# Patient Record
Sex: MALE | Race: WHITE | Employment: OTHER | URBAN - NONMETROPOLITAN AREA
[De-identification: names, ages, dates, MRNs, and addresses within clinical notes are randomized per-mention and may not be internally consistent; named-entity substitution may affect disease eponyms.]

---

## 2016-12-28 PROCEDURE — 93306 TTE W/DOPPLER COMPLETE: CPT | Mod: 26 | Performed by: INTERNAL MEDICINE

## 2017-01-01 ENCOUNTER — HISTORY (OUTPATIENT)
Dept: SURGERY | Facility: OTHER | Age: 82
End: 2017-01-01

## 2017-01-01 ENCOUNTER — AMBULATORY - GICH (OUTPATIENT)
Dept: SURGERY | Facility: OTHER | Age: 82
End: 2017-01-01

## 2017-01-01 ENCOUNTER — MEDICAL CORRESPONDENCE (OUTPATIENT)
Facility: CLINIC | Age: 82
End: 2017-01-01
Payer: MEDICARE

## 2017-01-01 ENCOUNTER — OFFICE VISIT - GICH (OUTPATIENT)
Dept: SURGERY | Facility: OTHER | Age: 82
End: 2017-01-01

## 2017-01-01 DIAGNOSIS — Z98.890 OTHER SPECIFIED POSTPROCEDURAL STATES: ICD-10-CM

## 2017-01-01 DIAGNOSIS — C44.319 BASAL CELL CARCINOMA OF SKIN OF OTHER PARTS OF FACE: ICD-10-CM

## 2017-01-04 ENCOUNTER — COMMUNICATION - GICH (OUTPATIENT)
Dept: FAMILY MEDICINE | Facility: OTHER | Age: 82
End: 2017-01-04

## 2017-01-04 ENCOUNTER — OFFICE VISIT - GICH (OUTPATIENT)
Dept: FAMILY MEDICINE | Facility: OTHER | Age: 82
End: 2017-01-04

## 2017-01-04 ENCOUNTER — HISTORY (OUTPATIENT)
Dept: FAMILY MEDICINE | Facility: OTHER | Age: 82
End: 2017-01-04

## 2017-01-04 DIAGNOSIS — I50.22 CHRONIC SYSTOLIC CONGESTIVE HEART FAILURE (H): ICD-10-CM

## 2017-01-04 DIAGNOSIS — I10 ESSENTIAL (PRIMARY) HYPERTENSION: ICD-10-CM

## 2017-01-04 DIAGNOSIS — E11.8 TYPE 2 DIABETES MELLITUS WITH COMPLICATIONS (H): ICD-10-CM

## 2017-01-04 LAB
ANION GAP - HISTORICAL: 8 (ref 5–18)
BUN SERPL-MCNC: 50 MG/DL (ref 7–25)
BUN/CREAT RATIO - HISTORICAL: 25
CALCIUM SERPL-MCNC: 9.4 MG/DL (ref 8.6–10.3)
CHLORIDE SERPLBLD-SCNC: 100 MMOL/L (ref 98–107)
CO2 SERPL-SCNC: 26 MMOL/L (ref 21–31)
CREAT SERPL-MCNC: 1.97 MG/DL (ref 0.7–1.3)
GFR IF NOT AFRICAN AMERICAN - HISTORICAL: 33 ML/MIN/1.73M2
GLUCOSE SERPL-MCNC: 267 MG/DL (ref 70–105)
POTASSIUM SERPL-SCNC: 4.1 MMOL/L (ref 3.5–5.1)
SODIUM SERPL-SCNC: 134 MMOL/L (ref 133–143)

## 2017-01-12 ENCOUNTER — COMMUNICATION - GICH (OUTPATIENT)
Dept: FAMILY MEDICINE | Facility: OTHER | Age: 82
End: 2017-01-12

## 2017-01-12 DIAGNOSIS — E11.8 TYPE 2 DIABETES MELLITUS WITH COMPLICATIONS (H): ICD-10-CM

## 2017-01-18 ENCOUNTER — OFFICE VISIT - GICH (OUTPATIENT)
Dept: FAMILY MEDICINE | Facility: OTHER | Age: 82
End: 2017-01-18

## 2017-01-18 ENCOUNTER — HISTORY (OUTPATIENT)
Dept: FAMILY MEDICINE | Facility: OTHER | Age: 82
End: 2017-01-18

## 2017-01-18 ENCOUNTER — ANTICOAGULATION - GICH (OUTPATIENT)
Dept: INTERNAL MEDICINE | Facility: OTHER | Age: 82
End: 2017-01-18

## 2017-01-18 DIAGNOSIS — Z79.01 LONG TERM CURRENT USE OF ANTICOAGULANT: ICD-10-CM

## 2017-01-18 DIAGNOSIS — I10 ESSENTIAL (PRIMARY) HYPERTENSION: ICD-10-CM

## 2017-01-18 DIAGNOSIS — I48.91 ATRIAL FIBRILLATION (H): ICD-10-CM

## 2017-01-18 DIAGNOSIS — I50.22 CHRONIC SYSTOLIC CONGESTIVE HEART FAILURE (H): ICD-10-CM

## 2017-01-18 DIAGNOSIS — E11.8 TYPE 2 DIABETES MELLITUS WITH COMPLICATIONS (H): ICD-10-CM

## 2017-01-18 DIAGNOSIS — N18.30 CHRONIC KIDNEY DISEASE, STAGE III (MODERATE) (H): ICD-10-CM

## 2017-01-18 LAB — INR - HISTORICAL: 2.2

## 2017-02-06 ENCOUNTER — AMBULATORY - GICH (OUTPATIENT)
Dept: ORTHOPEDICS | Facility: OTHER | Age: 82
End: 2017-02-06

## 2017-02-06 DIAGNOSIS — M25.562 PAIN IN LEFT KNEE: ICD-10-CM

## 2017-02-07 ENCOUNTER — HOSPITAL ENCOUNTER (OUTPATIENT)
Dept: RADIOLOGY | Facility: OTHER | Age: 82
End: 2017-02-07
Attending: ORTHOPAEDIC SURGERY

## 2017-02-07 ENCOUNTER — HISTORY (OUTPATIENT)
Dept: ORTHOPEDICS | Facility: OTHER | Age: 82
End: 2017-02-07

## 2017-02-07 ENCOUNTER — OFFICE VISIT - GICH (OUTPATIENT)
Dept: ORTHOPEDICS | Facility: OTHER | Age: 82
End: 2017-02-07

## 2017-02-07 DIAGNOSIS — M25.562 PAIN IN LEFT KNEE: ICD-10-CM

## 2017-02-07 DIAGNOSIS — M62.9 DISORDER OF MUSCLE: ICD-10-CM

## 2017-02-08 ENCOUNTER — OFFICE VISIT - GICH (OUTPATIENT)
Dept: FAMILY MEDICINE | Facility: OTHER | Age: 82
End: 2017-02-08

## 2017-02-08 ENCOUNTER — HISTORY (OUTPATIENT)
Dept: FAMILY MEDICINE | Facility: OTHER | Age: 82
End: 2017-02-08

## 2017-02-08 DIAGNOSIS — Z86.73 PERSONAL HISTORY OF TRANSIENT ISCHEMIC ATTACK (TIA), AND CEREBRAL INFARCTION WITHOUT RESIDUAL DEFICITS: ICD-10-CM

## 2017-02-08 DIAGNOSIS — Z79.4 LONG TERM CURRENT USE OF INSULIN (H): ICD-10-CM

## 2017-02-08 DIAGNOSIS — E11.22 TYPE 2 DIABETES MELLITUS WITH DIABETIC CHRONIC KIDNEY DISEASE (H): ICD-10-CM

## 2017-02-08 DIAGNOSIS — I50.22 CHRONIC SYSTOLIC CONGESTIVE HEART FAILURE (H): ICD-10-CM

## 2017-02-08 DIAGNOSIS — N18.30 CHRONIC KIDNEY DISEASE, STAGE III (MODERATE) (H): ICD-10-CM

## 2017-02-08 DIAGNOSIS — E11.8 TYPE 2 DIABETES MELLITUS WITH COMPLICATIONS (H): ICD-10-CM

## 2017-02-08 LAB
ANION GAP - HISTORICAL: 10 (ref 5–18)
BUN SERPL-MCNC: 55 MG/DL (ref 7–25)
BUN/CREAT RATIO - HISTORICAL: 31
CALCIUM SERPL-MCNC: 9.6 MG/DL (ref 8.6–10.3)
CHLORIDE SERPLBLD-SCNC: 101 MMOL/L (ref 98–107)
CO2 SERPL-SCNC: 25 MMOL/L (ref 21–31)
CREAT SERPL-MCNC: 1.77 MG/DL (ref 0.7–1.3)
GFR IF NOT AFRICAN AMERICAN - HISTORICAL: 37 ML/MIN/1.73M2
GLUCOSE SERPL-MCNC: 192 MG/DL (ref 70–105)
POTASSIUM SERPL-SCNC: 4.2 MMOL/L (ref 3.5–5.1)
SODIUM SERPL-SCNC: 136 MMOL/L (ref 133–143)

## 2017-02-15 ENCOUNTER — ANTICOAGULATION - GICH (OUTPATIENT)
Dept: INTERNAL MEDICINE | Facility: OTHER | Age: 82
End: 2017-02-15

## 2017-02-15 ENCOUNTER — HOSPITAL ENCOUNTER (OUTPATIENT)
Dept: PHYSICAL THERAPY | Facility: OTHER | Age: 82
Setting detail: THERAPIES SERIES
End: 2017-02-15
Attending: ORTHOPAEDIC SURGERY

## 2017-02-15 DIAGNOSIS — I48.91 ATRIAL FIBRILLATION (H): ICD-10-CM

## 2017-02-15 DIAGNOSIS — M62.9 DISORDER OF MUSCLE: ICD-10-CM

## 2017-02-15 DIAGNOSIS — Z79.01 LONG TERM CURRENT USE OF ANTICOAGULANT: ICD-10-CM

## 2017-02-15 LAB — INR - HISTORICAL: 1.5

## 2017-02-16 ENCOUNTER — COMMUNICATION - GICH (OUTPATIENT)
Dept: FAMILY MEDICINE | Facility: OTHER | Age: 82
End: 2017-02-16

## 2017-02-16 DIAGNOSIS — E78.2 MIXED HYPERLIPIDEMIA: ICD-10-CM

## 2017-02-16 DIAGNOSIS — E11.8 TYPE 2 DIABETES MELLITUS WITH COMPLICATIONS (H): ICD-10-CM

## 2017-02-28 ENCOUNTER — HOSPITAL ENCOUNTER (OUTPATIENT)
Dept: PHYSICAL THERAPY | Facility: OTHER | Age: 82
Setting detail: THERAPIES SERIES
End: 2017-02-28
Attending: ORTHOPAEDIC SURGERY

## 2017-03-01 ENCOUNTER — ANTICOAGULATION - GICH (OUTPATIENT)
Dept: INTERNAL MEDICINE | Facility: OTHER | Age: 82
End: 2017-03-01

## 2017-03-01 ENCOUNTER — OFFICE VISIT - GICH (OUTPATIENT)
Dept: FAMILY MEDICINE | Facility: OTHER | Age: 82
End: 2017-03-01

## 2017-03-01 ENCOUNTER — HISTORY (OUTPATIENT)
Dept: FAMILY MEDICINE | Facility: OTHER | Age: 82
End: 2017-03-01

## 2017-03-01 DIAGNOSIS — N18.30 CHRONIC KIDNEY DISEASE, STAGE III (MODERATE) (H): ICD-10-CM

## 2017-03-01 DIAGNOSIS — I10 ESSENTIAL (PRIMARY) HYPERTENSION: ICD-10-CM

## 2017-03-01 DIAGNOSIS — I48.19 PERSISTENT ATRIAL FIBRILLATION (H): ICD-10-CM

## 2017-03-01 DIAGNOSIS — E11.22 TYPE 2 DIABETES MELLITUS WITH DIABETIC CHRONIC KIDNEY DISEASE (H): ICD-10-CM

## 2017-03-01 DIAGNOSIS — Z79.4 LONG TERM CURRENT USE OF INSULIN (H): ICD-10-CM

## 2017-03-01 DIAGNOSIS — I50.22 CHRONIC SYSTOLIC CONGESTIVE HEART FAILURE (H): ICD-10-CM

## 2017-03-01 DIAGNOSIS — Z79.01 LONG TERM CURRENT USE OF ANTICOAGULANT: ICD-10-CM

## 2017-03-01 LAB
ANION GAP - HISTORICAL: 10 (ref 5–18)
BUN SERPL-MCNC: 80 MG/DL (ref 7–25)
BUN/CREAT RATIO - HISTORICAL: 35
CALCIUM SERPL-MCNC: 9.6 MG/DL (ref 8.6–10.3)
CHLORIDE SERPLBLD-SCNC: 105 MMOL/L (ref 98–107)
CO2 SERPL-SCNC: 20 MMOL/L (ref 21–31)
CREAT SERPL-MCNC: 2.27 MG/DL (ref 0.7–1.3)
GFR IF NOT AFRICAN AMERICAN - HISTORICAL: 28 ML/MIN/1.73M2
GLUCOSE SERPL-MCNC: 112 MG/DL (ref 70–105)
INR - HISTORICAL: 1.6
POTASSIUM SERPL-SCNC: 4.6 MMOL/L (ref 3.5–5.1)
SODIUM SERPL-SCNC: 135 MMOL/L (ref 133–143)

## 2017-03-02 ENCOUNTER — HOSPITAL ENCOUNTER (OUTPATIENT)
Dept: PHYSICAL THERAPY | Facility: OTHER | Age: 82
Setting detail: THERAPIES SERIES
End: 2017-03-02
Attending: ORTHOPAEDIC SURGERY

## 2017-03-07 ENCOUNTER — HOSPITAL ENCOUNTER (OUTPATIENT)
Dept: PHYSICAL THERAPY | Facility: OTHER | Age: 82
Setting detail: THERAPIES SERIES
End: 2017-03-07
Attending: ORTHOPAEDIC SURGERY

## 2017-03-09 ENCOUNTER — HOSPITAL ENCOUNTER (OUTPATIENT)
Dept: PHYSICAL THERAPY | Facility: OTHER | Age: 82
Setting detail: THERAPIES SERIES
End: 2017-03-09
Attending: ORTHOPAEDIC SURGERY

## 2017-03-15 ENCOUNTER — HOSPITAL ENCOUNTER (OUTPATIENT)
Dept: PHYSICAL THERAPY | Facility: OTHER | Age: 82
Setting detail: THERAPIES SERIES
End: 2017-03-15
Attending: ORTHOPAEDIC SURGERY

## 2017-03-15 ENCOUNTER — ANTICOAGULATION - GICH (OUTPATIENT)
Dept: INTERNAL MEDICINE | Facility: OTHER | Age: 82
End: 2017-03-15

## 2017-03-15 DIAGNOSIS — Z79.01 LONG TERM CURRENT USE OF ANTICOAGULANT: ICD-10-CM

## 2017-03-15 DIAGNOSIS — I48.91 ATRIAL FIBRILLATION (H): ICD-10-CM

## 2017-03-15 LAB — INR - HISTORICAL: 2.8

## 2017-03-22 ENCOUNTER — HOSPITAL ENCOUNTER (OUTPATIENT)
Dept: PHYSICAL THERAPY | Facility: OTHER | Age: 82
Setting detail: THERAPIES SERIES
End: 2017-03-22
Attending: ORTHOPAEDIC SURGERY

## 2017-03-24 ENCOUNTER — HOSPITAL ENCOUNTER (OUTPATIENT)
Dept: PHYSICAL THERAPY | Facility: OTHER | Age: 82
Setting detail: THERAPIES SERIES
End: 2017-03-24
Attending: ORTHOPAEDIC SURGERY

## 2017-03-28 ENCOUNTER — HOSPITAL ENCOUNTER (OUTPATIENT)
Dept: PHYSICAL THERAPY | Facility: OTHER | Age: 82
Setting detail: THERAPIES SERIES
End: 2017-03-28
Attending: ORTHOPAEDIC SURGERY

## 2017-03-30 ENCOUNTER — ANTICOAGULATION - GICH (OUTPATIENT)
Dept: INTERNAL MEDICINE | Facility: OTHER | Age: 82
End: 2017-03-30

## 2017-03-30 ENCOUNTER — HOSPITAL ENCOUNTER (OUTPATIENT)
Dept: PHYSICAL THERAPY | Facility: OTHER | Age: 82
Setting detail: THERAPIES SERIES
End: 2017-03-30
Attending: ORTHOPAEDIC SURGERY

## 2017-03-30 DIAGNOSIS — Z79.01 LONG TERM CURRENT USE OF ANTICOAGULANT: ICD-10-CM

## 2017-03-30 LAB — INR - HISTORICAL: 3.1

## 2017-04-05 ENCOUNTER — COMMUNICATION - GICH (OUTPATIENT)
Dept: FAMILY MEDICINE | Facility: OTHER | Age: 82
End: 2017-04-05

## 2017-04-05 ENCOUNTER — OFFICE VISIT - GICH (OUTPATIENT)
Dept: FAMILY MEDICINE | Facility: OTHER | Age: 82
End: 2017-04-05

## 2017-04-05 ENCOUNTER — HOSPITAL ENCOUNTER (OUTPATIENT)
Dept: PHYSICAL THERAPY | Facility: OTHER | Age: 82
Setting detail: THERAPIES SERIES
End: 2017-04-05
Attending: ORTHOPAEDIC SURGERY

## 2017-04-05 DIAGNOSIS — I50.22 CHRONIC SYSTOLIC CONGESTIVE HEART FAILURE (H): ICD-10-CM

## 2017-04-05 DIAGNOSIS — E11.8 TYPE 2 DIABETES MELLITUS WITH COMPLICATIONS (H): ICD-10-CM

## 2017-04-05 DIAGNOSIS — I25.5 ISCHEMIC CARDIOMYOPATHY: ICD-10-CM

## 2017-04-05 DIAGNOSIS — I10 ESSENTIAL (PRIMARY) HYPERTENSION: ICD-10-CM

## 2017-04-05 LAB
ANION GAP - HISTORICAL: 6 (ref 5–18)
BUN SERPL-MCNC: 69 MG/DL (ref 7–25)
BUN/CREAT RATIO - HISTORICAL: 31
CALCIUM SERPL-MCNC: 9.2 MG/DL (ref 8.6–10.3)
CHLORIDE SERPLBLD-SCNC: 107 MMOL/L (ref 98–107)
CO2 SERPL-SCNC: 22 MMOL/L (ref 21–31)
CREAT SERPL-MCNC: 2.2 MG/DL (ref 0.7–1.3)
ERYTHROCYTE [DISTWIDTH] IN BLOOD BY AUTOMATED COUNT: 17.5 % (ref 11.5–15.5)
ESTIMATED AVERAGE GLUCOSE: 169 MG/DL
GFR IF NOT AFRICAN AMERICAN - HISTORICAL: 29 ML/MIN/1.73M2
GLUCOSE SERPL-MCNC: 111 MG/DL (ref 70–105)
HCT VFR BLD AUTO: 45.5 % (ref 37–53)
HEMOGLOBIN A1C MONITORING (POCT) - HISTORICAL: 7.5 % (ref 4–6.2)
HEMOGLOBIN: 14.2 G/DL (ref 13.5–17.5)
MCH RBC QN AUTO: 31.7 PG (ref 26–34)
MCHC RBC AUTO-ENTMCNC: 31.1 G/DL (ref 32–36)
MCV RBC AUTO: 102 FL (ref 80–100)
PLATELET # BLD AUTO: 152 THOU/CU MM (ref 140–440)
PMV BLD: 8 FL (ref 6.5–11)
POTASSIUM SERPL-SCNC: 4.9 MMOL/L (ref 3.5–5.1)
RED BLOOD COUNT - HISTORICAL: 4.46 MIL/CU MM (ref 4.3–5.9)
SODIUM SERPL-SCNC: 135 MMOL/L (ref 133–143)
WHITE BLOOD COUNT - HISTORICAL: 7.8 THOU/CU MM (ref 4.5–11)

## 2017-04-07 ENCOUNTER — HOSPITAL ENCOUNTER (OUTPATIENT)
Dept: PHYSICAL THERAPY | Facility: OTHER | Age: 82
Setting detail: THERAPIES SERIES
End: 2017-04-07
Attending: ORTHOPAEDIC SURGERY

## 2017-04-10 ENCOUNTER — HOSPITAL ENCOUNTER (OUTPATIENT)
Dept: PHYSICAL THERAPY | Facility: OTHER | Age: 82
Setting detail: THERAPIES SERIES
End: 2017-04-10
Attending: ORTHOPAEDIC SURGERY

## 2017-04-12 ENCOUNTER — HOSPITAL ENCOUNTER (OUTPATIENT)
Dept: PHYSICAL THERAPY | Facility: OTHER | Age: 82
Setting detail: THERAPIES SERIES
End: 2017-04-12
Attending: ORTHOPAEDIC SURGERY

## 2017-04-19 ENCOUNTER — ANTICOAGULATION - GICH (OUTPATIENT)
Dept: INTERNAL MEDICINE | Facility: OTHER | Age: 82
End: 2017-04-19

## 2017-04-19 DIAGNOSIS — Z79.01 LONG TERM CURRENT USE OF ANTICOAGULANT: ICD-10-CM

## 2017-04-19 DIAGNOSIS — I48.19 PERSISTENT ATRIAL FIBRILLATION (H): ICD-10-CM

## 2017-04-19 DIAGNOSIS — I48.91 ATRIAL FIBRILLATION (H): ICD-10-CM

## 2017-04-19 LAB — INR - HISTORICAL: 3.6

## 2017-05-11 ENCOUNTER — COMMUNICATION - GICH (OUTPATIENT)
Dept: FAMILY MEDICINE | Facility: OTHER | Age: 82
End: 2017-05-11

## 2017-05-17 ENCOUNTER — ANTICOAGULATION - GICH (OUTPATIENT)
Dept: INTERNAL MEDICINE | Facility: OTHER | Age: 82
End: 2017-05-17

## 2017-05-17 DIAGNOSIS — I48.91 ATRIAL FIBRILLATION (H): ICD-10-CM

## 2017-05-17 DIAGNOSIS — Z79.01 LONG TERM CURRENT USE OF ANTICOAGULANT: ICD-10-CM

## 2017-05-17 LAB — INR - HISTORICAL: 2.1

## 2017-05-26 ENCOUNTER — COMMUNICATION - GICH (OUTPATIENT)
Dept: FAMILY MEDICINE | Facility: OTHER | Age: 82
End: 2017-05-26

## 2017-05-26 DIAGNOSIS — E78.2 MIXED HYPERLIPIDEMIA: ICD-10-CM

## 2017-06-06 ENCOUNTER — COMMUNICATION - GICH (OUTPATIENT)
Dept: FAMILY MEDICINE | Facility: OTHER | Age: 82
End: 2017-06-06

## 2017-06-06 DIAGNOSIS — E11.8 TYPE 2 DIABETES MELLITUS WITH COMPLICATIONS (H): ICD-10-CM

## 2017-06-06 DIAGNOSIS — M1A.3720 CHRONIC GOUT DUE TO RENAL IMPAIRMENT, LEFT ANKLE AND FOOT, WITHOUT TOPHUS (TOPHI): ICD-10-CM

## 2017-06-06 DIAGNOSIS — I10 ESSENTIAL (PRIMARY) HYPERTENSION: ICD-10-CM

## 2017-06-08 ENCOUNTER — AMBULATORY - GICH (OUTPATIENT)
Dept: LAB | Facility: OTHER | Age: 82
End: 2017-06-08

## 2017-06-08 DIAGNOSIS — M1A.3720 CHRONIC GOUT DUE TO RENAL IMPAIRMENT, LEFT ANKLE AND FOOT, WITHOUT TOPHUS (TOPHI): ICD-10-CM

## 2017-06-08 DIAGNOSIS — I10 ESSENTIAL (PRIMARY) HYPERTENSION: ICD-10-CM

## 2017-06-08 LAB
ANION GAP - HISTORICAL: 6 (ref 5–18)
BUN SERPL-MCNC: 72 MG/DL (ref 7–25)
BUN/CREAT RATIO - HISTORICAL: 33
CALCIUM SERPL-MCNC: 9.2 MG/DL (ref 8.6–10.3)
CHLORIDE SERPLBLD-SCNC: 112 MMOL/L (ref 98–107)
CO2 SERPL-SCNC: 18 MMOL/L (ref 21–31)
CREAT SERPL-MCNC: 2.21 MG/DL (ref 0.7–1.3)
GFR IF NOT AFRICAN AMERICAN - HISTORICAL: 29 ML/MIN/1.73M2
GLUCOSE SERPL-MCNC: 121 MG/DL (ref 70–105)
POTASSIUM SERPL-SCNC: 4.1 MMOL/L (ref 3.5–5.1)
SODIUM SERPL-SCNC: 136 MMOL/L (ref 133–143)
URATE SERPL-MCNC: 6.1 MG/DL (ref 4.4–7.6)

## 2017-06-14 ENCOUNTER — ANTICOAGULATION - GICH (OUTPATIENT)
Dept: INTERNAL MEDICINE | Facility: OTHER | Age: 82
End: 2017-06-14

## 2017-06-14 ENCOUNTER — HISTORY (OUTPATIENT)
Dept: FAMILY MEDICINE | Facility: OTHER | Age: 82
End: 2017-06-14

## 2017-06-14 ENCOUNTER — OFFICE VISIT - GICH (OUTPATIENT)
Dept: FAMILY MEDICINE | Facility: OTHER | Age: 82
End: 2017-06-14

## 2017-06-14 DIAGNOSIS — I50.22 CHRONIC SYSTOLIC CONGESTIVE HEART FAILURE (H): ICD-10-CM

## 2017-06-14 DIAGNOSIS — I48.19 PERSISTENT ATRIAL FIBRILLATION (H): ICD-10-CM

## 2017-06-14 DIAGNOSIS — E11.8 TYPE 2 DIABETES MELLITUS WITH COMPLICATIONS (H): ICD-10-CM

## 2017-06-14 DIAGNOSIS — Z79.01 LONG TERM CURRENT USE OF ANTICOAGULANT: ICD-10-CM

## 2017-06-14 DIAGNOSIS — M1A.3720 CHRONIC GOUT DUE TO RENAL IMPAIRMENT, LEFT ANKLE AND FOOT, WITHOUT TOPHUS (TOPHI): ICD-10-CM

## 2017-06-14 DIAGNOSIS — I48.91 ATRIAL FIBRILLATION (H): ICD-10-CM

## 2017-06-14 DIAGNOSIS — L98.9 DISORDER OF SKIN OR SUBCUTANEOUS TISSUE: ICD-10-CM

## 2017-06-14 DIAGNOSIS — E78.2 MIXED HYPERLIPIDEMIA: ICD-10-CM

## 2017-06-14 LAB — INR - HISTORICAL: 1.9

## 2017-06-20 ENCOUNTER — OFFICE VISIT - GICH (OUTPATIENT)
Dept: FAMILY MEDICINE | Facility: OTHER | Age: 82
End: 2017-06-20

## 2017-06-20 ENCOUNTER — HISTORY (OUTPATIENT)
Dept: FAMILY MEDICINE | Facility: OTHER | Age: 82
End: 2017-06-20

## 2017-06-20 DIAGNOSIS — E11.8 TYPE 2 DIABETES MELLITUS WITH COMPLICATIONS (H): ICD-10-CM

## 2017-06-20 DIAGNOSIS — E16.2 HYPOGLYCEMIA: ICD-10-CM

## 2017-07-03 ENCOUNTER — COMMUNICATION - GICH (OUTPATIENT)
Dept: FAMILY MEDICINE | Facility: OTHER | Age: 82
End: 2017-07-03

## 2017-07-03 DIAGNOSIS — I48.19 PERSISTENT ATRIAL FIBRILLATION (H): ICD-10-CM

## 2017-07-03 DIAGNOSIS — Z79.01 LONG TERM CURRENT USE OF ANTICOAGULANT: ICD-10-CM

## 2017-07-12 ENCOUNTER — ANTICOAGULATION - GICH (OUTPATIENT)
Dept: INTERNAL MEDICINE | Facility: OTHER | Age: 82
End: 2017-07-12

## 2017-07-12 DIAGNOSIS — I48.91 ATRIAL FIBRILLATION (H): ICD-10-CM

## 2017-07-12 DIAGNOSIS — Z79.01 LONG TERM CURRENT USE OF ANTICOAGULANT: ICD-10-CM

## 2017-07-12 LAB — INR - HISTORICAL: 2.3

## 2017-08-09 ENCOUNTER — ANTICOAGULATION - GICH (OUTPATIENT)
Dept: INTERNAL MEDICINE | Facility: OTHER | Age: 82
End: 2017-08-09

## 2017-08-09 DIAGNOSIS — Z79.01 LONG TERM CURRENT USE OF ANTICOAGULANT: ICD-10-CM

## 2017-08-09 DIAGNOSIS — I48.91 ATRIAL FIBRILLATION (H): ICD-10-CM

## 2017-08-09 LAB — INR - HISTORICAL: 3.3

## 2017-09-06 ENCOUNTER — ANTICOAGULATION - GICH (OUTPATIENT)
Dept: INTERNAL MEDICINE | Facility: OTHER | Age: 82
End: 2017-09-06

## 2017-09-06 DIAGNOSIS — I48.91 ATRIAL FIBRILLATION (H): ICD-10-CM

## 2017-09-06 DIAGNOSIS — Z79.01 LONG TERM CURRENT USE OF ANTICOAGULANT: ICD-10-CM

## 2017-09-06 LAB — INR - HISTORICAL: 4.6

## 2017-09-13 ENCOUNTER — ANTICOAGULATION - GICH (OUTPATIENT)
Dept: INTERNAL MEDICINE | Facility: OTHER | Age: 82
End: 2017-09-13

## 2017-09-13 DIAGNOSIS — I48.19 PERSISTENT ATRIAL FIBRILLATION (H): ICD-10-CM

## 2017-09-13 DIAGNOSIS — Z79.01 LONG TERM CURRENT USE OF ANTICOAGULANT: ICD-10-CM

## 2017-09-13 DIAGNOSIS — I48.91 ATRIAL FIBRILLATION (H): ICD-10-CM

## 2017-09-13 LAB — INR - HISTORICAL: 3.1

## 2017-09-27 ENCOUNTER — ANTICOAGULATION - GICH (OUTPATIENT)
Dept: INTERNAL MEDICINE | Facility: OTHER | Age: 82
End: 2017-09-27

## 2017-09-27 ENCOUNTER — COMMUNICATION - GICH (OUTPATIENT)
Dept: UROLOGY | Facility: OTHER | Age: 82
End: 2017-09-27

## 2017-09-27 DIAGNOSIS — C61 MALIGNANT NEOPLASM OF PROSTATE (H): ICD-10-CM

## 2017-09-27 DIAGNOSIS — Z79.01 LONG TERM CURRENT USE OF ANTICOAGULANT: ICD-10-CM

## 2017-09-27 DIAGNOSIS — I48.91 ATRIAL FIBRILLATION (H): ICD-10-CM

## 2017-09-27 LAB — INR - HISTORICAL: 2.6

## 2017-10-05 ENCOUNTER — COMMUNICATION - GICH (OUTPATIENT)
Dept: FAMILY MEDICINE | Facility: OTHER | Age: 82
End: 2017-10-05

## 2017-10-05 DIAGNOSIS — I48.19 PERSISTENT ATRIAL FIBRILLATION (H): ICD-10-CM

## 2017-10-05 DIAGNOSIS — Z79.01 LONG TERM CURRENT USE OF ANTICOAGULANT: ICD-10-CM

## 2017-10-06 ENCOUNTER — OFFICE VISIT - GICH (OUTPATIENT)
Dept: FAMILY MEDICINE | Facility: OTHER | Age: 82
End: 2017-10-06

## 2017-10-06 ENCOUNTER — HISTORY (OUTPATIENT)
Dept: FAMILY MEDICINE | Facility: OTHER | Age: 82
End: 2017-10-06

## 2017-10-06 DIAGNOSIS — I50.22 CHRONIC SYSTOLIC CONGESTIVE HEART FAILURE (H): ICD-10-CM

## 2017-10-06 DIAGNOSIS — Z23 ENCOUNTER FOR IMMUNIZATION: ICD-10-CM

## 2017-10-06 DIAGNOSIS — I48.19 PERSISTENT ATRIAL FIBRILLATION (H): ICD-10-CM

## 2017-10-06 DIAGNOSIS — E11.8 TYPE 2 DIABETES MELLITUS WITH COMPLICATIONS (H): ICD-10-CM

## 2017-10-06 DIAGNOSIS — C44.329 SQUAMOUS CELL CARCINOMA OF SKIN OF OTHER PARTS OF FACE: ICD-10-CM

## 2017-10-06 DIAGNOSIS — C61 MALIGNANT NEOPLASM OF PROSTATE (H): ICD-10-CM

## 2017-10-06 LAB
ANION GAP - HISTORICAL: 10 (ref 5–18)
BUN SERPL-MCNC: 77 MG/DL (ref 7–25)
BUN/CREAT RATIO - HISTORICAL: 34
CALCIUM SERPL-MCNC: 9.6 MG/DL (ref 8.6–10.3)
CHLORIDE SERPLBLD-SCNC: 106 MMOL/L (ref 98–107)
CHOL/HDL RATIO - HISTORICAL: 2.58
CHOLESTEROL TOTAL: 93 MG/DL
CO2 SERPL-SCNC: 17 MMOL/L (ref 21–31)
CREAT SERPL-MCNC: 2.29 MG/DL (ref 0.7–1.3)
ERYTHROCYTE [DISTWIDTH] IN BLOOD BY AUTOMATED COUNT: 15.1 % (ref 11.5–15.5)
ESTIMATED AVERAGE GLUCOSE: 143 MG/DL
GFR IF NOT AFRICAN AMERICAN - HISTORICAL: 27 ML/MIN/1.73M2
GLUCOSE SERPL-MCNC: 135 MG/DL (ref 70–105)
HCT VFR BLD AUTO: 40.1 % (ref 37–53)
HDLC SERPL-MCNC: 36 MG/DL (ref 23–92)
HEMOGLOBIN A1C MONITORING (POCT) - HISTORICAL: 6.6 % (ref 4–6.2)
HEMOGLOBIN: 13.3 G/DL (ref 13.5–17.5)
LDLC SERPL CALC-MCNC: 31 MG/DL
MCH RBC QN AUTO: 32.9 PG (ref 26–34)
MCHC RBC AUTO-ENTMCNC: 33.2 G/DL (ref 32–36)
MCV RBC AUTO: 99 FL (ref 80–100)
NON-HDL CHOLESTEROL - HISTORICAL: 57 MG/DL
PLATELET # BLD AUTO: 143 THOU/CU MM (ref 140–440)
PMV BLD: 10.8 FL (ref 6.5–11)
POTASSIUM SERPL-SCNC: 4.9 MMOL/L (ref 3.5–5.1)
PROVIDER ORDERDED STATUS - HISTORICAL: NORMAL
PSA TOTAL (DIAGNOSTIC) - HISTORICAL: 1.59 NG/ML
RED BLOOD COUNT - HISTORICAL: 4.04 MIL/CU MM (ref 4.3–5.9)
SODIUM SERPL-SCNC: 133 MMOL/L (ref 133–143)
TRIGL SERPL-MCNC: 132 MG/DL
WHITE BLOOD COUNT - HISTORICAL: 7.4 THOU/CU MM (ref 4.5–11)

## 2017-10-10 ENCOUNTER — AMBULATORY - GICH (OUTPATIENT)
Dept: LAB | Facility: OTHER | Age: 82
End: 2017-10-10

## 2017-10-10 DIAGNOSIS — E11.8 TYPE 2 DIABETES MELLITUS WITH COMPLICATIONS (H): ICD-10-CM

## 2017-10-10 LAB
ALB RAND URINE - HISTORICAL: 20.3 MG/L
CREATININE, URINE - HISTORICAL: 0.28 G/L
MICROALBUMIN, RAND UR - HISTORICAL: 72.5 MG/G CREAT

## 2017-10-12 ENCOUNTER — HISTORY (OUTPATIENT)
Dept: UROLOGY | Facility: OTHER | Age: 82
End: 2017-10-12

## 2017-10-12 ENCOUNTER — AMBULATORY - GICH (OUTPATIENT)
Dept: UROLOGY | Facility: OTHER | Age: 82
End: 2017-10-12

## 2017-10-12 DIAGNOSIS — N35.919 URETHRAL STRICTURE: ICD-10-CM

## 2017-10-12 DIAGNOSIS — Z87.448 PERSONAL HISTORY OF OTHER DISEASES OF URINARY SYSTEM: ICD-10-CM

## 2017-10-12 DIAGNOSIS — C61 MALIGNANT NEOPLASM OF PROSTATE (H): ICD-10-CM

## 2017-10-18 ENCOUNTER — ANTICOAGULATION - GICH (OUTPATIENT)
Dept: INTERNAL MEDICINE | Facility: OTHER | Age: 82
End: 2017-10-18

## 2017-10-18 DIAGNOSIS — Z79.01 LONG TERM CURRENT USE OF ANTICOAGULANT: ICD-10-CM

## 2017-10-18 DIAGNOSIS — I48.91 ATRIAL FIBRILLATION (H): ICD-10-CM

## 2017-10-18 DIAGNOSIS — I48.19 PERSISTENT ATRIAL FIBRILLATION (H): ICD-10-CM

## 2017-10-18 LAB — INR - HISTORICAL: 3.2

## 2017-10-20 ENCOUNTER — TRANSFERRED RECORDS (OUTPATIENT)
Dept: HEALTH INFORMATION MANAGEMENT | Facility: CLINIC | Age: 82
End: 2017-10-20

## 2017-10-20 ENCOUNTER — MEDICAL CORRESPONDENCE (OUTPATIENT)
Dept: CARDIOLOGY | Facility: CLINIC | Age: 82
End: 2017-10-20
Payer: MEDICARE

## 2017-10-20 ENCOUNTER — OFFICE VISIT - GICH (OUTPATIENT)
Dept: CARDIOLOGY | Facility: OTHER | Age: 82
End: 2017-10-20

## 2017-10-20 ENCOUNTER — HISTORY (OUTPATIENT)
Dept: CARDIOLOGY | Facility: OTHER | Age: 82
End: 2017-10-20

## 2017-10-20 DIAGNOSIS — I25.5 ISCHEMIC CARDIOMYOPATHY: ICD-10-CM

## 2017-10-20 DIAGNOSIS — Z95.1 PRESENCE OF AORTOCORONARY BYPASS GRAFT: ICD-10-CM

## 2017-10-20 DIAGNOSIS — N18.30 CHRONIC KIDNEY DISEASE, STAGE III (MODERATE) (H): ICD-10-CM

## 2017-10-20 DIAGNOSIS — Z79.4 LONG TERM CURRENT USE OF INSULIN (H): ICD-10-CM

## 2017-10-20 DIAGNOSIS — I48.19 PERSISTENT ATRIAL FIBRILLATION (H): ICD-10-CM

## 2017-10-20 DIAGNOSIS — E78.2 MIXED HYPERLIPIDEMIA: ICD-10-CM

## 2017-10-20 DIAGNOSIS — E87.6 HYPOKALEMIA: ICD-10-CM

## 2017-10-20 DIAGNOSIS — I50.22 CHRONIC SYSTOLIC CONGESTIVE HEART FAILURE (H): ICD-10-CM

## 2017-10-20 DIAGNOSIS — E11.22 TYPE 2 DIABETES MELLITUS WITH DIABETIC CHRONIC KIDNEY DISEASE (H): ICD-10-CM

## 2017-10-20 DIAGNOSIS — Z95.810 PRESENCE OF AUTOMATIC IMPLANTABLE CARDIOVERTER-DEFIBRILLATOR: ICD-10-CM

## 2017-10-20 DIAGNOSIS — Z86.73 PERSONAL HISTORY OF TRANSIENT ISCHEMIC ATTACK (TIA), AND CEREBRAL INFARCTION WITHOUT RESIDUAL DEFICITS: ICD-10-CM

## 2017-10-20 PROCEDURE — 99204 OFFICE O/P NEW MOD 45 MIN: CPT | Mod: ZP | Performed by: NURSE PRACTITIONER

## 2017-10-23 ENCOUNTER — AMBULATORY - GICH (OUTPATIENT)
Dept: SURGERY | Facility: OTHER | Age: 82
End: 2017-10-23

## 2017-10-23 ENCOUNTER — HISTORY (OUTPATIENT)
Dept: SURGERY | Facility: OTHER | Age: 82
End: 2017-10-23

## 2017-10-23 DIAGNOSIS — C44.329 SQUAMOUS CELL CARCINOMA OF SKIN OF OTHER PARTS OF FACE: ICD-10-CM

## 2017-10-23 DIAGNOSIS — L98.9 DISORDER OF SKIN OR SUBCUTANEOUS TISSUE: ICD-10-CM

## 2017-10-26 ENCOUNTER — AMBULATORY - GICH (OUTPATIENT)
Dept: SURGERY | Facility: OTHER | Age: 82
End: 2017-10-26

## 2017-10-26 ENCOUNTER — COMMUNICATION - GICH (OUTPATIENT)
Dept: SURGERY | Facility: OTHER | Age: 82
End: 2017-10-26

## 2017-11-01 ENCOUNTER — HOSPITAL ENCOUNTER (OUTPATIENT)
Dept: RADIOLOGY | Facility: OTHER | Age: 82
End: 2017-11-01
Attending: NURSE PRACTITIONER

## 2017-11-01 ENCOUNTER — TRANSFERRED RECORDS (OUTPATIENT)
Dept: HEALTH INFORMATION MANAGEMENT | Facility: CLINIC | Age: 82
End: 2017-11-01

## 2017-11-01 ENCOUNTER — MEDICAL CORRESPONDENCE (OUTPATIENT)
Facility: CLINIC | Age: 82
End: 2017-11-01
Payer: MEDICARE

## 2017-11-01 ENCOUNTER — OFFICE VISIT - GICH (OUTPATIENT)
Dept: SURGERY | Facility: OTHER | Age: 82
End: 2017-11-01

## 2017-11-01 ENCOUNTER — HISTORY (OUTPATIENT)
Dept: SURGERY | Facility: OTHER | Age: 82
End: 2017-11-01

## 2017-11-01 DIAGNOSIS — I48.19 PERSISTENT ATRIAL FIBRILLATION (H): ICD-10-CM

## 2017-11-01 DIAGNOSIS — C44.319 BASAL CELL CARCINOMA OF SKIN OF OTHER PARTS OF FACE: ICD-10-CM

## 2017-11-01 DIAGNOSIS — Z98.890 OTHER SPECIFIED POSTPROCEDURAL STATES: ICD-10-CM

## 2017-11-01 DIAGNOSIS — I50.22 CHRONIC SYSTOLIC CONGESTIVE HEART FAILURE (H): ICD-10-CM

## 2017-11-01 DIAGNOSIS — I25.5 ISCHEMIC CARDIOMYOPATHY: ICD-10-CM

## 2017-11-01 PROCEDURE — 93306 TTE W/DOPPLER COMPLETE: CPT | Mod: 26 | Performed by: INTERNAL MEDICINE

## 2017-11-08 ENCOUNTER — HISTORY (OUTPATIENT)
Dept: CARDIOLOGY | Facility: OTHER | Age: 82
End: 2017-11-08

## 2017-11-08 ENCOUNTER — ANTICOAGULATION - GICH (OUTPATIENT)
Dept: INTERNAL MEDICINE | Facility: OTHER | Age: 82
End: 2017-11-08

## 2017-11-08 ENCOUNTER — TRANSFERRED RECORDS (OUTPATIENT)
Dept: HEALTH INFORMATION MANAGEMENT | Facility: CLINIC | Age: 82
End: 2017-11-08

## 2017-11-08 ENCOUNTER — OFFICE VISIT - GICH (OUTPATIENT)
Dept: CARDIOLOGY | Facility: OTHER | Age: 82
End: 2017-11-08

## 2017-11-08 ENCOUNTER — MEDICAL CORRESPONDENCE (OUTPATIENT)
Dept: CARDIOLOGY | Facility: CLINIC | Age: 82
End: 2017-11-08
Payer: MEDICARE

## 2017-11-08 DIAGNOSIS — I08.0 RHEUMATIC DISORDER OF BOTH MITRAL AND AORTIC VALVES: ICD-10-CM

## 2017-11-08 DIAGNOSIS — I50.22 CHRONIC SYSTOLIC CONGESTIVE HEART FAILURE (H): ICD-10-CM

## 2017-11-08 DIAGNOSIS — I10 ESSENTIAL (PRIMARY) HYPERTENSION: ICD-10-CM

## 2017-11-08 DIAGNOSIS — I48.91 ATRIAL FIBRILLATION (H): ICD-10-CM

## 2017-11-08 DIAGNOSIS — Z79.01 LONG TERM CURRENT USE OF ANTICOAGULANT: ICD-10-CM

## 2017-11-08 DIAGNOSIS — I25.5 ISCHEMIC CARDIOMYOPATHY: ICD-10-CM

## 2017-11-08 DIAGNOSIS — Z95.810 PRESENCE OF AUTOMATIC IMPLANTABLE CARDIOVERTER-DEFIBRILLATOR: ICD-10-CM

## 2017-11-08 DIAGNOSIS — I77.810 THORACIC AORTIC ECTASIA (H): ICD-10-CM

## 2017-11-08 LAB — INR - HISTORICAL: 2.5

## 2017-11-08 PROCEDURE — 99214 OFFICE O/P EST MOD 30 MIN: CPT | Mod: ZP | Performed by: NURSE PRACTITIONER

## 2017-11-14 ENCOUNTER — COMMUNICATION - GICH (OUTPATIENT)
Dept: FAMILY MEDICINE | Facility: OTHER | Age: 82
End: 2017-11-14

## 2017-11-14 DIAGNOSIS — M1A.3720 CHRONIC GOUT DUE TO RENAL IMPAIRMENT, LEFT ANKLE AND FOOT, WITHOUT TOPHUS (TOPHI): ICD-10-CM

## 2017-11-22 ENCOUNTER — COMMUNICATION - GICH (OUTPATIENT)
Dept: FAMILY MEDICINE | Facility: OTHER | Age: 82
End: 2017-11-22

## 2017-11-22 DIAGNOSIS — M1A.3720 CHRONIC GOUT DUE TO RENAL IMPAIRMENT, LEFT ANKLE AND FOOT, WITHOUT TOPHUS (TOPHI): ICD-10-CM

## 2017-11-28 ENCOUNTER — AMBULATORY - GICH (OUTPATIENT)
Dept: SCHEDULING | Facility: OTHER | Age: 82
End: 2017-11-28

## 2017-11-28 ENCOUNTER — MEDICAL CORRESPONDENCE (OUTPATIENT)
Dept: CARDIOLOGY | Facility: CLINIC | Age: 82
End: 2017-11-28
Payer: MEDICARE

## 2017-11-28 ENCOUNTER — TRANSFERRED RECORDS (OUTPATIENT)
Dept: HEALTH INFORMATION MANAGEMENT | Facility: CLINIC | Age: 82
End: 2017-11-28

## 2017-11-28 PROCEDURE — 93289 INTERROG DEVICE EVAL HEART: CPT | Mod: 26 | Performed by: INTERNAL MEDICINE

## 2017-12-06 ENCOUNTER — ANTICOAGULATION - GICH (OUTPATIENT)
Dept: INTERNAL MEDICINE | Facility: OTHER | Age: 82
End: 2017-12-06

## 2017-12-06 DIAGNOSIS — I48.91 ATRIAL FIBRILLATION (H): ICD-10-CM

## 2017-12-06 DIAGNOSIS — Z79.01 LONG TERM CURRENT USE OF ANTICOAGULANT: ICD-10-CM

## 2017-12-06 LAB — INR - HISTORICAL: 2.4

## 2017-12-14 ENCOUNTER — COMMUNICATION - GICH (OUTPATIENT)
Dept: FAMILY MEDICINE | Facility: OTHER | Age: 82
End: 2017-12-14

## 2017-12-14 DIAGNOSIS — I50.22 CHRONIC SYSTOLIC CONGESTIVE HEART FAILURE (H): ICD-10-CM

## 2017-12-27 NOTE — PROGRESS NOTES
Patient Information     Patient Name MRN Sex Sukhdeep Cline [id number] Male 1934      Progress Notes by Emmanuel Snyder MD at 10/23/2017  1:50 PM     Author:  Emmanuel Snyder MD Service:  (none) Author Type:  Physician     Filed:  10/23/2017  2:18 PM Encounter Date:  10/23/2017 Status:  Signed     :  Emmanuel Snyder MD (Physician)            Procedure Note  Pre/Post Operative Diagnosis:   1.2 cm skin lesion right forehead    Procedure:    Excision     Surgeon: Emmanuel Snyder MD FACS    Local Anesthesia: 1% lidocaine with 0.25%Marcaine with epinephrine    Indication for the procedure:    This is a 83 y.o. male patient referred by Cesar Sanders MD  With 1.2 cm wide ulcerated skin lesion right forehead .  After explaining the risks to include bleeding, infection, recurrence or need for reexcision,and scarring the patient wished to proceed.    Procedure:   The area was prepped and draped in usual sterile fashion with ChloraPrep . After, adequate local anesthesia,an elliptical skin incision was made to encompass the lesion. Bleeding vessel controlled with 3-0 Vicryl suture . The skin was closed with 5-0 Nylon suture.  A clean dry dressing was applied.    Plan:  The patient will followup in one week for suture removal and to review the pathology. Patient will follow up if there any problems with the wound including redness or drainage.

## 2017-12-27 NOTE — PROGRESS NOTES
Patient Information     Patient Name MRN Sukhdeep Victoria [id number] Male 1934      Progress Notes by Cesar Sanders MD at 10/6/2017  2:15 PM     Author:  Cesar Sanders MD Service:  (none) Author Type:  Physician     Filed:  10/8/2017 10:38 AM Encounter Date:  10/6/2017 Status:  Signed     :  Cesar Sanders MD (Physician)            SUBJECTIVE:  83 y.o. male here with son for follow up on CHF, diabetes, a-fib on warfarin, CKD, and a sore on scalp.     Decreased furosemide dose to 20 mg BID alternating with 20 mg daily. Weight stable at home, but I have him down 6 pounds in 2.5 months. No leg edema or SOB.   ECHO Dec '16 obtained with EF of 19%.    Blood sugars stable from previous appointment. AM sugars are , before dinner 143-200, and before bed 115-244.    Remains on warfarin for a-fib. Has a lot of bruising on arms, but stable.    Mentioned a sore on scalp in Oct 2016. Monitoring and it has not improved and may have worsened. In the summer I recommended excision and he wanted to wait. Willing to see surgery now.    REVIEW OF SYSTEMS:    Constitutional: Negative  Respiratory: Negative  Cardiac: Negative  Neurological: stable neuropathy pain      Past Medical History:     Diagnosis  Date     Acute on chronic systolic congestive heart failure (HC) 2012    15-20% EF 2012      Acute urinary retention     with stricture      Aortic root enlargement (HC)     moderate, with mild increase in diameter of the ascending aorta      Aortic valve sclerosis     with mild Al      Arthritis      Atherosclerotic coronary vascular disease      Atrial fibrillation (HC)      Biatrial enlargement      Cardiomyopathy (HC)      Chronic kidney disease     Stage III      Chronic low back pain     lumbar spinal stenosis      Diabetes mellitus type II     A1C 7.6       Gout     tophaceous, right foot, right fingers      Hamstring tightness of left lower extremity 2017      "Hyperlipidemia      Hypertension     chronic      Peripheral neuropathy (HC)     via 2008 EMG in [place]      Prostate cancer (HC) 2003     Systolic heart failure (HC) 2009    echo, mild to moderate left ventricular enlargement with marked decreased in systolic function.      TIA (transient ischemic attack) 11/17/2014     Upper GI bleeding 2009    secondary to NSAID gastritis         Current Outpatient Prescriptions       Medication  Sig Dispense Refill     acetaminophen (TYLENOL) 325 mg tablet Take 975 mg by mouth every 6 hours if needed. Max acetaminophen dose: 4000mg in 24 hrs.       allopurinol (ZYLOPRIM) 100 mg tablet Take 1 tablet by mouth once daily. 90 tablet 3     amoxicillin (AMOXIL) 500 mg capsule TAKE 4 CAPSULES BY MOUTH ONE HOUR PRIOR TO APPT  99     aspirin 81 mg tablet Take one tablet by mouth daily  0     cholecalciferol (VITAMIN D) 1,000 unit tablet Take one tablet by mouth daily  0     furosemide (LASIX) 20 mg tablet Take 1 tablet every morning and 1 tablet every other afternoon. 135 tablet 1     insulin aspart protamine-insulin aspart (NOVOLOG MIX 70-30) 100 unit/mL (70-30) FLEXPEN 35 units every morning and 10 units every evening 10 pen 11     lisinopril (PRINIVIL; ZESTRIL) 2.5 mg tablet Take 1 tablet by mouth once daily. 30 tablet 11     metoprolol succinate (TOPROL XL) 50 mg sustained-release tablet Take 1.5 tablets by mouth once daily. 135 tablet 4     pen needle, diabetic (BD INSULIN PEN NEEDLE UF) 31 gauge x 5/16\" FOR ADMINISTERING INSULIN AT HOME. USE WITH NOVOLOG INJECTIONS (TWICE DAILY) Dx: E11.8 200 Each 3     potassium chloride (KLOR-CON M20) 20 mEq Extended-Release tablet Take 1 tablet by mouth once daily with a meal. 90 tablet 0     simvastatin (ZOCOR) 20 mg tablet Take 1 tablet by mouth at bedtime. 90 tablet 4     warfarin (COUMADIN) 2 mg tablet Take 1 mg x 2 days and 2 mg x 5 days/week 90 tablet 0     Wheel Chair Wheelchair with elevating leg rests/foot rest. For home use. Length of " need: 99 mo 1 Device 0     Allergies as of 10/06/2017 - Jeremy as Reviewed 10/06/2017      Allergen  Reaction Noted     Codeine *Unknown 08/23/2012     Naproxen Other - Describe In Comment Field 08/23/2012     Sulfa (sulfonamide antibiotics) *Unknown 08/23/2012     Tramadol Nausea Only 08/23/2012     Vancomycin *Unknown 08/23/2012       OBJECTIVE:  /64  Pulse 66  Resp 16  Wt 71.7 kg (158 lb) Comment: Pt stated  BMI 25.39 kg/m2    General Appearance: Alert. No acute distress  Chest/Respiratory Exam: Clear to auscultation bilaterally  Cardiovascular Exam: Regular rate and rhythm. S1, S2, no murmur, gallop, or rubs.  Extremities: No lower extremity edema.  Foot Exam: Left and right foot: monofilament sensation decreased, trace pedal pulses, no lesions, nail hygiene good.  Skin: 8 mm ulcerated lesion on R forehead.  Psychiatric: Normal pleasant affect    Results for orders placed or performed in visit on 10/06/17      Hgb A1c      Result  Value Ref Range    HEMOGLOBIN A1C MONITORING (POCT) 6.6 (H) 4.0 - 6.2 %    ESTIMATED AVERAGE GLUCOSE  143 mg/dL   BASIC METABOLIC PANEL      Result  Value Ref Range    SODIUM 133 133 - 143 mmol/L    POTASSIUM 4.9 3.5 - 5.1 mmol/L    CHLORIDE 106 98 - 107 mmol/L    CO2,TOTAL 17 (L) 21 - 31 mmol/L    ANION GAP 10 5 - 18                    GLUCOSE 135 (H) 70 - 105 mg/dL    CALCIUM 9.6 8.6 - 10.3 mg/dL    BUN 77 (H) 7 - 25 mg/dL    CREATININE 2.29 (H) 0.70 - 1.30 mg/dL    BUN/CREAT RATIO           34                    GFR if African American 33 (L) >60 ml/min/1.73m2    GFR if not African American 27 (L) >60 ml/min/1.73m2   CBC W PLT NO DIFF      Result  Value Ref Range    WHITE BLOOD COUNT         7.4 4.5 - 11.0 thou/cu mm    RED BLOOD COUNT           4.04 (L) 4.30 - 5.90 mil/cu mm    HEMOGLOBIN                13.3 (L) 13.5 - 17.5 g/dL    HEMATOCRIT                40.1 37.0 - 53.0 %    MCV                       99 80 - 100 fL    MCH                       32.9 26.0 - 34.0 pg     MCHC                      33.2 32.0 - 36.0 g/dL    RDW                       15.1 11.5 - 15.5 %    PLATELET COUNT            143 140 - 440 thou/cu mm    MPV                       10.8 6.5 - 11.0 fL   LIPID PANEL      Result  Value Ref Range    CHOLESTEROL,TOTAL 93 <200 mg/dL    TRIGLYCERIDES 132 <150 mg/dL    HDL CHOLESTEROL 36 23 - 92 mg/dL    NON-HDL CHOLESTEROL 57 <145 mg/dl    CHOL/HDL RATIO            2.58 <4.50                    LDL CHOLESTEROL 31 <100 mg/dL    PROVIDER ORDERED STATUS RANDOM    PSA TOTAL (DIAGNOSTIC)      Result  Value Ref Range    PSA TOTAL (DIAGNOSTIC) 1.587 <=3.100 ng/mL         ASSESSMENT/PLAN:    ICD-10-CM   1. 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 Chronic systolic congestive heart failure (HC) I50.22   4. Persistent atrial fibrillation (HC) I48.1   5. Needs flu shot Z23   6. Prostate ca-treated with radiotherapy 2007 C61     A1c in range of 6.5 to 7.5 acceptable for insulin and age. He likely needs a little lower dose, but has been stable and so will make no changes. If readings in the 70s, then reduce insulin.    Referral to general surgery to biopsy the lesion on forehead. Recommending stopping warfarin 3 d prior to appt to reduce bleeding.    Referral to cardiology for heart failure. We waited to make referral given lack of cardiology coverage earlier in the year. He appears stable at this time, but will get cardiology opinion on current med regimen. May be overdiuresed with the elevated Cr and drop in weight.    Given flu shot    PSA obtained for Dr Sanchez, he has an appt coming up.    F/U 3 mo

## 2017-12-27 NOTE — PROGRESS NOTES
Patient Information     Patient Name MRN Sex Sukhdeep Cline [id number] Male 1934      Progress Notes by Nguyen Azevedo NP at 10/20/2017 12:45 PM     Author:  Nguyen Azevedo NP Service:  (none) Author Type:  PHYS- Nurse Practitioner     Filed:  10/24/2017  3:48 PM Encounter Date:  10/20/2017 Status:  Signed     :  Nguyen Azevedo NP (PHYS- Nurse Practitioner)            [facility] HEART CARE   CARDIOLOGY CONSULT    Sukhdeep Gomez  [address] [facility] [address] [address]    Cesar Sanders MD    Chief Complaint     Patient presents with       Consult      chronic systolic CHF, persistent a-fib         HPI:  Sukhdeep Gomez is an 83 year old male who presents to [facility] cardiology care. He has a history of ASCAD, , ischemic cardiomyopathy, chronic atrial fibrillation and chronic systolic heart failure. Additional history of hypertension, documented regurgitation, mixed hyperlipidemia, type 2 diabetes diabetic neuropathy, chronic hypokalemia, TIA and stage III chronic renal disease.    Admits to a past history of myocardial infarction in 1989 treated at the [facility], which ultimately led to 4V bypass in . There is no documentation of this on record.     Angiography in  with stents x2 at [facility] in [place] per patient report. No angiogram report available.     Dual chamber ICD implanted in , ischemic cardiomyopathy with reduced EF with ICD generator change on 13 at [facility].    History of AF for 20+ years, believes s/p 4V CABG. Has been on oral anticoagulation with Coumadin since onset. Denies any signs or symptoms of bleeding, no complications with coumadin. He is currently rate controlled on metoprolol 50 mg daily. He denies any palpitations or racing heart. He is largely asymptomatic with his atrial fibrillation.  He has no history of RANGEL, denies ETOH use. Limited caffeine use of 2 cups coffee per week.     He has no  history of tobacco use. He has a  family history of premature coronary artery disease. His father was his first myocardial infarction in his 40s, past at the age of 67 complicated by heart failure.    Currently he denies any chest pain, chest tightness or chest pressure. He has had no increased shortness of breath or increased dyspnea on exertion. He has had no central or peripheral edema. He is on a sodium restricted diet. He denies any orthopnea or PND. He currently sleeps flat with only one pillow at night.     For HFrEF related to ICM he is currently on metoprolol succinate 50 mg daily, lisinopril 2.5 mg daily and Lasix as currently dosed at 20 mg daily with an additional dose of 20 mg in the afternoon every other day. He is on sodium restricted diet. He does admit his appetite has been worse. He weighs himself once per week, admits this has been stable with average weight of 159 pounds. No noted edema. No orthopnea or PND. No increased SHORTNESS OF BREATH or CACERES. Last echocardiogram 12/28/16 with a biplane LVEF of 19%. Severe diffuse hypokinesis is present. Inferior wall akinesis is present. Global right ventricular function was mildly to moderately reduced.    IMAGING RESULTS:  Echocardiogram 12/28/16:  Biplane LVEF of 19%. Severe diffuse hypokinesis is present. Inferior wall akinesis is present.  Global right ventricular function is mild to moderately reduced.  Mild to moderate aortic valve sclerosis present. There is no aortic stenosis by Doppler, although may be underestimated by LV dysfunction.  Mild aortic insufficiency present.  The aorta is dilated and measures 46 mm at the level of the proximal ascending segment.  The aortic root, index 2 BSA is 22.84 mm/m2  the inferior vena cava is normal. Estimated right atrial pressure is less than 5 mmHg.  No pericardial effusion is present.        PAST MEDICAL HISTORY:  Past Medical History:     Diagnosis  Date     Acute on chronic systolic congestive heart  failure (HC) 11/14/2012    15-20% EF 11/2012      Acute urinary retention 2010    with stricture      Aortic root enlargement (HC)     moderate, with mild increase in diameter of the ascending aorta      Aortic valve sclerosis     with mild Al      Arthritis      Atherosclerotic coronary vascular disease      Atrial fibrillation (HC)      Biatrial enlargement      Cardiomyopathy (HC)      Chronic kidney disease     Stage III      Chronic low back pain     lumbar spinal stenosis      Diabetes mellitus type II 2009    A1C 7.6       Gout     tophaceous, right foot, right fingers      Hamstring tightness of left lower extremity 2/7/2017     Hyperlipidemia      Hypertension     chronic      Peripheral neuropathy (HC)     via 2008 EMG in [place]      Prostate cancer (HC) 2003     Systolic heart failure (HC) 2009    echo, mild to moderate left ventricular enlargement with marked decreased in systolic function.      TIA (transient ischemic attack) 11/17/2014     Upper GI bleeding 2009    secondary to NSAID gastritis        FAMILY HISTORY:  Family History      Problem  Relation Age of Onset     Heart Disease Father      Cancer-prostate Brother        PAST SURGICAL HISTORY:  Past Surgical History:      Procedure  Laterality Date     CORONARY ARTERY BYPASS GRAFT      4 vessel, [facility], 1989       CORONARY STENT PLACEMENT  approx. 2003    angiogram, 2 stents, [facility]       CYSTOSCOPY  2009    & dilation of urethral stricture, Dr. Wallace       CYSTOSCOPY  2010    & catheter placement for acute obstruction, Dr. Elise       ESOPHAGOGASTRODUODENOSCOPY  2007    & colonoscopy with polypectomy, [facility] essetnially normal EGD with small polyp removed       KNEE REPLACEMENT  2011    left       LUMBAR LAMINECTOMY  2006    decompressive, L3, L4 & L5 with improvement, Dr. Lopes       PACEMAKER PLACEMENT  2007    Guidant ICD, CPI Vitality 2 EL, model #T177, serial #[id number], which was implanted on 05/30/2007 at [facility].         SOCIAL  HISTORY:  Social History     Social History        Marital status:       Spouse name: N/A     Number of children:  N/A     Years of education:  N/A     Social History Main Topics       Smoking status: Never Smoker     Smokeless tobacco: Never Used     Alcohol use No     Drug use: No     Sexual activity: Yes     Other Topics   Concern      Service  No     Blood Transfusions  No     Caffeine Concern  No     Occupational Exposure  No     Hobby Hazards  No     Sleep Concern  No     Stress Concern  No     Weight Concern  No     Special Diet  No     diabetic diet      Back Care  No     Exercise  No     Bike Helmet  No     doesn't use a bike      Seat Belt  Yes     Self-Exams  No     Social History Narrative     wife    Retired  for [employer]     The patient's wife  at home 2011.      Son is very helpful in the patient's care.    Patient has never smoked. Alcohol Use - no                       CURRENT MEDICATIONS:  Current Outpatient Prescriptions on File Prior to Visit       Medication  Sig Dispense Refill     acetaminophen (TYLENOL) 325 mg tablet Take 975 mg by mouth every 6 hours if needed. Max acetaminophen dose: 4000mg in 24 hrs.       allopurinol (ZYLOPRIM) 100 mg tablet Take 1 tablet by mouth once daily. 90 tablet 3     amoxicillin (AMOXIL) 500 mg capsule TAKE 4 CAPSULES BY MOUTH ONE HOUR PRIOR TO APPT  99     aspirin 81 mg tablet Take one tablet by mouth daily  0     cholecalciferol (VITAMIN D) 1,000 unit tablet Take one tablet by mouth daily  0     furosemide (LASIX) 20 mg tablet Take 1 tablet every morning and 1 tablet every other afternoon. 135 tablet 1     insulin aspart protamine-insulin aspart (NOVOLOG MIX 70-30) 100 unit/mL (70-30) FLEXPEN 35 units every morning and 10 units every evening 10 pen 11     lisinopril (PRINIVIL; ZESTRIL) 2.5 mg tablet Take 1 tablet by mouth once daily. 30 tablet 11     metoprolol succinate (TOPROL XL) 50 mg  "sustained-release tablet Take 1.5 tablets by mouth once daily. 135 tablet 4     pen needle, diabetic (BD INSULIN PEN NEEDLE UF) 31 gauge x 5/16\" FOR ADMINISTERING INSULIN AT HOME. USE WITH NOVOLOG INJECTIONS (TWICE DAILY) Dx: E11.8 200 Each 3     potassium chloride (KLOR-CON M20) 20 mEq Extended-Release tablet Take 1 tablet by mouth once daily with a meal. 90 tablet 0     warfarin (COUMADIN) 2 mg tablet Take 1 mg x 2 days and 2 mg x 5 days/week 90 tablet 0     Wheel Chair Wheelchair with elevating leg rests/foot rest. For home use. Length of need: 99 mo 1 Device 0     No current facility-administered medications on file prior to visit.        ALLERGIES:  Allergies      Allergen   Reactions     Codeine  *Unknown     Naproxen  Other - Describe In Comment Field     \"kidneys shut down\"      Sulfa (Sulfonamide Antibiotics)  *Unknown     Tramadol  Nausea Only     Vancomycin  *Unknown         ROS:  CONSTITUTIONAL:  No weight loss, fever, chills, weakness or increased fatigue.  CARDIOVASCULAR:  No chest pain, chest pressure or chest discomfort. No palpitations or increased lower extremity edema.  RESPIRATORY:  No increased  shortness of breath, dyspnea upon exertion, cough or sputum production.  GASTROINTESTINAL: No reported abdominal pain. No anorexia, nausea, vomiting or diarrhea.   NEUROLOGICAL:  No reported headache, lightheadedness, dizziness, syncope, ataxia or weakness.  HEMATOLOGIC:  No anemia, bleeding or bruising.  ENDOCRINOLOGIC:  No reports of sweating, cold or heat intolerance.       PHYSICAL EXAM:  /80  Pulse 64  Wt 72.1 kg (159 lb)  BMI 25.55 kg/m2  GENERAL: The patient is a well-developed, well-nourished, in no apparent distress. Alert and oriented x3.  HEENT: Head is normocephalic and atraumatic. Eyes are symmetrical with normal visual tracking. Nares appeared normal without nasal drainage. Mucous membranes are moist.   NECK: Supple. No appreciable JVD.   HEART: Regular rate and rhythm, S1S2 " present without audible murmur, rub or gallop.  LUNGS: Respirations regular and unlabored. Clear to auscultation.  GI: Abdomen is nondistended.    EXTREMITIES: Trace peripheral edema present.  NEUROLOGIC: Alert and oriented X3. No focal neurologic deficits.   SKIN: No jaundice. No rashes or visible skin lesions present.    EKG:  EKG with AF today, rate 68 bpm    LAB RESULTS:  Anticoagulation on 10/18/2017        Component  Date Value Ref Range Status     INR 10/18/2017 3.2* <1.3 Final   Orders Only on 10/10/2017        Component  Date Value Ref Range Status     ALB RAND URINE            10/10/2017 20.3  mg/L Final     CREATININE,URINE          10/10/2017 0.28  g/L Final     MICROALBUMIN,RAND UR      10/10/2017 72.5* <30.0 mg/g creat Final   Office Visit on 10/06/2017        Component  Date Value Ref Range Status     HEMOGLOBIN A1C MONITORING (POCT) 10/06/2017 6.6* 4.0 - 6.2 % Final     ESTIMATED AVERAGE GLUCOSE  10/06/2017 143  mg/dL Final     SODIUM 10/06/2017 133  133 - 143 mmol/L Final     POTASSIUM 10/06/2017 4.9  3.5 - 5.1 mmol/L Final     CHLORIDE 10/06/2017 106  98 - 107 mmol/L Final     CO2,TOTAL 10/06/2017 17* 21 - 31 mmol/L Final     ANION GAP 10/06/2017 10  5 - 18                 Final     GLUCOSE 10/06/2017 135* 70 - 105 mg/dL Final     CALCIUM 10/06/2017 9.6  8.6 - 10.3 mg/dL Final     BUN 10/06/2017 77* 7 - 25 mg/dL Final     CREATININE 10/06/2017 2.29* 0.70 - 1.30 mg/dL Final     BUN/CREAT RATIO           10/06/2017 34                  Final     GFR if  10/06/2017 33* >60 ml/min/1.73m2 Final     GFR if not  10/06/2017 27* >60 ml/min/1.73m2 Final     WHITE BLOOD COUNT         10/06/2017 7.4  4.5 - 11.0 thou/cu mm Final     RED BLOOD COUNT           10/06/2017 4.04* 4.30 - 5.90 mil/cu mm Final     HEMOGLOBIN                10/06/2017 13.3* 13.5 - 17.5 g/dL Final     HEMATOCRIT                10/06/2017 40.1  37.0 - 53.0 % Final     MCV                       10/06/2017 99   80 - 100 fL Final     MCH                       10/06/2017 32.9  26.0 - 34.0 pg Final     MCHC                      10/06/2017 33.2  32.0 - 36.0 g/dL Final     RDW                       10/06/2017 15.1  11.5 - 15.5 % Final     PLATELET COUNT            10/06/2017 143  140 - 440 thou/cu mm Final     MPV                       10/06/2017 10.8  6.5 - 11.0 fL Final     CHOLESTEROL,TOTAL 10/06/2017 93  <200 mg/dL Final     TRIGLYCERIDES 10/06/2017 132  <150 mg/dL Final     HDL CHOLESTEROL 10/06/2017 36  23 - 92 mg/dL Final     NON-HDL CHOLESTEROL 10/06/2017 57  <145 mg/dl Final     CHOL/HDL RATIO            10/06/2017 2.58  <4.50                 Final     LDL CHOLESTEROL 10/06/2017 31  <100 mg/dL Final     PROVIDER ORDERED STATUS 10/06/2017 RANDOM   Final     PSA TOTAL (DIAGNOSTIC) 10/06/2017 1.587  <=3.100 ng/mL Final   Anticoagulation on 09/27/2017        Component  Date Value Ref Range Status     INR 09/27/2017 2.6* <1.3 Final   Anticoagulation on 09/13/2017        Component  Date Value Ref Range Status     INR 09/13/2017 3.1* <1.3 Final   Anticoagulation on 09/06/2017        Component  Date Value Ref Range Status     INR 09/06/2017 4.6* <1.3 Final   Anticoagulation on 08/09/2017        Component  Date Value Ref Range Status     INR 08/09/2017 3.3* <1.3 Final   Anticoagulation on 07/12/2017        Component  Date Value Ref Range Status     INR 07/12/2017 2.3* <1.3 Final   Anticoagulation on 06/14/2017        Component  Date Value Ref Range Status     INR 06/14/2017 1.9* <1.3 Final   Orders Only on 06/08/2017        Component  Date Value Ref Range Status     SODIUM 06/08/2017 136  133 - 143 mmol/L Final     POTASSIUM 06/08/2017 4.1  3.5 - 5.1 mmol/L Final     CHLORIDE 06/08/2017 112* 98 - 107 mmol/L Final     CO2,TOTAL 06/08/2017 18* 21 - 31 mmol/L Final     ANION GAP 06/08/2017 6  5 - 18                 Final     GLUCOSE 06/08/2017 121* 70 - 105 mg/dL Final     CALCIUM 06/08/2017 9.2  8.6 - 10.3 mg/dL Final     BUN  06/08/2017 72* 7 - 25 mg/dL Final     CREATININE 06/08/2017 2.21* 0.70 - 1.30 mg/dL Final     BUN/CREAT RATIO           06/08/2017 33                  Final     GFR if  06/08/2017 35* >60 ml/min/1.73m2 Final     GFR if not  06/08/2017 29* >60 ml/min/1.73m2 Final     URIC ACID 06/08/2017 6.1  4.4 - 7.6 mg/dL Final   There may be more visits with results that are not included.      ASSESSMENT:  Sukhdeep Gomez presents to establish cardiology care. He has a history of ASCAD, ischemic cardiomyopathy, chronic atrial fibrillation and chronic systolic heart failure. Additional history of hypertension, documented regurgitation, mixed hyperlipidemia, type 2 diabetes diabetic neuropathy, chronic hypokalemia, TIA and stage III chronic renal disease.      PLAN:  1. Chronic systolic congestive heart failure (HC)  HFrEF, stage C classification. Currently on metoprolol, lisinopril and Lasix. Appears largely euvolemic today. Last echo with biplane EF of 19% in December 2016. Repeat echocardiogram ordered. Based on results, consider low dose Entresto with stage 3 renal failure.     - AMB CONSULT TO CARDIOLOGY  - ECHO COMPLETE WO CONTRAST; Future    2. Persistent atrial fibrillation (HC)  largely asymptomatic with chronic atrial fibrillation. Current lead and AF today based on EKG with rate controlled at 68 bpm. He is appropriately anticoagulated on Coumadin and rate controlled on metoprolol succinate. Repeat echo ordered, if rate in question, consider amiodarone (no thyroid of pulmonary disease) to prevent further LV dysfunction, which can also be rate induced with chronic AF. Rate currently stable.     - AMB CONSULT TO CARDIOLOGY  - EKG 12 LEAD UNIT PERFORMED  - NC ELECTROCARDIOGRAM TRACING  - ECHO COMPLETE WO CONTRAST; Future    3. S/P CABG (coronary artery bypass graft)  Recommend switching from moderate intensity statin (Zocor) to high intensity statin (Lipitor) with given history of bypass and  coronary stenting for plaque stabilization.  .  - atorvastatin (LIPITOR) 10 mg tablet; Take 1 tablet by mouth at bedtime.  Dispense: 60 tablet; Refill: 3    4. Ischemic cardiomyopathy  See above. ICD with no reported discharges. He is due for his next interrogation in November 2017. This will be set up with Device RN Terri Reynolds from [facility].     - ECHO COMPLETE WO CONTRAST; Future  - atorvastatin (LIPITOR) 10 mg tablet; Take 1 tablet by mouth at bedtime.  Dispense: 60 tablet; Refill: 3    5. Controlled type 2 diabetes mellitus with stage 3 chronic kidney disease, with long-term current use of insulin (HC)  Continued management by PCP    6. Hypokalemia  Stable.    7. Mixed hyperlipidemia  - atorvastatin (LIPITOR) 10 mg tablet; Take 1 tablet by mouth at bedtime.  Dispense: 60 tablet; Refill: 3    8. CKD (chronic kidney disease) stage 3, GFR 30-59 ml/min      9. History of TIA (transient ischemic attack)      10. ICD (implantable cardioverter-defibrillator) in place  See above device interrogation schedule.     Follow-up in 2-4 weeks following Echocardiogram, certainly sooner as needed. Continue with low sodium diet and daily weight monitoring.       Thank you for allowing me to participate in the care of your patient. Please do not hesitate to contact me if you have any questions.     LEXUS Chamberlain, FOSTER  Cardiology

## 2017-12-27 NOTE — PROGRESS NOTES
"Patient Information     Patient Name MRN Sex     Sukhdeep Gomez [id number] Male 1934      Progress Notes by Camryn Garcia MD at 2017 12:30 PM     Author:  Camryn Garcia MD Service:  (none) Author Type:  Physician     Filed:  2017  4:20 PM Encounter Date:  2017 Status:  Signed     :  Camryn Garcia MD (Physician)            SUBJECTIVE:    Sukhdeep Gomez is a 82 y.o. male who presents for Er followup    Nursing Notes:   Karen Rucker  2017 12:39 PM  Signed  Patient is here for follow up for [facility] visit and diabetes. Patient presented to [facility] for low blood sugar while visiting family.  Karen Rucker LPN .............2017  12:26 PM      HPI Comments: Was visiting in [facility] () when he developed weakness and slurred speech, seemed slightly better with juice but decided to go to ER  They diagnosed him with low BG, when it was measured it was 100  He felt immediately better with glucose infusion  No imaging was done  He had similar episode in Dec when returning from the [facility], seen in [facility] ER, head CT normal  He manages his own medications, lives with son in his own home  He is feeling fine now without complaint      Allergies      Allergen   Reactions     Codeine  *Unknown     Naproxen  Other - Describe In Comment Field     \"kidneys shut down\"      Sulfa (Sulfonamide Antibiotics)  *Unknown     Tramadol  Nausea Only     Vancomycin  *Unknown   ,   Current Outpatient Prescriptions on File Prior to Visit       Medication  Sig Dispense Refill     acetaminophen (TYLENOL) 325 mg tablet Take 975 mg by mouth every 6 hours if needed. Max acetaminophen dose: 4000mg in 24 hrs.       allopurinol (ZYLOPRIM) 100 mg tablet Take 1 tablet by mouth once daily. 90 tablet 3     amoxicillin (AMOXIL) 500 mg capsule TAKE 4 CAPSULES BY MOUTH ONE HOUR PRIOR TO APPT  99     aspirin 81 mg tablet Take one tablet by mouth daily  0     " "cholecalciferol (VITAMIN D) 1,000 unit tablet Take one tablet by mouth daily  0     furosemide (LASIX) 20 mg tablet Take 1 tablet every morning and 1 tablet every other afternoon. 135 tablet 1     insulin aspart protamine-insulin aspart (NOVOLOG MIX 70-30) 100 unit/mL (70-30) FLEXPEN 35 units every morning and 10 units every evening 10 pen 11     lisinopril (PRINIVIL; ZESTRIL) 2.5 mg tablet Take 1 tablet by mouth once daily. 30 tablet 11     metoprolol succinate (TOPROL XL) 50 mg sustained-release tablet Take 1.5 tablets by mouth once daily. 135 tablet 4     pen needle, diabetic (BD INSULIN PEN NEEDLE UF) 31 gauge x 5/16\" FOR ADMINISTERING INSULIN AT HOME. USE WITH NOVOLOG INJECTIONS (TWICE DAILY) Dx: E11.8 200 Each 3     potassium chloride (KLOR-CON M20) 20 mEq Extended-Release tablet Take 1 tablet by mouth once daily with a meal. 90 tablet 0     simvastatin (ZOCOR) 20 mg tablet Take 1 tablet by mouth at bedtime. 90 tablet 4     warfarin (COUMADIN) 2 mg tablet Take  2mg x 6 days and 3 mg x 1 day/week 90 tablet 1     Wheel Chair Wheelchair with elevating leg rests/foot rest. For home use. Length of need: 99 mo 1 Device 0     No current facility-administered medications on file prior to visit.     and   Patient Active Problem List       Diagnosis  Date Noted     Hamstring tightness of left lower extremity  02/07/2017     Altered level of consciousness  12/04/2016     Hypoglycemia associated with diabetes (HC)  12/04/2016     DM II (diabetes mellitus, type II), controlled (HC)  12/04/2016     Elevated troponin  12/04/2016     Hypokalemia  12/04/2016     Elevated INR  12/04/2016     Yeast dermatitis  12/04/2016     Anticoagulation monitoring, INR range 2-3  05/13/2015     History of TIA (transient ischemic attack)  11/17/2014     CKD (chronic kidney disease) stage 3, GFR 30-59 ml/min  10/17/2014     ACP (advance care planning)  12/10/2013     Patient has identified Health Care Agent(s): No  Add Health Care Agents: " No  Patient has Advance Care Plan Documents (Health Care Directive, POLST): Yes    Advance Care Plan Documents:  Health Care Directive    Patient has identified Specific Treatment Preferences: No   Specific limits to treatment preferences NOT identified: ASSUME FULL TREATMENT.            Diabetic neuropathy  11/11/2013     Prostate ca-treated with radiotherapy 2007 11/11/2013     History of urethral stricture  11/07/2013     Orthostatic hypotension  11/05/2013     Hematoma of right hip  11/21/2012     Systolic congestive heart failure (HC)  11/14/2012 9/19/2012 ECHO  Final Impression:   1) Dilated left ventricle to a moderate degree with severely depressed left ventricular systolic function and ejection fraction of 15%. See comments in text regarding question of left ventricular thrombus. Echo density noted there is felt to be artifactual, but the possibility of LV thrombus cannot be fully excluded. Consider repeat study with contrast.   2) Severe left atrial enlargement.   3) Sclerotic aortic valve with mild aortic insufficiency.   4) Sclerotic mitral valve with mild insufficiency.   5) Sclerotic tricuspid valve with mild to moderate regurgitation.   6) Probable moderate elevation right ventricular systolic pressure to 48 mmHg plus right atrial pressure.   7) Study is performed with patient in atrial fibrillation.   8) When compared with prior study of August 7, 2009, there has been further increase in right ventricular systolic pressure from 29 mmHg plus right atrial to 48 mmHg plus right atrial pressure and the aortic root and ascending aorta appear to have further increased in size from prior 4 cm in the mid ascending aorta to 4.4 cm. Additionally, the issue of possible LV thrombus was not raised in the report of the prior study.   Interpretation: M-Mode, two-dimensional, spectral and color flow Doppler evaluations were performed with standard echocardiographic images with the patient in atrial  fibrillation. The study is technically adequate for interpretation.   Aortic root and ascending aorta are mildly to moderately enlarged at 4.5 and 4.4 cm, respectively. There is severe left atrial enlargement. The right atrium is probably within normal limits. The right ventricle is also probably of normal size with normal wall motion. A device lead is noted throughout the right heart.   The left ventricular chamber is moderately enlarged at 6.2 cm. Left ventricular wall thickness appears to be probably mildly increased in the intraventricular septum.   The inferior vena cava is not well visualized.   All cardiac valves appear to be sclerotic. There is mild aortic insufficiency and probable mild mitral regurgitation. There is mild to moderate tricuspid regurgitation with regurgitation jet velocity suggesting right ventricular systolic pressure to be elevated to 48 mmHg plus indeterminate right atrial pressure, suggesting at least probable moderate pulmonary hypertension.   Left ventricular ejection fraction is severely reduced with severe global hypokinesis with estimated ejection fraction in the range of 15%. There is obstruction noted on apical views within the left ventricular apex which is felt to be artifactual; however, the possibility of left ventricular thrombus cannot be excluded. Consider repeat study with contrast for better definition.   No pericardial effusion is noted.   Left ventricular diastolic function is indeterminate in the setting of the atrial fibrillation.         Macrocytic anemia  04/25/2012     SQUAMOUS CELL CARCINOMA OF SKIN SITE UNSPECIFIED       METATARSALGIA       PES PLANUS  02/16/2012     DEGENERATIVE JOINT DISEASE       Stricture of male urethra  03/17/2011     COUMADIN THERAPY  10/20/2010     ONYCHOMYCOSIS  08/05/2010     Ischemic cardiomyopathy       S/P CABG (coronary artery bypass graft)       status post 4 vessel CABG, [facility]        Atrial fibrillation (HC)        chronic warfarin anticoagulation and toprol for rate control          Essential hypertension       Mixed hyperlipidemia       Gout of left foot due to renal impairment       tophaceous          SPINAL STENOSIS       Diabetes mellitus with multiple complications (HC)       LOW BACK PAIN, CHRONIC       MITRAL REGURGITATION       status post TTE          GI BLEEDING       upper/anemia 8.1          ACUTE GASTRITIS WITH HEMORRHAGE  08/10/2009     NSAID            REVIEW OF SYSTEMS:  Review of Systems   Constitutional: Negative for chills, diaphoresis, fever, malaise/fatigue and weight loss.   HENT: Positive for hearing loss.    Respiratory: Negative for cough and shortness of breath.    Cardiovascular: Negative for chest pain and palpitations.   Gastrointestinal: Negative for heartburn, nausea and vomiting.   Genitourinary: Negative for dysuria and urgency.   Musculoskeletal: Negative for neck pain.   Neurological: Negative for dizziness, tingling, weakness and headaches.   Psychiatric/Behavioral: Negative for memory loss.       OBJECTIVE:  /64  Pulse 66  Wt 74.4 kg (164 lb)  BMI 26.36 kg/m2    EXAM:   Physical Exam   Constitutional: He is oriented to person, place, and time and well-developed, well-nourished, and in no distress. No distress.   Cardiovascular:   Irregular, 2/6 GISELE   Pulmonary/Chest: Effort normal and breath sounds normal. No respiratory distress.   Musculoskeletal: He exhibits edema.   Neurological: He is alert and oriented to person, place, and time. He has normal reflexes. GCS score is 15.   Skin: No rash noted.   Psychiatric: Affect normal.       ASSESSMENT/PLAN:  Hypoglycemia, resolved  DM-2, good control with current regimen    Plan:  Suspect with change in routine that current insulin dosing is too much, recommend decreasing when traveling/change in routine  Would prefer he run high given age and coumadin use/risk of falls  They agree this seem to be the correlation  a1c at  goal    Patient Instructions   With travel or change in routine, recommend decreasing insulin to 25 units in morning and staying with 10 units in afternoon    Episode sounds typical of low blood sugar    Total of 25 minutes was spent with patient in counseling and coordination of care.  Camryn Manuel MD  4:18 PM 6/22/2017

## 2017-12-28 NOTE — TELEPHONE ENCOUNTER
Patient Information     Patient Name MRN Sukhdeep Victoria [id number] Male 1934      Telephone Encounter by Cesar Sanders MD at 2017 10:23 AM     Author:  Cesar Sanders MD Service:  (none) Author Type:  Physician     Filed:  2017 10:23 AM Encounter Date:  2017 Status:  Signed     :  Cesar Sanders MD (Physician)            Ordered necessary labs.

## 2017-12-28 NOTE — PROGRESS NOTES
Patient Information     Patient Name MRN Sex Sukhdeep Cline [id number] Male 1934      Progress Notes by Emmanuel Snyder MD at 2017  2:30 PM     Author:  Emmanuel Snyder MD Service:  (none) Author Type:  Physician     Filed:  2017  2:32 PM Encounter Date:  2017 Status:  Signed     :  Emmanuel Snyder MD (Physician)            Subjective:  This is a follow up after excision of basal cell carcinoma on right forehead. The patient has no complaints.   He reports that he did bleed after surgery. The area is still sensitive. Pathology reviewed with patient which shows basal cell carcinoma with invasion deep and at the margins.    Objective: /80  Pulse 64  Wt 72.1 kg (159 lb)  BMI 25.55 kg/m2  The incision is healing well without erythema. Some residual ecchymosis. Sutures removed.    Assessment:    Residual basal cell carcinoma right forehead    Plan:  Reexcision in a few weeks as an office procedure. The patient will hold his Coumadin for 3 days.

## 2017-12-28 NOTE — TELEPHONE ENCOUNTER
Patient Information     Patient Name MRN Sukhdeep Victoria [id number] Male 1934      Telephone Encounter by Cat Fraser at 10/26/2017 10:24 AM     Author:  Cat Fraser Service:  (none) Author Type:  (none)     Filed:  10/26/2017 10:24 AM Encounter Date:  10/26/2017 Status:  Signed     :  Cat Fraser            See result note.  Cat Fraser LPN.......................... 10/26/2017  10:24 AM

## 2017-12-28 NOTE — PATIENT INSTRUCTIONS
Patient Information     Patient Name MRN Sex Sukhdeep Cline [id number] Male 1934      Patient Instructions by Camryn Garcia MD at 2017 12:30 PM     Author:  Camryn Garcia MD Service:  (none) Author Type:  Physician     Filed:  2017 12:56 PM Encounter Date:  2017 Status:  Signed     :  Camryn Garcia MD (Physician)            With travel or change in routine, recommend decreasing insulin to 25 units in morning and staying with 10 units in afternoon    Episode sounds typical of low blood sugar

## 2017-12-28 NOTE — TELEPHONE ENCOUNTER
Patient Information     Patient Name MRN Sex Sukhdeep Cline [id number] Male 1934      Telephone Encounter by Yolanda Avila RN at 7/3/2017  8:46 AM     Author:  Yolanda Avila RN Service:  (none) Author Type:  NURS- Registered Nurse     Filed:  7/3/2017  8:48 AM Encounter Date:  7/3/2017 Status:  Signed     :  Yolanda Avila RN (NURS- Registered Nurse)            Anticoagulant    Office visit in the past 12 months.    Last visit with ARLENE GALLEGOS was on: 2017 in [facility]  Next visit with ARLENE GALLEGOS is on: No future appointment listed with this provider  Next visit with Family Practice is on: No future appointment listed in this department    Lab tests:  PT/INR at least monthly    INR (no units)    Date Value   2017 1.9 (H)     HEMOGLOBIN                (g/dL)    Date Value   2017 14.2         Prescription refilled per RN Medication Refill Policy.................... Yolanda Avila RN ....................  7/3/2017   8:47 AM

## 2017-12-28 NOTE — PROGRESS NOTES
Patient Information     Patient Name MRN Sex Sukhdeep Cline [id number] Male 1934      Progress Notes by Cesar Sanders MD at 2017 10:30 AM     Author:  Cesar Sanders MD Service:  (none) Author Type:  Physician     Filed:  2017  9:39 PM Encounter Date:  2017 Status:  Signed     :  Cesar Sanders MD (Physician)            SUBJECTIVE:  82 y.o. male here with son for follow up on CHF, diabetes, hyperlipidemia, gout, CKD, and a sore on scalp.     Decreased furosemide dose to 20 mg BID. Still lost a few pounds since last appointment. No leg edema or SOB.   ECHO Dec '16 obtained with EF of 19%.  Due for refill on simvastatin.    Blood sugars stable from previous appointment. A1c was 7.3% two months ago. AM sugars are , before dinner , and before bed .    No gout flares. Uric acid was below 5 a couple years ago before allopurinol dose dropped from 300 to 100.    Mentioned a sore on scalp in Oct 2016. Monitoring and it has not improved and may have worsened.    REVIEW OF SYSTEMS:    Constitutional: Negative  Respiratory: Negative  Cardiac: Negative  Neurological: stable neuropathy pain      Past Medical History:     Diagnosis  Date     Acute on chronic systolic congestive heart failure (HC) 2012    15-20% EF 2012      Acute urinary retention     with stricture      Aortic root enlargement (HC)     moderate, with mild increase in diameter of the ascending aorta      Aortic valve sclerosis     with mild Al      Arthritis      Atherosclerotic coronary vascular disease      Atrial fibrillation (HC)      Biatrial enlargement      Cardiomyopathy (HC)      Chronic kidney disease     Stage III      Chronic low back pain     lumbar spinal stenosis      Diabetes mellitus type II     A1C 7.6       Gout     tophaceous, right foot, right fingers      Hamstring tightness of left lower extremity 2017     Hyperlipidemia      Hypertension     chronic   "    Peripheral neuropathy (HC)     via 2008 EMG in [place]      Prostate cancer (HC) 2003     Systolic heart failure (HC) 2009    echo, mild to moderate left ventricular enlargement with marked decreased in systolic function.      TIA (transient ischemic attack) 11/17/2014     Upper GI bleeding 2009    secondary to NSAID gastritis         Current Outpatient Prescriptions       Medication  Sig Dispense Refill     acetaminophen (TYLENOL) 325 mg tablet Take 975 mg by mouth every 6 hours if needed. Max acetaminophen dose: 4000mg in 24 hrs.       allopurinol (ZYLOPRIM) 100 mg tablet Take 1 tablet by mouth once daily. 90 tablet 3     amoxicillin (AMOXIL) 500 mg capsule TAKE 4 CAPSULES BY MOUTH ONE HOUR PRIOR TO APPT  99     aspirin 81 mg tablet Take one tablet by mouth daily  0     cholecalciferol (VITAMIN D) 1,000 unit tablet Take one tablet by mouth daily  0     furosemide (LASIX) 20 mg tablet Take 1 tablet by mouth twice daily. 60 tablet 5     insulin aspart protamine-insulin aspart (NOVOLOG MIX 70-30) 100 unit/mL (70-30) FLEXPEN 35 units every morning and 10 units every evening 10 pen 11     lisinopril (PRINIVIL; ZESTRIL) 2.5 mg tablet Take 1 tablet by mouth once daily. 30 tablet 11     metoprolol succinate (TOPROL XL) 50 mg sustained-release tablet Take 1.5 tablets by mouth once daily. 135 tablet 4     pen needle, diabetic (BD INSULIN PEN NEEDLE UF) 31 gauge x 5/16\" FOR ADMINISTERING INSULIN AT HOME. USE WITH NOVOLOG INJECTIONS (TWICE DAILY) Dx: E11.8 200 Each 3     potassium chloride (KLOR-CON M20) 20 mEq Extended-Release tablet Take 1 tablet by mouth once daily with a meal. 90 tablet 0     simvastatin (ZOCOR) 20 mg tablet Take 1 tablet by mouth at bedtime. 30 tablet 0     warfarin (COUMADIN) 2 mg tablet Take  2mg  daily 90 tablet 1     Wheel Chair Wheelchair with elevating leg rests/foot rest. For home use. Length of need: 99 mo 1 Device 0     Allergies as of 06/14/2017 - Jeremy as Reviewed 06/14/2017      Allergen  " "Reaction Noted     Codeine *Unknown 08/23/2012     Naproxen Other - Describe In Comment Field 08/23/2012     Sulfa (sulfonamide antibiotics) *Unknown 08/23/2012     Tramadol Nausea Only 08/23/2012     Vancomycin *Unknown 08/23/2012       OBJECTIVE:  /60  Pulse 64  Ht 1.68 m (5' 6.14\")  Wt 73 kg (161 lb) Comment: patient stated  BMI 25.87 kg/m2    General Appearance: Alert. No acute distress  Chest/Respiratory Exam: Clear to auscultation bilaterally  Cardiovascular Exam: Regular rate and rhythm. S1, S2, no murmur, gallop, or rubs.  Extremities: No pitting lower extremity edema  Skin: 7 mm ulcerated lesion on R forehead.  Psychiatric: Normal pleasant affect    Results for orders placed or performed in visit on 06/08/17      BASIC METABOLIC PANEL      Result  Value Ref Range    SODIUM 136 133 - 143 mmol/L    POTASSIUM 4.1 3.5 - 5.1 mmol/L    CHLORIDE 112 (H) 98 - 107 mmol/L    CO2,TOTAL 18 (L) 21 - 31 mmol/L    ANION GAP 6 5 - 18                    GLUCOSE 121 (H) 70 - 105 mg/dL    CALCIUM 9.2 8.6 - 10.3 mg/dL    BUN 72 (H) 7 - 25 mg/dL    CREATININE 2.21 (H) 0.70 - 1.30 mg/dL    BUN/CREAT RATIO           33                    GFR if African American 35 (L) >60 ml/min/1.73m2    GFR if not African American 29 (L) >60 ml/min/1.73m2   URIC ACID      Result  Value Ref Range    URIC ACID 6.1 4.4 - 7.6 mg/dL         ASSESSMENT/PLAN:    ICD-10-CM   1. Chronic systolic congestive heart failure (HC) I50.22   2. Diabetes mellitus with multiple complications (HC) E11.8   3. Mixed hyperlipidemia E78.2   4. 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 Skin lesion of scalp L98.9     Weight dropped despite lower dose of furosemide. He used to take 20 mg BID alternating days with 20 mg daily. Cr and BUN remain elevated, but stable, so likely still overdiuresed. Decrease furosemide to 20 mg BID alternating with 20 mg daily. Daily weights. If up 2 lbs in a day or 5 lbs in a week, then take an " extra 20 mg of furosemide that day.  Keep on low dose lisinopril for heart failure, may tolerate increase in dose since BP increased. Refilled potassium.  Recommend cardiology consult with low EF, but he prefers to wait until the fall despite my recommendation to pursue now.    Diabetes is stable. Continue same insulin dosing.    Refilled simvastatin.     Recommend seeing surgeon about skin lesions, but he declines until the fall.     F/U 2 mo

## 2017-12-28 NOTE — TELEPHONE ENCOUNTER
Patient Information     Patient Name MRN Sukhdeep Victoria [id number] Male 1934      Telephone Encounter by Yolanda Avila RN at 10/6/2017 12:53 PM     Author:  Yolanda Avila RN Service:  (none) Author Type:  NURS- Registered Nurse     Filed:  10/6/2017 12:56 PM Encounter Date:  10/5/2017 Status:  Signed     :  Yolanda Avila RN (NURS- Registered Nurse)            Anticoagulant    Office visit in the past 12 months.    Last visit with ARLENE GALLEGOS was on: 2017 in [facility]  Next visit with ARLENE GALLEGOS is on: 10/06/2017 in [facility]  Next visit with Family Practice is on: 10/06/2017 in [facility]    Lab tests:  PT/INR at least monthly    INR (no units)    Date Value   2017 2.6 (H)     HEMOGLOBIN                (g/dL)    Date Value   2017 14.2       Prescription refilled per RN Medication Refill Policy.................... Yolanda Avila RN ....................  10/6/2017   12:53 PM

## 2017-12-28 NOTE — TELEPHONE ENCOUNTER
Patient Information     Patient Name MRN Sex Sukhdeep Cline [id number] Male 1934      Telephone Encounter by Patricia Davis RN at 2017 11:17 AM     Author:  Patricia Davis RN Service:  (none) Author Type:  NURS- Registered Nurse     Filed:  2017 11:24 AM Encounter Date:  2017 Status:  Signed     :  Patricia Davis RN (NURS- Registered Nurse)            GOUT  Office visit in the past 12 months or per provider note.  Last visit with ARLENE GALLEGOS was on: 10/06/2017 in [facility]-follow up 3 months  Next visit with ARLENE GALLEGOS is on: No future appointment listed with this provider  Next visit with Family Practice is on: No future appointment listed in this department  Max refill for 12 months from last office visit or per provider note.  Prescription refilled per RN Medication Refill Policy.................... Patricia Davis RN ....................  2017   11:23 AM

## 2017-12-28 NOTE — PROGRESS NOTES
Patient Information     Patient Name MRN Sukhdeep Victoria [id number] Male 1934      Progress Notes by Edgardo Sanchez MD at 10/12/2017 10:15 AM     Author:  Edgardo Sanchez MD Service:  (none) Author Type:  Physician     Filed:  10/12/2017 12:11 PM Encounter Date:  10/12/2017 Status:  Signed     :  Edgardo Sanchez MD (Physician)            Type of Visit  Established    Chief Complaint  Urethral stricture  History of prostate cancer    HPI  Mr. Gomez is a 83 y.o. male follows up with history of urethral stricture s/p dilation last 2015.  Prior to that was 2013.  He has a history of prostate cancer treated in  with EBRT.  We have been following his PSA once yearly as well as once yearly cystoscopies to proactively treat a clinically significant urethral stricture prior to developing acute urinary retention.  He continues to deny worsening of urinary symptoms.  He denies hematuria or dysuria.  He recently had a PSA drawn.      Review of Systems  I reviewed the ROS the patient today.    Weight loss: No   Recent fever/chills: No  Night sweats: No   Current skin rash: No   Recent hair loss: No   Heat intolerance: No   Cold intolerance: No   Chest pain: No   Palpitations: No   Shortness of breath: No  Wheezing: No   Constipation: No   Diarrhea: No   Nausea: No    Vomiting: no   Kidney/side pain: No   Back pain: No  Frequent headaches: No  Dizziness: No   Leg swelling: No   Calf pain: No      Nursing Notes:   Prabha Wang RN  10/12/2017 11:06 AM  Signed  Patient positioned in supine position, perineum area prepped with chlorhexidene Gluconate and patient draped per sterile technique. Per verbal order read back by Edgardo Sanchez MD, Urojet 10mL 2% lidocaine jelly to be instilled into urethra.  Urojet- 10ml 2% Lidocaine jelly instilled into the urethra.    Urojet 2%  Lot#: [id number]  Expiration date:   : [employer]  NDC: [address]-6    [place] Protocol    A. Pre-procedure  verification complete yes  1-relevant information / documentation available, reviewed and properly matched to the patient; 2-consent accurate and complete, 3-equipment and supplies available    B. Site marking complete N/A  Site marked if not in continuous attendance with patient    C. TIME OUT completed yes  Time Out was conducted just prior to starting procedure to verify the eight required elements: 1-patient identity, 2-consent accurate and complete, 3-position, 4-correct side/site marked (if applicable), 5-procedure, 6-relevant images / results properly labeled and displayed (if applicable), 7-antibiotics / irrigation fluids (if applicable), 8-safety precautions.    After procedure perineum area rinsed. Discharge instructions reviewed with patient. Patient verbalized understanding of discharge instructions and discharged ambulatory.     Family History  Family History      Problem  Relation Age of Onset     Heart Disease Father      Cancer-prostate Brother        Physical Exam  Vitals:     10/12/17 1029   Pulse: 68   Resp: 12   Weight: 71.7 kg (158 lb)     Constitutional: NAD, WDWN.  Cardiovascular: Regular rate.  Pulmonary/Chest: Respirations are even and non-labored bilaterally.  Abdominal: Soft. No distension, tenderness, masses or guarding. No CVA tenderness.  Extremities: TREV x 4, Warm. No clubbing.  No cyanosis.    Skin: Pink, warm and dry.  No rashes noted.  Genitourinary: nonpalpable bladder    ^^^^^^^^^^^^^^^^^^^^^^^^^^^^^^^^^^^^^^^^^^^^^^^^^^^    Preprocedure diagnosis  Weak stream  History of urethral strictures    Postprocedure diagnosis  Weak stream  History of urethral strictures  Urethral stricture    Procedure  Flexible Cystourethroscopy    Surgeon  Edgardo Sanchez MD    Anesthesia  2% lidocaine jelly intraurethrally    Complications  None    Indications  83 y.o. male undergoing a flexible cystoscopy for the above mentioned indications.  History of EBRT and subsequent urethral strictures undergoing  serial dilation as management.  His symptoms were returning and there was question as to wehther or not the stricture needed treatment.    Findings  Cystoscopic findings included an approximately 116F stricture located at the bulbar urethra.  Some prostatic growth obstructing urethra  The bladder appeared to be normal capacity.    There were no tumors, stones or foreign bodies.    The orifices were slit-shaped and in their normal location.    Procedure  The patient was placed in supine position and prepped and draped in sterile fashion with lidocaine jelly per urethra for anesthesia.    I passed a lubricated 14F flexible cystoscope through the penile urethra and into the bladder and the bladder was completely visualized.  The cystoscope was retroflexed and the bladder neck and prostate visualized.    The cystoscope was slowly withdrawn while visualizing the urethra and the procedure terminated.    The patient tolerated the procedure well.      ^^^^^^^^^^^^^^^^^^^^^^^^^^^^^^^^^^^^^^^^^^^^^^^^^^^    Labs  SODIUM      Date Value Ref Range Status   10/06/2017 133 133 - 143 mmol/L Final     POTASSIUM      Date Value Ref Range Status   10/06/2017 4.9 3.5 - 5.1 mmol/L Final     CHLORIDE      Date Value Ref Range Status   10/06/2017 106 98 - 107 mmol/L Final     CO2,TOTAL      Date Value Ref Range Status   10/06/2017 17 (L) 21 - 31 mmol/L Final     ANION GAP      Date Value Ref Range Status   10/06/2017 10 5 - 18                 Final     GLUCOSE      Date Value Ref Range Status   10/06/2017 135 (H) 70 - 105 mg/dL Final     BUN      Date Value Ref Range Status   10/06/2017 77 (H) 7 - 25 mg/dL Final     CREATININE      Date Value Ref Range Status   10/06/2017 2.29 (H) 0.70 - 1.30 mg/dL Final     BUN/CREAT RATIO                Date Value Ref Range Status   10/06/2017 34                 Final     CALCIUM      Date Value Ref Range Status   10/06/2017 9.6 8.6 - 10.3 mg/dL Final     Results for JOSE JOY (MRN [id number])  as of 10/12/2017 10:54   12/17/2014 10:10 11/11/2015 13:53 10/26/2016 09:58 10/6/2017 14:50   PSA TOTAL (DIAGNOSTIC) 0.920 1.355 1.006 1.587       Assessment & Plan  Mr. Gomez is a 83 y.o. male follows up with history of urethral stricture s/p dilation last 2/2015.  No worsening of symptoms  Continue to follow.  PSA continues to be slow rate of rise  Follow up in 1 year with PSA and cysto and plans to dilate if needed

## 2017-12-28 NOTE — TELEPHONE ENCOUNTER
Patient Information     Patient Name MRN Sex Sukhdeep Cline [id number] Male 1934      Telephone Encounter by Lety Kirkpatrick at 2017  8:53 AM     Author:  Lety Kirkpatrick Service:  (none) Author Type:  (none)     Filed:  2017  8:54 AM Encounter Date:  2017 Status:  Signed     :  Lety Kirkpatrick            PT HAS LETTER FROM CLINIC FOR DIABETIC CHECK UP, SCHEDULED FOR 2017, NEEDS LAB ORDERS AND  CALL BACK FOR LAB APPT. THANK YOU.

## 2017-12-28 NOTE — TELEPHONE ENCOUNTER
Patient Information     Patient Name MRN Sukhdeep Victoria [id number] Male 1934      Telephone Encounter by Fredy Becerra RN at 2017  1:09 PM     Author:  Fredy Becerra RN Service:  (none) Author Type:  NURS- Registered Nurse     Filed:  2017  1:10 PM Encounter Date:  2017 Status:  Signed     :  Fredy Becerra RN (NURS- Registered Nurse)            Redundant Refill Request for Allopurinol refused;    Prescribing Provider: Cesar Sanders MD                Order Date: 2017  Ordered by: RHYS BAR  Medication:allopurinol (ZYLOPRIM) 100 mg tablet  Prescription #:[id number]    Qty:90 tablet   Ref:0  Start:2017  End:              Route:Oral                  ALEXI:No   Class:eRx    Sig:TAKE ONE TABLET BY MOUTH EVERY DAY    Pharmacy:[facility], [address].              [address]    Cosign accepted by CESAR SANDERS[[id number]] on 2017 12:28 PM    Unable to complete prescription refill per RN Medication Refill Policy.................... Fredy Becerra RN ....................  2017   1:09 PM

## 2017-12-28 NOTE — PATIENT INSTRUCTIONS
Patient Information     Patient Name MRN Sukhdeep Victoria [id number] Male 1934      Patient Instructions by Precious Carrero RN at 2017  9:55 AM     Author:  Precious Carrero RN  Service:  (none) Author Type:  NURS- Registered Nurse     Filed:  2017 11:06 AM  Encounter Date:  2017 Status:  Addendum     :  Precious Carrero RN (NURS- Registered Nurse)        Related Notes: Original Note by Precious Carrero RN (NURS- Registered Nurse) filed at 2017 11:01 AM            Increase lisinopril from 2.5 mg to 5 mg daily    Echo in 6 months    Please follow-up with cardiology after echo

## 2017-12-28 NOTE — TELEPHONE ENCOUNTER
Patient Information     Patient Name MRSukhdeep Porter [id number] Male 1934      Telephone Encounter by Gretchen Choe at 2017 10:37 AM     Author:  Gretchen Choe Service:  (none) Author Type:  (none)     Filed:  2017 10:38 AM Encounter Date:  2017 Status:  Signed     :  Gretchen Choe            Left message for patient letting know of lab orders  Gretchen Choe LPN..............................2017  10:38 AM

## 2017-12-28 NOTE — TELEPHONE ENCOUNTER
Patient Information     Patient Name MRN Sukhdeep Victoria [id number] Male 1934      Telephone Encounter by Coral Jones at 2017 10:28 AM     Author:  Coral Jones Service:  (none) Author Type:  (none)     Filed:  2017 10:31 AM Encounter Date:  2017 Status:  Signed     :  Coral Jones            Assessment & Plan  Mr. Gomez is a 82 y.o. male follows up with history of urethral stricture s/p dilation last 2015.  No worsening of symptoms  Continue to follow.  PSA stable  Follow up in 1 year with PSA and cysto and plans to dilate if needed

## 2017-12-28 NOTE — PROGRESS NOTES
Patient Information     Patient Name MRN Sex Sukhdeep Cline [id number] Male 1934      Progress Notes by Nguyen Azevedo NP at 2017  9:55 AM     Author:  Nguyen Azevedo NP Service:  (none) Author Type:  PHYS- Nurse Practitioner     Filed:  11/10/2017 12:14 PM Encounter Date:  2017 Status:  Signed     :  Nguyen Azevedo NP (PHYS- Nurse Practitioner)            [facility] HEART CARE   CARDIOLOGY PROGRESS NOTE    Sukhdeep Gomez  [id number] [address] [address]    Cesar Sanders MD    Chief Complaint     Patient presents with       Follow Up      echo results         HPI:  Sukhdeep Gomez is an 83 year old male who presents for cardiology follow-up, review echocardiogram results. He has a history of ASCAD, ischemic cardiomyopathy, chronic atrial fibrillation and chronic systolic heart failure. Additional history of hypertension, documented mitral regurgitation, mixed hyperlipidemia, type 2 diabetes diabetic neuropathy, chronic hypokalemia, TIA and stage III chronic renal disease.     Admits to a past history of myocardial infarction in 1989 treated at the [facility], which ultimately led to 4V bypass in . There is no documentation of this on record.      Angiography in  with stents x2 at [facility] per patient report. No angiogram report available.      Dual chamber ICD implanted in , ischemic cardiomyopathy with reduced EF.  ICD generator change on 13 at [facility].     History of AF for 20+ years, believes s/p 4V CABG. Has been on oral anticoagulation with Coumadin since onset. No reported complication orbleeding. He is currently rate controlled on metoprolol 50 mg daily. He denies any palpitations or racing heart. He is largely asymptomatic with his atrial fibrillation.     He has no history of tobacco use. He has a  family history of premature coronary artery disease. His father was his first  myocardial infarction in his 40s, past at the age of 67 complicated by heart failure.     He has been feeling good, he has had no complaints of chest pain, chest tightness or chest pressure. He has had no increased shortness of breath or increased dyspnea on exertion. He has had no central or peripheral edema. No orthopnea or PND. He is on a sodium restricted diet. Has had a few episodes of reflux per his report describes as sharp pain, completely resolves with tarik setlzer.      For HFrEF related to ICM he is currently on metoprolol succinate 50 mg daily, lisinopril 2.5 mg daily and Lasix as currently dosed at 20 mg daily with an additional dose of 20 mg in the afternoon every other day. He is on sodium restricted diet. His weight has been stable at 159 pounds. No noted edema. No orthopnea or PND. No increased SHORTNESS OF BREATH or CACERES. Last echocardiogram 12/28/16 with a biplane LVEF of 19%. Severe diffuse hypokinesis is present. Inferior wall akinesis is present. Global right ventricular function was mildly to moderately reduced.    IMAGING RESULTS:  ECHOCARDIOGRAM 11/1/17:  Moderate to severe LV dilation present.  Biplane LVEF 40%.  Inferior wall akinesis is again present along with posterior wall akinesis.   Right ventricular function, chamber size, wall motion and thickness are normal.   Pulmonary artery systolic pressure is normal.   Ascending aorta 4.8 cm.  Inferior vena cava is normal.   LV size and function improved since last study.       PAST MEDICAL HISTORY:  Past Medical History:     Diagnosis  Date     Acute on chronic systolic congestive heart failure (HC) 11/14/2012    15-20% EF 11/2012      Acute urinary retention 2010    with stricture      Aortic root enlargement (HC)     moderate, with mild increase in diameter of the ascending aorta      Aortic valve sclerosis     with mild Al      Arthritis      Atherosclerotic coronary vascular disease      Atrial fibrillation (HC)      Biatrial enlargement       Cardiomyopathy (HC)      Chronic kidney disease     Stage III      Chronic low back pain     lumbar spinal stenosis      Diabetes mellitus type II 2009    A1C 7.6       Gout     tophaceous, right foot, right fingers      Hamstring tightness of left lower extremity 2/7/2017     Hyperlipidemia      Hypertension     chronic      Peripheral neuropathy (HC)     via 2008 EMG in [place]      Prostate cancer (HC) 2003     Systolic heart failure (HC) 2009    echo, mild to moderate left ventricular enlargement with marked decreased in systolic function.      TIA (transient ischemic attack) 11/17/2014     Upper GI bleeding 2009    secondary to NSAID gastritis        FAMILY HISTORY:  Family History      Problem  Relation Age of Onset     Heart Disease Father      Cancer-prostate Brother        PAST SURGICAL HISTORY:  Past Surgical History:      Procedure  Laterality Date     CORONARY ARTERY BYPASS GRAFT      4 vessel, [facility], 1989       CORONARY STENT PLACEMENT  approx. 2003    angiogram, 2 stents, [facility]       CYSTOSCOPY  2009    & dilation of urethral stricture, Dr. Wallace       CYSTOSCOPY  2010    & catheter placement for acute obstruction, Dr. Elise       ESOPHAGOGASTRODUODENOSCOPY  2007    & colonoscopy with polypectomy, [facility] essetnially normal EGD with small polyp removed       KNEE REPLACEMENT  2011    left       LUMBAR LAMINECTOMY  2006    decompressive, L3, L4 & L5 with improvement, Dr. Lopes       PACEMAKER PLACEMENT  2007    Guidant ICD, CPI Vitality 2 EL, model #T177, serial #[id number], which was implanted on 05/30/2007 at [facility].         SOCIAL HISTORY:  Social History     Social History        Marital status:       Spouse name: N/A     Number of children:  N/A     Years of education:  N/A     Social History Main Topics       Smoking status: Never Smoker     Smokeless tobacco: Never Used     Alcohol use No     Drug use: No     Sexual activity: Yes     Other Topics   Concern      Service   "No     Blood Transfusions  No     Caffeine Concern  No     Occupational Exposure  No     Hobby Hazards  No     Sleep Concern  No     Stress Concern  No     Weight Concern  No     Special Diet  No     diabetic diet      Back Care  No     Exercise  No     Bike Helmet  No     doesn't use a bike      Seat Belt  Yes     Self-Exams  No     Social History Narrative     wife    Retired  for [employer].     The patient's wife  at home 2011.      Son is very helpful in the patient's care.    Patient has never smoked. Alcohol Use - no                       CURRENT MEDICATIONS:  Current Outpatient Prescriptions on File Prior to Visit       Medication  Sig Dispense Refill     acetaminophen (TYLENOL) 325 mg tablet Take 975 mg by mouth every 6 hours if needed. Max acetaminophen dose: 4000mg in 24 hrs.       allopurinol (ZYLOPRIM) 100 mg tablet Take 1 tablet by mouth once daily. 90 tablet 3     amoxicillin (AMOXIL) 500 mg capsule TAKE 4 CAPSULES BY MOUTH ONE HOUR PRIOR TO APPT  99     aspirin 81 mg tablet Take one tablet by mouth daily  0     atorvastatin (LIPITOR) 10 mg tablet Take 1 tablet by mouth at bedtime. 60 tablet 3     cholecalciferol (VITAMIN D) 1,000 unit tablet Take one tablet by mouth daily  0     furosemide (LASIX) 20 mg tablet Take 1 tablet every morning and 1 tablet every other afternoon. 135 tablet 1     insulin aspart protamine-insulin aspart (NOVOLOG MIX 70-30) 100 unit/mL (70-30) FLEXPEN 35 units every morning and 10 units every evening 10 pen 11     metoprolol succinate (TOPROL XL) 50 mg sustained-release tablet Take 1.5 tablets by mouth once daily. 135 tablet 4     pen needle, diabetic (BD INSULIN PEN NEEDLE UF) 31 gauge x 5/16\" FOR ADMINISTERING INSULIN AT HOME. USE WITH NOVOLOG INJECTIONS (TWICE DAILY) Dx: E11.8 200 Each 3     potassium chloride (KLOR-CON M20) 20 mEq Extended-Release tablet Take 1 tablet by mouth once daily with a meal. 90 tablet 0     " "warfarin (COUMADIN) 2 mg tablet Take 1 mg x 2 days and 2 mg x 5 days/week 90 tablet 0     Wheel Chair Wheelchair with elevating leg rests/foot rest. For home use. Length of need: 99 mo 1 Device 0     No current facility-administered medications on file prior to visit.        ALLERGIES:  Allergies      Allergen   Reactions     Codeine  *Unknown     Naproxen  Other - Describe In Comment Field     \"kidneys shut down\"      Sulfa (Sulfonamide Antibiotics)  *Unknown     Tramadol  Nausea Only     Vancomycin  *Unknown         ROS:  CONSTITUTIONAL:  No weight loss, fever, chills, weakness or fatigue.  CARDIOVASCULAR:  No reported cardiac chest pain, chest pressure or chest discomfort. No palpitations or lower extremity edema.  RESPIRATORY:  No shortness of breath, dyspnea upon exertion, cough or sputum production.   ABD: No abdominal distension  NEUROLOGICAL:  No headache, lightheadedness, dizziness, syncope, ataxia or weakness.  HEMATOLOGIC:  No anemia, bleeding or bruising.  SKIN:  No abnormal rashes or itching.      PHYSICAL EXAM:  /78  Pulse 68  Wt 71.7 kg (158 lb)  BMI 25.39 kg/m2  GENERAL: The patient is a well-developed, well-nourished, in no apparent distress. Alert and oriented x3.  HEENT: Head is normocephalic and atraumatic. Eyes are symmetrical with normal visual tracking. Nares appeared normal without nasal drainage. Mucous membranes are moist.   NECK: Supple.   HEART: Regular rate and rhythm, S1S2 present without murmur, rub or gallop.  LUNGS: Respirations regular and unlabored. Clear to auscultation.  GI: Abdomen is nondistended.    EXTREMITIES: No peripheral edema present.  NEUROLOGIC: Alert and oriented X3. No focal neurologic deficits.       LAB RESULTS:  Anticoagulation on 11/08/2017        Component  Date Value Ref Range Status     INR 11/08/2017 2.5* <1.3 Final   Anticoagulation on 10/18/2017        Component  Date Value Ref Range Status     INR 10/18/2017 3.2* <1.3 Final   Orders Only on " 10/10/2017        Component  Date Value Ref Range Status     ALB RAND URINE            10/10/2017 20.3  mg/L Final     CREATININE,URINE          10/10/2017 0.28  g/L Final     MICROALBUMIN,RAND UR      10/10/2017 72.5* <30.0 mg/g creat Final   Office Visit on 10/06/2017        Component  Date Value Ref Range Status     HEMOGLOBIN A1C MONITORING (POCT) 10/06/2017 6.6* 4.0 - 6.2 % Final     ESTIMATED AVERAGE GLUCOSE  10/06/2017 143  mg/dL Final     SODIUM 10/06/2017 133  133 - 143 mmol/L Final     POTASSIUM 10/06/2017 4.9  3.5 - 5.1 mmol/L Final     CHLORIDE 10/06/2017 106  98 - 107 mmol/L Final     CO2,TOTAL 10/06/2017 17* 21 - 31 mmol/L Final     ANION GAP 10/06/2017 10  5 - 18                 Final     GLUCOSE 10/06/2017 135* 70 - 105 mg/dL Final     CALCIUM 10/06/2017 9.6  8.6 - 10.3 mg/dL Final     BUN 10/06/2017 77* 7 - 25 mg/dL Final     CREATININE 10/06/2017 2.29* 0.70 - 1.30 mg/dL Final     BUN/CREAT RATIO           10/06/2017 34                  Final     GFR if  10/06/2017 33* >60 ml/min/1.73m2 Final     GFR if not  10/06/2017 27* >60 ml/min/1.73m2 Final     WHITE BLOOD COUNT         10/06/2017 7.4  4.5 - 11.0 thou/cu mm Final     RED BLOOD COUNT           10/06/2017 4.04* 4.30 - 5.90 mil/cu mm Final     HEMOGLOBIN                10/06/2017 13.3* 13.5 - 17.5 g/dL Final     HEMATOCRIT                10/06/2017 40.1  37.0 - 53.0 % Final     MCV                       10/06/2017 99  80 - 100 fL Final     MCH                       10/06/2017 32.9  26.0 - 34.0 pg Final     MCHC                      10/06/2017 33.2  32.0 - 36.0 g/dL Final     RDW                       10/06/2017 15.1  11.5 - 15.5 % Final     PLATELET COUNT            10/06/2017 143  140 - 440 thou/cu mm Final     MPV                       10/06/2017 10.8  6.5 - 11.0 fL Final     CHOLESTEROL,TOTAL 10/06/2017 93  <200 mg/dL Final     TRIGLYCERIDES 10/06/2017 132  <150 mg/dL Final     HDL CHOLESTEROL 10/06/2017 36  23  - 92 mg/dL Final     NON-HDL CHOLESTEROL 10/06/2017 57  <145 mg/dl Final     CHOL/HDL RATIO            10/06/2017 2.58  <4.50                 Final     LDL CHOLESTEROL 10/06/2017 31  <100 mg/dL Final     PROVIDER ORDERED STATUS 10/06/2017 RANDOM   Final     PSA TOTAL (DIAGNOSTIC) 10/06/2017 1.587  <=3.100 ng/mL Final   Anticoagulation on 09/27/2017        Component  Date Value Ref Range Status     INR 09/27/2017 2.6* <1.3 Final   Anticoagulation on 09/13/2017        Component  Date Value Ref Range Status     INR 09/13/2017 3.1* <1.3 Final   Anticoagulation on 09/06/2017        Component  Date Value Ref Range Status     INR 09/06/2017 4.6* <1.3 Final   Anticoagulation on 08/09/2017        Component  Date Value Ref Range Status     INR 08/09/2017 3.3* <1.3 Final   Anticoagulation on 07/12/2017        Component  Date Value Ref Range Status     INR 07/12/2017 2.3* <1.3 Final   Anticoagulation on 06/14/2017        Component  Date Value Ref Range Status     INR 06/14/2017 1.9* <1.3 Final   There may be more visits with results that are not included.      ASSESSMENT:  Sukhdeep Gomez presents for cardiology follow-up, review echo results. He has a history of ASCAD, ischemic cardiomyopathy with ICD, chronic atrial fibrillation and chronic systolic heart failure. Additional history of hypertension, documented regurgitation, mixed hyperlipidemia, type 2 diabetes diabetic neuropathy, chronic hypokalemia, TIA and stage III chronic renal disease.    PLAN:  1. Atrial fibrillation, unspecified type (HC)  largely asymptomatic with chronic atrial fibrillation. He is appropriately anticoagulated on Coumadin and rate controlled on metoprolol succinate.     2. Essential hypertension  /78 today, increase Lisinopril to 5 mg daily. This will also beneficial dose change for mgmt LV dilation and HFrEF.    - lisinopril (PRINIVIL; ZESTRIL) 5 mg tablet; Take 1 tablet by mouth once daily.  Dispense: 90 tablet; Refill: 3    3. ICD  (implantable cardioverter-defibrillator) in place  ICD with no reported discharges. He is due for his next interrogation in November 2017. This will be set up with Device RN Terri Reynolds from [facility].     4. Ischemic cardiomyopathy      5. MITRAL REGURGITATION  Mild mitral insufficiency on recent echo    6. Chronic systolic heart failure (HC)  Echo results reviewed with improved LV function and size since last study. Biplane EF of 40% which is much improved from previously 19%. He is largely euvolemic today with stable weight. Continue with Current Lasix dosing, BB and ACE. Increased ACE to 5 mg daily. Make consider Digoxin vs. Entresto if needed for decompensation.  Entresto may be limited for therapeutic dosing based on renal failure    - ECHO COMPLETE WO CONTRAST; Future  - lisinopril (PRINIVIL; ZESTRIL) 5 mg tablet; Take 1 tablet by mouth once daily.  Dispense: 90 tablet; Refill: 3    7. Ascending aorta dilatation (HC)  Ascending aorta 4.8 cm on echo, close monitoring and tight BP control with increased Lisinopril dosing today. Repeat echo in 6 months to 1 year. Discussed referral to CT surgery at 5.0 cm  if wish to treat.     - ECHO COMPLETE WO CONTRAST; Future  - lisinopril (PRINIVIL; ZESTRIL) 5 mg tablet; Take 1 tablet by mouth once daily.  Dispense: 90 tablet; Refill: 3      Cardiology follow-up in 6 months, certainly sooner if any signs of decompensation with HFrEF.       Thank you for allowing me to participate in the care of your patient. Please do not hesitate to contact me if you have any questions.     LEXUS Chamberlain, CFNP  [facility]

## 2017-12-29 NOTE — PATIENT INSTRUCTIONS
Patient Information     Patient Name MRN Sex Sukhdeep Cline [id number] Male 1934      Patient Instructions by Cesar Sanders MD at 10/6/2017  2:15 PM     Author:  Cesar Sanders MD  Service:  (none) Author Type:  Physician     Filed:  10/6/2017  2:43 PM  Encounter Date:  10/6/2017 Status:  Addendum     :  Cesar Sanders MD (Physician)        Related Notes: Original Note by Cesar Sanders MD (Physician) filed at 10/6/2017  2:41 PM            Keep medications the same unless lab is abnormal  Referral to cardiologist about the heart failure  Referral to general surgery about the likely skin cancer on forehead  Stop warfarin 3 days before surgery appointment and may resume afterwards

## 2017-12-29 NOTE — PATIENT INSTRUCTIONS
Patient Information     Patient Name MRN Sukhdeep Victoria [id number] Male 1934      Patient Instructions by Ingrid Alexander at 2017  9:45 AM     Author:  Ingrid Alexander Service:  (none) Author Type:  NURS- Student Nurse     Filed:  2017  9:55 AM Encounter Date:  2017 Status:  Signed     :  Ingrid Alexander (NURS- Student Nurse)            2017 Details    Sun  Fri Sat          1               2                 3               4               5               6      Hold   See details      7      2 mg         8      2 mg         9      2 mg           10      2 mg         11      2 mg         12      2 mg         13      2 mg         14               15               16                 17               18               19               20               21               22               23                 24               25               26               27               28               29               30                Date Details    This INR check       Date of next INR:  2017         How to take your warfarin dose     To take:  2 mg Take one of the 2 mg tablets.    Hold Do not take your warfarin dose. The details table to the right may include additional information.                  Description          Hold today's dose. Then take 2 mg X 6 doses, and recheck in 1 week. Ingrid Alexander, RN at 0932 17.                   Anticoagulation Summary as of 2017     INR goal 2.0-3.0    Today's INR 4.6    Next INR check 2017          Call your Anticoagulation Clinic at Dept: [phone]   if:   1. Any medications are started, stopped, or there is a change in dose.  2. You experience any bleeding that is not easily stopped or if it is recurrent.  3. You notice an increase in bruising or any bruising that does not heal.  4. You are scheduled for surgery, colonoscopy, dental extraction or any other procedure where you may need to stop your  Coumadin (warfarin).

## 2017-12-29 NOTE — PATIENT INSTRUCTIONS
Patient Information     Patient Name MRN Sukhdeep Victoria [id number] Male 1934      Patient Instructions by Shasta Restrepo RN at 2017  9:30 AM     Author:  Shasta Restrepo RN Service:  (none) Author Type:  NURS- Registered Nurse     Filed:  2017  9:50 AM Encounter Date:  2017 Status:  Signed     :  Shasta Restrepo RN (NURS- Registered Nurse)            2017 Details    Sun  Fri Sat          1               2                 3               4               5               6               7               8               9                 10               11               12               13      1 mg   See details      14      2 mg         15      2 mg         16      1 mg           17      2 mg         18      2 mg         19      2 mg         20      1 mg         21      2 mg         22      2 mg         23      1 mg           24      2 mg         25      2 mg         26      2 mg         27      1 mg         28               29               30                Date Details    This INR check       Date of next INR:  2017         How to take your warfarin dose     To take:  1 mg Take half of a 2 mg tablet.    To take:  2 mg Take one of the 2 mg tablets.             Description          Continue 12 mg weekly, take 2 mg x five days/week and 1 mg x two days/week and recheck INR in two weeks.  SHASTA RESTREPO RN ....................  2017   9:49 AM                    Anticoagulation Summary as of 2017     INR goal 2.0-3.0    Today's INR 3.1!    Next INR check 2017          Call your Anticoagulation Clinic at Dept: [phone]   if:   1. Any medications are started, stopped, or there is a change in dose.  2. You experience any bleeding that is not easily stopped or if it is recurrent.  3. You notice an increase in bruising or any bruising that does not heal.  4. You are scheduled for surgery, colonoscopy, dental extraction or any  other procedure where you may need to stop your Coumadin (warfarin).

## 2017-12-29 NOTE — PATIENT INSTRUCTIONS
Patient Information     Patient Name MRSukhdeep Porter [id number] Male 1934      Patient Instructions by Prabha Wang RN at 10/12/2017 10:15 AM     Author:  Prabha Wang RN Service:  (none) Author Type:  NURS- Registered Nurse     Filed:  10/12/2017 10:32 AM Encounter Date:  10/12/2017 Status:  Signed     :  Prabha Wang RN (NURS- Registered Nurse)            Home Care after Cystoscopy  Follow these guidelines for your care after your procedure.    Activity  No limitations    Bathing or showering  No limitations    Symptoms  You may notice some burning with urination but this usually resolves after 1-2 days.  You may also notice small amounts of blood in your urine.  Please increase water intake for the next few days to help with these symptoms.    Contacts  General Questions: [phone]  Appointments:  [phone]  Emergencies:  [phone]    When to call the clinic  If you develop any of the following symptoms please call the clinic immediately.  If the clinic is closed please be seen at an urgent care clinic or the Emergency Department.  - Burning with urination that worsens after 2 days  - Unable to urinate causing severe pelvic pain  - Fevers of greater than 101 degrees F  - Flank pain that is not responding to pain medication    Follow up  Please follow up as discussed at the appointment.

## 2017-12-29 NOTE — PATIENT INSTRUCTIONS
Patient Information     Patient Name MRN Sukhdeep Victoria [id number] Male 1934      Patient Instructions by Yolanda Avila RN at 10/18/2017 10:00 AM     Author:  Yolanda Avila RN Service:  (none) Author Type:  NURS- Registered Nurse     Filed:  10/18/2017  9:51 AM Encounter Date:  10/18/2017 Status:  Signed     :  Yolanda Avila RN (NURS- Registered Nurse)            2017 Details    Sun Mon Tue Wed Thu Fri Sat     1               2               3               4               5               6               7                 8               9               10               11               12               13               14                 15               16               17               18      1 mg   See details      19      2 mg         20      2 mg         21      1 mg           22      2 mg         23      2 mg         24      2 mg         25      1 mg         26      2 mg         27      2 mg         28      1 mg           29      2 mg         30      2 mg         31      2 mg              Date Details   10/18 This INR check               How to take your warfarin dose     To take:  1 mg Take half of a 2 mg tablet.    To take:  2 mg Take one of the 2 mg tablets.           2017 Details    Sun Mon Tue Wed Thu Fri Sat        1      1 mg         2      2 mg         3      2 mg         4      1 mg           5      2 mg         6      2 mg         7      2 mg         8      1 mg         9               10               11                 12               13               14               15               16               17               18                 19               20               21               22               23               24               25                 26               27               28               29               30                  Date Details   No additional details    Date of next INR:  2017         How to take your warfarin  dose     To take:  1 mg Take half of a 2 mg tablet.    To take:  2 mg Take one of the 2 mg tablets.             Description          Continue same dose and recheck in 3 weeks. Yolanda Avila RN    10/18/2017  9:50 AM                    Anticoagulation Summary as of 10/18/2017     INR goal 2.0-3.0    Today's INR 3.2    Next INR check 11/8/2017          Call your Anticoagulation Clinic at Dept: [phone]   if:   1. Any medications are started, stopped, or there is a change in dose.  2. You experience any bleeding that is not easily stopped or if it is recurrent.  3. You notice an increase in bruising or any bruising that does not heal.  4. You are scheduled for surgery, colonoscopy, dental extraction or any other procedure where you may need to stop your Coumadin (warfarin).

## 2017-12-29 NOTE — PATIENT INSTRUCTIONS
Patient Information     Patient Name MRN Sukhdeep Victoria [id number] Male 1934      Patient Instructions by Shasta Restrepo RN at 2017  9:45 AM     Author:  Shasta Restrepo RN Service:  (none) Author Type:  NURS- Registered Nurse     Filed:  2017  9:47 AM Encounter Date:  2017 Status:  Signed     :  Shasta Restrepo RN (NURS- Registered Nurse)            2017 Details    Sun Mon Tue Wed Thu Fri Sat          1               2                 3               4               5               6               7               8               9                 10               11               12               13               14               15               16                 17               18               19               20               21               22               23                 24               25               26               27      1 mg   See details      28      2 mg         29      2 mg         30      1 mg          Date Details    This INR check               How to take your warfarin dose     To take:  1 mg Take half of a 2 mg tablet.    To take:  2 mg Take one of the 2 mg tablets.           2017 Details    Sun Mon Tue Wed Thu Fri Sat     1      2 mg         2      2 mg         3      2 mg         4      1 mg         5      2 mg         6      2 mg         7      1 mg           8      2 mg         9      2 mg         10      2 mg         11      1 mg         12      2 mg         13      2 mg         14      1 mg           15      2 mg         16      2 mg         17      2 mg         18      1 mg         19               20               21                 22               23               24               25               26               27               28                 29               30               31                    Date Details   No additional details    Date of next INR:  10/18/2017         How to take your warfarin  dose     To take:  1 mg Take half of a 2 mg tablet.    To take:  2 mg Take one of the 2 mg tablets.             Description          Continue same dose and recheck in 3 weeks. KELBY KEVIN RN    9/27/2017    9:47 AM                    Anticoagulation Summary as of 9/27/2017     INR goal 2.0-3.0    Today's INR 2.6    Next INR check 10/18/2017          Call your Anticoagulation Clinic at Dept: [phone]   if:   1. Any medications are started, stopped, or there is a change in dose.  2. You experience any bleeding that is not easily stopped or if it is recurrent.  3. You notice an increase in bruising or any bruising that does not heal.  4. You are scheduled for surgery, colonoscopy, dental extraction or any other procedure where you may need to stop your Coumadin (warfarin).

## 2017-12-29 NOTE — PATIENT INSTRUCTIONS
Patient Information     Patient Name MRN Sukhdeep Victoria [id number] Male 1934      Patient Instructions by Yolanda Avila RN at 2017 11:00 AM     Author:  Yolanda Avila RN Service:  (none) Author Type:  NURS- Registered Nurse     Filed:  2017 10:14 AM Encounter Date:  2017 Status:  Signed     :  Yolanda Avila RN (NURS- Registered Nurse)            2017 Details    Sun Mon Tue Wed Thu Fri Sat           1                 2               3               4               5               6               7               8                 9               10               11               12      3 mg   See details      13      2 mg         14      2 mg         15      2 mg           16      2 mg         17      2 mg         18      2 mg         19      3 mg         20      2 mg         21      2 mg         22      2 mg           23      2 mg         24      2 mg         25      2 mg         26      3 mg         27      2 mg         28      2 mg         29      2 mg           30      2 mg         31      2 mg               Date Details    This INR check               How to take your warfarin dose     To take:  2 mg Take one of the 2 mg tablets.    To take:  3 mg Take one and a half of the 2 mg tablets.           2017 Details    Sun Mon Tue Wed Thu Fri Sat       1      2 mg         2      3 mg         3      2 mg         4      2 mg         5      2 mg           6      2 mg         7      2 mg         8      2 mg         9      3 mg         10               11               12                 13               14               15               16               17               18               19                 20               21               22               23               24               25               26                 27               28               29               30               31                  Date Details   No additional details    Date  of next INR:  8/9/2017         How to take your warfarin dose     To take:  2 mg Take one of the 2 mg tablets.    To take:  3 mg Take one and a half of the 2 mg tablets.             Description          Continue same Warfarin dose and recheck in 1 month.  Yolanda Avila RN   7/12/2017    10:14 AM                      Anticoagulation Summary as of 7/12/2017     INR goal 2.0-3.0    Today's INR 2.3    Next INR check 8/9/2017          Call your Anticoagulation Clinic at Dept: [phone]   if:   1. Any medications are started, stopped, or there is a change in dose.  2. You experience any bleeding that is not easily stopped or if it is recurrent.  3. You notice an increase in bruising or any bruising that does not heal.  4. You are scheduled for surgery, colonoscopy, dental extraction or any other procedure where you may need to stop your Coumadin (warfarin).

## 2017-12-29 NOTE — PATIENT INSTRUCTIONS
Patient Information     Patient Name MRN Sukhdeep Victoria [id number] Male 1934      Patient Instructions by Yolanda Avila RN at 2017 10:15 AM     Author:  Yolanda Avila RN Service:  (none) Author Type:  NURS- Registered Nurse     Filed:  2017  9:43 AM Encounter Date:  2017 Status:  Signed     :  Yolanda Avila RN (NURS- Registered Nurse)            2017 Details    Sun Mon Tue Wed Thu Fri Sat       1               2               3               4               5                 6               7               8               9      2 mg   See details      10      2 mg         11      2 mg         12      2 mg           13      2 mg         14      2 mg         15      2 mg         16      3 mg         17      2 mg         18      2 mg         19      2 mg           20      2 mg         21      2 mg         22      2 mg         23      3 mg         24      2 mg         25      2 mg         26      2 mg           27      2 mg         28      2 mg         29      2 mg         30      3 mg         31      2 mg            Date Details    This INR check               How to take your warfarin dose     To take:  2 mg Take one of the 2 mg tablets.    To take:  3 mg Take one and a half of the 2 mg tablets.           2017 Details    Sun Mon Tue Wed Thu Fri Sat          1      2 mg         2      2 mg           3      2 mg         4      2 mg         5      2 mg         6      3 mg         7               8               9                 10               11               12               13               14               15               16                 17               18               19               20               21               22               23                 24               25               26               27               28               29               30                Date Details   No additional details    Date of next INR:  2017          How to take your warfarin dose     To take:  2 mg Take one of the 2 mg tablets.    To take:  3 mg Take one and a half of the 2 mg tablets.             Description          Take 2 mg today then continue same Warfarin dose and recheck in 1 month.  Yolanda Avila RN    8/9/2017  9:43 AM                    Anticoagulation Summary as of 8/9/2017     INR goal 2.0-3.0    Today's INR 3.3    Next INR check 9/6/2017          Call your Anticoagulation Clinic at Dept: [phone]   if:   1. Any medications are started, stopped, or there is a change in dose.  2. You experience any bleeding that is not easily stopped or if it is recurrent.  3. You notice an increase in bruising or any bruising that does not heal.  4. You are scheduled for surgery, colonoscopy, dental extraction or any other procedure where you may need to stop your Coumadin (warfarin).

## 2017-12-29 NOTE — PATIENT INSTRUCTIONS
Patient Information     Patient Name MRN Sukhdeep Victoria [id number] Male 1934      Patient Instructions by Shasta Restrepo RN at 2017  9:30 AM     Author:  Shasta Restrepo RN Service:  (none) Author Type:  NURS- Registered Nurse     Filed:  2017  9:58 AM Encounter Date:  2017 Status:  Signed     :  Shasta Restrepo RN (NURS- Registered Nurse)            2017 Details    Sun Mon Tue Wed Thu Fri Sat        1               2               3               4                 5               6               7               8      1 mg   See details      9      2 mg         10      2 mg         11      1 mg           12      2 mg         13      2 mg         14      2 mg         15      1 mg         16      2 mg         17      2 mg         18      1 mg           19      2 mg         20      2 mg         21      2 mg         22      1 mg         23      2 mg         24      2 mg         25      1 mg           26      2 mg         27      2 mg         28      2 mg         29      1 mg         30      2 mg            Date Details    This INR check               How to take your warfarin dose     To take:  1 mg Take half of a 2 mg tablet.    To take:  2 mg Take one of the 2 mg tablets.           2017 Details    Sun Mon Tue Wed Thu Fri Sat          1      2 mg         2      1 mg           3      2 mg         4      2 mg         5      2 mg         6      1 mg         7               8               9                 10               11               12               13               14               15               16                 17               18               19               20               21               22               23                 24               25               26               27               28               29               30                 31                      Date Details   No additional details    Date of next INR:  2017          How to take your warfarin dose     To take:  1 mg Take half of a 2 mg tablet.    To take:  2 mg Take one of the 2 mg tablets.             Description          Continue same dose and recheck in one month.  KELBY KEVIN RN ....................  11/8/2017   9:57 AM            Anticoagulation Summary as of 11/8/2017     INR goal 2.0-3.0    Today's INR 2.5    Next INR check 12/6/2017          Call your Anticoagulation Clinic at Dept: [phone]   if:   1. Any medications are started, stopped, or there is a change in dose.  2. You experience any bleeding that is not easily stopped or if it is recurrent.  3. You notice an increase in bruising or any bruising that does not heal.  4. You are scheduled for surgery, colonoscopy, dental extraction or any other procedure where you may need to stop your Coumadin (warfarin).

## 2017-12-29 NOTE — PATIENT INSTRUCTIONS
Patient Information     Patient Name MRN Sex Sukhdeep Cline [id number] Male 1934      Patient Instructions by Cesar Sanders MD at 2017 10:30 AM     Author:  Cesar Sanders MD Service:  (none) Author Type:  Physician     Filed:  2017 11:02 AM Encounter Date:  2017 Status:  Signed     :  Cesar Sanders MD (Physician)            Reduce furosemide to 20 mg every morning and every other afternoon  Daily weights. If up 2 lbs in a day or 5 lbs in a week, then take an extra 20 mg that day  Follow up in 2 months  Consider seeing surgeon for the scalp and the cardiologist for heart failure

## 2017-12-29 NOTE — PATIENT INSTRUCTIONS
Patient Information     Patient Name MRN Sukhdeep Victoria [id number] Male 1934      Patient Instructions by Yolanda Avila RN at 2017  9:00 AM     Author:  Yolanda Avila RN Service:  (none) Author Type:  NURS- Registered Nurse     Filed:  2017  9:56 AM Encounter Date:  2017 Status:  Signed     :  Yolanda Avila RN (NURS- Registered Nurse)            2017 Details    Sun Mon Tue Wed Thu Fri Sat         1               2               3                 4               5               6               7               8               9               10                 11               12               13               14      3 mg   See details      15      2 mg         16      2 mg         17      2 mg           18      2 mg         19      2 mg         20      2 mg         21      3 mg         22      2 mg         23      2 mg         24      2 mg           25      2 mg         26      2 mg         27      2 mg         28      3 mg         29      2 mg         30      2 mg           Date Details    This INR check               How to take your warfarin dose     To take:  2 mg Take one of the 2 mg tablets.    To take:  3 mg Take one and a half of the 2 mg tablets.           2017 Details    Sun Mon Tue Wed Thu Fri Sat           1      2 mg           2      2 mg         3      2 mg         4      2 mg         5      3 mg         6      2 mg         7      2 mg         8      2 mg           9      2 mg         10      2 mg         11      2 mg         12      3 mg         13               14               15                 16               17               18               19               20               21               22                 23               24               25               26               27               28               29                 30               31                     Date Details   No additional details    Date of next INR:   7/12/2017         How to take your warfarin dose     To take:  2 mg Take one of the 2 mg tablets.    To take:  3 mg Take one and a half of the 2 mg tablets.             Description          Increase dose and recheck in 4 weeks. .............Yolanda Avila RN.......... 6/14/2017  9:56 AM                    Anticoagulation Summary as of 6/14/2017     INR goal 2.0-3.0    Today's INR 1.9    Next INR check 7/12/2017          Call your Anticoagulation Clinic at Dept: [phone]   if:   1. Any medications are started, stopped, or there is a change in dose.  2. You experience any bleeding that is not easily stopped or if it is recurrent.  3. You notice an increase in bruising or any bruising that does not heal.  4. You are scheduled for surgery, colonoscopy, dental extraction or any other procedure where you may need to stop your Coumadin (warfarin).

## 2017-12-29 NOTE — DISCHARGE SUMMARY
Patient Information     Patient Name MRN Sukhdeep Victoria [id number] Male 1934      Discharge Summaries by Jerson Shannon, PT at 2017 11:57 AM     Author:  Jerson Shannon, PT Service:  (none) Author Type:  PT- Physical Therapist     Filed:  2017 11:57 AM Date of Service:  2017 11:57 AM Status:  Signed     :  Jerson Shannon PT (PT- Physical Therapist)            PT D/C Note    Patient was last in PT on 17, please see that note for more information.    Jerson Shannon, PT ....................  2017

## 2017-12-30 NOTE — NURSING NOTE
Patient Information     Patient Name MRN Sukhdeep Victoria [id number] Male 1934      Nursing Note by Precious Carrero RN at 10/20/2017 12:45 PM     Author:  Precious Carrero RN Service:  (none) Author Type:  NURS- Registered Nurse     Filed:  10/20/2017 12:52 PM Encounter Date:  10/20/2017 Status:  Signed     :  Precious Carrero RN (NURS- Registered Nurse)            Pt present today for consult for chronic systolic CHF, and chronic a-fib.  Precious Carrero RN 10/20/2017 12:47 PM

## 2017-12-30 NOTE — NURSING NOTE
Patient Information     Patient Name MRN Sex Sukhdeep Cline [id number] Male 1934      Nursing Note by Prabha Gee at 10/6/2017  2:15 PM     Author:  Prabha Gee Service:  (none) Author Type:  (none)     Filed:  10/6/2017  2:27 PM Encounter Date:  10/6/2017 Status:  Signed     :  Prabha Gee            Previous A1C is at goal of <8  HEMOGLOBIN A1C MONITORING (POCT) (%)    Date Value   2017 7.5 (H)   2014 6.5 (H)     Urine microalbumin:creatine: 2.20  Foot exam Pt is unsure  Eye exam 2017    Patient is not a current smoker  Patient is on a daily aspirin  Patient is on a Statin.  Blood pressure today of   is at the goal of <139/89 for diabetics.    Prabha Gee LPN..............10/6/2017 2:14 PM

## 2017-12-30 NOTE — NURSING NOTE
Patient Information     Patient Name MRN Sex Sukhdeep Cline [id number] Male 1934      Nursing Note by Prabha Wang RN at 10/12/2017 10:15 AM     Author:  Prabha Wang RN Service:  (none) Author Type:  NURS- Registered Nurse     Filed:  10/12/2017 11:06 AM Encounter Date:  10/12/2017 Status:  Signed     :  Prabha Wang RN (NURS- Registered Nurse)            Patient positioned in supine position, perineum area prepped with chlorhexidene Gluconate and patient draped per sterile technique. Per verbal order read back by Edgardo Sanchez MD, Urojet 10mL 2% lidocaine jelly to be instilled into urethra.  Urojet- 10ml 2% Lidocaine jelly instilled into the urethra.    Urojet 2%  Lot#: [id number]  Expiration date:   : [employer]  NDC: [address]-4    [facility] Protocol    A. Pre-procedure verification complete yes  1-relevant information / documentation available, reviewed and properly matched to the patient; 2-consent accurate and complete, 3-equipment and supplies available    B. Site marking complete N/A  Site marked if not in continuous attendance with patient    C. TIME OUT completed yes  Time Out was conducted just prior to starting procedure to verify the eight required elements: 1-patient identity, 2-consent accurate and complete, 3-position, 4-correct side/site marked (if applicable), 5-procedure, 6-relevant images / results properly labeled and displayed (if applicable), 7-antibiotics / irrigation fluids (if applicable), 8-safety precautions.    After procedure perineum area rinsed. Discharge instructions reviewed with patient. Patient verbalized understanding of discharge instructions and discharged ambulatory.

## 2017-12-30 NOTE — NURSING NOTE
Patient Information     Patient Name MRN Sukhdeep Victoria [id number] Male 1934      Nursing Note by Karen Rucker at 2017 12:30 PM     Author:  Karen Rucker Service:  (none) Author Type:  (none)     Filed:  2017 12:39 PM Encounter Date:  2017 Status:  Signed     :  Karen Rucker            Patient is here for follow up for [facility] visit and diabetes. Patient presented to [facility] for low blood sugar while visiting family.  Karen Rucker LPN .............2017  12:26 PM

## 2017-12-30 NOTE — NURSING NOTE
Patient Information     Patient Name MRN Sex Sukhdeep Cline [id number] Male 1934      Nursing Note by Precious Carrero RN at 2017  9:55 AM     Author:  Precious Carrero RN Service:  (none) Author Type:  NURS- Registered Nurse     Filed:  2017 10:26 AM Encounter Date:  2017 Status:  Signed     :  Precious Carrero RN (NURS- Registered Nurse)            Pt presents today for follow up after echo.  States has been feeling well since his last visit  Precious Carrero RN 2017 10:22 AM

## 2017-12-30 NOTE — NURSING NOTE
Patient Information     Patient Name MRN Sukhdeep Victoria [id number] Male 1934      Nursing Note by Misty Davison at 2017 10:30 AM     Author:  Misty Davison Service:  (none) Author Type:  (none)     Filed:  2017 10:40 AM Encounter Date:  2017 Status:  Signed     :  Misty Davison GIA Gomez is a 82 y.o. male presenting for a diabetic check.  Misty Davison LPN 2017 10:23 AM

## 2017-12-30 NOTE — NURSING NOTE
Patient Information     Patient Name MRN Sex Sukhdeep Cline [id number] Male 1934      Nursing Note by Precious Carrero RN at 10/20/2017 12:45 PM     Author:  Precious Carrero RN Service:  (none) Author Type:  NURS- Registered Nurse     Filed:  10/20/2017  1:09 PM Encounter Date:  10/20/2017 Status:  Signed     :  Precious Carrero RN (NURS- Registered Nurse)            Performed EKG in clinic per [facility] from Nguyen Azevedo NP.  Order sent to provider for co-sign.  Precious Carrero RN 10/20/2017 1:09 PM

## 2017-12-30 NOTE — NURSING NOTE
Patient Information     Patient Name MRN Sex Sukhdeep Cline [id number] Male 1934      Nursing Note by Ainsley Cardona at 2017  2:30 PM     Author:  Ainsley Cardona Service:  (none) Author Type:  (none)     Filed:  2017  2:22 PM Encounter Date:  2017 Status:  Signed     :  Ainsley Cardona            Here today for suture removal.  Ainsley Cardona LPN..........2017  2:19 PM

## 2018-01-01 ENCOUNTER — COMMUNICATION - GICH (OUTPATIENT)
Dept: FAMILY MEDICINE | Facility: OTHER | Age: 83
End: 2018-01-01

## 2018-01-01 ENCOUNTER — TRANSFERRED RECORDS (OUTPATIENT)
Dept: HEALTH INFORMATION MANAGEMENT | Facility: OTHER | Age: 83
End: 2018-01-01

## 2018-01-01 ENCOUNTER — APPOINTMENT (OUTPATIENT)
Dept: CARDIOLOGY | Facility: OTHER | Age: 83
DRG: 291 | End: 2018-01-01
Attending: FAMILY MEDICINE
Payer: MEDICARE

## 2018-01-01 ENCOUNTER — ALLIED HEALTH/NURSE VISIT (OUTPATIENT)
Dept: CARDIOLOGY | Facility: OTHER | Age: 83
End: 2018-01-01
Attending: FAMILY MEDICINE
Payer: MEDICARE

## 2018-01-01 ENCOUNTER — HOSPITAL ENCOUNTER (OUTPATIENT)
Dept: PHYSICAL THERAPY | Facility: OTHER | Age: 83
Setting detail: THERAPIES SERIES
End: 2018-10-03
Attending: FAMILY MEDICINE
Payer: MEDICARE

## 2018-01-01 ENCOUNTER — HOSPITAL ENCOUNTER (OUTPATIENT)
Dept: PHYSICAL THERAPY | Facility: OTHER | Age: 83
Setting detail: THERAPIES SERIES
End: 2018-09-07
Attending: FAMILY MEDICINE
Payer: MEDICARE

## 2018-01-01 ENCOUNTER — HOSPITAL ENCOUNTER (OUTPATIENT)
Dept: PHYSICAL THERAPY | Facility: OTHER | Age: 83
Setting detail: THERAPIES SERIES
End: 2018-09-14
Attending: FAMILY MEDICINE
Payer: MEDICARE

## 2018-01-01 ENCOUNTER — APPOINTMENT (OUTPATIENT)
Dept: OCCUPATIONAL THERAPY | Facility: OTHER | Age: 83
DRG: 291 | End: 2018-01-01
Attending: FAMILY MEDICINE
Payer: MEDICARE

## 2018-01-01 ENCOUNTER — ALLIED HEALTH/NURSE VISIT (OUTPATIENT)
Dept: UROLOGY | Facility: OTHER | Age: 83
End: 2018-01-01
Attending: UROLOGY
Payer: MEDICARE

## 2018-01-01 ENCOUNTER — OFFICE VISIT (OUTPATIENT)
Dept: UROLOGY | Facility: OTHER | Age: 83
End: 2018-01-01
Attending: UROLOGY
Payer: MEDICARE

## 2018-01-01 ENCOUNTER — OFFICE VISIT - GICH (OUTPATIENT)
Dept: FAMILY MEDICINE | Facility: OTHER | Age: 83
End: 2018-01-01

## 2018-01-01 ENCOUNTER — OFFICE VISIT (OUTPATIENT)
Dept: FAMILY MEDICINE | Facility: OTHER | Age: 83
End: 2018-01-01
Attending: NURSE PRACTITIONER
Payer: MEDICARE

## 2018-01-01 ENCOUNTER — TELEPHONE (OUTPATIENT)
Dept: FAMILY MEDICINE | Facility: OTHER | Age: 83
End: 2018-01-01

## 2018-01-01 ENCOUNTER — ALLIED HEALTH/NURSE VISIT (OUTPATIENT)
Dept: CARDIOLOGY | Facility: CLINIC | Age: 83
End: 2018-01-01
Attending: INTERNAL MEDICINE
Payer: MEDICARE

## 2018-01-01 ENCOUNTER — APPOINTMENT (OUTPATIENT)
Dept: PHYSICAL THERAPY | Facility: OTHER | Age: 83
DRG: 291 | End: 2018-01-01
Attending: FAMILY MEDICINE
Payer: MEDICARE

## 2018-01-01 ENCOUNTER — HOSPITAL ENCOUNTER (INPATIENT)
Facility: OTHER | Age: 83
LOS: 8 days | DRG: 291 | End: 2018-12-15
Attending: EMERGENCY MEDICINE | Admitting: FAMILY MEDICINE
Payer: MEDICARE

## 2018-01-01 ENCOUNTER — OFFICE VISIT - GICH (OUTPATIENT)
Dept: CARDIOLOGY | Facility: OTHER | Age: 83
End: 2018-01-01

## 2018-01-01 ENCOUNTER — HOSPITAL ENCOUNTER (OUTPATIENT)
Dept: PHYSICAL THERAPY | Facility: OTHER | Age: 83
Setting detail: THERAPIES SERIES
End: 2018-02-09
Attending: FAMILY MEDICINE

## 2018-01-01 ENCOUNTER — HISTORY (OUTPATIENT)
Dept: CARDIOLOGY | Facility: OTHER | Age: 83
End: 2018-01-01

## 2018-01-01 ENCOUNTER — ANTICOAGULATION THERAPY VISIT (OUTPATIENT)
Dept: ANTICOAGULATION | Facility: OTHER | Age: 83
End: 2018-01-01
Attending: FAMILY MEDICINE
Payer: MEDICARE

## 2018-01-01 ENCOUNTER — HOSPITAL ENCOUNTER (OUTPATIENT)
Dept: PHYSICAL THERAPY | Facility: OTHER | Age: 83
Setting detail: THERAPIES SERIES
End: 2018-10-05
Attending: FAMILY MEDICINE
Payer: MEDICARE

## 2018-01-01 ENCOUNTER — HOSPITAL ENCOUNTER (OUTPATIENT)
Dept: PHYSICAL THERAPY | Facility: OTHER | Age: 83
Setting detail: THERAPIES SERIES
End: 2018-08-31
Attending: FAMILY MEDICINE
Payer: MEDICARE

## 2018-01-01 ENCOUNTER — OFFICE VISIT (OUTPATIENT)
Dept: FAMILY MEDICINE | Facility: OTHER | Age: 83
End: 2018-01-01
Attending: FAMILY MEDICINE
Payer: MEDICARE

## 2018-01-01 ENCOUNTER — HOSPITAL ENCOUNTER (OUTPATIENT)
Dept: PHYSICAL THERAPY | Facility: OTHER | Age: 83
Setting detail: THERAPIES SERIES
End: 2018-11-16
Attending: FAMILY MEDICINE
Payer: MEDICARE

## 2018-01-01 ENCOUNTER — HOSPITAL ENCOUNTER (OUTPATIENT)
Dept: PHYSICAL THERAPY | Facility: OTHER | Age: 83
Setting detail: THERAPIES SERIES
End: 2018-03-14
Attending: FAMILY MEDICINE
Payer: MEDICARE

## 2018-01-01 ENCOUNTER — HOSPITAL ENCOUNTER (OUTPATIENT)
Dept: PHYSICAL THERAPY | Facility: OTHER | Age: 83
Setting detail: THERAPIES SERIES
End: 2018-04-13
Attending: FAMILY MEDICINE
Payer: MEDICARE

## 2018-01-01 ENCOUNTER — OFFICE VISIT (OUTPATIENT)
Dept: FAMILY MEDICINE | Facility: OTHER | Age: 83
End: 2018-01-01
Attending: UROLOGY
Payer: MEDICARE

## 2018-01-01 ENCOUNTER — HOSPITAL ENCOUNTER (EMERGENCY)
Facility: OTHER | Age: 83
Discharge: HOME | End: 2018-10-12
Attending: STUDENT IN AN ORGANIZED HEALTH CARE EDUCATION/TRAINING PROGRAM | Admitting: STUDENT IN AN ORGANIZED HEALTH CARE EDUCATION/TRAINING PROGRAM
Payer: MEDICARE

## 2018-01-01 ENCOUNTER — ANESTHESIA EVENT (OUTPATIENT)
Dept: SURGERY | Facility: OTHER | Age: 83
End: 2018-01-01
Payer: MEDICARE

## 2018-01-01 ENCOUNTER — APPOINTMENT (OUTPATIENT)
Dept: CT IMAGING | Facility: OTHER | Age: 83
End: 2018-01-01
Attending: FAMILY MEDICINE
Payer: MEDICARE

## 2018-01-01 ENCOUNTER — APPOINTMENT (OUTPATIENT)
Dept: GENERAL RADIOLOGY | Facility: OTHER | Age: 83
End: 2018-01-01
Attending: STUDENT IN AN ORGANIZED HEALTH CARE EDUCATION/TRAINING PROGRAM
Payer: MEDICARE

## 2018-01-01 ENCOUNTER — HISTORY (OUTPATIENT)
Dept: FAMILY MEDICINE | Facility: OTHER | Age: 83
End: 2018-01-01

## 2018-01-01 ENCOUNTER — DOCUMENTATION ONLY (OUTPATIENT)
Dept: FAMILY MEDICINE | Facility: OTHER | Age: 83
End: 2018-01-01

## 2018-01-01 ENCOUNTER — APPOINTMENT (OUTPATIENT)
Dept: GENERAL RADIOLOGY | Facility: OTHER | Age: 83
End: 2018-01-01
Attending: FAMILY MEDICINE
Payer: MEDICARE

## 2018-01-01 ENCOUNTER — HOSPITAL ENCOUNTER (OUTPATIENT)
Facility: OTHER | Age: 83
Discharge: HOME | End: 2018-05-22
Attending: UROLOGY | Admitting: UROLOGY
Payer: MEDICARE

## 2018-01-01 ENCOUNTER — HOSPITAL ENCOUNTER (OUTPATIENT)
Dept: PHYSICAL THERAPY | Facility: OTHER | Age: 83
Setting detail: THERAPIES SERIES
End: 2018-03-28
Attending: INTERNAL MEDICINE
Payer: MEDICARE

## 2018-01-01 ENCOUNTER — HOSPITAL ENCOUNTER (OUTPATIENT)
Facility: OTHER | Age: 83
Setting detail: OBSERVATION
Discharge: HOME | End: 2018-10-09
Attending: FAMILY MEDICINE | Admitting: FAMILY MEDICINE
Payer: MEDICARE

## 2018-01-01 ENCOUNTER — ANESTHESIA (OUTPATIENT)
Dept: SURGERY | Facility: OTHER | Age: 83
End: 2018-01-01
Payer: MEDICARE

## 2018-01-01 ENCOUNTER — HOSPITAL ENCOUNTER (OUTPATIENT)
Dept: PHYSICAL THERAPY | Facility: OTHER | Age: 83
Setting detail: THERAPIES SERIES
End: 2018-04-27
Attending: FAMILY MEDICINE
Payer: MEDICARE

## 2018-01-01 ENCOUNTER — HOSPITAL ENCOUNTER (OUTPATIENT)
Dept: PHYSICAL THERAPY | Facility: OTHER | Age: 83
Setting detail: THERAPIES SERIES
End: 2018-03-02
Attending: INTERNAL MEDICINE
Payer: MEDICARE

## 2018-01-01 ENCOUNTER — HOSPITAL ENCOUNTER (OUTPATIENT)
Facility: OTHER | Age: 83
Discharge: HOME | End: 2018-05-08
Attending: UROLOGY | Admitting: UROLOGY
Payer: MEDICARE

## 2018-01-01 ENCOUNTER — ANTICOAGULATION - GICH (OUTPATIENT)
Dept: INTERNAL MEDICINE | Facility: OTHER | Age: 83
End: 2018-01-01

## 2018-01-01 ENCOUNTER — HOSPITAL ENCOUNTER (OUTPATIENT)
Dept: CT IMAGING | Facility: OTHER | Age: 83
Discharge: HOME | End: 2018-05-17
Attending: UROLOGY | Admitting: UROLOGY
Payer: MEDICARE

## 2018-01-01 ENCOUNTER — SURGERY (OUTPATIENT)
Age: 83
End: 2018-01-01

## 2018-01-01 ENCOUNTER — ANTICOAGULATION THERAPY VISIT (OUTPATIENT)
Dept: ANTICOAGULATION | Facility: OTHER | Age: 83
End: 2018-01-01
Attending: INTERNAL MEDICINE
Payer: MEDICARE

## 2018-01-01 ENCOUNTER — OFFICE VISIT (OUTPATIENT)
Dept: FAMILY MEDICINE | Facility: OTHER | Age: 83
End: 2018-01-01
Attending: PHYSICIAN ASSISTANT
Payer: MEDICARE

## 2018-01-01 ENCOUNTER — HOSPITAL ENCOUNTER (OUTPATIENT)
Dept: PHYSICAL THERAPY | Facility: OTHER | Age: 83
Setting detail: THERAPIES SERIES
End: 2018-04-11
Attending: FAMILY MEDICINE
Payer: MEDICARE

## 2018-01-01 ENCOUNTER — HOSPITAL ENCOUNTER (OUTPATIENT)
Dept: PHYSICAL THERAPY | Facility: OTHER | Age: 83
Setting detail: THERAPIES SERIES
End: 2018-03-26
Attending: FAMILY MEDICINE
Payer: MEDICARE

## 2018-01-01 ENCOUNTER — HOSPITAL ENCOUNTER (OUTPATIENT)
Dept: PHYSICAL THERAPY | Facility: OTHER | Age: 83
Setting detail: THERAPIES SERIES
End: 2018-08-29
Attending: FAMILY MEDICINE
Payer: MEDICARE

## 2018-01-01 ENCOUNTER — HOSPITAL ENCOUNTER (OUTPATIENT)
Dept: CARDIOLOGY | Facility: OTHER | Age: 83
Discharge: HOME | End: 2018-04-09
Attending: NURSE PRACTITIONER | Admitting: NURSE PRACTITIONER
Payer: MEDICARE

## 2018-01-01 ENCOUNTER — HOSPITAL ENCOUNTER (OUTPATIENT)
Dept: PHYSICAL THERAPY | Facility: OTHER | Age: 83
Setting detail: THERAPIES SERIES
End: 2018-09-28
Attending: FAMILY MEDICINE
Payer: MEDICARE

## 2018-01-01 ENCOUNTER — HOSPITAL ENCOUNTER (OUTPATIENT)
Dept: PHYSICAL THERAPY | Facility: OTHER | Age: 83
Setting detail: THERAPIES SERIES
End: 2018-08-10
Attending: FAMILY MEDICINE
Payer: MEDICARE

## 2018-01-01 ENCOUNTER — HOSPITAL ENCOUNTER (OUTPATIENT)
Dept: PHYSICAL THERAPY | Facility: OTHER | Age: 83
Setting detail: THERAPIES SERIES
End: 2018-02-23
Attending: INTERNAL MEDICINE
Payer: MEDICARE

## 2018-01-01 ENCOUNTER — HOSPITAL ENCOUNTER (OUTPATIENT)
Dept: PHYSICAL THERAPY | Facility: OTHER | Age: 83
Setting detail: THERAPIES SERIES
End: 2018-10-24
Attending: FAMILY MEDICINE
Payer: MEDICARE

## 2018-01-01 ENCOUNTER — HOSPITAL ENCOUNTER (OUTPATIENT)
Dept: PHYSICAL THERAPY | Facility: OTHER | Age: 83
Setting detail: THERAPIES SERIES
End: 2018-03-07
Attending: INTERNAL MEDICINE
Payer: MEDICARE

## 2018-01-01 ENCOUNTER — HOSPITAL ENCOUNTER (OUTPATIENT)
Dept: PHYSICAL THERAPY | Facility: OTHER | Age: 83
Setting detail: THERAPIES SERIES
End: 2018-09-26
Attending: FAMILY MEDICINE
Payer: MEDICARE

## 2018-01-01 ENCOUNTER — APPOINTMENT (OUTPATIENT)
Dept: GENERAL RADIOLOGY | Facility: OTHER | Age: 83
DRG: 291 | End: 2018-01-01
Attending: PHYSICIAN ASSISTANT
Payer: MEDICARE

## 2018-01-01 ENCOUNTER — APPOINTMENT (OUTPATIENT)
Dept: GENERAL RADIOLOGY | Facility: OTHER | Age: 83
End: 2018-01-01
Attending: EMERGENCY MEDICINE
Payer: MEDICARE

## 2018-01-01 ENCOUNTER — ANTICOAGULATION THERAPY VISIT (OUTPATIENT)
Dept: ANTICOAGULATION | Facility: OTHER | Age: 83
End: 2018-01-01
Payer: MEDICARE

## 2018-01-01 ENCOUNTER — HOSPITAL ENCOUNTER (OUTPATIENT)
Dept: PHYSICAL THERAPY | Facility: OTHER | Age: 83
Setting detail: THERAPIES SERIES
End: 2018-04-18
Attending: FAMILY MEDICINE
Payer: MEDICARE

## 2018-01-01 ENCOUNTER — HOSPITAL ENCOUNTER (OUTPATIENT)
Dept: PHYSICAL THERAPY | Facility: OTHER | Age: 83
Setting detail: THERAPIES SERIES
End: 2018-10-19
Attending: FAMILY MEDICINE
Payer: MEDICARE

## 2018-01-01 ENCOUNTER — HOSPITAL ENCOUNTER (OUTPATIENT)
Dept: PHYSICAL THERAPY | Facility: OTHER | Age: 83
Setting detail: THERAPIES SERIES
End: 2018-11-14
Attending: FAMILY MEDICINE
Payer: MEDICARE

## 2018-01-01 ENCOUNTER — HOSPITAL ENCOUNTER (EMERGENCY)
Facility: OTHER | Age: 83
Discharge: HOME | End: 2018-09-17
Attending: STUDENT IN AN ORGANIZED HEALTH CARE EDUCATION/TRAINING PROGRAM | Admitting: STUDENT IN AN ORGANIZED HEALTH CARE EDUCATION/TRAINING PROGRAM
Payer: MEDICARE

## 2018-01-01 ENCOUNTER — HOSPITAL ENCOUNTER (OUTPATIENT)
Dept: PHYSICAL THERAPY | Facility: OTHER | Age: 83
Setting detail: THERAPIES SERIES
End: 2018-02-21
Attending: INTERNAL MEDICINE
Payer: MEDICARE

## 2018-01-01 ENCOUNTER — HOSPITAL ENCOUNTER (OUTPATIENT)
Dept: PHYSICAL THERAPY | Facility: OTHER | Age: 83
Setting detail: THERAPIES SERIES
End: 2018-03-23
Attending: FAMILY MEDICINE
Payer: MEDICARE

## 2018-01-01 ENCOUNTER — OFFICE VISIT (OUTPATIENT)
Dept: CARDIOLOGY | Facility: OTHER | Age: 83
End: 2018-01-01
Attending: STUDENT IN AN ORGANIZED HEALTH CARE EDUCATION/TRAINING PROGRAM
Payer: MEDICARE

## 2018-01-01 ENCOUNTER — HOSPITAL ENCOUNTER (OUTPATIENT)
Dept: PHYSICAL THERAPY | Facility: OTHER | Age: 83
Setting detail: THERAPIES SERIES
End: 2018-07-20
Attending: FAMILY MEDICINE
Payer: MEDICARE

## 2018-01-01 ENCOUNTER — HOSPITAL ENCOUNTER (OUTPATIENT)
Dept: PHYSICAL THERAPY | Facility: OTHER | Age: 83
Setting detail: THERAPIES SERIES
End: 2018-10-31
Attending: FAMILY MEDICINE
Payer: MEDICARE

## 2018-01-01 ENCOUNTER — HOSPITAL ENCOUNTER (OUTPATIENT)
Dept: PHYSICAL THERAPY | Facility: OTHER | Age: 83
Setting detail: THERAPIES SERIES
End: 2018-03-09
Attending: INTERNAL MEDICINE
Payer: MEDICARE

## 2018-01-01 ENCOUNTER — HOSPITAL ENCOUNTER (OUTPATIENT)
Dept: PHYSICAL THERAPY | Facility: OTHER | Age: 83
Setting detail: THERAPIES SERIES
End: 2018-01-24
Attending: FAMILY MEDICINE

## 2018-01-01 ENCOUNTER — HOSPITAL ENCOUNTER (OUTPATIENT)
Dept: PHYSICAL THERAPY | Facility: OTHER | Age: 83
Setting detail: THERAPIES SERIES
End: 2018-01-26
Attending: FAMILY MEDICINE

## 2018-01-01 ENCOUNTER — HOSPITAL ENCOUNTER (OUTPATIENT)
Dept: PHYSICAL THERAPY | Facility: OTHER | Age: 83
Setting detail: THERAPIES SERIES
End: 2018-08-24
Attending: FAMILY MEDICINE
Payer: MEDICARE

## 2018-01-01 ENCOUNTER — HISTORY (OUTPATIENT)
Dept: EMERGENCY MEDICINE | Facility: OTHER | Age: 83
End: 2018-01-01

## 2018-01-01 ENCOUNTER — HOSPITAL ENCOUNTER (OUTPATIENT)
Dept: PHYSICAL THERAPY | Facility: OTHER | Age: 83
Setting detail: THERAPIES SERIES
End: 2018-03-21
Attending: FAMILY MEDICINE
Payer: MEDICARE

## 2018-01-01 ENCOUNTER — HOSPITAL ENCOUNTER (OUTPATIENT)
Dept: PHYSICAL THERAPY | Facility: OTHER | Age: 83
Setting detail: THERAPIES SERIES
End: 2018-03-12
Attending: FAMILY MEDICINE
Payer: MEDICARE

## 2018-01-01 ENCOUNTER — HOSPITAL ENCOUNTER (OUTPATIENT)
Dept: PHYSICAL THERAPY | Facility: OTHER | Age: 83
Setting detail: THERAPIES SERIES
End: 2018-09-12
Attending: FAMILY MEDICINE
Payer: MEDICARE

## 2018-01-01 ENCOUNTER — HOSPITAL ENCOUNTER (OUTPATIENT)
Dept: PHYSICAL THERAPY | Facility: OTHER | Age: 83
Setting detail: THERAPIES SERIES
End: 2018-01-19
Attending: FAMILY MEDICINE

## 2018-01-01 ENCOUNTER — HOSPITAL ENCOUNTER (OUTPATIENT)
Dept: PHYSICAL THERAPY | Facility: OTHER | Age: 83
Setting detail: THERAPIES SERIES
End: 2018-02-28
Attending: INTERNAL MEDICINE
Payer: MEDICARE

## 2018-01-01 ENCOUNTER — HOSPITAL ENCOUNTER (OUTPATIENT)
Dept: PHYSICAL THERAPY | Facility: OTHER | Age: 83
Setting detail: THERAPIES SERIES
End: 2018-11-02
Attending: FAMILY MEDICINE
Payer: MEDICARE

## 2018-01-01 ENCOUNTER — OFFICE VISIT (OUTPATIENT)
Dept: FAMILY MEDICINE | Facility: OTHER | Age: 83
DRG: 291 | End: 2018-01-01
Attending: FAMILY MEDICINE
Payer: MEDICARE

## 2018-01-01 ENCOUNTER — HOSPITAL ENCOUNTER (OUTPATIENT)
Dept: PHYSICAL THERAPY | Facility: OTHER | Age: 83
Setting detail: THERAPIES SERIES
End: 2018-04-04
Attending: FAMILY MEDICINE
Payer: MEDICARE

## 2018-01-01 ENCOUNTER — HOSPITAL ENCOUNTER (OUTPATIENT)
Dept: PHYSICAL THERAPY | Facility: OTHER | Age: 83
Setting detail: THERAPIES SERIES
End: 2018-04-25
Attending: FAMILY MEDICINE
Payer: MEDICARE

## 2018-01-01 ENCOUNTER — ANTICOAGULATION THERAPY VISIT (OUTPATIENT)
Dept: ANTICOAGULATION | Facility: OTHER | Age: 83
DRG: 291 | End: 2018-01-01
Attending: FAMILY MEDICINE
Payer: MEDICARE

## 2018-01-01 ENCOUNTER — TELEPHONE (OUTPATIENT)
Dept: ANTICOAGULATION | Facility: OTHER | Age: 83
End: 2018-01-01

## 2018-01-01 ENCOUNTER — HOSPITAL ENCOUNTER (OUTPATIENT)
Dept: PHYSICAL THERAPY | Facility: OTHER | Age: 83
Setting detail: THERAPIES SERIES
End: 2018-02-07
Attending: FAMILY MEDICINE
Payer: MEDICARE

## 2018-01-01 ENCOUNTER — HOSPITAL ENCOUNTER (EMERGENCY)
Facility: OTHER | Age: 83
Discharge: HOME | End: 2018-05-27
Attending: EMERGENCY MEDICINE | Admitting: EMERGENCY MEDICINE
Payer: MEDICARE

## 2018-01-01 ENCOUNTER — HOSPITAL ENCOUNTER (OUTPATIENT)
Dept: PHYSICAL THERAPY | Facility: OTHER | Age: 83
Setting detail: THERAPIES SERIES
End: 2018-11-21
Attending: FAMILY MEDICINE
Payer: MEDICARE

## 2018-01-01 ENCOUNTER — MEDICAL CORRESPONDENCE (OUTPATIENT)
Dept: CARDIOLOGY | Facility: CLINIC | Age: 83
End: 2018-01-01
Payer: MEDICARE

## 2018-01-01 VITALS
HEIGHT: 66 IN | SYSTOLIC BLOOD PRESSURE: 134 MMHG | WEIGHT: 172 LBS | HEART RATE: 80 BPM | DIASTOLIC BLOOD PRESSURE: 80 MMHG | BODY MASS INDEX: 27.64 KG/M2

## 2018-01-01 VITALS — RESPIRATION RATE: 12 BRPM | HEART RATE: 80 BPM

## 2018-01-01 VITALS
DIASTOLIC BLOOD PRESSURE: 82 MMHG | BODY MASS INDEX: 25.66 KG/M2 | RESPIRATION RATE: 12 BRPM | HEART RATE: 66 BPM | BODY MASS INDEX: 27.32 KG/M2 | WEIGHT: 159 LBS | SYSTOLIC BLOOD PRESSURE: 128 MMHG | DIASTOLIC BLOOD PRESSURE: 60 MMHG | WEIGHT: 158 LBS | HEART RATE: 68 BPM | WEIGHT: 170 LBS | BODY MASS INDEX: 25.5 KG/M2 | SYSTOLIC BLOOD PRESSURE: 100 MMHG | HEART RATE: 44 BPM

## 2018-01-01 VITALS
WEIGHT: 155 LBS | HEIGHT: 66 IN | DIASTOLIC BLOOD PRESSURE: 80 MMHG | BODY MASS INDEX: 24.91 KG/M2 | SYSTOLIC BLOOD PRESSURE: 122 MMHG | RESPIRATION RATE: 16 BRPM | TEMPERATURE: 97.5 F

## 2018-01-01 VITALS
OXYGEN SATURATION: 100 % | RESPIRATION RATE: 16 BRPM | TEMPERATURE: 98 F | DIASTOLIC BLOOD PRESSURE: 64 MMHG | SYSTOLIC BLOOD PRESSURE: 103 MMHG

## 2018-01-01 VITALS
DIASTOLIC BLOOD PRESSURE: 64 MMHG | HEART RATE: 66 BPM | BODY MASS INDEX: 26.36 KG/M2 | WEIGHT: 164 LBS | SYSTOLIC BLOOD PRESSURE: 122 MMHG

## 2018-01-01 VITALS
SYSTOLIC BLOOD PRESSURE: 130 MMHG | WEIGHT: 158 LBS | HEART RATE: 66 BPM | BODY MASS INDEX: 25.66 KG/M2 | HEART RATE: 68 BPM | DIASTOLIC BLOOD PRESSURE: 80 MMHG | HEART RATE: 64 BPM | SYSTOLIC BLOOD PRESSURE: 130 MMHG | DIASTOLIC BLOOD PRESSURE: 78 MMHG | WEIGHT: 158 LBS | SYSTOLIC BLOOD PRESSURE: 126 MMHG | RESPIRATION RATE: 16 BRPM | WEIGHT: 159 LBS | BODY MASS INDEX: 25.5 KG/M2 | DIASTOLIC BLOOD PRESSURE: 64 MMHG | BODY MASS INDEX: 25.5 KG/M2

## 2018-01-01 VITALS
BODY MASS INDEX: 29.36 KG/M2 | TEMPERATURE: 97 F | RESPIRATION RATE: 24 BRPM | DIASTOLIC BLOOD PRESSURE: 58 MMHG | OXYGEN SATURATION: 92 % | SYSTOLIC BLOOD PRESSURE: 95 MMHG | HEART RATE: 82 BPM | WEIGHT: 181.88 LBS

## 2018-01-01 VITALS
TEMPERATURE: 96.3 F | BODY MASS INDEX: 24.91 KG/M2 | WEIGHT: 155 LBS | DIASTOLIC BLOOD PRESSURE: 80 MMHG | HEIGHT: 66 IN | HEART RATE: 60 BPM | SYSTOLIC BLOOD PRESSURE: 102 MMHG

## 2018-01-01 VITALS — RESPIRATION RATE: 20 BRPM | HEART RATE: 70 BPM | TEMPERATURE: 97.1 F | WEIGHT: 160 LBS | BODY MASS INDEX: 25.71 KG/M2

## 2018-01-01 VITALS
HEART RATE: 75 BPM | TEMPERATURE: 95.9 F | BODY MASS INDEX: 24.05 KG/M2 | SYSTOLIC BLOOD PRESSURE: 111 MMHG | DIASTOLIC BLOOD PRESSURE: 66 MMHG | OXYGEN SATURATION: 99 % | WEIGHT: 149 LBS | RESPIRATION RATE: 14 BRPM

## 2018-01-01 VITALS
HEIGHT: 66 IN | SYSTOLIC BLOOD PRESSURE: 125 MMHG | WEIGHT: 155 LBS | TEMPERATURE: 97.2 F | RESPIRATION RATE: 16 BRPM | TEMPERATURE: 98.1 F | DIASTOLIC BLOOD PRESSURE: 69 MMHG | BODY MASS INDEX: 24.91 KG/M2 | OXYGEN SATURATION: 99 % | HEART RATE: 64 BPM

## 2018-01-01 VITALS
SYSTOLIC BLOOD PRESSURE: 140 MMHG | RESPIRATION RATE: 16 BRPM | HEART RATE: 82 BPM | TEMPERATURE: 97.2 F | OXYGEN SATURATION: 99 % | BODY MASS INDEX: 25.3 KG/M2 | DIASTOLIC BLOOD PRESSURE: 59 MMHG | HEIGHT: 66 IN | WEIGHT: 157.41 LBS

## 2018-01-01 VITALS
DIASTOLIC BLOOD PRESSURE: 60 MMHG | HEIGHT: 66 IN | SYSTOLIC BLOOD PRESSURE: 130 MMHG | BODY MASS INDEX: 26.47 KG/M2 | SYSTOLIC BLOOD PRESSURE: 115 MMHG | DIASTOLIC BLOOD PRESSURE: 68 MMHG | HEART RATE: 64 BPM | HEART RATE: 58 BPM | BODY MASS INDEX: 25.88 KG/M2 | HEART RATE: 64 BPM | WEIGHT: 172 LBS | DIASTOLIC BLOOD PRESSURE: 80 MMHG | BODY MASS INDEX: 27.76 KG/M2 | SYSTOLIC BLOOD PRESSURE: 118 MMHG | WEIGHT: 161 LBS | WEIGHT: 164 LBS

## 2018-01-01 VITALS
HEART RATE: 67 BPM | SYSTOLIC BLOOD PRESSURE: 119 MMHG | HEIGHT: 65 IN | RESPIRATION RATE: 14 BRPM | DIASTOLIC BLOOD PRESSURE: 64 MMHG | TEMPERATURE: 96.7 F | OXYGEN SATURATION: 99 % | BODY MASS INDEX: 24.99 KG/M2 | WEIGHT: 150 LBS

## 2018-01-01 VITALS
HEART RATE: 68 BPM | DIASTOLIC BLOOD PRESSURE: 53 MMHG | BODY MASS INDEX: 25.23 KG/M2 | HEIGHT: 66 IN | WEIGHT: 157 LBS | SYSTOLIC BLOOD PRESSURE: 78 MMHG

## 2018-01-01 VITALS — HEART RATE: 80 BPM | RESPIRATION RATE: 16 BRPM

## 2018-01-01 VITALS
DIASTOLIC BLOOD PRESSURE: 78 MMHG | BODY MASS INDEX: 25.23 KG/M2 | SYSTOLIC BLOOD PRESSURE: 126 MMHG | WEIGHT: 157 LBS | HEART RATE: 68 BPM

## 2018-01-01 VITALS — WEIGHT: 150 LBS | RESPIRATION RATE: 16 BRPM | HEIGHT: 66 IN | BODY MASS INDEX: 24.11 KG/M2 | HEART RATE: 76 BPM

## 2018-01-01 VITALS — HEART RATE: 64 BPM | RESPIRATION RATE: 14 BRPM | TEMPERATURE: 96.8 F

## 2018-01-01 VITALS
DIASTOLIC BLOOD PRESSURE: 56 MMHG | OXYGEN SATURATION: 97 % | SYSTOLIC BLOOD PRESSURE: 96 MMHG | HEIGHT: 66 IN | WEIGHT: 153 LBS | BODY MASS INDEX: 24.59 KG/M2 | HEART RATE: 82 BPM

## 2018-01-01 VITALS
DIASTOLIC BLOOD PRESSURE: 80 MMHG | HEART RATE: 64 BPM | WEIGHT: 159 LBS | SYSTOLIC BLOOD PRESSURE: 110 MMHG | BODY MASS INDEX: 25.66 KG/M2

## 2018-01-01 VITALS
TEMPERATURE: 97.1 F | DIASTOLIC BLOOD PRESSURE: 70 MMHG | RESPIRATION RATE: 16 BRPM | HEART RATE: 68 BPM | SYSTOLIC BLOOD PRESSURE: 110 MMHG

## 2018-01-01 VITALS
DIASTOLIC BLOOD PRESSURE: 84 MMHG | BODY MASS INDEX: 27.77 KG/M2 | HEART RATE: 56 BPM | WEIGHT: 172 LBS | SYSTOLIC BLOOD PRESSURE: 124 MMHG

## 2018-01-01 VITALS
WEIGHT: 150 LBS | SYSTOLIC BLOOD PRESSURE: 108 MMHG | HEART RATE: 80 BPM | DIASTOLIC BLOOD PRESSURE: 68 MMHG | BODY MASS INDEX: 24.21 KG/M2 | RESPIRATION RATE: 16 BRPM

## 2018-01-01 VITALS — RESPIRATION RATE: 14 BRPM | TEMPERATURE: 96.7 F | HEART RATE: 60 BPM

## 2018-01-01 VITALS — HEART RATE: 60 BPM | SYSTOLIC BLOOD PRESSURE: 102 MMHG | DIASTOLIC BLOOD PRESSURE: 60 MMHG | RESPIRATION RATE: 16 BRPM

## 2018-01-01 VITALS — RESPIRATION RATE: 16 BRPM | HEART RATE: 68 BPM | DIASTOLIC BLOOD PRESSURE: 78 MMHG | SYSTOLIC BLOOD PRESSURE: 130 MMHG

## 2018-01-01 VITALS
WEIGHT: 155 LBS | RESPIRATION RATE: 20 BRPM | BODY MASS INDEX: 25.02 KG/M2 | SYSTOLIC BLOOD PRESSURE: 98 MMHG | DIASTOLIC BLOOD PRESSURE: 65 MMHG | OXYGEN SATURATION: 98 % | TEMPERATURE: 97.4 F

## 2018-01-01 VITALS — SYSTOLIC BLOOD PRESSURE: 60 MMHG | DIASTOLIC BLOOD PRESSURE: 50 MMHG | HEART RATE: 68 BPM

## 2018-01-01 VITALS — BODY MASS INDEX: 27.92 KG/M2 | WEIGHT: 173 LBS | HEART RATE: 76 BPM | RESPIRATION RATE: 18 BRPM

## 2018-01-01 VITALS
HEART RATE: 68 BPM | HEIGHT: 66 IN | SYSTOLIC BLOOD PRESSURE: 104 MMHG | TEMPERATURE: 96.3 F | DIASTOLIC BLOOD PRESSURE: 62 MMHG

## 2018-01-01 VITALS — HEART RATE: 66 BPM | DIASTOLIC BLOOD PRESSURE: 76 MMHG | RESPIRATION RATE: 18 BRPM | SYSTOLIC BLOOD PRESSURE: 124 MMHG

## 2018-01-01 VITALS — RESPIRATION RATE: 16 BRPM | HEIGHT: 66 IN | BODY MASS INDEX: 24.11 KG/M2 | WEIGHT: 150 LBS

## 2018-01-01 VITALS — SYSTOLIC BLOOD PRESSURE: 96 MMHG | DIASTOLIC BLOOD PRESSURE: 78 MMHG | HEART RATE: 60 BPM

## 2018-01-01 VITALS — WEIGHT: 172.6 LBS | HEART RATE: 76 BPM

## 2018-01-01 VITALS
DIASTOLIC BLOOD PRESSURE: 63 MMHG | WEIGHT: 151.5 LBS | SYSTOLIC BLOOD PRESSURE: 103 MMHG | BODY MASS INDEX: 24.35 KG/M2 | HEART RATE: 72 BPM

## 2018-01-01 VITALS — HEART RATE: 82 BPM | SYSTOLIC BLOOD PRESSURE: 110 MMHG | DIASTOLIC BLOOD PRESSURE: 70 MMHG | RESPIRATION RATE: 16 BRPM

## 2018-01-01 DIAGNOSIS — Z79.01 LONG TERM CURRENT USE OF ANTICOAGULANT: ICD-10-CM

## 2018-01-01 DIAGNOSIS — I50.22 CHRONIC SYSTOLIC CONGESTIVE HEART FAILURE (H): ICD-10-CM

## 2018-01-01 DIAGNOSIS — R41.89 DECREASED RESPONSIVENESS: ICD-10-CM

## 2018-01-01 DIAGNOSIS — I10 ESSENTIAL HYPERTENSION: Primary | ICD-10-CM

## 2018-01-01 DIAGNOSIS — I50.22 CHRONIC SYSTOLIC HEART FAILURE (H): ICD-10-CM

## 2018-01-01 DIAGNOSIS — I10 ESSENTIAL (PRIMARY) HYPERTENSION: ICD-10-CM

## 2018-01-01 DIAGNOSIS — M62.81 MUSCLE WEAKNESS (GENERALIZED): ICD-10-CM

## 2018-01-01 DIAGNOSIS — I48.91 ATRIAL FIBRILLATION, UNSPECIFIED TYPE (H): ICD-10-CM

## 2018-01-01 DIAGNOSIS — R33.9 URINARY RETENTION: Primary | ICD-10-CM

## 2018-01-01 DIAGNOSIS — E11.8 DIABETES MELLITUS WITH MULTIPLE COMPLICATIONS (H): ICD-10-CM

## 2018-01-01 DIAGNOSIS — M79.652 PAIN OF LEFT THIGH: ICD-10-CM

## 2018-01-01 DIAGNOSIS — Z95.810 PRESENCE OF AUTOMATIC IMPLANTABLE CARDIOVERTER-DEFIBRILLATOR: ICD-10-CM

## 2018-01-01 DIAGNOSIS — E11.22 TYPE 2 DIABETES MELLITUS WITH DIABETIC CHRONIC KIDNEY DISEASE (H): ICD-10-CM

## 2018-01-01 DIAGNOSIS — R33.9 URINARY RETENTION: ICD-10-CM

## 2018-01-01 DIAGNOSIS — C61 PROSTATE CA (H): Primary | ICD-10-CM

## 2018-01-01 DIAGNOSIS — E78.2 MIXED HYPERLIPIDEMIA: ICD-10-CM

## 2018-01-01 DIAGNOSIS — S51.812A FOREARM LACERATION, LEFT, INITIAL ENCOUNTER: ICD-10-CM

## 2018-01-01 DIAGNOSIS — Z01.818 PRE-OP EXAM: Primary | ICD-10-CM

## 2018-01-01 DIAGNOSIS — R73.9 HYPERGLYCEMIA: Primary | ICD-10-CM

## 2018-01-01 DIAGNOSIS — I25.5 ISCHEMIC CARDIOMYOPATHY: Primary | ICD-10-CM

## 2018-01-01 DIAGNOSIS — I48.0 PAROXYSMAL ATRIAL FIBRILLATION (H): ICD-10-CM

## 2018-01-01 DIAGNOSIS — N39.0 URINARY TRACT INFECTION ASSOCIATED WITH CYSTOSTOMY CATHETER, INITIAL ENCOUNTER (H): ICD-10-CM

## 2018-01-01 DIAGNOSIS — E11.649 HYPOGLYCEMIA ASSOCIATED WITH DIABETES (H): Primary | ICD-10-CM

## 2018-01-01 DIAGNOSIS — I48.91 ATRIAL FIBRILLATION (H): ICD-10-CM

## 2018-01-01 DIAGNOSIS — R79.1 SUPRATHERAPEUTIC INR: ICD-10-CM

## 2018-01-01 DIAGNOSIS — E78.2 MIXED HYPERLIPIDEMIA: Primary | ICD-10-CM

## 2018-01-01 DIAGNOSIS — I10 ESSENTIAL HYPERTENSION: ICD-10-CM

## 2018-01-01 DIAGNOSIS — M1A.3720 CHRONIC GOUT DUE TO RENAL IMPAIRMENT, LEFT ANKLE AND FOOT, WITHOUT TOPHUS (TOPHI): ICD-10-CM

## 2018-01-01 DIAGNOSIS — N35.919 STRICTURE OF MALE URETHRA: ICD-10-CM

## 2018-01-01 DIAGNOSIS — N18.6 TYPE 2 DIABETES MELLITUS WITH ESRD (END-STAGE RENAL DISEASE) (H): ICD-10-CM

## 2018-01-01 DIAGNOSIS — N18.4 CKD (CHRONIC KIDNEY DISEASE) STAGE 4, GFR 15-29 ML/MIN (H): ICD-10-CM

## 2018-01-01 DIAGNOSIS — Z87.448 HISTORY OF URETHRAL STRICTURE: ICD-10-CM

## 2018-01-01 DIAGNOSIS — R79.89 ELEVATED TROPONIN: ICD-10-CM

## 2018-01-01 DIAGNOSIS — S51.812A SKIN TEAR OF LEFT FOREARM WITHOUT COMPLICATION, INITIAL ENCOUNTER: Primary | ICD-10-CM

## 2018-01-01 DIAGNOSIS — E11.8 DIABETES MELLITUS WITH MULTIPLE COMPLICATIONS (H): Primary | ICD-10-CM

## 2018-01-01 DIAGNOSIS — E11.9 INSULIN DEPENDENT TYPE 2 DIABETES MELLITUS (H): ICD-10-CM

## 2018-01-01 DIAGNOSIS — I77.810 THORACIC AORTIC ECTASIA (H): ICD-10-CM

## 2018-01-01 DIAGNOSIS — I48.91 ATRIAL FIBRILLATION (H): Primary | ICD-10-CM

## 2018-01-01 DIAGNOSIS — E11.42 TYPE 2 DIABETES MELLITUS WITH DIABETIC POLYNEUROPATHY (H): ICD-10-CM

## 2018-01-01 DIAGNOSIS — I50.9 ACUTE ON CHRONIC CONGESTIVE HEART FAILURE, UNSPECIFIED HEART FAILURE TYPE (H): ICD-10-CM

## 2018-01-01 DIAGNOSIS — Z79.01 LONG TERM (CURRENT) USE OF ANTICOAGULANTS: ICD-10-CM

## 2018-01-01 DIAGNOSIS — Z79.01 ANTICOAGULATION GOAL OF INR 2 TO 3: ICD-10-CM

## 2018-01-01 DIAGNOSIS — I50.22 CHRONIC SYSTOLIC HEART FAILURE (H): Primary | ICD-10-CM

## 2018-01-01 DIAGNOSIS — T83.511A URINARY TRACT INFECTION ASSOCIATED WITH INDWELLING URETHRAL CATHETER, INITIAL ENCOUNTER (H): Primary | ICD-10-CM

## 2018-01-01 DIAGNOSIS — Z79.4 CONTROLLED TYPE 2 DIABETES MELLITUS WITH DIABETIC POLYNEUROPATHY, WITH LONG-TERM CURRENT USE OF INSULIN (H): ICD-10-CM

## 2018-01-01 DIAGNOSIS — Z79.01 LONG TERM CURRENT USE OF ANTICOAGULANT THERAPY: ICD-10-CM

## 2018-01-01 DIAGNOSIS — N48.1 BALANITIS: Primary | ICD-10-CM

## 2018-01-01 DIAGNOSIS — N18.30 CHRONIC KIDNEY DISEASE, STAGE III (MODERATE) (H): ICD-10-CM

## 2018-01-01 DIAGNOSIS — R79.89 ELEVATED TROPONIN: Primary | ICD-10-CM

## 2018-01-01 DIAGNOSIS — Z79.4 ENCOUNTER FOR LONG-TERM (CURRENT) USE OF INSULIN (H): ICD-10-CM

## 2018-01-01 DIAGNOSIS — Z79.4 INSULIN DEPENDENT TYPE 2 DIABETES MELLITUS (H): ICD-10-CM

## 2018-01-01 DIAGNOSIS — S20.212A CHEST WALL CONTUSION, LEFT, INITIAL ENCOUNTER: ICD-10-CM

## 2018-01-01 DIAGNOSIS — C61 PROSTATE CA (H): ICD-10-CM

## 2018-01-01 DIAGNOSIS — E16.2 HYPOGLYCEMIA: ICD-10-CM

## 2018-01-01 DIAGNOSIS — Z01.818 PRE-OP EXAM: ICD-10-CM

## 2018-01-01 DIAGNOSIS — Z95.1 PRESENCE OF AORTOCORONARY BYPASS GRAFT: ICD-10-CM

## 2018-01-01 DIAGNOSIS — Z79.01 CHRONIC ANTICOAGULATION: ICD-10-CM

## 2018-01-01 DIAGNOSIS — I48.20 CHRONIC ATRIAL FIBRILLATION (H): ICD-10-CM

## 2018-01-01 DIAGNOSIS — I50.23 ACUTE ON CHRONIC SYSTOLIC HEART FAILURE (H): ICD-10-CM

## 2018-01-01 DIAGNOSIS — I48.91 ATRIAL FIBRILLATION, UNSPECIFIED TYPE (H): Primary | ICD-10-CM

## 2018-01-01 DIAGNOSIS — I25.2 OLD MYOCARDIAL INFARCTION: ICD-10-CM

## 2018-01-01 DIAGNOSIS — Z95.5 PRESENCE OF CORONARY ANGIOPLASTY IMPLANT AND GRAFT: ICD-10-CM

## 2018-01-01 DIAGNOSIS — L03.115 CELLULITIS OF RIGHT LOWER EXTREMITY: ICD-10-CM

## 2018-01-01 DIAGNOSIS — N18.4 CHRONIC KIDNEY DISEASE, STAGE IV (SEVERE) (H): ICD-10-CM

## 2018-01-01 DIAGNOSIS — T83.510A URINARY TRACT INFECTION ASSOCIATED WITH CYSTOSTOMY CATHETER, INITIAL ENCOUNTER (H): ICD-10-CM

## 2018-01-01 DIAGNOSIS — N18.30 CKD (CHRONIC KIDNEY DISEASE) STAGE 3, GFR 30-59 ML/MIN (H): ICD-10-CM

## 2018-01-01 DIAGNOSIS — I77.810 ASCENDING AORTA DILATATION (H): ICD-10-CM

## 2018-01-01 DIAGNOSIS — Z51.81 ANTICOAGULATION GOAL OF INR 2 TO 3: ICD-10-CM

## 2018-01-01 DIAGNOSIS — I25.5 ISCHEMIC CARDIOMYOPATHY: ICD-10-CM

## 2018-01-01 DIAGNOSIS — B02.30 HERPES ZOSTER OPHTHALMICUS, LEFT EYE: ICD-10-CM

## 2018-01-01 DIAGNOSIS — I13.0 MALIGNANT HYPERTENSIVE HEART AND RENAL DISEASE WITH RENAL FAILURE, STAGE 1 THROUGH STAGE 4 OR UNSPECIFIED CHRONIC KIDNEY DISEASE, WITH HEART FAILURE (H): ICD-10-CM

## 2018-01-01 DIAGNOSIS — M62.81 GENERALIZED MUSCLE WEAKNESS: Primary | ICD-10-CM

## 2018-01-01 DIAGNOSIS — L03.116 CELLULITIS OF LEG, LEFT: Primary | ICD-10-CM

## 2018-01-01 DIAGNOSIS — E11.42 CONTROLLED TYPE 2 DIABETES MELLITUS WITH DIABETIC POLYNEUROPATHY, WITH LONG-TERM CURRENT USE OF INSULIN (H): ICD-10-CM

## 2018-01-01 DIAGNOSIS — B02.30 HERPES ZOSTER WITH OPHTHALMIC COMPLICATION, UNSPECIFIED HERPES ZOSTER EYE DISEASE: Primary | ICD-10-CM

## 2018-01-01 DIAGNOSIS — N39.0 URINARY TRACT INFECTION ASSOCIATED WITH INDWELLING URETHRAL CATHETER, INITIAL ENCOUNTER (H): Primary | ICD-10-CM

## 2018-01-01 DIAGNOSIS — E11.22 TYPE 2 DIABETES MELLITUS WITH ESRD (END-STAGE RENAL DISEASE) (H): ICD-10-CM

## 2018-01-01 DIAGNOSIS — Z79.4 LONG TERM CURRENT USE OF INSULIN (H): ICD-10-CM

## 2018-01-01 DIAGNOSIS — I25.10 CORONARY ARTERY DISEASE INVOLVING NATIVE HEART WITHOUT ANGINA PECTORIS, UNSPECIFIED VESSEL OR LESION TYPE: ICD-10-CM

## 2018-01-01 DIAGNOSIS — E11.8 TYPE 2 DIABETES MELLITUS WITH COMPLICATIONS (H): ICD-10-CM

## 2018-01-01 LAB
ALBUMIN SERPL-MCNC: 2.7 G/DL (ref 3.5–5.7)
ALBUMIN SERPL-MCNC: 2.9 G/DL (ref 3.5–5.7)
ALBUMIN SERPL-MCNC: 3 G/DL (ref 3.5–5.7)
ALBUMIN UR-MCNC: 100 MG/DL
ALBUMIN UR-MCNC: 30 MG/DL
ALBUMIN UR-MCNC: NEGATIVE MG/DL
ALP SERPL-CCNC: 108 U/L (ref 34–104)
ALP SERPL-CCNC: 121 U/L (ref 34–104)
ALP SERPL-CCNC: 97 U/L (ref 34–104)
ALT SERPL W P-5'-P-CCNC: 15 U/L (ref 7–52)
ALT SERPL W P-5'-P-CCNC: 21 U/L (ref 7–52)
ALT SERPL W P-5'-P-CCNC: 22 U/L (ref 7–52)
AMORPH CRY #/AREA URNS HPF: ABNORMAL /HPF
ANION GAP COMMENT - HISTORICAL: ABNORMAL
ANION GAP SERPL CALCULATED.3IONS-SCNC: 10 MMOL/L (ref 3–14)
ANION GAP SERPL CALCULATED.3IONS-SCNC: 11 MMOL/L (ref 3–14)
ANION GAP SERPL CALCULATED.3IONS-SCNC: 11 MMOL/L (ref 3–14)
ANION GAP SERPL CALCULATED.3IONS-SCNC: 16 MMOL/L (ref 3–14)
ANION GAP SERPL CALCULATED.3IONS-SCNC: 7 MMOL/L (ref 3–14)
ANION GAP SERPL CALCULATED.3IONS-SCNC: 8 MMOL/L (ref 3–14)
ANION GAP SERPL CALCULATED.3IONS-SCNC: 8 MMOL/L (ref 3–14)
ANION GAP SERPL CALCULATED.3IONS-SCNC: 9 MMOL/L (ref 3–14)
APPEARANCE UR: ABNORMAL
APPEARANCE UR: ABNORMAL
APPEARANCE UR: CLEAR
APTT PPP: 38 SEC (ref 29–50)
AST SERPL W P-5'-P-CCNC: 23 U/L (ref 13–39)
AST SERPL W P-5'-P-CCNC: 24 U/L (ref 13–39)
AST SERPL W P-5'-P-CCNC: 30 U/L (ref 13–39)
BACTERIA #/AREA URNS HPF: ABNORMAL /HPF
BACTERIA SPEC CULT: NORMAL
BASOPHILS # BLD AUTO: 0 10E9/L (ref 0–0.2)
BASOPHILS NFR BLD AUTO: 0.2 %
BASOPHILS NFR BLD AUTO: 0.3 %
BILIRUB SERPL-MCNC: 0.4 MG/DL (ref 0.3–1)
BILIRUB UR QL STRIP: NEGATIVE
BUN SERPL-MCNC: 48 MG/DL (ref 7–25)
BUN SERPL-MCNC: 48 MG/DL (ref 7–25)
BUN SERPL-MCNC: 49 MG/DL (ref 7–25)
BUN SERPL-MCNC: 51 MG/DL (ref 7–25)
BUN SERPL-MCNC: 52 MG/DL (ref 7–25)
BUN SERPL-MCNC: 55 MG/DL (ref 7–25)
BUN SERPL-MCNC: 56 MG/DL (ref 7–25)
BUN SERPL-MCNC: 60 MG/DL (ref 7–25)
BUN SERPL-MCNC: 62 MG/DL (ref 7–25)
BUN SERPL-MCNC: 63 MG/DL (ref 7–25)
BUN SERPL-MCNC: 65 MG/DL (ref 7–25)
BUN SERPL-MCNC: 70 MG/DL (ref 7–25)
BUN SERPL-MCNC: 72 MG/DL (ref 7–25)
BUN SERPL-MCNC: 74 MG/DL (ref 7–25)
BUN SERPL-MCNC: 76 MG/DL (ref 7–25)
BUN/CREAT RATIO - HISTORICAL: 29
CALCIUM SERPL-MCNC: 7.8 MG/DL (ref 8.6–10.3)
CALCIUM SERPL-MCNC: 7.9 MG/DL (ref 8.6–10.3)
CALCIUM SERPL-MCNC: 8 MG/DL (ref 8.6–10.3)
CALCIUM SERPL-MCNC: 8 MG/DL (ref 8.6–10.3)
CALCIUM SERPL-MCNC: 8.1 MG/DL (ref 8.6–10.3)
CALCIUM SERPL-MCNC: 8.2 MG/DL (ref 8.6–10.3)
CALCIUM SERPL-MCNC: 8.2 MG/DL (ref 8.6–10.3)
CALCIUM SERPL-MCNC: 8.3 MG/DL (ref 8.6–10.3)
CALCIUM SERPL-MCNC: 8.4 MG/DL (ref 8.6–10.3)
CALCIUM SERPL-MCNC: 8.4 MG/DL (ref 8.6–10.3)
CALCIUM SERPL-MCNC: 8.5 MG/DL (ref 8.6–10.3)
CALCIUM SERPL-MCNC: 8.6 MG/DL (ref 8.6–10.3)
CHLORIDE SERPL-SCNC: 102 MMOL/L (ref 98–107)
CHLORIDE SERPL-SCNC: 104 MMOL/L (ref 98–107)
CHLORIDE SERPL-SCNC: 105 MMOL/L (ref 98–107)
CHLORIDE SERPL-SCNC: 106 MMOL/L (ref 98–107)
CHLORIDE SERPL-SCNC: 108 MMOL/L (ref 98–107)
CHLORIDE SERPL-SCNC: 109 MMOL/L (ref 98–107)
CHLORIDE SERPL-SCNC: 110 MMOL/L (ref 98–107)
CHLORIDE SERPL-SCNC: 110 MMOL/L (ref 98–107)
CHLORIDE SERPL-SCNC: 112 MMOL/L (ref 98–107)
CHLORIDE SERPL-SCNC: 113 MMOL/L (ref 98–107)
CHLORIDE SERPL-SCNC: 113 MMOL/L (ref 98–107)
CHLORIDE SERPL-SCNC: 114 MMOL/L (ref 98–107)
CHLORIDE SERPL-SCNC: 115 MMOL/L (ref 98–107)
CHLORIDE SERPLBLD-SCNC: 116 MMOL/L (ref 98–107)
CHOLEST SERPL-MCNC: 76 MG/DL
CK SERPL-CCNC: 55 U/L (ref 30–223)
CO2 SERPL-SCNC: 14 MMOL/L (ref 21–31)
CO2 SERPL-SCNC: 16 MMOL/L (ref 21–31)
CO2 SERPL-SCNC: 17 MMOL/L (ref 21–31)
CO2 SERPL-SCNC: 17 MMOL/L (ref 21–31)
CO2 SERPL-SCNC: 18 MMOL/L (ref 21–31)
CO2 SERPL-SCNC: 19 MMOL/L (ref 21–31)
CO2 SERPL-SCNC: 19 MMOL/L (ref 21–31)
CO2 SERPL-SCNC: 20 MMOL/L (ref 21–31)
CO2 SERPL-SCNC: 20 MMOL/L (ref 21–31)
CO2 SERPL-SCNC: 21 MMOL/L (ref 21–31)
CO2 SERPL-SCNC: 22 MMOL/L (ref 21–31)
CO2 SERPL-SCNC: 23 MMOL/L (ref 21–31)
CO2 SERPL-SCNC: 23 MMOL/L (ref 21–31)
COLOR UR AUTO: YELLOW
CREAT SERPL-MCNC: 1.38 MG/DL (ref 0.7–1.3)
CREAT SERPL-MCNC: 1.52 MG/DL (ref 0.7–1.3)
CREAT SERPL-MCNC: 1.53 MG/DL (ref 0.7–1.3)
CREAT SERPL-MCNC: 1.59 MG/DL (ref 0.7–1.3)
CREAT SERPL-MCNC: 1.66 MG/DL (ref 0.7–1.3)
CREAT SERPL-MCNC: 1.91 MG/DL (ref 0.7–1.3)
CREAT SERPL-MCNC: 1.96 MG/DL (ref 0.7–1.3)
CREAT SERPL-MCNC: 1.98 MG/DL (ref 0.7–1.3)
CREAT SERPL-MCNC: 2.11 MG/DL (ref 0.7–1.3)
CREAT SERPL-MCNC: 2.16 MG/DL (ref 0.7–1.3)
CREAT SERPL-MCNC: 2.21 MG/DL (ref 0.7–1.3)
CREAT SERPL-MCNC: 2.28 MG/DL (ref 0.7–1.3)
CREAT SERPL-MCNC: 2.3 MG/DL (ref 0.7–1.3)
CREAT SERPL-MCNC: 2.45 MG/DL (ref 0.7–1.3)
CREAT SERPL-MCNC: 2.61 MG/DL (ref 0.7–1.3)
CREAT SERPL-MCNC: 3.12 MG/DL (ref 0.7–1.3)
CREAT SERPL-MCNC: 3.41 MG/DL (ref 0.7–1.3)
CRP SERPL-MCNC: 2.6 MG/L
D DIMER PPP DDU-MCNC: 292 NG/ML D-DU (ref 0–230)
DIFFERENTIAL METHOD BLD: ABNORMAL
EOSINOPHIL # BLD AUTO: 0 10E9/L (ref 0–0.7)
EOSINOPHIL # BLD AUTO: 0.1 10E9/L (ref 0–0.7)
EOSINOPHIL # BLD AUTO: 0.2 10E9/L (ref 0–0.7)
EOSINOPHIL NFR BLD AUTO: 0.3 %
EOSINOPHIL NFR BLD AUTO: 1 %
EOSINOPHIL NFR BLD AUTO: 1.1 %
EOSINOPHIL NFR BLD AUTO: 1.5 %
EOSINOPHIL NFR BLD AUTO: 1.7 %
ERYTHROCYTE [DISTWIDTH] IN BLOOD BY AUTOMATED COUNT: 14.4 % (ref 10–15)
ERYTHROCYTE [DISTWIDTH] IN BLOOD BY AUTOMATED COUNT: 15.2 % (ref 10–15)
ERYTHROCYTE [DISTWIDTH] IN BLOOD BY AUTOMATED COUNT: 16.5 % (ref 10–15)
ERYTHROCYTE [DISTWIDTH] IN BLOOD BY AUTOMATED COUNT: 16.6 % (ref 10–15)
ERYTHROCYTE [DISTWIDTH] IN BLOOD BY AUTOMATED COUNT: 16.6 % (ref 10–15)
ERYTHROCYTE [DISTWIDTH] IN BLOOD BY AUTOMATED COUNT: 16.7 % (ref 10–15)
ERYTHROCYTE [DISTWIDTH] IN BLOOD BY AUTOMATED COUNT: 16.7 % (ref 10–15)
ERYTHROCYTE [DISTWIDTH] IN BLOOD BY AUTOMATED COUNT: 16.8 % (ref 10–15)
ERYTHROCYTE [DISTWIDTH] IN BLOOD BY AUTOMATED COUNT: 17.9 % (ref 10–15)
ERYTHROCYTE [DISTWIDTH] IN BLOOD BY AUTOMATED COUNT: 18.1 % (ref 10–15)
ERYTHROCYTE [DISTWIDTH] IN BLOOD BY AUTOMATED COUNT: 18.2 % (ref 10–15)
ERYTHROCYTE [DISTWIDTH] IN BLOOD BY AUTOMATED COUNT: 18.7 % (ref 10–15)
ESTIMATED AVERAGE GLUCOSE: 143 MG/DL
FERRITIN SERPL-MCNC: 99 NG/ML (ref 23.9–336.2)
FOLATE SERPL-MCNC: 14.4 NG/ML
GFR IF NOT AFRICAN AMERICAN - HISTORICAL: 29 ML/MIN/1.73M2
GFR SERPL CREATININE-BSD FRML MDRD: 17 ML/MIN/1.7M2
GFR SERPL CREATININE-BSD FRML MDRD: 19 ML/MIN/1.7M2
GFR SERPL CREATININE-BSD FRML MDRD: 24 ML/MIN/1.7M2
GFR SERPL CREATININE-BSD FRML MDRD: 25 ML/MIN/1.7M2
GFR SERPL CREATININE-BSD FRML MDRD: 27 ML/MIN/1.7M2
GFR SERPL CREATININE-BSD FRML MDRD: 28 ML/MIN/1.7M2
GFR SERPL CREATININE-BSD FRML MDRD: 29 ML/MIN/1.7M2
GFR SERPL CREATININE-BSD FRML MDRD: 30 ML/MIN/1.7M2
GFR SERPL CREATININE-BSD FRML MDRD: 32 ML/MIN/1.7M2
GFR SERPL CREATININE-BSD FRML MDRD: 33 ML/MIN/1.7M2
GFR SERPL CREATININE-BSD FRML MDRD: 34 ML/MIN/1.7M2
GFR SERPL CREATININE-BSD FRML MDRD: 40 ML/MIN/1.7M2
GFR SERPL CREATININE-BSD FRML MDRD: 42 ML/MIN/1.7M2
GFR SERPL CREATININE-BSD FRML MDRD: 44 ML/MIN/1.7M2
GFR SERPL CREATININE-BSD FRML MDRD: 44 ML/MIN/1.7M2
GFR SERPL CREATININE-BSD FRML MDRD: 49 ML/MIN/1.7M2
GLUCOSE SERPL-MCNC: 101 MG/DL (ref 70–105)
GLUCOSE SERPL-MCNC: 115 MG/DL (ref 70–105)
GLUCOSE SERPL-MCNC: 120 MG/DL (ref 70–105)
GLUCOSE SERPL-MCNC: 129 MG/DL (ref 70–105)
GLUCOSE SERPL-MCNC: 132 MG/DL (ref 70–105)
GLUCOSE SERPL-MCNC: 146 MG/DL (ref 70–105)
GLUCOSE SERPL-MCNC: 149 MG/DL (ref 70–105)
GLUCOSE SERPL-MCNC: 155 MG/DL (ref 70–105)
GLUCOSE SERPL-MCNC: 160 MG/DL (ref 70–105)
GLUCOSE SERPL-MCNC: 166 MG/DL (ref 70–105)
GLUCOSE SERPL-MCNC: 200 MG/DL (ref 70–105)
GLUCOSE SERPL-MCNC: 232 MG/DL (ref 70–105)
GLUCOSE SERPL-MCNC: 247 MG/DL (ref 70–105)
GLUCOSE SERPL-MCNC: 250 MG/DL (ref 70–105)
GLUCOSE SERPL-MCNC: 52 MG/DL (ref 70–105)
GLUCOSE SERPL-MCNC: 69 MG/DL (ref 70–105)
GLUCOSE SERPL-MCNC: 88 MG/DL (ref 70–105)
GLUCOSE UR STRIP-MCNC: NEGATIVE MG/DL
HBA1C MFR BLD: 5.9 % (ref 4–6)
HBA1C MFR BLD: 6.8 % (ref 4–6)
HCO3 BLD-SCNC: 18 MMOL/L (ref 22–28)
HCT VFR BLD AUTO: 25.1 % (ref 40–53)
HCT VFR BLD AUTO: 26 % (ref 40–53)
HCT VFR BLD AUTO: 26.1 % (ref 40–53)
HCT VFR BLD AUTO: 26.3 % (ref 40–53)
HCT VFR BLD AUTO: 26.6 % (ref 40–53)
HCT VFR BLD AUTO: 26.6 % (ref 40–53)
HCT VFR BLD AUTO: 28.6 % (ref 40–53)
HCT VFR BLD AUTO: 28.7 % (ref 40–53)
HCT VFR BLD AUTO: 29.6 % (ref 40–53)
HCT VFR BLD AUTO: 29.8 % (ref 40–53)
HCT VFR BLD AUTO: 30.5 % (ref 40–53)
HCT VFR BLD AUTO: 31.6 % (ref 40–53)
HCT VFR BLD AUTO: 32 % (ref 40–53)
HCT VFR BLD AUTO: 33.1 % (ref 40–53)
HDLC SERPL-MCNC: 32 MG/DL (ref 23–92)
HEMOGLOBIN A1C MONITORING (POCT) - HISTORICAL: 6.6 % (ref 4–6.2)
HGB BLD-MCNC: 10.2 G/DL (ref 13.3–17.7)
HGB BLD-MCNC: 10.5 G/DL (ref 13.3–17.7)
HGB BLD-MCNC: 7.8 G/DL (ref 13.3–17.7)
HGB BLD-MCNC: 8.2 G/DL (ref 13.3–17.7)
HGB BLD-MCNC: 8.3 G/DL (ref 13.3–17.7)
HGB BLD-MCNC: 8.4 G/DL (ref 13.3–17.7)
HGB BLD-MCNC: 8.9 G/DL (ref 13.3–17.7)
HGB BLD-MCNC: 9 G/DL (ref 13.3–17.7)
HGB BLD-MCNC: 9.1 G/DL (ref 13.3–17.7)
HGB BLD-MCNC: 9.2 G/DL (ref 13.3–17.7)
HGB BLD-MCNC: 9.7 G/DL (ref 13.3–17.7)
HGB UR QL STRIP: ABNORMAL
IMM GRANULOCYTES # BLD: 0.1 10E9/L (ref 0–0.4)
IMM GRANULOCYTES NFR BLD: 0.5 %
IMM GRANULOCYTES NFR BLD: 0.6 %
IMM GRANULOCYTES NFR BLD: 0.7 %
IMM GRANULOCYTES NFR BLD: 1.1 %
IMM GRANULOCYTES NFR BLD: 1.6 %
INR - HISTORICAL: 1.8
INR - HISTORICAL: 2.9
INR POINT OF CARE: 1.4 (ref 2–3)
INR POINT OF CARE: 1.5 (ref 0.86–1.14)
INR POINT OF CARE: 1.6 (ref 2–3)
INR POINT OF CARE: 1.7 (ref 0.86–1.14)
INR POINT OF CARE: 1.8 (ref 0.86–1.14)
INR POINT OF CARE: 1.9 (ref 2–3)
INR POINT OF CARE: 2 (ref 2–3)
INR POINT OF CARE: 2.2 (ref 2–3)
INR POINT OF CARE: 2.3 (ref 2–3)
INR POINT OF CARE: 2.4 (ref 0.86–1.14)
INR POINT OF CARE: 2.5 (ref 0.86–1.14)
INR POINT OF CARE: 2.9 (ref 0.86–1.14)
INR POINT OF CARE: 3.1 (ref 0.86–1.14)
INR POINT OF CARE: 3.1 (ref 0.86–1.14)
INR POINT OF CARE: 3.3 (ref 0.86–1.14)
INR POINT OF CARE: 4.5 (ref 0.86–1.14)
INR POINT OF CARE: >8 (ref 2–3)
INR PPP: 1.48 (ref 0–1.3)
INR PPP: 1.68 (ref 0–1.3)
INR PPP: 1.9 (ref 0–1.3)
INR PPP: 2.15 (ref 0–1.3)
INR PPP: 2.25 (ref 0–1.3)
INR PPP: 2.37 (ref 0–1.3)
INR PPP: 2.53 (ref 0–1.3)
INR PPP: 2.87 (ref 0–1.3)
INR PPP: 2.88 (ref 0–1.3)
INR PPP: 2.99 (ref 0–1.3)
INR PPP: 3.13 (ref 0–1.3)
INR PPP: 3.5 (ref 0–1.3)
INR PPP: 3.53 (ref 0–1.3)
INR PPP: 3.77 (ref 0–1.3)
INR PPP: 4.19 (ref 0–1.3)
IRON SATN MFR SERPL: 7 % (ref 20–55)
IRON SERPL-MCNC: 15 UG/DL (ref 50–212)
KETONES UR STRIP-MCNC: NEGATIVE MG/DL
LACTATE SERPL-SCNC: 1.4 MMOL/L (ref 0.5–2.2)
LDLC SERPL CALC-MCNC: 30 MG/DL
LEUKOCYTE ESTERASE UR QL STRIP: ABNORMAL
LYMPHOCYTES # BLD AUTO: 0.4 10E9/L (ref 0.8–5.3)
LYMPHOCYTES # BLD AUTO: 0.5 10E9/L (ref 0.8–5.3)
LYMPHOCYTES # BLD AUTO: 0.6 10E9/L (ref 0.8–5.3)
LYMPHOCYTES # BLD AUTO: 0.9 10E9/L (ref 0.8–5.3)
LYMPHOCYTES # BLD AUTO: 1 10E9/L (ref 0.8–5.3)
LYMPHOCYTES NFR BLD AUTO: 12.7 %
LYMPHOCYTES NFR BLD AUTO: 3.8 %
LYMPHOCYTES NFR BLD AUTO: 5.7 %
LYMPHOCYTES NFR BLD AUTO: 6.7 %
LYMPHOCYTES NFR BLD AUTO: 8.6 %
MCH RBC QN AUTO: 30.2 PG (ref 26.5–33)
MCH RBC QN AUTO: 30.2 PG (ref 26.5–33)
MCH RBC QN AUTO: 30.3 PG (ref 26.5–33)
MCH RBC QN AUTO: 30.3 PG (ref 26.5–33)
MCH RBC QN AUTO: 30.4 PG (ref 26.5–33)
MCH RBC QN AUTO: 30.5 PG (ref 26.5–33)
MCH RBC QN AUTO: 30.6 PG (ref 26.5–33)
MCH RBC QN AUTO: 30.7 PG (ref 26.5–33)
MCH RBC QN AUTO: 31.8 PG (ref 26.5–33)
MCH RBC QN AUTO: 32 PG (ref 26.5–33)
MCH RBC QN AUTO: 32.3 PG (ref 26.5–33)
MCH RBC QN AUTO: 32.4 PG (ref 26.5–33)
MCH RBC QN AUTO: 32.6 PG (ref 26.5–33)
MCH RBC QN AUTO: 32.6 PG (ref 26.5–33)
MCHC RBC AUTO-ENTMCNC: 30.4 G/DL (ref 31.5–36.5)
MCHC RBC AUTO-ENTMCNC: 30.9 G/DL (ref 31.5–36.5)
MCHC RBC AUTO-ENTMCNC: 31 G/DL (ref 31.5–36.5)
MCHC RBC AUTO-ENTMCNC: 31.1 G/DL (ref 31.5–36.5)
MCHC RBC AUTO-ENTMCNC: 31.2 G/DL (ref 31.5–36.5)
MCHC RBC AUTO-ENTMCNC: 31.5 G/DL (ref 31.5–36.5)
MCHC RBC AUTO-ENTMCNC: 31.6 G/DL (ref 31.5–36.5)
MCHC RBC AUTO-ENTMCNC: 31.6 G/DL (ref 31.5–36.5)
MCHC RBC AUTO-ENTMCNC: 31.7 G/DL (ref 31.5–36.5)
MCHC RBC AUTO-ENTMCNC: 31.8 G/DL (ref 31.5–36.5)
MCHC RBC AUTO-ENTMCNC: 32.3 G/DL (ref 31.5–36.5)
MCHC RBC AUTO-ENTMCNC: 32.8 G/DL (ref 31.5–36.5)
MCV RBC AUTO: 100 FL (ref 78–100)
MCV RBC AUTO: 100 FL (ref 78–100)
MCV RBC AUTO: 102 FL (ref 78–100)
MCV RBC AUTO: 102 FL (ref 78–100)
MCV RBC AUTO: 103 FL (ref 78–100)
MCV RBC AUTO: 96 FL (ref 78–100)
MCV RBC AUTO: 96 FL (ref 78–100)
MCV RBC AUTO: 97 FL (ref 78–100)
MCV RBC AUTO: 98 FL (ref 78–100)
MCV RBC AUTO: 99 FL (ref 78–100)
MONOCYTES # BLD AUTO: 0.6 10E9/L (ref 0–1.3)
MONOCYTES # BLD AUTO: 0.6 10E9/L (ref 0–1.3)
MONOCYTES # BLD AUTO: 0.7 10E9/L (ref 0–1.3)
MONOCYTES NFR BLD AUTO: 6.6 %
MONOCYTES NFR BLD AUTO: 6.9 %
MONOCYTES NFR BLD AUTO: 7.2 %
MONOCYTES NFR BLD AUTO: 7.7 %
MONOCYTES NFR BLD AUTO: 7.8 %
NEUTROPHILS # BLD AUTO: 6.2 10E9/L (ref 1.6–8.3)
NEUTROPHILS # BLD AUTO: 6.7 10E9/L (ref 1.6–8.3)
NEUTROPHILS # BLD AUTO: 7.9 10E9/L (ref 1.6–8.3)
NEUTROPHILS # BLD AUTO: 8.2 10E9/L (ref 1.6–8.3)
NEUTROPHILS # BLD AUTO: 8.8 10E9/L (ref 1.6–8.3)
NEUTROPHILS NFR BLD AUTO: 78 %
NEUTROPHILS NFR BLD AUTO: 82.2 %
NEUTROPHILS NFR BLD AUTO: 83.6 %
NEUTROPHILS NFR BLD AUTO: 83.9 %
NEUTROPHILS NFR BLD AUTO: 87.4 %
NITRATE UR QL: NEGATIVE
NITRATE UR QL: NEGATIVE
NITRATE UR QL: POSITIVE
NON-SQ EPI CELLS #/AREA URNS LPF: ABNORMAL /LPF
NONHDLC SERPL-MCNC: 44 MG/DL
NT-PROBNP SERPL-MCNC: 642 PG/ML (ref 0–100)
O2/TOTAL GAS SETTING VFR VENT: 0 %
OXYHGB MFR BLD: 97 %
PCO2 BLD: 32 MM HG (ref 35–45)
PH BLD: 7.37 PH (ref 7.35–7.45)
PH UR STRIP: 5.5 PH (ref 5–7)
PH UR STRIP: 7 PH (ref 5–9)
PH UR STRIP: 8.5 PH (ref 5–9)
PLATELET # BLD AUTO: 168 10E9/L (ref 150–450)
PLATELET # BLD AUTO: 173 10E9/L (ref 150–450)
PLATELET # BLD AUTO: 177 10E9/L (ref 150–450)
PLATELET # BLD AUTO: 178 10E9/L (ref 150–450)
PLATELET # BLD AUTO: 180 10E9/L (ref 150–450)
PLATELET # BLD AUTO: 181 10E9/L (ref 150–450)
PLATELET # BLD AUTO: 188 10E9/L (ref 150–450)
PLATELET # BLD AUTO: 191 10E9/L (ref 150–450)
PLATELET # BLD AUTO: 200 10E9/L (ref 150–450)
PLATELET # BLD AUTO: 203 10E9/L (ref 150–450)
PLATELET # BLD AUTO: 206 10E9/L (ref 150–450)
PLATELET # BLD AUTO: 217 10E9/L (ref 150–450)
PLATELET # BLD AUTO: 231 10E9/L (ref 150–450)
PLATELET # BLD AUTO: 232 10E9/L (ref 150–450)
PO2 BLD: 124 MM HG (ref 83–108)
POTASSIUM SERPL-SCNC: 3.7 MMOL/L (ref 3.5–5.1)
POTASSIUM SERPL-SCNC: 3.9 MMOL/L (ref 3.5–5.1)
POTASSIUM SERPL-SCNC: 4 MMOL/L (ref 3.5–5.1)
POTASSIUM SERPL-SCNC: 4.1 MMOL/L (ref 3.5–5.1)
POTASSIUM SERPL-SCNC: 4.1 MMOL/L (ref 3.5–5.1)
POTASSIUM SERPL-SCNC: 4.2 MMOL/L (ref 3.5–5.1)
POTASSIUM SERPL-SCNC: 4.3 MMOL/L (ref 3.5–5.1)
POTASSIUM SERPL-SCNC: 4.4 MMOL/L (ref 3.5–5.1)
POTASSIUM SERPL-SCNC: 4.5 MMOL/L (ref 3.5–5.1)
POTASSIUM SERPL-SCNC: 4.6 MMOL/L (ref 3.5–5.1)
POTASSIUM SERPL-SCNC: 4.8 MMOL/L (ref 3.5–5.1)
POTASSIUM SERPL-SCNC: 4.9 MMOL/L (ref 3.5–5.1)
POTASSIUM SERPL-SCNC: 5 MMOL/L (ref 3.5–5.1)
PROT SERPL-MCNC: 5.3 G/DL (ref 6.4–8.9)
PROT SERPL-MCNC: 5.5 G/DL (ref 6.4–8.9)
PROT SERPL-MCNC: 5.8 G/DL (ref 6.4–8.9)
PSA SERPL-MCNC: 1.28 NG/ML
RBC # BLD AUTO: 2.58 10E12/L (ref 4.4–5.9)
RBC # BLD AUTO: 2.6 10E12/L (ref 4.4–5.9)
RBC # BLD AUTO: 2.7 10E12/L (ref 4.4–5.9)
RBC # BLD AUTO: 2.71 10E12/L (ref 4.4–5.9)
RBC # BLD AUTO: 2.72 10E12/L (ref 4.4–5.9)
RBC # BLD AUTO: 2.74 10E12/L (ref 4.4–5.9)
RBC # BLD AUTO: 2.79 10E12/L (ref 4.4–5.9)
RBC # BLD AUTO: 2.9 10E12/L (ref 4.4–5.9)
RBC # BLD AUTO: 2.97 10E12/L (ref 4.4–5.9)
RBC # BLD AUTO: 2.98 10E12/L (ref 4.4–5.9)
RBC # BLD AUTO: 3.05 10E12/L (ref 4.4–5.9)
RBC # BLD AUTO: 3.19 10E12/L (ref 4.4–5.9)
RBC # BLD AUTO: 3.24 10E12/L (ref 4.4–5.9)
RBC # BLD AUTO: 3.3 10E12/L (ref 4.4–5.9)
RBC #/AREA URNS AUTO: ABNORMAL /HPF
SODIUM SERPL-SCNC: 130 MMOL/L (ref 133–143)
SODIUM SERPL-SCNC: 132 MMOL/L (ref 134–144)
SODIUM SERPL-SCNC: 132 MMOL/L (ref 134–144)
SODIUM SERPL-SCNC: 134 MMOL/L (ref 134–144)
SODIUM SERPL-SCNC: 135 MMOL/L (ref 134–144)
SODIUM SERPL-SCNC: 136 MMOL/L (ref 134–144)
SODIUM SERPL-SCNC: 137 MMOL/L (ref 134–144)
SODIUM SERPL-SCNC: 137 MMOL/L (ref 134–144)
SODIUM SERPL-SCNC: 138 MMOL/L (ref 134–144)
SODIUM SERPL-SCNC: 138 MMOL/L (ref 134–144)
SODIUM SERPL-SCNC: 139 MMOL/L (ref 134–144)
SODIUM SERPL-SCNC: 140 MMOL/L (ref 134–144)
SODIUM SERPL-SCNC: 140 MMOL/L (ref 134–144)
SODIUM SERPL-SCNC: 141 MMOL/L (ref 134–144)
SODIUM SERPL-SCNC: 142 MMOL/L (ref 134–144)
SOURCE: ABNORMAL
SP GR UR STRIP: 1.01 (ref 1–1.03)
SPECIMEN SOURCE: NORMAL
TIBC SERPL-MCNC: 200.2 UG/DL (ref 245–400)
TRIGL SERPL-MCNC: 72 MG/DL
TROPONIN I SERPL-MCNC: 0.09 UG/L (ref 0–0.03)
TROPONIN I SERPL-MCNC: 0.12 UG/L (ref 0–0.03)
TROPONIN I SERPL-MCNC: 0.12 UG/L (ref 0–0.03)
TROPONIN I SERPL-MCNC: 0.13 UG/L (ref 0–0.03)
TROPONIN I SERPL-MCNC: 0.13 UG/L (ref 0–0.03)
TROPONIN I SERPL-MCNC: 0.14 UG/L (ref 0–0.03)
TROPONIN I SERPL-MCNC: 0.17 UG/L (ref 0–0.03)
UIBC (UNSATURATED): 185.2 MG/DL
URATE SERPL-MCNC: 5 MG/DL (ref 4.4–7.6)
UROBILINOGEN UR STRIP-ACNC: 0.2 EU/DL (ref 0.2–1)
VIT B12 SERPL-MCNC: 431 PG/ML (ref 180–914)
WBC # BLD AUTO: 10.1 10E9/L (ref 4–11)
WBC # BLD AUTO: 10.2 10E9/L (ref 4–11)
WBC # BLD AUTO: 10.7 10E9/L (ref 4–11)
WBC # BLD AUTO: 7.9 10E9/L (ref 4–11)
WBC # BLD AUTO: 8 10E9/L (ref 4–11)
WBC # BLD AUTO: 8.3 10E9/L (ref 4–11)
WBC # BLD AUTO: 8.4 10E9/L (ref 4–11)
WBC # BLD AUTO: 8.7 10E9/L (ref 4–11)
WBC # BLD AUTO: 8.8 10E9/L (ref 4–11)
WBC # BLD AUTO: 9 10E9/L (ref 4–11)
WBC # BLD AUTO: 9.1 10E9/L (ref 4–11)
WBC # BLD AUTO: 9.3 10E9/L (ref 4–11)
WBC # BLD AUTO: 9.4 10E9/L (ref 4–11)
WBC # BLD AUTO: 9.5 10E9/L (ref 4–11)
WBC #/AREA URNS AUTO: ABNORMAL /HPF

## 2018-01-01 PROCEDURE — 97110 THERAPEUTIC EXERCISES: CPT | Mod: GP | Performed by: PHYSICAL THERAPIST

## 2018-01-01 PROCEDURE — 99223 1ST HOSP IP/OBS HIGH 75: CPT | Mod: AI | Performed by: FAMILY MEDICINE

## 2018-01-01 PROCEDURE — 25000131 ZZH RX MED GY IP 250 OP 636 PS 637: Mod: GY | Performed by: FAMILY MEDICINE

## 2018-01-01 PROCEDURE — 97530 THERAPEUTIC ACTIVITIES: CPT | Mod: GP

## 2018-01-01 PROCEDURE — 40000185 ZZHC STATISTIC PT OUTPT VISIT: Performed by: PHYSICAL THERAPIST

## 2018-01-01 PROCEDURE — 99232 SBSQ HOSP IP/OBS MODERATE 35: CPT | Performed by: FAMILY MEDICINE

## 2018-01-01 PROCEDURE — 85027 COMPLETE CBC AUTOMATED: CPT | Performed by: FAMILY MEDICINE

## 2018-01-01 PROCEDURE — 97140 MANUAL THERAPY 1/> REGIONS: CPT | Mod: GP | Performed by: PHYSICAL THERAPIST

## 2018-01-01 PROCEDURE — 71045 X-RAY EXAM CHEST 1 VIEW: CPT

## 2018-01-01 PROCEDURE — 87086 URINE CULTURE/COLONY COUNT: CPT | Performed by: FAMILY MEDICINE

## 2018-01-01 PROCEDURE — 85379 FIBRIN DEGRADATION QUANT: CPT | Performed by: PHYSICIAN ASSISTANT

## 2018-01-01 PROCEDURE — 80048 BASIC METABOLIC PNL TOTAL CA: CPT | Performed by: FAMILY MEDICINE

## 2018-01-01 PROCEDURE — 99284 EMERGENCY DEPT VISIT MOD MDM: CPT | Mod: 25 | Performed by: STUDENT IN AN ORGANIZED HEALTH CARE EDUCATION/TRAINING PROGRAM

## 2018-01-01 PROCEDURE — 36415 COLL VENOUS BLD VENIPUNCTURE: CPT | Performed by: FAMILY MEDICINE

## 2018-01-01 PROCEDURE — G0463 HOSPITAL OUTPT CLINIC VISIT: HCPCS

## 2018-01-01 PROCEDURE — 51705 CHANGE OF BLADDER TUBE: CPT | Performed by: UROLOGY

## 2018-01-01 PROCEDURE — 12000000 ZZH R&B MED SURG/OB

## 2018-01-01 PROCEDURE — A9270 NON-COVERED ITEM OR SERVICE: HCPCS | Mod: GY | Performed by: INTERNAL MEDICINE

## 2018-01-01 PROCEDURE — 85610 PROTHROMBIN TIME: CPT | Mod: QW

## 2018-01-01 PROCEDURE — G0378 HOSPITAL OBSERVATION PER HR: HCPCS

## 2018-01-01 PROCEDURE — 99284 EMERGENCY DEPT VISIT MOD MDM: CPT | Mod: 25 | Performed by: EMERGENCY MEDICINE

## 2018-01-01 PROCEDURE — 81001 URINALYSIS AUTO W/SCOPE: CPT | Performed by: FAMILY MEDICINE

## 2018-01-01 PROCEDURE — 85610 PROTHROMBIN TIME: CPT | Mod: QW,ZL

## 2018-01-01 PROCEDURE — C1769 GUIDE WIRE: HCPCS | Performed by: UROLOGY

## 2018-01-01 PROCEDURE — 51705 CHANGE OF BLADDER TUBE: CPT

## 2018-01-01 PROCEDURE — G0463 HOSPITAL OUTPT CLINIC VISIT: HCPCS | Mod: 25

## 2018-01-01 PROCEDURE — 93010 ELECTROCARDIOGRAM REPORT: CPT | Performed by: INTERNAL MEDICINE

## 2018-01-01 PROCEDURE — 97112 NEUROMUSCULAR REEDUCATION: CPT | Mod: GP | Performed by: PHYSICAL THERAPIST

## 2018-01-01 PROCEDURE — 84484 ASSAY OF TROPONIN QUANT: CPT | Performed by: STUDENT IN AN ORGANIZED HEALTH CARE EDUCATION/TRAINING PROGRAM

## 2018-01-01 PROCEDURE — 25000132 ZZH RX MED GY IP 250 OP 250 PS 637: Mod: GY | Performed by: INTERNAL MEDICINE

## 2018-01-01 PROCEDURE — 99213 OFFICE O/P EST LOW 20 MIN: CPT | Mod: 25 | Performed by: UROLOGY

## 2018-01-01 PROCEDURE — 84484 ASSAY OF TROPONIN QUANT: CPT | Performed by: PHYSICIAN ASSISTANT

## 2018-01-01 PROCEDURE — 99283 EMERGENCY DEPT VISIT LOW MDM: CPT | Mod: Z6 | Performed by: STUDENT IN AN ORGANIZED HEALTH CARE EDUCATION/TRAINING PROGRAM

## 2018-01-01 PROCEDURE — 97535 SELF CARE MNGMENT TRAINING: CPT | Mod: GO | Performed by: OCCUPATIONAL THERAPIST

## 2018-01-01 PROCEDURE — 97110 THERAPEUTIC EXERCISES: CPT | Mod: GP,KX | Performed by: PHYSICAL THERAPIST

## 2018-01-01 PROCEDURE — 96372 THER/PROPH/DIAG INJ SC/IM: CPT | Mod: XU

## 2018-01-01 PROCEDURE — A9270 NON-COVERED ITEM OR SERVICE: HCPCS | Mod: GY | Performed by: FAMILY MEDICINE

## 2018-01-01 PROCEDURE — 25000128 H RX IP 250 OP 636: Performed by: FAMILY MEDICINE

## 2018-01-01 PROCEDURE — 85610 PROTHROMBIN TIME: CPT | Performed by: FAMILY MEDICINE

## 2018-01-01 PROCEDURE — 25000132 ZZH RX MED GY IP 250 OP 250 PS 637: Mod: GY | Performed by: FAMILY MEDICINE

## 2018-01-01 PROCEDURE — 82805 BLOOD GASES W/O2 SATURATION: CPT | Performed by: PHYSICIAN ASSISTANT

## 2018-01-01 PROCEDURE — 25000125 ZZHC RX 250: Performed by: NURSE ANESTHETIST, CERTIFIED REGISTERED

## 2018-01-01 PROCEDURE — 99213 OFFICE O/P EST LOW 20 MIN: CPT | Performed by: NURSE PRACTITIONER

## 2018-01-01 PROCEDURE — 36416 COLLJ CAPILLARY BLOOD SPEC: CPT | Mod: ZL

## 2018-01-01 PROCEDURE — 99217 ZZC OBSERVATION CARE DISCHARGE: CPT | Performed by: FAMILY MEDICINE

## 2018-01-01 PROCEDURE — 85027 COMPLETE CBC AUTOMATED: CPT | Performed by: NURSE PRACTITIONER

## 2018-01-01 PROCEDURE — 97116 GAIT TRAINING THERAPY: CPT | Mod: GP | Performed by: PHYSICAL THERAPIST

## 2018-01-01 PROCEDURE — 99214 OFFICE O/P EST MOD 30 MIN: CPT | Mod: 25 | Performed by: NURSE PRACTITIONER

## 2018-01-01 PROCEDURE — 83605 ASSAY OF LACTIC ACID: CPT | Performed by: FAMILY MEDICINE

## 2018-01-01 PROCEDURE — 99284 EMERGENCY DEPT VISIT MOD MDM: CPT | Mod: Z6 | Performed by: STUDENT IN AN ORGANIZED HEALTH CARE EDUCATION/TRAINING PROGRAM

## 2018-01-01 PROCEDURE — 99232 SBSQ HOSP IP/OBS MODERATE 35: CPT | Performed by: INTERNAL MEDICINE

## 2018-01-01 PROCEDURE — 96361 HYDRATE IV INFUSION ADD-ON: CPT | Performed by: FAMILY MEDICINE

## 2018-01-01 PROCEDURE — 84484 ASSAY OF TROPONIN QUANT: CPT | Performed by: FAMILY MEDICINE

## 2018-01-01 PROCEDURE — 93306 TTE W/DOPPLER COMPLETE: CPT

## 2018-01-01 PROCEDURE — 54161 CIRCUM 28 DAYS OR OLDER: CPT | Performed by: NURSE ANESTHETIST, CERTIFIED REGISTERED

## 2018-01-01 PROCEDURE — 99207 ZZC NO CHARGE NURSE ONLY: CPT

## 2018-01-01 PROCEDURE — 25000125 ZZHC RX 250: Performed by: ANESTHESIOLOGY

## 2018-01-01 PROCEDURE — 97116 GAIT TRAINING THERAPY: CPT | Mod: GP,KX | Performed by: PHYSICAL THERAPIST

## 2018-01-01 PROCEDURE — 71000027 ZZH RECOVERY PHASE 2 EACH 15 MINS: Performed by: UROLOGY

## 2018-01-01 PROCEDURE — 82746 ASSAY OF FOLIC ACID SERUM: CPT | Performed by: FAMILY MEDICINE

## 2018-01-01 PROCEDURE — 80053 COMPREHEN METABOLIC PANEL: CPT | Performed by: NURSE PRACTITIONER

## 2018-01-01 PROCEDURE — 99239 HOSP IP/OBS DSCHRG MGMT >30: CPT | Performed by: FAMILY MEDICINE

## 2018-01-01 PROCEDURE — A9270 NON-COVERED ITEM OR SERVICE: HCPCS | Mod: GY | Performed by: EMERGENCY MEDICINE

## 2018-01-01 PROCEDURE — 82607 VITAMIN B-12: CPT | Performed by: FAMILY MEDICINE

## 2018-01-01 PROCEDURE — 85025 COMPLETE CBC W/AUTO DIFF WBC: CPT | Performed by: PHYSICIAN ASSISTANT

## 2018-01-01 PROCEDURE — 72192 CT PELVIS W/O DYE: CPT

## 2018-01-01 PROCEDURE — 99214 OFFICE O/P EST MOD 30 MIN: CPT | Performed by: UROLOGY

## 2018-01-01 PROCEDURE — 25000128 H RX IP 250 OP 636: Performed by: ANESTHESIOLOGY

## 2018-01-01 PROCEDURE — 82728 ASSAY OF FERRITIN: CPT | Performed by: FAMILY MEDICINE

## 2018-01-01 PROCEDURE — 73562 X-RAY EXAM OF KNEE 3: CPT | Mod: RT

## 2018-01-01 PROCEDURE — 83880 ASSAY OF NATRIURETIC PEPTIDE: CPT | Performed by: PHYSICIAN ASSISTANT

## 2018-01-01 PROCEDURE — 51700 IRRIGATION OF BLADDER: CPT

## 2018-01-01 PROCEDURE — 85018 HEMOGLOBIN: CPT | Performed by: PHYSICIAN ASSISTANT

## 2018-01-01 PROCEDURE — G8979 MOBILITY GOAL STATUS: HCPCS | Mod: GP,CI | Performed by: PHYSICAL THERAPIST

## 2018-01-01 PROCEDURE — 99285 EMERGENCY DEPT VISIT HI MDM: CPT | Mod: 25 | Performed by: FAMILY MEDICINE

## 2018-01-01 PROCEDURE — 37000008 ZZH ANESTHESIA TECHNICAL FEE, 1ST 30 MIN: Performed by: UROLOGY

## 2018-01-01 PROCEDURE — 93296 REM INTERROG EVL PM/IDS: CPT | Mod: ZF

## 2018-01-01 PROCEDURE — 27210794 ZZH OR GENERAL SUPPLY STERILE: Performed by: UROLOGY

## 2018-01-01 PROCEDURE — 40000305 ZZH STATISTIC PRE PROC ASSESS I: Performed by: UROLOGY

## 2018-01-01 PROCEDURE — 85025 COMPLETE CBC W/AUTO DIFF WBC: CPT | Performed by: STUDENT IN AN ORGANIZED HEALTH CARE EDUCATION/TRAINING PROGRAM

## 2018-01-01 PROCEDURE — 85610 PROTHROMBIN TIME: CPT | Performed by: STUDENT IN AN ORGANIZED HEALTH CARE EDUCATION/TRAINING PROGRAM

## 2018-01-01 PROCEDURE — 70450 CT HEAD/BRAIN W/O DYE: CPT

## 2018-01-01 PROCEDURE — 88305 TISSUE EXAM BY PATHOLOGIST: CPT | Performed by: UROLOGY

## 2018-01-01 PROCEDURE — 82746 ASSAY OF FOLIC ACID SERUM: CPT | Performed by: INTERNAL MEDICINE

## 2018-01-01 PROCEDURE — 40000193 ZZH STATISTIC PT WARD VISIT

## 2018-01-01 PROCEDURE — 84153 ASSAY OF PSA TOTAL: CPT | Performed by: UROLOGY

## 2018-01-01 PROCEDURE — 25000132 ZZH RX MED GY IP 250 OP 250 PS 637: Mod: GY | Performed by: EMERGENCY MEDICINE

## 2018-01-01 PROCEDURE — 99100 ANES PT EXTEME AGE<1 YR&>70: CPT | Performed by: NURSE ANESTHETIST, CERTIFIED REGISTERED

## 2018-01-01 PROCEDURE — G8978 MOBILITY CURRENT STATUS: HCPCS | Mod: GP,CK | Performed by: PHYSICAL THERAPIST

## 2018-01-01 PROCEDURE — 99215 OFFICE O/P EST HI 40 MIN: CPT | Performed by: NURSE PRACTITIONER

## 2018-01-01 PROCEDURE — 88304 TISSUE EXAM BY PATHOLOGIST: CPT | Performed by: UROLOGY

## 2018-01-01 PROCEDURE — 97110 THERAPEUTIC EXERCISES: CPT | Mod: GO | Performed by: OCCUPATIONAL THERAPIST

## 2018-01-01 PROCEDURE — 99214 OFFICE O/P EST MOD 30 MIN: CPT | Performed by: FAMILY MEDICINE

## 2018-01-01 PROCEDURE — 37000009 ZZH ANESTHESIA TECHNICAL FEE, EACH ADDTL 15 MIN: Performed by: UROLOGY

## 2018-01-01 PROCEDURE — 93010 ELECTROCARDIOGRAM REPORT: CPT | Mod: 76 | Performed by: INTERNAL MEDICINE

## 2018-01-01 PROCEDURE — 97112 NEUROMUSCULAR REEDUCATION: CPT | Mod: GP,KX | Performed by: PHYSICAL THERAPIST

## 2018-01-01 PROCEDURE — 25000128 H RX IP 250 OP 636: Performed by: PHYSICIAN ASSISTANT

## 2018-01-01 PROCEDURE — 25000125 ZZHC RX 250: Performed by: UROLOGY

## 2018-01-01 PROCEDURE — 93282 PRGRMG EVAL IMPLANTABLE DFB: CPT | Mod: 26 | Performed by: INTERNAL MEDICINE

## 2018-01-01 PROCEDURE — G8979 MOBILITY GOAL STATUS: HCPCS | Mod: GP,CJ,KX | Performed by: PHYSICAL THERAPIST

## 2018-01-01 PROCEDURE — 85730 THROMBOPLASTIN TIME PARTIAL: CPT | Performed by: STUDENT IN AN ORGANIZED HEALTH CARE EDUCATION/TRAINING PROGRAM

## 2018-01-01 PROCEDURE — 36000050 ZZH SURGERY LEVEL 2 1ST 30 MIN: Performed by: UROLOGY

## 2018-01-01 PROCEDURE — 85025 COMPLETE CBC W/AUTO DIFF WBC: CPT | Performed by: EMERGENCY MEDICINE

## 2018-01-01 PROCEDURE — 25000128 H RX IP 250 OP 636: Performed by: NURSE ANESTHETIST, CERTIFIED REGISTERED

## 2018-01-01 PROCEDURE — 99220 ZZC INITIAL OBSERVATION CARE,LEVL III: CPT | Performed by: FAMILY MEDICINE

## 2018-01-01 PROCEDURE — 99214 OFFICE O/P EST MOD 30 MIN: CPT | Mod: ZP | Performed by: INTERNAL MEDICINE

## 2018-01-01 PROCEDURE — 51798 US URINE CAPACITY MEASURE: CPT | Performed by: UROLOGY

## 2018-01-01 PROCEDURE — 93005 ELECTROCARDIOGRAM TRACING: CPT

## 2018-01-01 PROCEDURE — 80048 BASIC METABOLIC PNL TOTAL CA: CPT | Performed by: EMERGENCY MEDICINE

## 2018-01-01 PROCEDURE — 80048 BASIC METABOLIC PNL TOTAL CA: CPT | Performed by: PHYSICIAN ASSISTANT

## 2018-01-01 PROCEDURE — 40000133 ZZH STATISTIC OT WARD VISIT: Performed by: OCCUPATIONAL THERAPIST

## 2018-01-01 PROCEDURE — G8979 MOBILITY GOAL STATUS: HCPCS | Mod: GP,CJ | Performed by: PHYSICAL THERAPIST

## 2018-01-01 PROCEDURE — 36415 COLL VENOUS BLD VENIPUNCTURE: CPT | Performed by: UROLOGY

## 2018-01-01 PROCEDURE — 96374 THER/PROPH/DIAG INJ IV PUSH: CPT | Performed by: PHYSICIAN ASSISTANT

## 2018-01-01 PROCEDURE — 99213 OFFICE O/P EST LOW 20 MIN: CPT | Performed by: FAMILY MEDICINE

## 2018-01-01 PROCEDURE — 87070 CULTURE OTHR SPECIMN AEROBIC: CPT | Performed by: FAMILY MEDICINE

## 2018-01-01 PROCEDURE — G0008 ADMIN INFLUENZA VIRUS VAC: HCPCS

## 2018-01-01 PROCEDURE — 93282 PRGRMG EVAL IMPLANTABLE DFB: CPT

## 2018-01-01 PROCEDURE — 25000128 H RX IP 250 OP 636: Performed by: INTERNAL MEDICINE

## 2018-01-01 PROCEDURE — 25000128 H RX IP 250 OP 636: Performed by: STUDENT IN AN ORGANIZED HEALTH CARE EDUCATION/TRAINING PROGRAM

## 2018-01-01 PROCEDURE — 36415 COLL VENOUS BLD VENIPUNCTURE: CPT | Performed by: STUDENT IN AN ORGANIZED HEALTH CARE EDUCATION/TRAINING PROGRAM

## 2018-01-01 PROCEDURE — G8978 MOBILITY CURRENT STATUS: HCPCS | Mod: GP,CK,KX | Performed by: PHYSICAL THERAPIST

## 2018-01-01 PROCEDURE — 25800025 ZZH RX 258: Performed by: FAMILY MEDICINE

## 2018-01-01 PROCEDURE — 99024 POSTOP FOLLOW-UP VISIT: CPT | Performed by: UROLOGY

## 2018-01-01 PROCEDURE — 51102 DRAIN BL W/CATH INSERTION: CPT | Performed by: NURSE ANESTHETIST, CERTIFIED REGISTERED

## 2018-01-01 PROCEDURE — 27210995 ZZH RX 272: Performed by: UROLOGY

## 2018-01-01 PROCEDURE — 97165 OT EVAL LOW COMPLEX 30 MIN: CPT | Mod: GO | Performed by: OCCUPATIONAL THERAPIST

## 2018-01-01 PROCEDURE — 88312 SPECIAL STAINS GROUP 1: CPT | Performed by: UROLOGY

## 2018-01-01 PROCEDURE — 40000718 ZZHC STATISTIC PT DEPARTMENT ORTHO VISIT: Performed by: PHYSICAL THERAPIST

## 2018-01-01 PROCEDURE — 96365 THER/PROPH/DIAG IV INF INIT: CPT | Performed by: FAMILY MEDICINE

## 2018-01-01 PROCEDURE — 90662 IIV NO PRSV INCREASED AG IM: CPT | Performed by: FAMILY MEDICINE

## 2018-01-01 PROCEDURE — 83550 IRON BINDING TEST: CPT | Performed by: FAMILY MEDICINE

## 2018-01-01 PROCEDURE — 93306 TTE W/DOPPLER COMPLETE: CPT | Mod: 26 | Performed by: INTERNAL MEDICINE

## 2018-01-01 PROCEDURE — 51710 CHANGE OF BLADDER TUBE: CPT | Performed by: UROLOGY

## 2018-01-01 PROCEDURE — 54161 CIRCUM 28 DAYS OR OLDER: CPT | Performed by: ANESTHESIOLOGY

## 2018-01-01 PROCEDURE — 93295 DEV INTERROG REMOTE 1/2/MLT: CPT | Performed by: INTERNAL MEDICINE

## 2018-01-01 PROCEDURE — 36415 COLL VENOUS BLD VENIPUNCTURE: CPT | Performed by: EMERGENCY MEDICINE

## 2018-01-01 PROCEDURE — 99283 EMERGENCY DEPT VISIT LOW MDM: CPT | Mod: Z6 | Performed by: EMERGENCY MEDICINE

## 2018-01-01 PROCEDURE — 51700 IRRIGATION OF BLADDER: CPT | Performed by: UROLOGY

## 2018-01-01 PROCEDURE — 82550 ASSAY OF CK (CPK): CPT | Performed by: FAMILY MEDICINE

## 2018-01-01 PROCEDURE — 99285 EMERGENCY DEPT VISIT HI MDM: CPT | Mod: Z6 | Performed by: PHYSICIAN ASSISTANT

## 2018-01-01 PROCEDURE — 99233 SBSQ HOSP IP/OBS HIGH 50: CPT | Performed by: INTERNAL MEDICINE

## 2018-01-01 PROCEDURE — 83036 HEMOGLOBIN GLYCOSYLATED A1C: CPT | Performed by: NURSE PRACTITIONER

## 2018-01-01 PROCEDURE — 86140 C-REACTIVE PROTEIN: CPT | Performed by: FAMILY MEDICINE

## 2018-01-01 PROCEDURE — 85610 PROTHROMBIN TIME: CPT | Performed by: PHYSICIAN ASSISTANT

## 2018-01-01 PROCEDURE — 85610 PROTHROMBIN TIME: CPT | Performed by: UROLOGY

## 2018-01-01 PROCEDURE — 83540 ASSAY OF IRON: CPT | Performed by: FAMILY MEDICINE

## 2018-01-01 PROCEDURE — 97530 THERAPEUTIC ACTIVITIES: CPT | Mod: GO | Performed by: OCCUPATIONAL THERAPIST

## 2018-01-01 PROCEDURE — 99285 EMERGENCY DEPT VISIT HI MDM: CPT | Mod: 25 | Performed by: PHYSICIAN ASSISTANT

## 2018-01-01 PROCEDURE — 93005 ELECTROCARDIOGRAM TRACING: CPT | Performed by: FAMILY MEDICINE

## 2018-01-01 PROCEDURE — 97140 MANUAL THERAPY 1/> REGIONS: CPT | Mod: GP,KX | Performed by: PHYSICAL THERAPIST

## 2018-01-01 PROCEDURE — 54100 BIOPSY OF PENIS: CPT | Mod: XU | Performed by: UROLOGY

## 2018-01-01 PROCEDURE — 83036 HEMOGLOBIN GLYCOSYLATED A1C: CPT | Performed by: FAMILY MEDICINE

## 2018-01-01 PROCEDURE — 36600 WITHDRAWAL OF ARTERIAL BLOOD: CPT | Performed by: PHYSICIAN ASSISTANT

## 2018-01-01 PROCEDURE — 96372 THER/PROPH/DIAG INJ SC/IM: CPT | Performed by: STUDENT IN AN ORGANIZED HEALTH CARE EDUCATION/TRAINING PROGRAM

## 2018-01-01 PROCEDURE — 81001 URINALYSIS AUTO W/SCOPE: CPT | Performed by: INTERNAL MEDICINE

## 2018-01-01 PROCEDURE — 25000125 ZZHC RX 250

## 2018-01-01 PROCEDURE — 93005 ELECTROCARDIOGRAM TRACING: CPT | Performed by: STUDENT IN AN ORGANIZED HEALTH CARE EDUCATION/TRAINING PROGRAM

## 2018-01-01 PROCEDURE — 99214 OFFICE O/P EST MOD 30 MIN: CPT | Performed by: PHYSICIAN ASSISTANT

## 2018-01-01 PROCEDURE — 25000128 H RX IP 250 OP 636: Performed by: UROLOGY

## 2018-01-01 PROCEDURE — 97161 PT EVAL LOW COMPLEX 20 MIN: CPT | Mod: GP

## 2018-01-01 PROCEDURE — 97161 PT EVAL LOW COMPLEX 20 MIN: CPT | Mod: GP | Performed by: PHYSICAL THERAPIST

## 2018-01-01 PROCEDURE — 85610 PROTHROMBIN TIME: CPT | Mod: 91 | Performed by: NURSE PRACTITIONER

## 2018-01-01 PROCEDURE — 71046 X-RAY EXAM CHEST 2 VIEWS: CPT

## 2018-01-01 PROCEDURE — 84484 ASSAY OF TROPONIN QUANT: CPT | Performed by: NURSE PRACTITIONER

## 2018-01-01 PROCEDURE — 36415 COLL VENOUS BLD VENIPUNCTURE: CPT | Performed by: PHYSICIAN ASSISTANT

## 2018-01-01 PROCEDURE — 93005 ELECTROCARDIOGRAM TRACING: CPT | Performed by: PHYSICIAN ASSISTANT

## 2018-01-01 PROCEDURE — 36415 COLL VENOUS BLD VENIPUNCTURE: CPT | Performed by: NURSE PRACTITIONER

## 2018-01-01 PROCEDURE — 81001 URINALYSIS AUTO W/SCOPE: CPT | Performed by: UROLOGY

## 2018-01-01 PROCEDURE — 80053 COMPREHEN METABOLIC PANEL: CPT | Performed by: FAMILY MEDICINE

## 2018-01-01 PROCEDURE — 80061 LIPID PANEL: CPT | Performed by: FAMILY MEDICINE

## 2018-01-01 PROCEDURE — 96375 TX/PRO/DX INJ NEW DRUG ADDON: CPT

## 2018-01-01 PROCEDURE — 99285 EMERGENCY DEPT VISIT HI MDM: CPT | Mod: Z6 | Performed by: FAMILY MEDICINE

## 2018-01-01 PROCEDURE — 54161 CIRCUM 28 DAYS OR OLDER: CPT | Performed by: UROLOGY

## 2018-01-01 PROCEDURE — 85025 COMPLETE CBC W/AUTO DIFF WBC: CPT | Performed by: FAMILY MEDICINE

## 2018-01-01 PROCEDURE — 36000052 ZZH SURGERY LEVEL 2 EA 15 ADDTL MIN: Performed by: UROLOGY

## 2018-01-01 PROCEDURE — 51102 DRAIN BL W/CATH INSERTION: CPT | Performed by: UROLOGY

## 2018-01-01 PROCEDURE — 93005 ELECTROCARDIOGRAM TRACING: CPT | Performed by: NURSE PRACTITIONER

## 2018-01-01 PROCEDURE — 97116 GAIT TRAINING THERAPY: CPT | Mod: GP

## 2018-01-01 PROCEDURE — 80048 BASIC METABOLIC PNL TOTAL CA: CPT | Performed by: STUDENT IN AN ORGANIZED HEALTH CARE EDUCATION/TRAINING PROGRAM

## 2018-01-01 PROCEDURE — 99213 OFFICE O/P EST LOW 20 MIN: CPT | Performed by: UROLOGY

## 2018-01-01 RX ORDER — FUROSEMIDE 10 MG/ML
40 INJECTION INTRAMUSCULAR; INTRAVENOUS EVERY 8 HOURS
Status: DISCONTINUED | OUTPATIENT
Start: 2018-01-01 | End: 2018-01-01

## 2018-01-01 RX ORDER — NALOXONE HYDROCHLORIDE 0.4 MG/ML
.1-.4 INJECTION, SOLUTION INTRAMUSCULAR; INTRAVENOUS; SUBCUTANEOUS
Status: DISCONTINUED | OUTPATIENT
Start: 2018-01-01 | End: 2018-01-01 | Stop reason: HOSPADM

## 2018-01-01 RX ORDER — KETOROLAC TROMETHAMINE 30 MG/ML
30 INJECTION, SOLUTION INTRAMUSCULAR; INTRAVENOUS ONCE
Status: COMPLETED | OUTPATIENT
Start: 2018-01-01 | End: 2018-01-01

## 2018-01-01 RX ORDER — LIDOCAINE HYDROCHLORIDE 20 MG/ML
INJECTION, SOLUTION INFILTRATION; PERINEURAL PRN
Status: DISCONTINUED | OUTPATIENT
Start: 2018-01-01 | End: 2018-01-01

## 2018-01-01 RX ORDER — CEFDINIR 300 MG/1
300 CAPSULE ORAL 2 TIMES DAILY
Qty: 20 CAPSULE | Refills: 0 | Status: ON HOLD | OUTPATIENT
Start: 2018-01-01 | End: 2018-01-01

## 2018-01-01 RX ORDER — FENTANYL CITRATE 50 UG/ML
INJECTION, SOLUTION INTRAMUSCULAR; INTRAVENOUS PRN
Status: DISCONTINUED | OUTPATIENT
Start: 2018-01-01 | End: 2018-01-01

## 2018-01-01 RX ORDER — METOPROLOL SUCCINATE 50 MG/1
TABLET, EXTENDED RELEASE ORAL
Qty: 135 TABLET | Refills: 1 | Status: SHIPPED | OUTPATIENT
Start: 2018-01-01 | End: 2018-01-01

## 2018-01-01 RX ORDER — NYSTATIN AND TRIAMCINOLONE ACETONIDE 100000; 1 [USP'U]/G; MG/G
CREAM TOPICAL
COMMUNITY
Start: 2018-01-01 | End: 2018-01-01

## 2018-01-01 RX ORDER — WARFARIN SODIUM 2 MG/1
TABLET ORAL
Qty: 90 TABLET | Refills: 1 | Status: ON HOLD | COMMUNITY
Start: 2018-01-01 | End: 2018-01-01

## 2018-01-01 RX ORDER — SODIUM CHLORIDE, SODIUM LACTATE, POTASSIUM CHLORIDE, CALCIUM CHLORIDE 600; 310; 30; 20 MG/100ML; MG/100ML; MG/100ML; MG/100ML
INJECTION, SOLUTION INTRAVENOUS CONTINUOUS
Status: DISCONTINUED | OUTPATIENT
Start: 2018-01-01 | End: 2018-01-01 | Stop reason: HOSPADM

## 2018-01-01 RX ORDER — ONDANSETRON 2 MG/ML
4 INJECTION INTRAMUSCULAR; INTRAVENOUS EVERY 30 MIN PRN
Status: DISCONTINUED | OUTPATIENT
Start: 2018-01-01 | End: 2018-01-01 | Stop reason: HOSPADM

## 2018-01-01 RX ORDER — ONDANSETRON 2 MG/ML
INJECTION INTRAMUSCULAR; INTRAVENOUS PRN
Status: DISCONTINUED | OUTPATIENT
Start: 2018-01-01 | End: 2018-01-01

## 2018-01-01 RX ORDER — FUROSEMIDE 20 MG
TABLET ORAL
Qty: 137 TABLET | Refills: 3 | Status: SHIPPED | OUTPATIENT
Start: 2018-01-01 | End: 2018-01-01

## 2018-01-01 RX ORDER — LISINOPRIL 2.5 MG/1
2.5 TABLET ORAL DAILY
Status: ON HOLD | COMMUNITY
Start: 2018-01-01 | End: 2018-01-01

## 2018-01-01 RX ORDER — FUROSEMIDE 20 MG
TABLET ORAL
Qty: 90 TABLET | Refills: 0 | Status: SHIPPED | OUTPATIENT
Start: 2018-01-01 | End: 2018-01-01

## 2018-01-01 RX ORDER — PEN NEEDLE, DIABETIC 31 GX5/16"
NEEDLE, DISPOSABLE MISCELLANEOUS
OUTPATIENT
Start: 2018-01-01

## 2018-01-01 RX ORDER — DEXTROSE MONOHYDRATE 25 G/50ML
25-50 INJECTION, SOLUTION INTRAVENOUS
Status: DISCONTINUED | OUTPATIENT
Start: 2018-01-01 | End: 2018-01-01 | Stop reason: HOSPADM

## 2018-01-01 RX ORDER — CEFTRIAXONE SODIUM 1 G/50ML
1 INJECTION, SOLUTION INTRAVENOUS ONCE
Status: COMPLETED | OUTPATIENT
Start: 2018-01-01 | End: 2018-01-01

## 2018-01-01 RX ORDER — WARFARIN SODIUM 2 MG/1
2 TABLET ORAL
Status: COMPLETED | OUTPATIENT
Start: 2018-01-01 | End: 2018-01-01

## 2018-01-01 RX ORDER — FUROSEMIDE 20 MG
TABLET ORAL
Qty: 137 TABLET | Refills: 0 | Status: SHIPPED | OUTPATIENT
Start: 2018-01-01 | End: 2018-01-01

## 2018-01-01 RX ORDER — FUROSEMIDE 20 MG
20 TABLET ORAL EVERY MORNING
Qty: 90 TABLET | Refills: 1 | Status: SHIPPED | OUTPATIENT
Start: 2018-01-01 | End: 2018-01-01

## 2018-01-01 RX ORDER — METOPROLOL SUCCINATE 50 MG/1
TABLET, EXTENDED RELEASE ORAL
COMMUNITY
Start: 2017-03-01 | End: 2018-01-01

## 2018-01-01 RX ORDER — ONDANSETRON 4 MG/1
4 TABLET, ORALLY DISINTEGRATING ORAL EVERY 30 MIN PRN
Status: DISCONTINUED | OUTPATIENT
Start: 2018-01-01 | End: 2018-01-01 | Stop reason: HOSPADM

## 2018-01-01 RX ORDER — ATORVASTATIN CALCIUM 10 MG/1
10 TABLET, FILM COATED ORAL EVERY EVENING
Status: DISCONTINUED | OUTPATIENT
Start: 2018-01-01 | End: 2018-01-01

## 2018-01-01 RX ORDER — ATORVASTATIN CALCIUM 10 MG/1
TABLET, FILM COATED ORAL
COMMUNITY
Start: 2017-10-20 | End: 2018-01-01

## 2018-01-01 RX ORDER — ACETAMINOPHEN 325 MG/1
TABLET ORAL
COMMUNITY
End: 2018-01-01

## 2018-01-01 RX ORDER — LIDOCAINE HYDROCHLORIDE AND EPINEPHRINE 10; 10 MG/ML; UG/ML
INJECTION, SOLUTION INFILTRATION; PERINEURAL PRN
Status: DISCONTINUED | OUTPATIENT
Start: 2018-01-01 | End: 2018-01-01 | Stop reason: HOSPADM

## 2018-01-01 RX ORDER — FUROSEMIDE 20 MG
TABLET ORAL
Status: ON HOLD | COMMUNITY
End: 2018-01-01

## 2018-01-01 RX ORDER — LORAZEPAM 2 MG/ML
.5-1 INJECTION INTRAMUSCULAR
Status: DISCONTINUED | OUTPATIENT
Start: 2018-01-01 | End: 2018-01-01 | Stop reason: HOSPADM

## 2018-01-01 RX ORDER — GABAPENTIN 300 MG/1
300 CAPSULE ORAL 2 TIMES DAILY
Status: DISCONTINUED | OUTPATIENT
Start: 2018-01-01 | End: 2018-01-01 | Stop reason: CLARIF

## 2018-01-01 RX ORDER — OXYCODONE HYDROCHLORIDE 5 MG/1
5 TABLET ORAL EVERY 4 HOURS PRN
Status: DISCONTINUED | OUTPATIENT
Start: 2018-01-01 | End: 2018-01-01 | Stop reason: HOSPADM

## 2018-01-01 RX ORDER — MUPIROCIN 20 MG/G
OINTMENT TOPICAL 3 TIMES DAILY
Qty: 15 G | Refills: 0 | Status: SHIPPED | OUTPATIENT
Start: 2018-01-01 | End: 2018-01-01

## 2018-01-01 RX ORDER — WARFARIN SODIUM 2 MG/1
TABLET ORAL
Qty: 90 TABLET | Refills: 1 | Status: SHIPPED | OUTPATIENT
Start: 2018-01-01 | End: 2018-01-01

## 2018-01-01 RX ORDER — SIMVASTATIN 20 MG
TABLET ORAL
Qty: 90 TABLET | Refills: 0 | Status: SHIPPED | OUTPATIENT
Start: 2018-01-01 | End: 2018-01-01

## 2018-01-01 RX ORDER — NICOTINE POLACRILEX 4 MG
15-30 LOZENGE BUCCAL
Status: DISCONTINUED | OUTPATIENT
Start: 2018-01-01 | End: 2018-01-01 | Stop reason: HOSPADM

## 2018-01-01 RX ORDER — DEXAMETHASONE SODIUM PHOSPHATE 4 MG/ML
4 INJECTION, SOLUTION INTRA-ARTICULAR; INTRALESIONAL; INTRAMUSCULAR; INTRAVENOUS; SOFT TISSUE EVERY 10 MIN PRN
Status: DISCONTINUED | OUTPATIENT
Start: 2018-01-01 | End: 2018-01-01 | Stop reason: HOSPADM

## 2018-01-01 RX ORDER — DUREZOL 0.5 MG/ML
EMULSION OPHTHALMIC
Refills: 1 | COMMUNITY
Start: 2018-01-01 | End: 2018-01-01

## 2018-01-01 RX ORDER — FENTANYL CITRATE 50 UG/ML
50 INJECTION, SOLUTION INTRAMUSCULAR; INTRAVENOUS
Status: DISCONTINUED | OUTPATIENT
Start: 2018-01-01 | End: 2018-01-01 | Stop reason: HOSPADM

## 2018-01-01 RX ORDER — ONDANSETRON 2 MG/ML
4 INJECTION INTRAMUSCULAR; INTRAVENOUS EVERY 6 HOURS PRN
Status: DISCONTINUED | OUTPATIENT
Start: 2018-01-01 | End: 2018-01-01 | Stop reason: HOSPADM

## 2018-01-01 RX ORDER — ACETAMINOPHEN 325 MG/1
650 TABLET ORAL EVERY 4 HOURS PRN
Status: DISCONTINUED | OUTPATIENT
Start: 2018-01-01 | End: 2018-01-01 | Stop reason: HOSPADM

## 2018-01-01 RX ORDER — AMOXICILLIN 500 MG/1
2000 CAPSULE ORAL DAILY PRN
COMMUNITY

## 2018-01-01 RX ORDER — LANOLIN ALCOHOL/MO/W.PET/CERES
CREAM (GRAM) TOPICAL 2 TIMES DAILY PRN
Status: DISCONTINUED | OUTPATIENT
Start: 2018-01-01 | End: 2018-01-01 | Stop reason: HOSPADM

## 2018-01-01 RX ORDER — CEPHALEXIN 500 MG/1
500 CAPSULE ORAL 2 TIMES DAILY
Qty: 20 CAPSULE | Refills: 0 | Status: SHIPPED | OUTPATIENT
Start: 2018-01-01 | End: 2018-01-01

## 2018-01-01 RX ORDER — METOPROLOL SUCCINATE 50 MG/1
75 TABLET, EXTENDED RELEASE ORAL DAILY
Qty: 135 TABLET | Refills: 0 | Status: SHIPPED | OUTPATIENT
Start: 2018-01-01 | End: 2018-01-01

## 2018-01-01 RX ORDER — NICOTINE POLACRILEX 4 MG
15-30 LOZENGE BUCCAL
Status: DISCONTINUED | OUTPATIENT
Start: 2018-01-01 | End: 2018-01-01

## 2018-01-01 RX ORDER — HYDRALAZINE HYDROCHLORIDE 20 MG/ML
2.5-5 INJECTION INTRAMUSCULAR; INTRAVENOUS EVERY 10 MIN PRN
Status: DISCONTINUED | OUTPATIENT
Start: 2018-01-01 | End: 2018-01-01 | Stop reason: HOSPADM

## 2018-01-01 RX ORDER — VALACYCLOVIR HYDROCHLORIDE 1 G/1
1000 TABLET, FILM COATED ORAL 2 TIMES DAILY
Qty: 21 TABLET | Refills: 0 | Status: SHIPPED | OUTPATIENT
Start: 2018-01-01 | End: 2018-01-01

## 2018-01-01 RX ORDER — METOPROLOL SUCCINATE 50 MG/1
TABLET, EXTENDED RELEASE ORAL
Qty: 135 TABLET | Refills: 0 | Status: SHIPPED | OUTPATIENT
Start: 2018-01-01 | End: 2018-01-01

## 2018-01-01 RX ORDER — SIMVASTATIN 20 MG
TABLET ORAL
COMMUNITY
Start: 2017-01-01 | End: 2018-01-01

## 2018-01-01 RX ORDER — ALBUTEROL SULFATE 0.83 MG/ML
2.5 SOLUTION RESPIRATORY (INHALATION) EVERY 4 HOURS PRN
Status: DISCONTINUED | OUTPATIENT
Start: 2018-01-01 | End: 2018-01-01 | Stop reason: HOSPADM

## 2018-01-01 RX ORDER — FUROSEMIDE 20 MG
20 TABLET ORAL DAILY
Status: DISCONTINUED | OUTPATIENT
Start: 2018-01-01 | End: 2018-01-01 | Stop reason: HOSPADM

## 2018-01-01 RX ORDER — MORPHINE SULFATE 100 MG/5ML
5-10 SOLUTION ORAL
Status: DISCONTINUED | OUTPATIENT
Start: 2018-01-01 | End: 2018-01-01 | Stop reason: HOSPADM

## 2018-01-01 RX ORDER — CEFAZOLIN SODIUM 2 G/100ML
2 INJECTION, SOLUTION INTRAVENOUS
Status: COMPLETED | OUTPATIENT
Start: 2018-01-01 | End: 2018-01-01

## 2018-01-01 RX ORDER — SODIUM CHLORIDE 9 MG/ML
INJECTION, SOLUTION INTRAVENOUS CONTINUOUS
Status: DISPENSED | OUTPATIENT
Start: 2018-01-01 | End: 2018-01-01

## 2018-01-01 RX ORDER — MEPERIDINE HYDROCHLORIDE 50 MG/ML
12.5 INJECTION INTRAMUSCULAR; INTRAVENOUS; SUBCUTANEOUS
Status: DISCONTINUED | OUTPATIENT
Start: 2018-01-01 | End: 2018-01-01 | Stop reason: HOSPADM

## 2018-01-01 RX ORDER — ASPIRIN 81 MG/1
81 TABLET ORAL DAILY
Status: DISCONTINUED | OUTPATIENT
Start: 2018-01-01 | End: 2018-01-01 | Stop reason: HOSPADM

## 2018-01-01 RX ORDER — ACETAMINOPHEN 650 MG/1
650 SUPPOSITORY RECTAL EVERY 4 HOURS PRN
Status: DISCONTINUED | OUTPATIENT
Start: 2018-01-01 | End: 2018-01-01 | Stop reason: HOSPADM

## 2018-01-01 RX ORDER — ALLOPURINOL 100 MG/1
TABLET ORAL
Status: ON HOLD | COMMUNITY
Start: 2018-01-01 | End: 2018-01-01

## 2018-01-01 RX ORDER — DEXAMETHASONE SODIUM PHOSPHATE 4 MG/ML
INJECTION, SOLUTION INTRA-ARTICULAR; INTRALESIONAL; INTRAMUSCULAR; INTRAVENOUS; SOFT TISSUE PRN
Status: DISCONTINUED | OUTPATIENT
Start: 2018-01-01 | End: 2018-01-01

## 2018-01-01 RX ORDER — FUROSEMIDE 10 MG/ML
40 INJECTION INTRAMUSCULAR; INTRAVENOUS EVERY 6 HOURS
Status: DISCONTINUED | OUTPATIENT
Start: 2018-01-01 | End: 2018-01-01

## 2018-01-01 RX ORDER — FENTANYL CITRATE 50 UG/ML
25-50 INJECTION, SOLUTION INTRAMUSCULAR; INTRAVENOUS EVERY 5 MIN PRN
Status: DISCONTINUED | OUTPATIENT
Start: 2018-01-01 | End: 2018-01-01 | Stop reason: HOSPADM

## 2018-01-01 RX ORDER — LISINOPRIL 2.5 MG/1
2.5 TABLET ORAL DAILY
Status: DISCONTINUED | OUTPATIENT
Start: 2018-01-01 | End: 2018-01-01

## 2018-01-01 RX ORDER — LIDOCAINE 40 MG/G
CREAM TOPICAL
Status: DISCONTINUED | OUTPATIENT
Start: 2018-01-01 | End: 2018-01-01 | Stop reason: HOSPADM

## 2018-01-01 RX ORDER — MAGNESIUM HYDROXIDE 1200 MG/15ML
LIQUID ORAL PRN
Status: DISCONTINUED | OUTPATIENT
Start: 2018-01-01 | End: 2018-01-01 | Stop reason: HOSPADM

## 2018-01-01 RX ORDER — SODIUM CHLORIDE 9 MG/ML
INJECTION, SOLUTION INTRAVENOUS CONTINUOUS
Status: DISCONTINUED | OUTPATIENT
Start: 2018-01-01 | End: 2018-01-01 | Stop reason: HOSPADM

## 2018-01-01 RX ORDER — MORPHINE SULFATE 10 MG/5ML
5-10 SOLUTION ORAL
Status: DISCONTINUED | OUTPATIENT
Start: 2018-01-01 | End: 2018-01-01 | Stop reason: CLARIF

## 2018-01-01 RX ORDER — PROPOFOL 10 MG/ML
INJECTION, EMULSION INTRAVENOUS CONTINUOUS PRN
Status: DISCONTINUED | OUTPATIENT
Start: 2018-01-01 | End: 2018-01-01

## 2018-01-01 RX ORDER — FUROSEMIDE 20 MG
TABLET ORAL
COMMUNITY
Start: 2017-06-14 | End: 2018-01-01

## 2018-01-01 RX ORDER — SIMVASTATIN 20 MG
20 TABLET ORAL DAILY
Status: DISCONTINUED | OUTPATIENT
Start: 2018-01-01 | End: 2018-01-01 | Stop reason: CLARIF

## 2018-01-01 RX ORDER — FUROSEMIDE 10 MG/ML
40 INJECTION INTRAMUSCULAR; INTRAVENOUS EVERY 12 HOURS
Status: DISCONTINUED | OUTPATIENT
Start: 2018-01-01 | End: 2018-01-01

## 2018-01-01 RX ORDER — POTASSIUM CHLORIDE 1500 MG/1
TABLET, EXTENDED RELEASE ORAL
COMMUNITY
Start: 2018-01-01

## 2018-01-01 RX ORDER — NEOMYCIN/BACITRACIN/POLYMYXINB 3.5-400-5K
OINTMENT (GRAM) TOPICAL ONCE
Status: DISCONTINUED | OUTPATIENT
Start: 2018-01-01 | End: 2018-01-01 | Stop reason: HOSPADM

## 2018-01-01 RX ORDER — ONDANSETRON 4 MG/1
4 TABLET, ORALLY DISINTEGRATING ORAL EVERY 6 HOURS PRN
Status: DISCONTINUED | OUTPATIENT
Start: 2018-01-01 | End: 2018-01-01 | Stop reason: HOSPADM

## 2018-01-01 RX ORDER — MORPHINE SULFATE 2 MG/ML
1-2 INJECTION, SOLUTION INTRAMUSCULAR; INTRAVENOUS
Status: DISCONTINUED | OUTPATIENT
Start: 2018-01-01 | End: 2018-01-01 | Stop reason: HOSPADM

## 2018-01-01 RX ORDER — CEPHALEXIN 500 MG/1
500 CAPSULE ORAL 3 TIMES DAILY
Qty: 29 CAPSULE | Refills: 0 | Status: SHIPPED | OUTPATIENT
Start: 2018-01-01 | End: 2018-01-01

## 2018-01-01 RX ORDER — LISINOPRIL 5 MG/1
TABLET ORAL
COMMUNITY
Start: 2017-11-08 | End: 2018-01-01

## 2018-01-01 RX ORDER — METOPROLOL SUCCINATE 25 MG/1
25 TABLET, EXTENDED RELEASE ORAL DAILY
Status: ON HOLD | COMMUNITY
End: 2018-01-01

## 2018-01-01 RX ORDER — HYDROMORPHONE HYDROCHLORIDE 1 MG/ML
.3-.5 INJECTION, SOLUTION INTRAMUSCULAR; INTRAVENOUS; SUBCUTANEOUS EVERY 10 MIN PRN
Status: DISCONTINUED | OUTPATIENT
Start: 2018-01-01 | End: 2018-01-01 | Stop reason: HOSPADM

## 2018-01-01 RX ORDER — FUROSEMIDE 20 MG
1 TABLET ORAL EVERY MORNING
COMMUNITY
Start: 2018-01-01 | End: 2018-01-01

## 2018-01-01 RX ORDER — AMOXICILLIN 500 MG/1
CAPSULE ORAL
COMMUNITY
Start: 2016-10-17 | End: 2018-01-01

## 2018-01-01 RX ORDER — METOPROLOL SUCCINATE 25 MG/1
25 TABLET, EXTENDED RELEASE ORAL DAILY
Status: DISCONTINUED | OUTPATIENT
Start: 2018-01-01 | End: 2018-01-01

## 2018-01-01 RX ORDER — MOXIFLOXACIN 5 MG/ML
SOLUTION/ DROPS OPHTHALMIC
Refills: 0 | COMMUNITY
Start: 2018-01-01 | End: 2018-01-01

## 2018-01-01 RX ORDER — SIMVASTATIN 20 MG
TABLET ORAL
Qty: 90 TABLET | Refills: 2 | Status: ON HOLD | OUTPATIENT
Start: 2018-01-01 | End: 2018-01-01

## 2018-01-01 RX ORDER — WARFARIN SODIUM 1 MG/1
1 TABLET ORAL
Status: COMPLETED | OUTPATIENT
Start: 2018-01-01 | End: 2018-01-01

## 2018-01-01 RX ORDER — NITROGLYCERIN 0.4 MG/1
0.4 TABLET SUBLINGUAL EVERY 5 MIN PRN
Status: DISCONTINUED | OUTPATIENT
Start: 2018-01-01 | End: 2018-01-01 | Stop reason: HOSPADM

## 2018-01-01 RX ORDER — ASPIRIN 81 MG/1
81 TABLET ORAL DAILY
Status: ON HOLD | COMMUNITY
End: 2018-01-01

## 2018-01-01 RX ORDER — LIDOCAINE 4 G/G
1-2 PATCH TOPICAL DAILY PRN
Status: ON HOLD | COMMUNITY
End: 2018-01-01

## 2018-01-01 RX ORDER — METOPROLOL SUCCINATE 25 MG/1
TABLET, EXTENDED RELEASE ORAL
Qty: 60 TABLET | Refills: 1 | Status: ON HOLD | OUTPATIENT
Start: 2018-01-01 | End: 2018-01-01

## 2018-01-01 RX ORDER — ERYTHROMYCIN 5 MG/G
OINTMENT OPHTHALMIC
Refills: 0 | COMMUNITY
Start: 2018-01-01 | End: 2018-01-01

## 2018-01-01 RX ORDER — GABAPENTIN 300 MG/1
300 CAPSULE ORAL DAILY
Status: DISCONTINUED | OUTPATIENT
Start: 2018-01-01 | End: 2018-01-01

## 2018-01-01 RX ORDER — LORAZEPAM 0.5 MG/1
.5-1 TABLET ORAL
Status: DISCONTINUED | OUTPATIENT
Start: 2018-01-01 | End: 2018-01-01 | Stop reason: HOSPADM

## 2018-01-01 RX ORDER — WARFARIN SODIUM 2 MG/1
TABLET ORAL
COMMUNITY
Start: 2017-10-06 | End: 2018-01-01

## 2018-01-01 RX ORDER — OXYCODONE AND ACETAMINOPHEN 5; 325 MG/1; MG/1
1-2 TABLET ORAL EVERY 4 HOURS PRN
Status: DISCONTINUED | OUTPATIENT
Start: 2018-01-01 | End: 2018-01-01 | Stop reason: HOSPADM

## 2018-01-01 RX ORDER — FUROSEMIDE 40 MG
40 TABLET ORAL DAILY
Status: DISCONTINUED | OUTPATIENT
Start: 2018-01-01 | End: 2018-01-01

## 2018-01-01 RX ORDER — FUROSEMIDE 10 MG/ML
40 INJECTION INTRAMUSCULAR; INTRAVENOUS ONCE
Status: COMPLETED | OUTPATIENT
Start: 2018-01-01 | End: 2018-01-01

## 2018-01-01 RX ORDER — GINSENG 100 MG
CAPSULE ORAL PRN
Status: DISCONTINUED | OUTPATIENT
Start: 2018-01-01 | End: 2018-01-01 | Stop reason: HOSPADM

## 2018-01-01 RX ORDER — SODIUM CHLORIDE 9 MG/ML
INJECTION, SOLUTION INTRAVENOUS CONTINUOUS
Status: DISCONTINUED | OUTPATIENT
Start: 2018-01-01 | End: 2018-01-01

## 2018-01-01 RX ORDER — FENTANYL CITRATE 50 UG/ML
25-50 INJECTION, SOLUTION INTRAMUSCULAR; INTRAVENOUS EVERY 5 MIN PRN
Status: CANCELLED | OUTPATIENT
Start: 2018-01-01

## 2018-01-01 RX ORDER — DEXTROSE MONOHYDRATE 25 G/50ML
25-50 INJECTION, SOLUTION INTRAVENOUS
Status: DISCONTINUED | OUTPATIENT
Start: 2018-01-01 | End: 2018-01-01

## 2018-01-01 RX ADMIN — ATORVASTATIN CALCIUM 10 MG: 10 TABLET, FILM COATED ORAL at 22:26

## 2018-01-01 RX ADMIN — GABAPENTIN 300 MG: 300 CAPSULE ORAL at 09:08

## 2018-01-01 RX ADMIN — FUROSEMIDE 40 MG: 10 INJECTION, SOLUTION INTRAMUSCULAR; INTRAVENOUS at 14:16

## 2018-01-01 RX ADMIN — FUROSEMIDE 40 MG: 10 INJECTION, SOLUTION INTRAMUSCULAR; INTRAVENOUS at 16:28

## 2018-01-01 RX ADMIN — GABAPENTIN 300 MG: 300 CAPSULE ORAL at 22:51

## 2018-01-01 RX ADMIN — INSULIN ASPART 22 UNITS: 100 INJECTION, SUSPENSION SUBCUTANEOUS at 08:39

## 2018-01-01 RX ADMIN — ATORVASTATIN CALCIUM 10 MG: 10 TABLET, FILM COATED ORAL at 22:51

## 2018-01-01 RX ADMIN — FUROSEMIDE 20 MG: 20 TABLET ORAL at 09:39

## 2018-01-01 RX ADMIN — ASPIRIN 81 MG: 81 TABLET, COATED ORAL at 10:45

## 2018-01-01 RX ADMIN — FUROSEMIDE 40 MG: 10 INJECTION, SOLUTION INTRAMUSCULAR; INTRAVENOUS at 04:54

## 2018-01-01 RX ADMIN — ASPIRIN 81 MG: 81 TABLET, COATED ORAL at 09:36

## 2018-01-01 RX ADMIN — FUROSEMIDE 40 MG: 10 INJECTION, SOLUTION INTRAMUSCULAR; INTRAVENOUS at 10:50

## 2018-01-01 RX ADMIN — SODIUM CHLORIDE, SODIUM LACTATE, POTASSIUM CHLORIDE, AND CALCIUM CHLORIDE: 600; 310; 30; 20 INJECTION, SOLUTION INTRAVENOUS at 10:10

## 2018-01-01 RX ADMIN — ONDANSETRON 4 MG: 2 INJECTION INTRAMUSCULAR; INTRAVENOUS at 07:43

## 2018-01-01 RX ADMIN — Medication 0.5 MG: at 17:17

## 2018-01-01 RX ADMIN — CEFAZOLIN SODIUM 2 G: 2 INJECTION, SOLUTION INTRAVENOUS at 11:23

## 2018-01-01 RX ADMIN — FUROSEMIDE 40 MG: 10 INJECTION, SOLUTION INTRAVENOUS at 05:21

## 2018-01-01 RX ADMIN — LIDOCAINE HYDROCHLORIDE 80 MG: 20 INJECTION, SOLUTION INFILTRATION; PERINEURAL at 11:18

## 2018-01-01 RX ADMIN — SODIUM CHLORIDE 250 ML: 900 INJECTION, SOLUTION INTRAVENOUS at 10:48

## 2018-01-01 RX ADMIN — PHENYLEPHRINE HYDROCHLORIDE 200 MCG: 10 INJECTION, SOLUTION INTRAMUSCULAR; INTRAVENOUS; SUBCUTANEOUS at 11:31

## 2018-01-01 RX ADMIN — GABAPENTIN 300 MG: 300 CAPSULE ORAL at 09:36

## 2018-01-01 RX ADMIN — ASPIRIN 81 MG: 81 TABLET, COATED ORAL at 10:19

## 2018-01-01 RX ADMIN — LORAZEPAM 0.5 MG: 0.5 TABLET ORAL at 13:57

## 2018-01-01 RX ADMIN — LIDOCAINE HYDROCHLORIDE,EPINEPHRINE BITARTRATE 2 ML: 10; .01 INJECTION, SOLUTION INFILTRATION; PERINEURAL at 11:34

## 2018-01-01 RX ADMIN — INSULIN ASPART 20 UNITS: 100 INJECTION, SUSPENSION SUBCUTANEOUS at 10:32

## 2018-01-01 RX ADMIN — FUROSEMIDE 40 MG: 10 INJECTION, SOLUTION INTRAMUSCULAR; INTRAVENOUS at 21:23

## 2018-01-01 RX ADMIN — GABAPENTIN 300 MG: 300 CAPSULE ORAL at 22:26

## 2018-01-01 RX ADMIN — CEFTRIAXONE SODIUM 1 G: 1 INJECTION, SOLUTION INTRAVENOUS at 13:12

## 2018-01-01 RX ADMIN — ASPIRIN 81 MG: 81 TABLET, COATED ORAL at 10:49

## 2018-01-01 RX ADMIN — KETOROLAC TROMETHAMINE 30 MG: 30 INJECTION, SOLUTION INTRAMUSCULAR at 12:40

## 2018-01-01 RX ADMIN — WARFARIN SODIUM 2 MG: 2 TABLET ORAL at 17:55

## 2018-01-01 RX ADMIN — FENTANYL CITRATE 25 MCG: 50 INJECTION, SOLUTION INTRAMUSCULAR; INTRAVENOUS at 07:43

## 2018-01-01 RX ADMIN — MORPHINE SULFATE 10 MG: 20 SOLUTION ORAL at 14:05

## 2018-01-01 RX ADMIN — LIDOCAINE HYDROCHLORIDE 0.1 ML: 10 INJECTION, SOLUTION EPIDURAL; INFILTRATION; INTRACAUDAL; PERINEURAL at 07:30

## 2018-01-01 RX ADMIN — ATORVASTATIN CALCIUM 10 MG: 10 TABLET, FILM COATED ORAL at 19:41

## 2018-01-01 RX ADMIN — FUROSEMIDE 40 MG: 10 INJECTION, SOLUTION INTRAMUSCULAR; INTRAVENOUS at 05:34

## 2018-01-01 RX ADMIN — PHENYLEPHRINE HYDROCHLORIDE 100 MCG: 10 INJECTION, SOLUTION INTRAMUSCULAR; INTRAVENOUS; SUBCUTANEOUS at 11:25

## 2018-01-01 RX ADMIN — INSULIN ASPART 22 UNITS: 100 INJECTION, SUSPENSION SUBCUTANEOUS at 08:49

## 2018-01-01 RX ADMIN — SODIUM CHLORIDE, SODIUM LACTATE, POTASSIUM CHLORIDE, AND CALCIUM CHLORIDE: 600; 310; 30; 20 INJECTION, SOLUTION INTRAVENOUS at 08:18

## 2018-01-01 RX ADMIN — SODIUM CHLORIDE 200 ML: 900 IRRIGANT IRRIGATION at 08:25

## 2018-01-01 RX ADMIN — FUROSEMIDE 40 MG: 10 INJECTION, SOLUTION INTRAMUSCULAR; INTRAVENOUS at 22:51

## 2018-01-01 RX ADMIN — ASPIRIN 81 MG: 81 TABLET, COATED ORAL at 09:45

## 2018-01-01 RX ADMIN — ATORVASTATIN CALCIUM 10 MG: 10 TABLET, FILM COATED ORAL at 20:32

## 2018-01-01 RX ADMIN — METOPROLOL SUCCINATE 12.5 MG: 25 TABLET, EXTENDED RELEASE ORAL at 10:50

## 2018-01-01 RX ADMIN — MORPHINE SULFATE 2 MG: 2 INJECTION, SOLUTION INTRAMUSCULAR; INTRAVENOUS at 19:49

## 2018-01-01 RX ADMIN — MORPHINE SULFATE 2 MG: 2 INJECTION, SOLUTION INTRAMUSCULAR; INTRAVENOUS at 10:50

## 2018-01-01 RX ADMIN — WARFARIN SODIUM 2 MG: 2 TABLET ORAL at 17:12

## 2018-01-01 RX ADMIN — MORPHINE SULFATE 5 MG: 20 SOLUTION ORAL at 13:01

## 2018-01-01 RX ADMIN — LIDOCAINE HYDROCHLORIDE 75 MG: 20 INJECTION, SOLUTION INFILTRATION; PERINEURAL at 07:43

## 2018-01-01 RX ADMIN — FUROSEMIDE 40 MG: 10 INJECTION, SOLUTION INTRAMUSCULAR; INTRAVENOUS at 13:24

## 2018-01-01 RX ADMIN — Medication 113 G: at 16:38

## 2018-01-01 RX ADMIN — CEFTRIAXONE SODIUM 1 G: 1 INJECTION, SOLUTION INTRAVENOUS at 10:51

## 2018-01-01 RX ADMIN — FUROSEMIDE 40 MG: 10 INJECTION, SOLUTION INTRAMUSCULAR; INTRAVENOUS at 03:53

## 2018-01-01 RX ADMIN — INSULIN ASPART 10 UNITS: 100 INJECTION, SUSPENSION SUBCUTANEOUS at 17:17

## 2018-01-01 RX ADMIN — SODIUM CHLORIDE: 900 INJECTION, SOLUTION INTRAVENOUS at 14:28

## 2018-01-01 RX ADMIN — LISINOPRIL 2.5 MG: 2.5 TABLET ORAL at 09:46

## 2018-01-01 RX ADMIN — PHENYLEPHRINE HYDROCHLORIDE 200 MCG: 10 INJECTION, SOLUTION INTRAMUSCULAR; INTRAVENOUS; SUBCUTANEOUS at 08:24

## 2018-01-01 RX ADMIN — ATORVASTATIN CALCIUM 10 MG: 10 TABLET, FILM COATED ORAL at 22:40

## 2018-01-01 RX ADMIN — FUROSEMIDE 40 MG: 10 INJECTION, SOLUTION INTRAMUSCULAR; INTRAVENOUS at 17:18

## 2018-01-01 RX ADMIN — Medication 30 G: at 09:26

## 2018-01-01 RX ADMIN — INSULIN ASPART 10 UNITS: 100 INJECTION, SUSPENSION SUBCUTANEOUS at 17:13

## 2018-01-01 RX ADMIN — PHENYLEPHRINE HYDROCHLORIDE 200 MCG: 10 INJECTION, SOLUTION INTRAMUSCULAR; INTRAVENOUS; SUBCUTANEOUS at 08:18

## 2018-01-01 RX ADMIN — LISINOPRIL 2.5 MG: 2.5 TABLET ORAL at 09:16

## 2018-01-01 RX ADMIN — GABAPENTIN 300 MG: 300 CAPSULE ORAL at 11:40

## 2018-01-01 RX ADMIN — Medication 0.5 MG: at 18:59

## 2018-01-01 RX ADMIN — DEXAMETHASONE SODIUM PHOSPHATE 4 MG: 4 INJECTION, SOLUTION INTRA-ARTICULAR; INTRALESIONAL; INTRAMUSCULAR; INTRAVENOUS; SOFT TISSUE at 07:43

## 2018-01-01 RX ADMIN — WARFARIN SODIUM 1 MG: 1 TABLET ORAL at 17:33

## 2018-01-01 RX ADMIN — GABAPENTIN 300 MG: 300 CAPSULE ORAL at 09:45

## 2018-01-01 RX ADMIN — METOPROLOL SUCCINATE 25 MG: 25 TABLET, EXTENDED RELEASE ORAL at 09:16

## 2018-01-01 RX ADMIN — METOPROLOL SUCCINATE 75 MG: 25 TABLET, EXTENDED RELEASE ORAL at 17:59

## 2018-01-01 RX ADMIN — SODIUM CHLORIDE: 900 INJECTION, SOLUTION INTRAVENOUS at 16:51

## 2018-01-01 RX ADMIN — INSULIN ASPART 10 UNITS: 100 INJECTION, SUSPENSION SUBCUTANEOUS at 17:56

## 2018-01-01 RX ADMIN — WARFARIN SODIUM 2 MG: 2 TABLET ORAL at 19:24

## 2018-01-01 RX ADMIN — ASPIRIN 81 MG: 81 TABLET, COATED ORAL at 09:08

## 2018-01-01 RX ADMIN — METOPROLOL SUCCINATE 25 MG: 25 TABLET, EXTENDED RELEASE ORAL at 10:19

## 2018-01-01 RX ADMIN — CEPHALEXIN 500 MG: 250 CAPSULE ORAL at 09:58

## 2018-01-01 RX ADMIN — PHENYLEPHRINE HYDROCHLORIDE 100 MCG: 10 INJECTION, SOLUTION INTRAMUSCULAR; INTRAVENOUS; SUBCUTANEOUS at 08:03

## 2018-01-01 RX ADMIN — INFLUENZA A VIRUS A/MICHIGAN/45/2015 X-275 (H1N1) ANTIGEN (FORMALDEHYDE INACTIVATED), INFLUENZA A VIRUS A/SINGAPORE/INFIMH-16-0019/2016 IVR-186 (H3N2) ANTIGEN (FORMALDEHYDE INACTIVATED), AND INFLUENZA B VIRUS B/MARYLAND/15/2016 BX-69A (A B/COLORADO/6/2017-LIKE VIRUS) ANTIGEN (FORMALDEHYDE INACTIVATED) 0.5 ML: 60; 60; 60 INJECTION, SUSPENSION INTRAMUSCULAR at 10:33

## 2018-01-01 RX ADMIN — SODIUM CHLORIDE, POTASSIUM CHLORIDE, SODIUM LACTATE AND CALCIUM CHLORIDE: 600; 310; 30; 20 INJECTION, SOLUTION INTRAVENOUS at 07:37

## 2018-01-01 RX ADMIN — PHENYLEPHRINE HYDROCHLORIDE 100 MCG: 10 INJECTION, SOLUTION INTRAMUSCULAR; INTRAVENOUS; SUBCUTANEOUS at 08:00

## 2018-01-01 RX ADMIN — WARFARIN SODIUM 2 MG: 2 TABLET ORAL at 17:58

## 2018-01-01 RX ADMIN — INSULIN ASPART 10 UNITS: 100 INJECTION, SUSPENSION SUBCUTANEOUS at 19:41

## 2018-01-01 RX ADMIN — PROPOFOL 140 MCG/KG/MIN: 10 INJECTION, EMULSION INTRAVENOUS at 11:20

## 2018-01-01 RX ADMIN — INSULIN ASPART 22 UNITS: 100 INJECTION, SUSPENSION SUBCUTANEOUS at 09:31

## 2018-01-01 RX ADMIN — ACETAMINOPHEN 650 MG: 325 TABLET, FILM COATED ORAL at 21:41

## 2018-01-01 RX ADMIN — ATORVASTATIN CALCIUM 10 MG: 10 TABLET, FILM COATED ORAL at 22:43

## 2018-01-01 RX ADMIN — SODIUM CHLORIDE, SODIUM LACTATE, POTASSIUM CHLORIDE, AND CALCIUM CHLORIDE: 600; 310; 30; 20 INJECTION, SOLUTION INTRAVENOUS at 11:10

## 2018-01-01 RX ADMIN — DEXTROSE MONOHYDRATE 50 ML: 25 INJECTION, SOLUTION INTRAVENOUS at 10:14

## 2018-01-01 RX ADMIN — INSULIN ASPART 10 UNITS: 100 INJECTION, SUSPENSION SUBCUTANEOUS at 17:33

## 2018-01-01 RX ADMIN — LISINOPRIL 2.5 MG: 2.5 TABLET ORAL at 10:19

## 2018-01-01 RX ADMIN — FUROSEMIDE 40 MG: 10 INJECTION, SOLUTION INTRAVENOUS at 15:50

## 2018-01-01 RX ADMIN — GABAPENTIN 300 MG: 300 CAPSULE ORAL at 10:49

## 2018-01-01 RX ADMIN — SODIUM CHLORIDE: 900 INJECTION, SOLUTION INTRAVENOUS at 10:05

## 2018-01-01 RX ADMIN — ASPIRIN 81 MG: 81 TABLET, COATED ORAL at 09:17

## 2018-01-01 RX ADMIN — FUROSEMIDE 40 MG: 10 INJECTION, SOLUTION INTRAMUSCULAR; INTRAVENOUS at 10:19

## 2018-01-01 RX ADMIN — INSULIN ASPART 10 UNITS: 100 INJECTION, SUSPENSION SUBCUTANEOUS at 17:18

## 2018-01-01 RX ADMIN — FUROSEMIDE 40 MG: 10 INJECTION, SOLUTION INTRAMUSCULAR; INTRAVENOUS at 21:31

## 2018-01-01 RX ADMIN — CEFAZOLIN SODIUM 2 G: 2 INJECTION, SOLUTION INTRAVENOUS at 07:43

## 2018-01-01 RX ADMIN — INSULIN ASPART 22 UNITS: 100 INJECTION, SUSPENSION SUBCUTANEOUS at 09:08

## 2018-01-01 RX ADMIN — METOPROLOL SUCCINATE 25 MG: 25 TABLET, EXTENDED RELEASE ORAL at 09:45

## 2018-01-01 RX ADMIN — METOPROLOL SUCCINATE 25 MG: 25 TABLET, EXTENDED RELEASE ORAL at 10:45

## 2018-01-01 RX ADMIN — LISINOPRIL 2.5 MG: 2.5 TABLET ORAL at 10:45

## 2018-01-01 RX ADMIN — SODIUM CHLORIDE: 900 INJECTION, SOLUTION INTRAVENOUS at 04:39

## 2018-01-01 RX ADMIN — BACITRACIN 0.5 TUBE: 500 OINTMENT TOPICAL at 08:23

## 2018-01-01 RX ADMIN — PROPOFOL 100 MCG/KG/MIN: 10 INJECTION, EMULSION INTRAVENOUS at 07:43

## 2018-01-01 RX ADMIN — FUROSEMIDE 40 MG: 10 INJECTION, SOLUTION INTRAMUSCULAR; INTRAVENOUS at 22:40

## 2018-01-01 RX ADMIN — PHENYLEPHRINE HYDROCHLORIDE 100 MCG: 10 INJECTION, SOLUTION INTRAMUSCULAR; INTRAVENOUS; SUBCUTANEOUS at 08:08

## 2018-01-01 ASSESSMENT — ENCOUNTER SYMPTOMS
WHEEZING: 0
WEAKNESS: 0
AGITATION: 0
CHILLS: 0
FATIGUE: 0
CHEST TIGHTNESS: 0
ARTHRALGIAS: 1
FEVER: 0
FEVER: 0
WOUND: 1
WOUND: 1
PALPITATIONS: 0
SHORTNESS OF BREATH: 1
HEADACHES: 1
CHILLS: 0
PHOTOPHOBIA: 1
PALPITATIONS: 0
CHILLS: 0
ABDOMINAL PAIN: 0
SHORTNESS OF BREATH: 1
SHORTNESS OF BREATH: 0
DYSRHYTHMIAS: 1
BACK PAIN: 1
COUGH: 0
COUGH: 0
FEVER: 0
EYE PAIN: 1

## 2018-01-01 ASSESSMENT — ACTIVITIES OF DAILY LIVING (ADL)
ADLS_ACUITY_SCORE: 22
ADLS_ACUITY_SCORE: 21
ADLS_ACUITY_SCORE: 26
ADLS_ACUITY_SCORE: 21
ADLS_ACUITY_SCORE: 21
ADLS_ACUITY_SCORE: 22
ADLS_ACUITY_SCORE: 22
ADLS_ACUITY_SCORE: 21
ADLS_ACUITY_SCORE: 21
ADLS_ACUITY_SCORE: 26
ADLS_ACUITY_SCORE: 22
ADLS_ACUITY_SCORE: 22
ADLS_ACUITY_SCORE: 21
ADLS_ACUITY_SCORE: 20
ADLS_ACUITY_SCORE: 21
ADLS_ACUITY_SCORE: 22
ADLS_ACUITY_SCORE: 23
ADLS_ACUITY_SCORE: 24
ADLS_ACUITY_SCORE: 21
ADLS_ACUITY_SCORE: 20
ADLS_ACUITY_SCORE: 21
ADLS_ACUITY_SCORE: 21
ADLS_ACUITY_SCORE: 23
ADLS_ACUITY_SCORE: 26
ADLS_ACUITY_SCORE: 21
ADLS_ACUITY_SCORE: 22
ADLS_ACUITY_SCORE: 21
ADLS_ACUITY_SCORE: 22
ADLS_ACUITY_SCORE: 25
ADLS_ACUITY_SCORE: 20
ADLS_ACUITY_SCORE: 21
ADLS_ACUITY_SCORE: 22
ADLS_ACUITY_SCORE: 21
ADLS_ACUITY_SCORE: 25
ADLS_ACUITY_SCORE: 22
ADLS_ACUITY_SCORE: 21
ADLS_ACUITY_SCORE: 26
ADLS_ACUITY_SCORE: 21
ADLS_ACUITY_SCORE: 23
ADLS_ACUITY_SCORE: 21
ADLS_ACUITY_SCORE: 21
ADLS_ACUITY_SCORE: 20
NUMBER_OF_TIMES_PATIENT_HAS_FALLEN_WITHIN_LAST_SIX_MONTHS: 3
ADLS_ACUITY_SCORE: 21
ADLS_ACUITY_SCORE: 21
ADLS_ACUITY_SCORE: 24

## 2018-01-01 ASSESSMENT — PAIN SCALES - GENERAL
PAINLEVEL: NO PAIN (0)
PAINLEVEL: MILD PAIN (3)
PAINLEVEL: MODERATE PAIN (4)
PAINLEVEL: NO PAIN (0)
PAINLEVEL: NO PAIN (0)
PAINLEVEL: MODERATE PAIN (5)
PAINLEVEL: MODERATE PAIN (4)
PAINLEVEL: MODERATE PAIN (4)
PAINLEVEL: MILD PAIN (3)
PAINLEVEL: NO PAIN (0)
PAINLEVEL: MODERATE PAIN (5)
PAINLEVEL: NO PAIN (0)

## 2018-01-01 ASSESSMENT — PATIENT HEALTH QUESTIONNAIRE - PHQ9
SUM OF ALL RESPONSES TO PHQ QUESTIONS 1-9: 0
SUM OF ALL RESPONSES TO PHQ QUESTIONS 1-9: 0

## 2018-01-01 ASSESSMENT — ANXIETY QUESTIONNAIRES
2. NOT BEING ABLE TO STOP OR CONTROL WORRYING: NOT AT ALL
1. FEELING NERVOUS, ANXIOUS, OR ON EDGE: NOT AT ALL

## 2018-01-02 NOTE — PATIENT INSTRUCTIONS
Patient Information     Patient Name MRN Sex Sukhdeep Cline [id number] Male 1934      Patient Instructions by Cesar Sanders MD at 2017 12:15 PM     Author:  Cesar Sanders MD  Service:  (none) Author Type:  Physician     Filed:  2017  1:08 PM  Encounter Date:  2017 Status:  Addendum     :  Cesar Sanders MD (Physician)        Related Notes: Original Note by Cesar Sanders MD (Physician) filed at 2017  1:07 PM            Increase insulin from 25 to 35 units in the morning.   Keep 10 units at night  Start lisinopril 2.5 mg daily  Keep furosemide 40 mg twice daily  Decrease potassium to 40 meq (2 pills) total daily   Decrease metoprolol from 100 to 75 mg daily   Follow up in 2 weeks

## 2018-01-02 NOTE — PROGRESS NOTES
Patient Information     Patient Name MRN Sukhdeep Victoria [id number] Male 1934      Progress Notes by Cesar Sanders MD at 2017 12:15 PM     Author:  Cesar Sanders MD Service:  (none) Author Type:  Physician     Filed:  2017  9:50 AM Encounter Date:  2017 Status:  Signed     :  Cesar Sanders MD (Physician)            SUBJECTIVE:  82 y.o. male here with son for follow up on CHF, leg edema, and diabetes.     Had increased furosemide to 80 mg BID and then decreased to 60 mg BID 2 weeks ago, but after losing further weight and having no edema, his son decreased the dose further. Taking furosemide 40 mg twice daily for the past week.   Potassium taking 40 meq in the morning and 20 meq at night    Weight down to 170, which is his baseline. No leg edema. No SOB.    ECHO obtained with EF of 19%. Down from previous EF of 25% in . He has been off lisinopril a couple years after [facility]. Still has similar wall motion abnormalities and aortic sclerosis.     BS are much higher. Had hypoglycemia  and insulin reduced. Gradually working back up on the dose. He is at 25 units in the morning and 10 at night.   AM blood sugars 112-259, afternoon 302-542 and evening 186-378.    Ulcer on L shin without further erythema after using mupirocin ointment.     REVIEW OF SYSTEMS:    Constitutional: Negative  Respiratory: SOB resolved  GI: Negative    Past Medical History      Diagnosis   Date     Acute on chronic systolic congestive heart failure (HC)  2012     15-20% EF 2012      Acute urinary retention       with stricture      Aortic root enlargement (HC)       moderate, with mild increase in diameter of the ascending aorta      Aortic valve sclerosis       with mild Al      Arthritis       Atherosclerotic coronary vascular disease       Atrial fibrillation (HC)       Biatrial enlargement       Cardiomyopathy (HC)       Chronic kidney disease       Stage III      Chronic low  "back pain       lumbar spinal stenosis      Diabetes mellitus type II  2009     A1C 7.6       Gout       tophaceous, right foot, right fingers      Hyperlipidemia       Hypertension       chronic      Peripheral neuropathy (HC)       via 2008 EMG in [place]      Prostate cancer (HC)  2003     Systolic heart failure (HC)  2009     echo, mild to moderate left ventricular enlargement with marked decreased in systolic function.      TIA (transient ischemic attack)  11/17/2014     Upper GI bleeding  2009     secondary to NSAID gastritis         Current Outpatient Prescriptions       Medication  Sig Dispense Refill     acetaminophen (TYLENOL) 325 mg tablet Take 975 mg by mouth every 6 hours if needed. Max acetaminophen dose: 4000mg in 24 hrs.       allopurinol (ZYLOPRIM) 100 mg tablet Take 1 tablet by mouth once daily. 90 tablet 3     amoxicillin (AMOXIL) 500 mg capsule TAKE 4 CAPSULES BY MOUTH ONE HOUR PRIOR TO APPT  99     aspirin 81 mg tablet Take one tablet by mouth daily  0     cholecalciferol (VITAMIN D) 1,000 unit tablet Take one tablet by mouth daily  0     furosemide (LASIX) 20 mg tablet Take 3 tablets by mouth twice daily. 120 tablet 0     insulin aspart protamine-insulin aspart (NOVOLOG MIX 70-30) 100 unit/mL (70-30) FLEXPEN 25 units every morning and 10 units every evening 10 pen 11     metoprolol succinate (TOPROL XL) 100 mg Sustained-Release tablet TAKE 1 AND 1/2 TABLETS BY MOUTH DAILY 135 tablet 2     mupirocin 2% topical (BACTROBAN OINTMENT) ointment Apply  topically to affected area(s) 2 times daily for 15 days. Left shin 30 g 0     potassium chloride (KLOR-CON M20) 20 mEq Extended-Release tablet Take 1 tablet by mouth 2 times daily with meals. 180 tablet 0     simvastatin (ZOCOR) 20 mg tablet Take 1 tablet by mouth at bedtime. 90 tablet 4     UNIFINE PENTIPS 31 gauge x 5/16\" FOR ADMINISTERING INSULIN AT HOME. USE WITH NOVOLOG INJECTIONS (TWICE DAILY) 200 Each 3     warfarin (COUMADIN) 2 mg tablet Take  2 " mg daily 90 tablet 1     Wheel Chair Wheelchair with elevating leg rests/foot rest. For home use. Length of need: 99 mo 1 Device 0     Allergies as of 01/04/2017 - Jeremy as Reviewed 12/21/2016      Allergen  Reaction Noted     Codeine *Unknown 08/23/2012     Naproxen Other - Describe In Comment Field 08/23/2012     Sulfa (sulfonamide antibiotics) *Unknown 08/23/2012     Tramadol Nausea Only 08/23/2012     Vancomycin *Unknown 08/23/2012       OBJECTIVE:  Visit Vitals       /60     Pulse (!) 44     Wt 77.1 kg (170 lb)     BMI 27.44 kg/m2       General Appearance: Alert. No acute distress  Chest/Respiratory Exam: Clear to auscultation bilaterally  Cardiovascular Exam: Regular rate and rhythm. S1, S2, no murmur, gallop, or rubs.  Extremities: No pitting lower extremity edema  Psychiatric: Normal pleasant affect  Skin: 1 cm abrasion with eschar on L anterior shin, no surrounding erythema.     Results for orders placed or performed in visit on 01/04/17      BASIC METABOLIC PANEL      Result  Value Ref Range    SODIUM 134 133 - 143 mmol/L    POTASSIUM 4.1 3.5 - 5.1 mmol/L    CHLORIDE 100 98 - 107 mmol/L    CO2,TOTAL 26 21 - 31 mmol/L    ANION GAP 8 5 - 18                    GLUCOSE 267 (H) 70 - 105 mg/dL    CALCIUM 9.4 8.6 - 10.3 mg/dL    BUN 50 (H) 7 - 25 mg/dL    CREATININE 1.97 (H) 0.70 - 1.30 mg/dL    BUN/CREAT RATIO           25                    GFR if African American 40 (L) >60 ml/min/1.73m2    GFR if not  33 (L) >60 ml/min/1.73m2        ASSESSMENT/PLAN:    ICD-10-CM   1. Chronic systolic congestive heart failure (HC) I50.22   2. Essential hypertension I10   3. 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 It appears slightly worse, but cannot directly compare as [facility] now reads rather than [facility]. He should be on lisinopril. Is on beta blocker. Perhaps optimal medical management changes function. Offered cardiology referral, but would have to travel.  Patient and son comfortable with some medication adjustment and then see cardiology if needed.    Start lisinopril 2.5 mg daily and reduce metoprolol from 100 mg daily to 75 mg daily due to low BP.     Cr returned elevated, so need to reduce furosemide from 40 mg BID to 40 mg in AM and 20 mg in PM. Reduce potassium from 60 meq to 40 meq total daily.    BS elevated, will make a decent jump in AM Novolog 70/30 dose from 25 to 35 units. Keep the 10 units at night.     F/U in 2 weeks

## 2018-01-02 NOTE — TELEPHONE ENCOUNTER
Patient Information     Patient Name MRN Sex Sukhdeep Cline [id number] Male 1934      Telephone Encounter by Justice Tobar LPN at 2017  3:46 PM     Author:  Justice Tobar LPN Service:  (none) Author Type:  NURS- Licensed Practical Nurse     Filed:  2017  3:46 PM Encounter Date:  2017 Status:  Signed     :  Justice Tobar LPN (NURS- Licensed Practical Nurse)            ----- Message from Cesar Sanders MD sent at 2017  3:36 PM CST -----  Please let patient (or his son) know that the kidney test is slightly elevated due to too much furosemide. Should reduce furosemide to 20 mg in the afternoon and keep at 40 mg in the morning.

## 2018-01-02 NOTE — NURSING NOTE
Patient Information     Patient Name MRSukhdeep Porter [id number] Male 1934      Nursing Note by Misty Davison at 2017 12:15 PM     Author:  Misty Davison Service:  (none) Author Type:  (none)     Filed:  2017 12:28 PM Encounter Date:  2017 Status:  Signed     :  Misty Davison GIA Gomez is a 82 y.o. male presenting for a follow up of his heart failure.  Misty Davison LPN 2017 12:21 PM

## 2018-01-02 NOTE — TELEPHONE ENCOUNTER
Patient Information     Patient Name MRN Sex Sukhdeep Cline [id number] Male 1934      Telephone Encounter by Justice Tobar LPN at 2017  3:46 PM     Author:  Justice Tobar LPN Service:  (none) Author Type:  NURS- Licensed Practical Nurse     Filed:  2017  3:46 PM Encounter Date:  2017 Status:  Signed     :  Justice Tobar LPN (NURS- Licensed Practical Nurse)            Patient notified of results.  Justice Tobar LPN ..............2017 3:46 PM

## 2018-01-03 NOTE — NURSING NOTE
Patient Information     Patient Name MRN Sukhdeep Victoria [id number] Male 1934      Nursing Note by Misty Davison at 2017 10:00 AM     Author:  Misty Davison Service:  (none) Author Type:  (none)     Filed:  2017 10:03 AM Encounter Date:  2017 Status:  Signed     :  Misty Davison GIA Gomez is a 82 y.o. male presenting for a follow up of his diabetes.  Misty Davison LPN 2017 9:52 AM

## 2018-01-03 NOTE — PATIENT INSTRUCTIONS
Patient Information     Patient Name MRN Sukhdeep Victoria [id number] Male 1934      Patient Instructions by Yolanda Avila RN at 2/15/2017  9:45 AM     Author:  Yolanda Avila RN Service:  (none) Author Type:  NURS- Registered Nurse     Filed:  2/15/2017  9:51 AM Encounter Date:  2/15/2017 Status:  Signed     :  Yolanda Avila RN (NURS- Registered Nurse)            2017 Details    Sun Mon Tue Wed Thu Fri Sat        1               2               3               4                 5               6               7               8               9               10               11                 12               13               14               15      4 mg   See details      16      2 mg         17      2 mg         18      2 mg           19      2 mg         20      2 mg         21      2 mg         22      2 mg         23      2 mg         24      2 mg         25      2 mg           26      2 mg         27      2 mg         28      2 mg              Date Details   02/15 This INR check               How to take your warfarin dose     To take:  2 mg Take one of the 2 mg tablets.    To take:  4 mg Take two of the 2 mg tablets.           2017 Details    Sun Mon Tue Wed Thu Fri Sat        1      2 mg         2               3               4                 5               6               7               8               9               10               11                 12               13               14               15               16               17               18                 19               20               21               22               23               24               25                 26               27               28               29               30               31                 Date Details   No additional details    Date of next INR:  3/1/2017         How to take your warfarin dose     To take:  2 mg Take one of the 2 mg tablets.              Description          Take extra 2 mg today then resume 2 mg daily. Yolanda Avila RN    2/15/2017  9:50 AM                Anticoagulation Summary as of 2/15/2017     INR goal 2.0-3.0    Today's INR 1.5    Next INR check 3/1/2017          Call your Anticoagulation Clinic at Dept: [phone]   if:   1. Any medications are started, stopped, or there is a change in dose.  2. You experience any bleeding that is not easily stopped or if it is recurrent.  3. You notice an increase in bruising or any bruising that does not heal.  You are scheduled for surgery, colonoscopy, dental extraction or any other procedure where you may need to stop your Coumadin (warfarin).

## 2018-01-03 NOTE — PROGRESS NOTES
Patient Information     Patient Name MRN Sukhdeep Victoria [id number] Male 1934      Progress Notes by Cesar Sanders MD at 3/1/2017  9:30 AM     Author:  Cesar Sanders MD Service:  (none) Author Type:  Physician     Filed:  3/2/2017 10:38 PM Encounter Date:  3/1/2017 Status:  Signed     :  Cesar Sanders MD (Physician)            SUBJECTIVE:  82 y.o. male here with son for follow up on CHF, htn, CKD, and diabetes.     Weight is stable. He reports 165 lbs at home, is consistently 172 here. On furosemide 40 mg in AM with 20 mg in afternoon. Used to be on 20 mg daily alternating with 40 mg total daily several months ago. Increased furosemide due to weight gain and SOB and has been able to drop dose due to improvement in those symptoms.  Feels well. Denies SOB. No leg edema.  ECHO Dec '16 obtained with EF of 19%.    Started back on lisinopril 2.5 mg daily. BP at home is 113-148/60s. Mostly in 115-120 systolic range.    BS are better overall. AM range is , before dinner 102-283, and before bed .  On 70/30 mix using 34 units in AM and 10 units at night.     REVIEW OF SYSTEMS:    Constitutional: Negative  Respiratory: Negative  Cardiac: Negative      Past Medical History      Diagnosis   Date     Acute on chronic systolic congestive heart failure (HC)  2012     15-20% EF 2012      Acute urinary retention       with stricture      Aortic root enlargement (HC)       moderate, with mild increase in diameter of the ascending aorta      Aortic valve sclerosis       with mild Al      Arthritis       Atherosclerotic coronary vascular disease       Atrial fibrillation (HC)       Biatrial enlargement       Cardiomyopathy (HC)       Chronic kidney disease       Stage III      Chronic low back pain       lumbar spinal stenosis      Diabetes mellitus type II  2009     A1C 7.6       Gout       tophaceous, right foot, right fingers      Hamstring tightness of left lower  "extremity  2/7/2017     Hyperlipidemia       Hypertension       chronic      Peripheral neuropathy (HC)       via 2008 EMG in [place]      Prostate cancer (HC)  2003     Systolic heart failure (HC)  2009     echo, mild to moderate left ventricular enlargement with marked decreased in systolic function.      TIA (transient ischemic attack)  11/17/2014     Upper GI bleeding  2009     secondary to NSAID gastritis         Current Outpatient Prescriptions       Medication  Sig Dispense Refill     acetaminophen (TYLENOL) 325 mg tablet Take 975 mg by mouth every 6 hours if needed. Max acetaminophen dose: 4000mg in 24 hrs.       allopurinol (ZYLOPRIM) 100 mg tablet Take 1 tablet by mouth once daily. 90 tablet 3     amoxicillin (AMOXIL) 500 mg capsule TAKE 4 CAPSULES BY MOUTH ONE HOUR PRIOR TO APPT  99     aspirin 81 mg tablet Take one tablet by mouth daily  0     cholecalciferol (VITAMIN D) 1,000 unit tablet Take one tablet by mouth daily  0     furosemide (LASIX) 20 mg tablet Take 40 mg in the morning and 20 mg in the afternoon 90 tablet 0     insulin aspart protamine-insulin aspart (NOVOLOG MIX 70-30) 100 unit/mL (70-30) FLEXPEN 34 units every morning and 10 units every evening 10 pen 11     lisinopril (PRINIVIL; ZESTRIL) 2.5 mg tablet Take 1 tablet by mouth once daily. 30 tablet 1     metoprolol succinate (TOPROL XL) 50 mg sustained-release tablet Take 1.5 tablets by mouth once daily. 135 tablet 0     pen needle, diabetic (BD INSULIN PEN NEEDLE UF) 31 gauge x 5/16\" FOR ADMINISTERING INSULIN AT HOME. USE WITH NOVOLOG INJECTIONS (TWICE DAILY) Dx: E11.8 200 Each 3     potassium chloride (KLOR-CON M20) 20 mEq Extended-Release tablet Take 1 tablet by mouth 2 times daily with meals. 180 tablet 0     simvastatin (ZOCOR) 20 mg tablet Take 1 tablet by mouth at bedtime. 90 tablet 0     warfarin (COUMADIN) 2 mg tablet Take  2 mg daily 90 tablet 1     Wheel Chair Wheelchair with elevating leg rests/foot rest. For home use. Length of " need: 99 mo 1 Device 0     Allergies as of 03/01/2017 - Jeremy as Reviewed 03/01/2017      Allergen  Reaction Noted     Codeine *Unknown 08/23/2012     Naproxen Other - Describe In Comment Field 08/23/2012     Sulfa (sulfonamide antibiotics) *Unknown 08/23/2012     Tramadol Nausea Only 08/23/2012     Vancomycin *Unknown 08/23/2012       OBJECTIVE:  Visit Vitals       /80     Pulse 64     Wt 78 kg (172 lb)     BMI 27.76 kg/m2       General Appearance: Alert. No acute distress  Chest/Respiratory Exam: Clear to auscultation bilaterally  Cardiovascular Exam: Regular rate and rhythm. S1, S2, no murmur, gallop, or rubs.  Extremities: No pitting lower extremity edema  Psychiatric: Normal pleasant affect    Results for orders placed or performed in visit on 03/01/17      BASIC METABOLIC PANEL      Result  Value Ref Range    SODIUM 135 133 - 143 mmol/L    POTASSIUM 4.6 3.5 - 5.1 mmol/L    CHLORIDE 105 98 - 107 mmol/L    CO2,TOTAL 20 (L) 21 - 31 mmol/L    ANION GAP 10 5 - 18                    GLUCOSE 112 (H) 70 - 105 mg/dL    CALCIUM 9.6 8.6 - 10.3 mg/dL    BUN 80 (H) 7 - 25 mg/dL    CREATININE 2.27 (H) 0.70 - 1.30 mg/dL    BUN/CREAT RATIO           35                    GFR if African American 34 (L) >60 ml/min/1.73m2    GFR if not African American 28 (L) >60 ml/min/1.73m2      ASSESSMENT/PLAN:    ICD-10-CM   1. Chronic systolic congestive heart failure (HC) I50.22   2. Essential hypertension I10   3. CKD (chronic kidney disease) stage 3, GFR 30-59 ml/min N18.3   4. Controlled type 2 diabetes mellitus with stage 3 chronic kidney disease, with long-term current use of insulin (HC) E11.22     N18.3     Z79.4     Weight stable. BP good. He is tolerating meds. Unfortunately the Cr increased and BUN is much increased. He appears to be overdiuresed. Reduce furosemide to 20 mg BID. This is still an overall increase from previous dosing. Reduce potassium to 20 meq daily to prevent hyperkalemia. Remain on lisinopril 20 mg  daily.  Refilled metoprolol at 75 mg daily. This is a decrease from previous 100 mg daily a few months ago.  Was hoping he could follow up in 2 months, but given lab results, return in 1 month.    Diabetes appears under good control. Keep on same insulin dosing.     F/U 1 mo

## 2018-01-03 NOTE — PROGRESS NOTES
Patient Information     Patient Name MRN Sex Sukhdeep Cline [id number] Male 1934      Progress Notes by Jerosn Shannon PT at 3/9/2017 10:15 AM     Author:  Jerson Shannon PT Service:  (none) Author Type:  PT- Physical Therapist     Filed:  3/9/2017  2:25 PM Date of Service:  3/9/2017 10:15 AM Status:  Signed     :  Jerson Shannon PT (PT- Physical Therapist)            [facility]  Outpatient PT - Daily Note      Date of Service: 3/9/2017   Visit 5 of 10  PSFS completed: 2/15/2017      Patient Name: Sukhdeep Gomez   YOB: 1934   Referring MD/Provider: Zacarisa  Diagnosis: Hamstring tightness of left lower extremity [M62.9]    Treatment Diagnosis: decreased knee extension ROM, tightness in hams and gastroc and in posterior knee soft tissue  Insurance: Medicare: G Codes/C Modifiers at Initial Assessment:   and BCBS  Start of Care Date: 2/15/2017   Certification Dates: From: 2/15/2017  Re-Cert Due: 17    Subjective      Pain Rating:     3 = Mild Pain, (Bothersome, Annoying, Irritating, Nagging) and  6 = Severe Pain, (Miserable, Gnawing, Fierce, Piercing) / Location:  Knee  And feet  Other:   Wants to walk more.  Has been doing HEP.    Functional Improvement:    Objective    Today's Intervention:    BioDex balance   Wt shifting, limits of stability on static platform x2 reps  Walk in adan with walker 4WW, 120 ft, rest, 150ft.  Leg Press 120/150# 2x15 each  Seated Tband Ham curl 2x10 red/green  Seated ham stretch  Sit/stand at railing x5 from hand rails on W/C and x5 with hands on wall railing.      Home Exercise Program:  Supine ham stretch, seated ham stretch, seated gastroc stretch with strap, squats.       Assessment    Therapist Assessment:    Did well pushing through fatigue with walking. Gets really tired easily.    Goals:  Patient goal: Have improved ability to standing and walk with less tightness in knee in 8 weeks.     Functional Short  Term Goals (4 weeks):      Patient will have 25% less tightness in hams/knee for stnading and walking in home.  Patient will have 25% improved bedmobility.  Patient is to have improved knee ROM to have 5 deg or better Extension ROM.        Functional Long Term Goals (8 weeks):      Patient will be independent in their Home Exercise Program without exacerbation of symptoms.  Patient will have full extension ROM.  Patient will tolerate all transfers with walker with no need of assist.  Patient will complete walking with walker over 300 ft.    Response to Intervention:  tired       Plan    Treatment Plan / Targeted Outcomes:     Frequency:   16 visits     Duration of Treatment: 3 months    Planned Interventions:    Home Exercise Program development  Therapeutic Exercise (ROM & Strengthening)  Therapeutic Activities  Manual Therapy  Neuromuscular Re-education  Ultrasound  Electrical Stimulation  Phonophoresis with Ketoprofen  Iontophoresis with Dexamethasone  Warm/Cold Pack  Vasopneumatic Device    Plan for next visit:  Walk.      Student or PTA has been instructed in and demonstrates skills necessary to carry out above stated treatment plan: Yes    Thank you for your referral to [facility].  Please call with any questions, concerns or comments.  [phone]

## 2018-01-03 NOTE — PATIENT INSTRUCTIONS
Patient Information     Patient Name MRN Sukhdeep Victoria [id number] Male 1934      Patient Instructions by Shasta Restrepo RN at 3/1/2017 10:15 AM     Author:  Shasta Restrepo RN Service:  (none) Author Type:  NURS- Registered Nurse     Filed:  3/1/2017 10:29 AM Encounter Date:  3/1/2017 Status:  Signed     :  Shasta Restrepo RN (NURS- Registered Nurse)            2017 Details    Sun  Sat        1      3 mg   See details      2      2 mg         3      2 mg         4      2 mg           5      2 mg         6      2 mg         7      2 mg         8      3 mg         9      2 mg         10      2 mg         11      2 mg           12      2 mg         13      2 mg         14      2 mg         15      3 mg         16               17               18                 19               20               21               22               23               24               25                 26               27               28               29               30               31                 Date Details    This INR check       Date of next INR:  3/15/2017         How to take your warfarin dose     To take:  2 mg Take one of the 2 mg tablets.    To take:  3 mg Take one and a half of the 2 mg tablets.             Description          Increased does. Take 3mg on Wednesday 1 x a day/week and 2mg rest of the days. Recheck in two weeks. SHASTA RESTREPO RN ....................  3/1/2017   10:29 AM                  Anticoagulation Summary as of 3/1/2017     INR goal 2.0-3.0    Today's INR 1.6!    Next INR check 3/15/2017          Call your Anticoagulation Clinic at Dept: [phone]   if:   1. Any medications are started, stopped, or there is a change in dose.  2. You experience any bleeding that is not easily stopped or if it is recurrent.  3. You notice an increase in bruising or any bruising that does not heal.  You are scheduled for surgery, colonoscopy, dental  extraction or any other procedure where you may need to stop your Coumadin (warfarin).

## 2018-01-03 NOTE — NURSING NOTE
Patient Information     Patient Name MRN Sukhdeep Victoria [id number] Male 1934      Nursing Note by Misty Davison at 3/1/2017  9:30 AM     Author:  Misty Davison Service:  (none) Author Type:  (none)     Filed:  3/1/2017 10:00 AM Encounter Date:  3/1/2017 Status:  Signed     :  Misty Davisonnayan Gomez is a 82 y.o. male presenting for his 3 week follow up of his diabetes and kidney disease.  Misty Davison LPN 3/1/2017 9:47 AM

## 2018-01-03 NOTE — NURSING NOTE
Patient Information     Patient Name MRN Sex Sukhdeep Cline [id number] Male 1934      Nursing Note by Gosselin, Norma J at 2017  9:45 AM     Author:  Gosselin, Norma J  Service:  (none) Author Type:  (none)     Filed:  2017 10:05 AM  Encounter Date:  2017 Status:  Addendum     :  Gosselin, Norma J        Related Notes: Original Note by Gosselin, Norma J filed at 2017  9:47 AM            Consult left knee.  DOS: 11 with Dr Orta.  He is now having Left knee stiffness.  Norma J Gosselin LPJACKIE....................  2017   9:46 AM

## 2018-01-03 NOTE — PROGRESS NOTES
Patient Information     Patient Name MRN Sex Sukhdeep Cline [id number] Male 1934      Progress Notes by Jerson Shannon PT at 3/22/2017  9:08 AM     Author:  Jerson Shannon PT Service:  (none) Author Type:  PT- Physical Therapist     Filed:  3/22/2017 10:17 AM Date of Service:  3/22/2017  9:08 AM Status:  Signed     :  Jerson Shannon PT (PT- Physical Therapist)            [facility]  Outpatient PT - Daily Note      Date of Service: 3/22/2017   Visit 7 of 10  PSFS completed: 2/15/2017      Patient Name: Sukhdeep Gomez   YOB: 1934   Referring MD/Provider: Zacarias  Diagnosis: Hamstring tightness of left lower extremity [M62.9]    Treatment Diagnosis: decreased knee extension ROM, tightness in hams and gastroc and in posterior knee soft tissue  Insurance: Medicare: G Codes/C Modifiers at Initial Assessment:   and BCBS  Start of Care Date: 2/15/2017   Certification Dates: From: 2/15/2017  Re-Cert Due: 17    Subjective      Pain Rating:     3 = Mild Pain, (Bothersome, Annoying, Irritating, Nagging) and  6 = Severe Pain, (Miserable, Gnawing, Fierce, Piercing) / Location:  Knee  And feet  Other:   Knee is feeling a little stronger, has better ROM.    Functional Improvement:    Objective    Today's Intervention:    NuStep 3 min L5  Walk with walker 150ft  Seated rest  Side step at railing 2x25 ft each L/R  Walk 150ft with 4WW, cues for posture  Leg press 130/150# x12 each  Knee ext 30# 2x10, cues for full ext  Seated STM and stretch/ext mobs of knee extension.    Home Exercise Program:  Supine ham stretch, seated ham stretch, seated gastroc stretch with strap, squats.       Assessment    Therapist Assessment:    Has improved knee extension tolerance, better posture with effort during gait.    Goals:  Patient goal: Have improved ability to standing and walk with less tightness in knee in 8 weeks.     Functional Short Term Goals (4 weeks):       Patient will have 25% less tightness in hams/knee for stnading and walking in home.  Patient will have 25% improved bedmobility.  Patient is to have improved knee ROM to have 5 deg or better Extension ROM.        Functional Long Term Goals (8 weeks):      Patient will be independent in their Home Exercise Program without exacerbation of symptoms.  Patient will have full extension ROM.  Patient will tolerate all transfers with walker with no need of assist.  Patient will complete walking with walker over 300 ft.    Response to Intervention:  tired       Plan    Treatment Plan / Targeted Outcomes:     Frequency:   16 visits     Duration of Treatment: 3 months    Planned Interventions:    Home Exercise Program development  Therapeutic Exercise (ROM & Strengthening)  Therapeutic Activities  Manual Therapy  Neuromuscular Re-education  Ultrasound  Electrical Stimulation  Phonophoresis with Ketoprofen  Iontophoresis with Dexamethasone  Warm/Cold Pack  Vasopneumatic Device    Plan for next visit:  Walk endurance, hams, knee ext.      Student or PTA has been instructed in and demonstrates skills necessary to carry out above stated treatment plan: Yes    Thank you for your referral to [facility].  Please call with any questions, concerns or comments.  [phone]

## 2018-01-03 NOTE — PATIENT INSTRUCTIONS
Patient Information     Patient Name MRN Sukhdeep Victoria [id number] Male 1934      Patient Instructions by Cesar Sanders MD at 2017 10:00 AM     Author:  Cesar Sanders MD  Service:  (none) Author Type:  Physician     Filed:  2017 10:24 AM  Encounter Date:  2017 Status:  Addendum     :  Cesar Sanders MD (Physician)        Related Notes: Original Note by Cesar Sanders MD (Physician) filed at 2017 10:17 AM            Increase insulin to 34 units in the morning, keep at 10 at night  Start lisinopril 2.5 mg daily again  Reduce the furosemide to 40 mg in the morning and 20 mg at night  Plan to take 20 mg additional Lasix if weight goes up 2 lbs in a day over 5 lbs in a week. Call to let me know if you do this more than once  Keep on same potassium  Follow up in 3 weeks

## 2018-01-03 NOTE — INITIAL ASSESSMENTS
Patient Information     Patient Name MRN Sex Sukhdeep Cline [id number] Male 1934      Initial Assessments by Jerson Shannon PT at 2/15/2017 10:04 AM     Author:  Jerson Shannon PT Service:  (none) Author Type:  PT- Physical Therapist     Filed:  2/15/2017  5:15 PM Date of Service:  2/15/2017 10:04 AM Status:  Signed     :  Jerson Shannon PT (PT- Physical Therapist)            [facility]  Outpatient PT - Initial Evaluation      Date of Service: 2/15/2017   Visit 1 of 10  PSFS completed: 2/15/2017     Patient Name: Sukhdeep Gomez   YOB: 1934   Referring MD/Provider: Zacarias  Diagnosis: Hamstring tightness of left lower extremity [M62.9]    Treatment Diagnosis: decreased knee extension ROM, tightness in hams and gastroc and in posterior knee soft tissue  Insurance:  Medicare: G Codes/C Modifiers at Initial Assessment:    and BCBS  Start of Care Date: 2/15/2017   Certification Dates: From: 2/15/2017   Re-Cert Due: 17    Subjective      History/JULES:   Bilateral foot pain and numbness, feels like he is on marbles.  Has been doing PEAK with NuStep, walking, and leg press 2x/week.    Diabetic issues, at times, blood sugars are trying to be better adjusted, seeing MD for this.  Was in a diabetic coma with sugars reading at 28. Went to ED and was back up.    Left posterior leg, hams and gastrocs feels stiff.  Walking causes some soreness.   Stride with left is decreased.    Has a lift chair.    Does some stretches of hams and LAQ regularly at home.    Uses walker for most all mobility at home.  W/C for meals and transfers during the night to bathroom.  Uses grab bars for assist and balance for dressing.    Previous Injury: past TKA  Surgery: TKA 2011  Prior Level of Function: is able to get around with walker, but not as well as he had been prior to increased stiffness in leg.    Pain Rating:     3 = Mild Pain, (Bothersome, Annoying,  Irritating, Nagging) and  6 = Severe Pain, (Miserable, Gnawing, Fierce, Piercing) / Location:  Left knee and hams,   Pain description: stiffness, balance deficits, tightness.  Aggravating factors: stand or walk too long   Relieving Factors: stretch, heat.    Living Situations:  Independent in Living Situation     Preadmission Functional Mobility: Independent with Assistive Device,       Walkers,  Type:  4 Wheeled Walker with Seat and Brakes       also uses W/C around the house.  Cognition:  Oriented to Person, Place, and Time.   Precautions:  Falls.      Occupation: retired teacher  Significant Medical History: CHF, TKA    Diagnostics:         FINDINGS: Postoperative changes of remote prior left total knee arthroplasty are seen. There is no evidence periprosthetic loosening or fracture. There are extensive vascular calcifications. Imaging of the right knee demonstrates slight lateral subluxation of the patella and minimal lateral compartment osteophytosis.      IMPRESSION: Prior left total knee arthroplasty without evidence of complication. Severe vascular calcification.     Current Medications:  Reviewed (see chart)    Drug Allergies:  Reviewed (see chart)  ?   Latex Allergy:  no    Previous Treatment:    Pain Meds / Anti-inflammatory Meds  - see [facility]  Physical Therapy - in past for strength, balance, TKA, gait  Chiro- no  Injections - no  Surgery - TKA 2011     Were cultural / age or other special adaptations needed? No      Patient is a vulnerable adult: No      Patient is aware of diagnosis: Yes      Risks and benefits explained: Yes    Other:     Patient Specific Functional and Pain Scales (PSFS):    Clinician Instructions: Complete after the history and before the exam.    Initial Assessment: We want to know what 3 activities in your life you are unable to perform, or are having the most difficulty performing, as a result of your chief problem. Please list and score at least 3 activities that you are unable  to perform, or having the most difficulty performing, because of your chief problem.   Patient Specific Activity Scoring Scheme (score one number for each activity):   Activity Score (0-10)  0= Unable to perform activity  10= Able to perform activity at same level as before injury or problem   1. Walking with walker 4/10   2. Transfers in/out of car, bed, toilet 8.5/10   3. Stand for tasks in kitchen 6/10   4.  /10   5.  /10   Totals:  18.5/30 = 62 % ability which relates to 38% impairment    Patient verbally states that they understand that the information they have provided above is current and complete to the best of their knowledge.    Patient Specific Functional Scale Modifier Scale Conversion: (patient's modifier that correlates with pt's score on PSFS): 7-CJ (30% Impaired).    G codes and Modifier taken from patient completing the PSFS:   Initial Primary G Code and Modifier:    Per the Patient's intake and/or assessment the Primary G Code is: Mobility .   The Patient's Impairment, Limitation or Restriction Modifier would be best described as: CJ - 20% - 40% Impairment.   Goal Primary G Code and Modifier:    The Patient's G Code Goal would be: Mobility    The Patient's Impairment, Limitation or Restriction Modifier goal would be best described as: CJ - 20% - 40% Impairment.     Objective    Items left blank indicate that the test was inappropriate or not meaningful at the time of evaluation and therefore not performed.    Fall Risk Screening:  History of a fall in the last year  Patient reported fear or concerns of falling     TUG:     Observation: moves fair with walker, does sit/stand with use of hands. Decreased standing left knee extension.    Palpation: mild tension in gastroc and hams    Neuro/sensation: intact distally with proprio, but claims decreased soft touch and sensation with walking due to neuropathy    ROM: lacks 10 deg of extension, flexion is good at over 120    Strength: 4+ knee  flex and ext    Special Tests: TUG 20/21 sec     Today's Intervention/Treatment:    Eval  Intro to HEP, practice  Side lying and supine STM/MFR at posterior lower and upper leg and posterior knee.    Home Exercise Program:   Supine ham stretch, seated ham stretch, seated gastroc stretch with strap, squats.    Assessment    Therapist Assessment: Signs and symptoms consistent with decreased ROM of knee extension and decreased mobility. He will improve with soft tissue manual work and stretches as well as ROM work and muscles stretches.      Pt will benefit from skilled physical therapy to address functional limitations.      Goals:  Patient goal:  Have improved ability to standing and walk with less tightness in knee in 8 weeks.    Functional Short Term Goals (4 weeks):     Patient will have 25% less tightness in hams/knee for stnading and walking in home.  Patient will have 25% improved bedmobility.  Patient is to have improved knee ROM to have 5 deg or better Extension ROM.      Functional Long Term Goals (8 weeks):     Patient will be independent in their Home Exercise Program without exacerbation of symptoms.  Patient will have full extension ROM.  Patient will tolerate all transfers with walker with no need of assist.  Patient will complete walking with walker over 300 ft.      Patient participated in goal selection and understand(s) the plan of care: Yes   Patient Potential for Achieving Desired Outcome:  Fair, age, chronic knee issues.    Response to Intervention: Patient had good response and is in understanding of plan of care and home exercise program at this time.      Plan    Treatment Plan / Targeted Outcomes:     Frequency:   16 visits     Duration of Treatment: 3 months    Planned Interventions:    Home Exercise Program development  Therapeutic Exercise (ROM & Strengthening)  Therapeutic Activities  Manual Therapy  Neuromuscular Re-education  Gait Training  Ultrasound  E-Stim/TENS  Phonophoresis with  Ketoprofen  Iontophoresis with Dexamethasone  Warm/Cold Pack  Vasopneumatic Compression with Cold Therapy ( [employer] )    Plan for next visit: ROM work, IASTM    Student or PTA has been instructed in and demonstrates skills necessary to carry out above stated treatment plan: Yes    Thank you for your referral to [facility].  Please call with any questions, concerns or comments.  [phone]    The signature, of the referring medical provider, on this document indicates certification of the above prescribed plan of care and is medically necessary.

## 2018-01-03 NOTE — PROGRESS NOTES
Patient Information     Patient Name MRN Sex Sukhdeep Cline [id number] Male 1934      Progress Notes by Jerson Shannon PT at 3/15/2017  8:34 AM     Author:  Jerson Shannon PT Service:  (none) Author Type:  PT- Physical Therapist     Filed:  3/15/2017  4:43 PM Date of Service:  3/15/2017  8:34 AM Status:  Signed     :  Jerson Shannon PT (PT- Physical Therapist)            [facility]  Outpatient PT - Daily Note      Date of Service: 3/15/2017   Visit 6 of 10  PSFS completed: 2/15/2017      Patient Name: Sukhdeep Gomez   YOB: 1934   Referring MD/Provider: Zacarias  Diagnosis: Hamstring tightness of left lower extremity [M62.9]    Treatment Diagnosis: decreased knee extension ROM, tightness in hams and gastroc and in posterior knee soft tissue  Insurance: Medicare: G Codes/C Modifiers at Initial Assessment:   and BCBS  Start of Care Date: 2/15/2017   Certification Dates: From: 2/15/2017  Re-Cert Due: 17    Subjective      Pain Rating:     3 = Mild Pain, (Bothersome, Annoying, Irritating, Nagging) and  6 = Severe Pain, (Miserable, Gnawing, Fierce, Piercing) / Location:  Knee  And feet  Other:   Knee is feeling weak.    Functional Improvement:    Objective    Today's Intervention:    Walk in adan with walker   Stop/go, for/back, diagonal 2 step, line walk,   Seated ham stretch   Tband ham curl  Sit/stand x10 at railing  Stand on foam lateral step taps. R, L, and alternating.  Standing cone taps., alternating.  Seated STM/MFR at ham   Knee extension stretch/mobs  LAQ4#       Home Exercise Program:  Supine ham stretch, seated ham stretch, seated gastroc stretch with strap, squats.       Assessment    Therapist Assessment:    Did well pushing through fatigue with walking. Gets really tired easily.  Still increased tension in hams, but improved, was able to improve extension ROM a little with overpressure.    Goals:  Patient goal: Have improved  ability to standing and walk with less tightness in knee in 8 weeks.     Functional Short Term Goals (4 weeks):      Patient will have 25% less tightness in hams/knee for stnading and walking in home.  Patient will have 25% improved bedmobility.  Patient is to have improved knee ROM to have 5 deg or better Extension ROM.        Functional Long Term Goals (8 weeks):      Patient will be independent in their Home Exercise Program without exacerbation of symptoms.  Patient will have full extension ROM.  Patient will tolerate all transfers with walker with no need of assist.  Patient will complete walking with walker over 300 ft.    Response to Intervention:  tired       Plan    Treatment Plan / Targeted Outcomes:     Frequency:   16 visits     Duration of Treatment: 3 months    Planned Interventions:    Home Exercise Program development  Therapeutic Exercise (ROM & Strengthening)  Therapeutic Activities  Manual Therapy  Neuromuscular Re-education  Ultrasound  Electrical Stimulation  Phonophoresis with Ketoprofen  Iontophoresis with Dexamethasone  Warm/Cold Pack  Vasopneumatic Device    Plan for next visit:  Walk endurance, hams, knee ext.      Student or PTA has been instructed in and demonstrates skills necessary to carry out above stated treatment plan: Yes    Thank you for your referral to [facility].  Please call with any questions, concerns or comments.  [phone]

## 2018-01-03 NOTE — PATIENT INSTRUCTIONS
Patient Information     Patient Name MRN Sukhdeep Victoria [id number] Male 1934      Patient Instructions by Yolanda Avila RN at 2017  9:45 AM     Author:  Yolanda Avila RN Service:  (none) Author Type:  NURS- Registered Nurse     Filed:  2017  9:42 AM Encounter Date:  2017 Status:  Signed     :  Yolanda Avila RN (NURS- Registered Nurse)            2017 Details    Sun Mon Tue Wed Thu Fri Sat     1               2               3               4               5               6               7                 8               9               10               11               12               13               14                 15               16               17               18      2 mg   See details      19      2 mg         20      2 mg         21      2 mg           22      2 mg         23      2 mg         24      2 mg         25      2 mg         26      2 mg         27      2 mg         28      2 mg           29      2 mg         30      2 mg         31      2 mg              Date Details    This INR check               How to take your warfarin dose     To take:  2 mg Take one of the 2 mg tablets.           2017 Details    Sun Mon Tue Wed Thu Fri Sat        1      2 mg         2      2 mg         3      2 mg         4      2 mg           5      2 mg         6      2 mg         7      2 mg         8      2 mg         9      2 mg         10      2 mg         11      2 mg           12      2 mg         13      2 mg         14      2 mg         15      2 mg         16               17               18                 19               20               21               22               23               24               25                 26               27               28                    Date Details   No additional details    Date of next INR:  2/15/2017         How to take your warfarin dose     To take:  2 mg Take one of the 2 mg tablets.              Description          Continue same Warfarin dose and recheck in 1 month.  Yolanda Avila RN   1/18/2017    9:42 AM              Anticoagulation Summary as of 1/18/2017     INR goal 2.0-3.0    Today's INR 2.2    Next INR check 2/15/2017          Call your Anticoagulation Clinic at Dept: [phone]   if:   1. Any medications are started, stopped, or there is a change in dose.  2. You experience any bleeding that is not easily stopped or if it is recurrent.  3. You notice an increase in bruising or any bruising that does not heal.  You are scheduled for surgery, colonoscopy, dental extraction or any other procedure where you may need to stop your Coumadin (warfarin).

## 2018-01-03 NOTE — TELEPHONE ENCOUNTER
Patient Information     Patient Name MRN Sukhdeep Victoria [id number] Male 1934      Telephone Encounter by Fredy Becerra RN at 2017 10:06 AM     Author:  Fredy Becerra RN Service:  (none) Author Type:  NURS- Registered Nurse     Filed:  2017 10:18 AM Encounter Date:  2017 Status:  Signed     :  Fredy Becerra RN (NURS- Registered Nurse)            [facility]    Office visit in the past 12 months.    Last visit with ARLENE GALLEGOS was on: 2017 in [place] [facility]  Next visit with ARLENE GALLEGOS is on: 2017 in [facility]  Next visit with Family Practice is on: 2017 in [facility]    Last Lipids:  Chol: 87    10/22/2014  T    10/22/2014  HDL:   33    10/22/2014  LDL:  31    10/22/2014  LDL DIRECT:  No results found in past 5 years    .    Max refills 12 months from last office visit.      Patient is due for medication management appointment as last office visit with PCP to address hyperlipidemia was on 2016. Per office visit notes on that date, patient was to continue on current dose of simvastatin. Limited refill provided at this time and letter sent for reminder to patient. Prescription refilled per RN Medication Refill Policy.................... Fredy Becerra RN ....................  2017   10:12 AM

## 2018-01-03 NOTE — PROGRESS NOTES
Patient Information     Patient Name MRN Sex Sukhdeep Cline [id number] Male 1934      Progress Notes by Jerson Shannon PT at 3/7/2017 10:21 AM     Author:  Jerson Shannon PT Service:  (none) Author Type:  PT- Physical Therapist     Filed:  3/7/2017  2:29 PM Date of Service:  3/7/2017 10:21 AM Status:  Signed     :  Jerson Shannon PT (PT- Physical Therapist)            [facility]  Outpatient PT - Daily Note      Date of Service: 3/7/2017   Visit 4 of 10  PSFS completed: 2/15/2017      Patient Name: Sukhdeep Gomez   YOB: 1934   Referring MD/Provider: Zacarias  Diagnosis: Hamstring tightness of left lower extremity [M62.9]    Treatment Diagnosis: decreased knee extension ROM, tightness in hams and gastroc and in posterior knee soft tissue  Insurance: Medicare: G Codes/C Modifiers at Initial Assessment:   and BCBS  Start of Care Date: 2/15/2017   Certification Dates: From: 2/15/2017  Re-Cert Due: 17    Subjective      Pain Rating:     3 = Mild Pain, (Bothersome, Annoying, Irritating, Nagging) and  6 = Severe Pain, (Miserable, Gnawing, Fierce, Piercing) / Location:  Knee  And feet  Other:   Doesn't see himself getting stronger.    Functional Improvement:    Objective    Today's Intervention:    NuStep 6 min.  Leg press 110/130/150# x10 each  Hip abduct 40# 2x15  Knee Ext 50# 2x10  TRX sit/stand x10  Seated MFR/STM at gastroc and ham  TRX sit/stand  Supine   STM at hams   Ham stretch   Adductor/ham stretch   Extension ROM stretch/mobs    Home Exercise Program:  Supine ham stretch, seated ham stretch, seated gastroc stretch with strap, squats.       Assessment    Therapist Assessment:    Fear of faling. Less tone in hams, knee extension limit still. He has poor stamina.    Goals:  Patient goal: Have improved ability to standing and walk with less tightness in knee in 8 weeks.     Functional Short Term Goals (4 weeks):      Patient will have 25%  less tightness in hams/knee for stnading and walking in home.  Patient will have 25% improved bedmobility.  Patient is to have improved knee ROM to have 5 deg or better Extension ROM.        Functional Long Term Goals (8 weeks):      Patient will be independent in their Home Exercise Program without exacerbation of symptoms.  Patient will have full extension ROM.  Patient will tolerate all transfers with walker with no need of assist.  Patient will complete walking with walker over 300 ft.    Response to Intervention:  tired       Plan    Treatment Plan / Targeted Outcomes:     Frequency:   16 visits     Duration of Treatment: 3 months    Planned Interventions:    Home Exercise Program development  Therapeutic Exercise (ROM & Strengthening)  Therapeutic Activities  Manual Therapy  Neuromuscular Re-education  Ultrasound  Electrical Stimulation  Phonophoresis with Ketoprofen  Iontophoresis with Dexamethasone  Warm/Cold Pack  Vasopneumatic Device    Plan for next visit:  Walk.      Student or PTA has been instructed in and demonstrates skills necessary to carry out above stated treatment plan: Yes    Thank you for your referral to [facility].  Please call with any questions, concerns or comments.  [phone]

## 2018-01-03 NOTE — PROGRESS NOTES
Patient Information     Patient Name MRN Sex Sukhdeep Cline [id number] Male 1934      Progress Notes by Jerson Shannon PT at 2017 11:50 AM     Author:  Jerson Shannon PT  Service:  (none) Author Type:  PT- Physical Therapist     Filed:  2017  5:28 PM  Date of Service:  2017 11:50 AM Status:  Addendum     :  Jerson Shannon PT (PT- Physical Therapist)        Related Notes: Original Note by Jerson Shannon PT (PT- Physical Therapist) filed at 2017  5:27 PM            [facility]  Outpatient PT - Daily Note      Date of Service: 2017     Visit 2 of 10  PSFS completed: 2/15/2017      Patient Name: Sukhdeep Gomez   YOB: 1934   Referring MD/Provider: Zacarias  Diagnosis: Hamstring tightness of left lower extremity [M62.9]    Treatment Diagnosis: decreased knee extension ROM, tightness in hams and gastroc and in posterior knee soft tissue  Insurance: Medicare: G Codes/C Modifiers at Initial Assessment:   and BCBS  Start of Care Date: 2/15/2017   Certification Dates: From: 2/15/2017  Re-Cert Due: 17    Subjective        Pain Rating:     3 = Mild Pain, (Bothersome, Annoying, Irritating, Nagging) and  6 = Severe Pain, (Miserable, Gnawing, Fierce, Piercing) / Location:  Knee  And feet  Other:   Knee feels weak, hams are looser.    Functional Improvement:    Objective    Today's Intervention:    NuStep 2 min  Leg press bilat 160 x20   unilat 80 x10 each  Knee Ext 40# 2x10  TRX sit/stand 2x5  Side lying    STF at ham  Supine   STM at gastroc   Passive ham stretch    Home Exercise Program:  Supine ham stretch, seated ham stretch, seated gastroc stretch with strap, squats.       Assessment    Therapist Assessment:      Lacks some extension ROM of knee.  Quads and qluts area weak    Goals:  Patient goal: Have improved ability to standing and walk with less tightness in knee in 8 weeks.     Functional Short Term Goals (4 weeks):       Patient will have 25% less tightness in hams/knee for stnading and walking in home.  Patient will have 25% improved bedmobility.  Patient is to have improved knee ROM to have 5 deg or better Extension ROM.        Functional Long Term Goals (8 weeks):      Patient will be independent in their Home Exercise Program without exacerbation of symptoms.  Patient will have full extension ROM.  Patient will tolerate all transfers with walker with no need of assist.  Patient will complete walking with walker over 300 ft.    Response to Intervention:  tired       Plan    Treatment Plan / Targeted Outcomes:     Frequency:   16 visits     Duration of Treatment: 3 months    Planned Interventions:    Home Exercise Program development  Therapeutic Exercise (ROM & Strengthening)  Therapeutic Activities  Manual Therapy  Neuromuscular Re-education  Ultrasound  Electrical Stimulation  Phonophoresis with Ketoprofen  Iontophoresis with Dexamethasone  Warm/Cold Pack  Vasopneumatic Device    Plan for next visit:  Leg strength    Student or PTA has been instructed in and demonstrates skills necessary to carry out above stated treatment plan: Yes    Thank you for your referral to [facility].  Please call with any questions, concerns or comments.  [phone]

## 2018-01-03 NOTE — PATIENT INSTRUCTIONS
Patient Information     Patient Name MRN Sukhdeep Victoria [id number] Male 1934      Patient Instructions by Cesar Sanders MD at 2017 10:00 AM     Author:  Cesar Sanders MD Service:  (none) Author Type:  Physician     Filed:  2017 10:24 AM Encounter Date:  2017 Status:  Signed     :  Cesar Sanders MD (Physician)            Stop lisinopril for a week. Resume in a week if feeling okay.  Reduce furosemide to 40 mg in the morning and 20 mg in the afternoon. Do not take this afternoon.     Reduce insulin to 32 units in the morning    Follow up in 3 weeks

## 2018-01-03 NOTE — PROGRESS NOTES
Patient Information     Patient Name MRN Sex Sukhdeep Cline [id number] Male 1934      Progress Notes by Sina Adames DO at 2017  9:45 AM     Author:  Sina Adames DO Service:  (none) Author Type:  Physician     Filed:  2017 10:36 AM Encounter Date:  2017 Status:  Signed     :  Sina Adames DO (Physician)            Sukhdeep Gomez was seen in consultation for Cesar Sanders MD for a chief complaint of left knee pain.    CHIEF COMPLAINT: Sukhdeep Gomez is a 82 y.o.  male  Chief Complaint     Patient presents with       Consult      Left knee         HISTORY OF PRESENTING INJURY   History of presenting injury, patient comes in today with an ongoing complaint of stiffness into his hamstrings. His is on the left knee of which he had a replacement done by Dr. Orta on 2011. He states over probably the past year has noticed more tightness into the hamstring region. He feels a little bit weaker on that left side. He has been going to the therapy department to work out on his own. He has not had formal therapy. Overall he is getting along but is open that there being anything else he can do to be a little bit stronger.  Description of pain:  mild discomfort   Radiation of pain: no  Pain course: stable  Worse with: bending or flexing affected area  Improved by: rest  History of injection: No  Any PT: No    PAST MEDICAL HISTORY:  Past Medical History      Diagnosis   Date     Acute on chronic systolic congestive heart failure (HC)  2012     15-20% EF 2012      Acute urinary retention       with stricture      Aortic root enlargement (HC)       moderate, with mild increase in diameter of the ascending aorta      Aortic valve sclerosis       with mild Al      Arthritis       Atherosclerotic coronary vascular disease       Atrial fibrillation (HC)       Biatrial enlargement       Cardiomyopathy (HC)       Chronic kidney disease        "Stage III      Chronic low back pain       lumbar spinal stenosis      Diabetes mellitus type II  2009     A1C 7.6       Gout       tophaceous, right foot, right fingers      Hyperlipidemia       Hypertension       chronic      Peripheral neuropathy (HC)       via 2008 EMG in [place]      Prostate cancer (HC)  2003     Systolic heart failure (HC)  2009     echo, mild to moderate left ventricular enlargement with marked decreased in systolic function.      TIA (transient ischemic attack)  11/17/2014     Upper GI bleeding  2009     secondary to NSAID gastritis        PAST SURGICAL HISTORY:  Past Surgical History       Procedure   Laterality Date     Coronary artery bypass graft        4 vessel, [facility], 1989       Coronary stent placement   approx. 2003     angiogram, 2 stents, [facility]       Cystoscopy   2009     & dilation of urethral stricture, Dr. Wallace       Esophagogastroduodenoscopy   2007     & colonoscopy with polypectomy, [facility] essetnially normal EGD with small polyp removed       Lumbar laminectomy   2006     decompressive, L3, L4 & L5 with improvement, Dr. Lopes       Cystoscopy   2010     & catheter placement for acute obstruction, Dr. Elise       Knee replacement   2011     left       Pacemaker placement   2007     Guidant ICD, CPI Vitality 2 EL, model #T177, serial #[id number], which was implanted on 05/30/2007 at [facility].         ORTHOPEDIC FRACTURES AND BROKEN BONES  as above.    ALLERGIES:  Allergies      Allergen   Reactions     Codeine  *Unknown     Naproxen  Other - Describe In Comment Field     \"kidneys shut down\"      Sulfa (Sulfonamide Antibiotics)  *Unknown     Tramadol  Nausea Only     Vancomycin  *Unknown       CURRENT MEDICATIONS:  Current Outpatient Prescriptions       Medication  Sig Dispense Refill     acetaminophen (TYLENOL) 325 mg tablet Take 975 mg by mouth every 6 hours if needed. Max acetaminophen dose: 4000mg in 24 hrs.       allopurinol (ZYLOPRIM) 100 mg tablet Take 1 tablet by " "mouth once daily. 90 tablet 3     amoxicillin (AMOXIL) 500 mg capsule TAKE 4 CAPSULES BY MOUTH ONE HOUR PRIOR TO APPT  99     aspirin 81 mg tablet Take one tablet by mouth daily  0     cholecalciferol (VITAMIN D) 1,000 unit tablet Take one tablet by mouth daily  0     furosemide (LASIX) 20 mg tablet Take 40 mg in the morning and 20 mg in the afternoon 90 tablet 0     insulin aspart protamine-insulin aspart (NOVOLOG MIX 70-30) 100 unit/mL (70-30) FLEXPEN 32 units every morning and 10 units every evening 10 pen 11     metoprolol succinate (TOPROL XL) 50 mg sustained-release tablet Take 1.5 tablets by mouth once daily. 135 tablet 0     potassium chloride (KLOR-CON M20) 20 mEq Extended-Release tablet Take 1 tablet by mouth 2 times daily with meals. 180 tablet 0     simvastatin (ZOCOR) 20 mg tablet Take 1 tablet by mouth at bedtime. 90 tablet 4     UNIFINE PENTIPS 31 gauge x 5/16\" FOR ADMINISTERING INSULIN AT HOME. USE WITH NOVOLOG INJECTIONS (TWICE DAILY) 200 Each 3     warfarin (COUMADIN) 2 mg tablet Take  2 mg daily 90 tablet 1     Wheel Chair Wheelchair with elevating leg rests/foot rest. For home use. Length of need: 99 mo 1 Device 0     No current facility-administered medications for this visit.      Medications have been reviewed by me and are current to the best of my knowledge and ability.      SOCIAL HISTORY:  Marital Status:   Children: Yes  Occupation: Retired [employer] professor.  Alcohol use:  No  Tobacco use: Smoker: no  Are you or have you used illicit drugs:  no    FAMILY HISTORY:  Family History      Problem  Relation Age of Onset     Heart Disease Father      Cancer-prostate Brother        REVIEW OF SYSTEMS:  The review of systems as documented in the HPI and on the intake questionnaire, completed by the patient on 2/7/2017., have been reviewed by myself and the pertinent positives and negatives addressed.  The remainder of the complete review of systems was non-contributory.      PHYSICAL EXAM: " "  Visit Vitals       /80     Pulse 80     Ht 1.676 m (5' 6\")     Wt 78 kg (172 lb)     BMI 27.76 kg/m2    Body mass index is 27.76 kg/(m^2).      General Appearance: Pleasant male in good appearance, mood and affect.  Alert and orientated times three ( time, date and location).    Skin: Abnormal, well-healed surgical incision sites. No signs of infection appreciated.      Sensation is Abnormal    Knee:    Effusion: No effusion.  Motion: Motion shows 5-110 degrees there is significant tightness into the hamstring and I cannot passively extended fully.  Patient has normal mechanical sounds to his left knee patella tracks midline.    Hip:  Flexion:Normal  Extension: Normal  Internal rotation: Abnormal, tightness.  External rotation: Abnormal, tightness.    negative Push-pull and log roll testing    Calf:  negative tenderness    Feet:  Pes planus positive   Sensation  Abnormal    Eyes:  Pupils are round and he wears glasses.    Ears:  Hearing: Impaired    Heart:  irregularly irregular    Lungs:  Coarse breath sounds.     Abdomen:  Soft, nontender.    Radiographic images from 2/7 where independently reviewed and discussed with the patient.      Xray:    X-rays demonstrated a well seated left total knee arthroplasty. Significant calcific changes in the vascular of both knees. Right knee does have varus narrowing but minimal osteoarthritic changes. There is no signs of loosening of the components.    IMPRESSION:  Tight hamstring on the left.  Status post left total knee arthroplasty done by [facility] on 11/8/2011    PLAN:  Risks, benefits, conservative, surgical and alternatives to treatment where discussed and the patient would like to proceed with conservative measures.  I will set him up for formal therapy to work on his hamstrings.  He will ice as needed but it would be important to warm it up first and then ice it.  He may be a candidate for graston.  He does not need a brace.  He knows that when in the lazy boy " chair he needs to stretch out his leg better, no pillows under the knee.  Follow-up as needed.    Sina Adames D.O.  Orthopaedic Surgeon    [facility]  [address] [place] [address]  Phone [phone] (KNEE)  Fax [phone]    This document was created using computer generated templates and voice activated software.    10:28 AM 2/7/2017

## 2018-01-03 NOTE — PROGRESS NOTES
Patient Information     Patient Name MRN Sukhdeep Victoria [id number] Male 1934      Progress Notes by Cesar Sanders MD at 2017 10:00 AM     Author:  Cesar Sanders MD Service:  (none) Author Type:  Physician     Filed:  2017  7:25 PM Encounter Date:  2017 Status:  Signed     :  Cesar Sanders MD (Physician)            SUBJECTIVE:  82 y.o. male here with son for follow up on CHF, htn, CKD, leg edema, and diabetes.     Increased insulin from 25 to 35 units in the morning, 10 at night. AM readings now , later in the day are 138-233 and  by night. Has not hypoglycemia symptoms.    Remains on furosemide 40 mg BID. BP very difficult to obtain today. Denies lightheadedness.   Weight remains stable around 170 lbs. No SOB  ECHO obtained with EF of 19%. Down from previous EF of 25% in .     Ulcer on L shin without further erythema after using mupirocin ointment.     REVIEW OF SYSTEMS:    Constitutional: Negative  Respiratory: Negative  Cardiac: Negative  GI: Negative    Past Medical History      Diagnosis   Date     Acute on chronic systolic congestive heart failure (HC)  2012     15-20% EF 2012      Acute urinary retention       with stricture      Aortic root enlargement (HC)       moderate, with mild increase in diameter of the ascending aorta      Aortic valve sclerosis       with mild Al      Arthritis       Atherosclerotic coronary vascular disease       Atrial fibrillation (HC)       Biatrial enlargement       Cardiomyopathy (HC)       Chronic kidney disease       Stage III      Chronic low back pain       lumbar spinal stenosis      Diabetes mellitus type II       A1C 7.6       Gout       tophaceous, right foot, right fingers      Hyperlipidemia       Hypertension       chronic      Peripheral neuropathy (HC)       via  EMG in [place]      Prostate cancer (HC)       Systolic heart failure (HC)  2009     echo, mild to moderate  "left ventricular enlargement with marked decreased in systolic function.      TIA (transient ischemic attack)  11/17/2014     Upper GI bleeding  2009     secondary to NSAID gastritis         Current Outpatient Prescriptions       Medication  Sig Dispense Refill     acetaminophen (TYLENOL) 325 mg tablet Take 975 mg by mouth every 6 hours if needed. Max acetaminophen dose: 4000mg in 24 hrs.       allopurinol (ZYLOPRIM) 100 mg tablet Take 1 tablet by mouth once daily. 90 tablet 3     amoxicillin (AMOXIL) 500 mg capsule TAKE 4 CAPSULES BY MOUTH ONE HOUR PRIOR TO APPT  99     aspirin 81 mg tablet Take one tablet by mouth daily  0     cholecalciferol (VITAMIN D) 1,000 unit tablet Take one tablet by mouth daily  0     furosemide (LASIX) 20 mg tablet Take 40 mg in the morning and 20 mg in the afternoon 90 tablet 0     insulin aspart protamine-insulin aspart (NOVOLOG MIX 70-30) 100 unit/mL (70-30) FLEXPEN 35 units every morning and 10 units every evening 10 pen 11     lisinopril (PRINIVIL; ZESTRIL) 2.5 mg tablet Take 1 tablet by mouth once daily. 30 tablet 1     metoprolol succinate (TOPROL XL) 50 mg sustained-release tablet Take 1.5 tablets by mouth once daily. 135 tablet 0     potassium chloride (KLOR-CON M20) 20 mEq Extended-Release tablet Take 1 tablet by mouth 2 times daily with meals. 180 tablet 0     simvastatin (ZOCOR) 20 mg tablet Take 1 tablet by mouth at bedtime. 90 tablet 4     UNIFINE PENTIPS 31 gauge x 5/16\" FOR ADMINISTERING INSULIN AT HOME. USE WITH NOVOLOG INJECTIONS (TWICE DAILY) 200 Each 3     warfarin (COUMADIN) 2 mg tablet Take  2 mg daily 90 tablet 1     Wheel Chair Wheelchair with elevating leg rests/foot rest. For home use. Length of need: 99 mo 1 Device 0     Allergies as of 01/18/2017 - Jeremy as Reviewed 01/18/2017      Allergen  Reaction Noted     Codeine *Unknown 08/23/2012     Naproxen Other - Describe In Comment Field 08/23/2012     Sulfa (sulfonamide antibiotics) *Unknown 08/23/2012     Tramadol " Nausea Only 08/23/2012     Vancomycin *Unknown 08/23/2012       OBJECTIVE:  Visit Vitals       Pulse 76     Wt 78.3 kg (172 lb 9.6 oz)     BMI 27.86 kg/m2       General Appearance: Alert. No acute distress  Chest/Respiratory Exam: Clear to auscultation bilaterally  Cardiovascular Exam: Regular rate and rhythm. S1, S2, no murmur, gallop, or rubs.  Extremities: No pitting lower extremity edema  Psychiatric: Normal pleasant affect  Skin: 1 cm abrasion with eschar on L anterior shin, no surrounding erythema.       ASSESSMENT/PLAN:    ICD-10-CM   1. 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 Chronic systolic congestive heart failure (HC) I50.22   3. CKD (chronic kidney disease) stage 3, GFR 30-59 ml/min N18.3   4. Essential hypertension I10     BS running low at times. Recommend reducing from 35 to 32 units and keeping 10 units at night. Best to run over 100 and up to 300 rather than repeat hypoglycemia leading to hospitalization like a month ago.    Has been taking more furosemide than recommended. BP likely in 90s/80, but difficult to obtain. He feels fine, so did not check lab, did not send to ED and did not give IVF.  Hold furosemide today. Hold lisinopril for 1 week with the assumption that Cr is elevated. Patient and son comfortable without checking and making corrective steps.  Reduce furosemide to 40 mg in the AM and 20 mg in the afternoon. Resume lisinopril in a week.    Follow up in 3 weeks.

## 2018-01-03 NOTE — PROGRESS NOTES
Patient Information     Patient Name MRN Sex Sukhdeep Cline [id number] Male 1934      Progress Notes by Jerson Shannon, PT at 3/2/2017 10:42 AM     Author:  Jerson Shannon PT Service:  (none) Author Type:  PT- Physical Therapist     Filed:  3/2/2017  2:38 PM Date of Service:  3/2/2017 10:42 AM Status:  Signed     :  Jerson Shannon PT (PT- Physical Therapist)            [facility]  Outpatient PT - Daily Note      Date of Service: 3/2/2017     Visit 3 of 10  PSFS completed: 2/15/2017      Patient Name: Sukhdeep Gomez   YOB: 1934   Referring MD/Provider: Zacarias  Diagnosis: Hamstring tightness of left lower extremity [M62.9]    Treatment Diagnosis: decreased knee extension ROM, tightness in hams and gastroc and in posterior knee soft tissue  Insurance: Medicare: G Codes/C Modifiers at Initial Assessment:   and BCBS  Start of Care Date: 2/15/2017   Certification Dates: From: 2/15/2017  Re-Cert Due: 17    Subjective        Pain Rating:     3 = Mild Pain, (Bothersome, Annoying, Irritating, Nagging) and  6 = Severe Pain, (Miserable, Gnawing, Fierce, Piercing) / Location:  Knee  And feet  Other:   Legs continue to be weak, not sure of himself when transfering today.    Functional Improvement:    Objective    Today's Intervention:    NuStep 2 min  Leg press 130/150/170# x10 each  Hip abduct 48# x15  Knee Ext 50# 2x10  TRX sit/stand 2x5  Seated MFR/STM at gastroc and ham  TRX sit/stand    Home Exercise Program:  Supine ham stretch, seated ham stretch, seated gastroc stretch with strap, squats.       Assessment    Therapist Assessment:      Lacks some extension ROM of knee.  Quads and qluts area weak    Goals:  Patient goal: Have improved ability to standing and walk with less tightness in knee in 8 weeks.     Functional Short Term Goals (4 weeks):      Patient will have 25% less tightness in hams/knee for stnading and walking in home.  Patient will  have 25% improved bedmobility.  Patient is to have improved knee ROM to have 5 deg or better Extension ROM.        Functional Long Term Goals (8 weeks):      Patient will be independent in their Home Exercise Program without exacerbation of symptoms.  Patient will have full extension ROM.  Patient will tolerate all transfers with walker with no need of assist.  Patient will complete walking with walker over 300 ft.    Response to Intervention:  tired       Plan    Treatment Plan / Targeted Outcomes:     Frequency:   16 visits     Duration of Treatment: 3 months    Planned Interventions:    Home Exercise Program development  Therapeutic Exercise (ROM & Strengthening)  Therapeutic Activities  Manual Therapy  Neuromuscular Re-education  Ultrasound  Electrical Stimulation  Phonophoresis with Ketoprofen  Iontophoresis with Dexamethasone  Warm/Cold Pack  Vasopneumatic Device    Plan for next visit:  Leg strength and extension ROM.      Student or PTA has been instructed in and demonstrates skills necessary to carry out above stated treatment plan: Yes    Thank you for your referral to [facility].  Please call with any questions, concerns or comments.  [phone]

## 2018-01-03 NOTE — NURSING NOTE
Patient Information     Patient Name MRN Sukhdeep Victoria [id number] Male 1934      Nursing Note by Misty Davison at 2017 10:00 AM     Author:  Misty Davison Service:  (none) Author Type:  (none)     Filed:  2017 10:12 AM Encounter Date:  2017 Status:  Signed     :  Misty Davison GIA Gomez is a 82 y.o. male presenting for follow up on his diabetes.  Misty Davison LPN 2017 9:55 AM

## 2018-01-03 NOTE — PATIENT INSTRUCTIONS
Patient Information     Patient Name MRN Sukhdeep Victoria [id number] Male 1934      Patient Instructions by Yolanda Avila RN at 3/15/2017  9:45 AM     Author:  Yolanda Avila RN Service:  (none) Author Type:  NURS- Registered Nurse     Filed:  3/15/2017 10:08 AM Encounter Date:  3/15/2017 Status:  Signed     :  Yolanda Avila RN (NURS- Registered Nurse)            2017 Details    Sun  Fri Sat        1               2               3               4                 5               6               7               8               9               10               11                 12               13               14               15      3 mg   See details      16      2 mg         17      2 mg         18      2 mg           19      2 mg         20      2 mg         21      2 mg         22      3 mg         23      2 mg         24      2 mg         25      2 mg           26      2 mg         27      2 mg         28      2 mg         29      3 mg         30      2 mg         31                 Date Details   03/15 This INR check       Date of next INR:  3/30/2017         How to take your warfarin dose     To take:  2 mg Take one of the 2 mg tablets.    To take:  3 mg Take one and a half of the 2 mg tablets.             Description          Continue same dose and recheck in 2 weeks. ...............Yolanda SALEEM. CHARY Avila    3/15/2017    10:08 AM                  Anticoagulation Summary as of 3/15/2017     INR goal 2.0-3.0    Today's INR 2.8    Next INR check 3/30/2017          Call your Anticoagulation Clinic at Dept: [phone]   if:   1. Any medications are started, stopped, or there is a change in dose.  2. You experience any bleeding that is not easily stopped or if it is recurrent.  3. You notice an increase in bruising or any bruising that does not heal.  You are scheduled for surgery, colonoscopy, dental extraction or any other procedure where you may need to stop  your Coumadin (warfarin).

## 2018-01-03 NOTE — PROGRESS NOTES
Patient Information     Patient Name MRN Sex Sukhdeep Cline [id number] Male 1934      Progress Notes by Cesar Sanders MD at 2017 10:00 AM     Author:  Cesar Sanders MD Service:  (none) Author Type:  Physician     Filed:  2017  5:04 PM Encounter Date:  2017 Status:  Signed     :  Cesar Sanders MD (Physician)            SUBJECTIVE:  82 y.o. male here with son for follow up on CHF, htn, CKD, leg edema, and diabetes.     Stopped lisinopril due to low BP. Remains off despite my instructions to restart a couple weeks ago. BP elevated today.     Continues with furosemide 40 mg BID. He was supposed to reduce to 40 mg in AM with 20 mg in afternoon. Feels well. Denies SOB. No leg edema. Weight is stable.  ECHO Dec '16 obtained with EF of 19%. Down from previous EF of 25% in .     BS in -180. In PM is 250-350. Before bed in 300 range. Readings are higher since he dropped insulin from 35 to 32 units in the morning.     REVIEW OF SYSTEMS:    Constitutional: Negative  Respiratory: Negative  Cardiac: Negative  Skin: Shin ulcer healing    Past Medical History      Diagnosis   Date     Acute on chronic systolic congestive heart failure (HC)  2012     15-20% EF 2012      Acute urinary retention       with stricture      Aortic root enlargement (HC)       moderate, with mild increase in diameter of the ascending aorta      Aortic valve sclerosis       with mild Al      Arthritis       Atherosclerotic coronary vascular disease       Atrial fibrillation (HC)       Biatrial enlargement       Cardiomyopathy (HC)       Chronic kidney disease       Stage III      Chronic low back pain       lumbar spinal stenosis      Diabetes mellitus type II  2009     A1C 7.6       Gout       tophaceous, right foot, right fingers      Hamstring tightness of left lower extremity  2017     Hyperlipidemia       Hypertension       chronic      Peripheral neuropathy (HC)       via   "EMG in [place]      Prostate cancer (HC)  2003     Systolic heart failure (HC)  2009     echo, mild to moderate left ventricular enlargement with marked decreased in systolic function.      TIA (transient ischemic attack)  11/17/2014     Upper GI bleeding  2009     secondary to NSAID gastritis         Current Outpatient Prescriptions       Medication  Sig Dispense Refill     acetaminophen (TYLENOL) 325 mg tablet Take 975 mg by mouth every 6 hours if needed. Max acetaminophen dose: 4000mg in 24 hrs.       allopurinol (ZYLOPRIM) 100 mg tablet Take 1 tablet by mouth once daily. 90 tablet 3     amoxicillin (AMOXIL) 500 mg capsule TAKE 4 CAPSULES BY MOUTH ONE HOUR PRIOR TO APPT  99     aspirin 81 mg tablet Take one tablet by mouth daily  0     cholecalciferol (VITAMIN D) 1,000 unit tablet Take one tablet by mouth daily  0     furosemide (LASIX) 20 mg tablet Take 40 mg in the morning and 20 mg in the afternoon 90 tablet 0     insulin aspart protamine-insulin aspart (NOVOLOG MIX 70-30) 100 unit/mL (70-30) FLEXPEN 32 units every morning and 10 units every evening 10 pen 11     metoprolol succinate (TOPROL XL) 50 mg sustained-release tablet Take 1.5 tablets by mouth once daily. 135 tablet 0     potassium chloride (KLOR-CON M20) 20 mEq Extended-Release tablet Take 1 tablet by mouth 2 times daily with meals. 180 tablet 0     simvastatin (ZOCOR) 20 mg tablet Take 1 tablet by mouth at bedtime. 90 tablet 4     UNIFINE PENTIPS 31 gauge x 5/16\" FOR ADMINISTERING INSULIN AT HOME. USE WITH NOVOLOG INJECTIONS (TWICE DAILY) 200 Each 3     warfarin (COUMADIN) 2 mg tablet Take  2 mg daily 90 tablet 1     Wheel Chair Wheelchair with elevating leg rests/foot rest. For home use. Length of need: 99 mo 1 Device 0     Allergies as of 02/08/2017 - Jeremy as Reviewed 02/08/2017      Allergen  Reaction Noted     Codeine *Unknown 08/23/2012     Naproxen Other - Describe In Comment Field 08/23/2012     Sulfa (sulfonamide antibiotics) *Unknown " 08/23/2012     Tramadol Nausea Only 08/23/2012     Vancomycin *Unknown 08/23/2012       OBJECTIVE:  Visit Vitals       /92     Pulse 56     Wt 78 kg (172 lb)     BMI 27.76 kg/m2       General Appearance: Alert. No acute distress  Chest/Respiratory Exam: Clear to auscultation bilaterally  Cardiovascular Exam: Regular rate and rhythm. S1, S2, no murmur, gallop, or rubs.  Extremities: No pitting lower extremity edema  Psychiatric: Normal pleasant affect  Skin: 1 cm abrasion with eschar on L anterior shin, no surrounding erythema.     Results for orders placed or performed in visit on 02/08/17      BASIC METABOLIC PANEL      Result  Value Ref Range    SODIUM 136 133 - 143 mmol/L    POTASSIUM 4.2 3.5 - 5.1 mmol/L    CHLORIDE 101 98 - 107 mmol/L    CO2,TOTAL 25 21 - 31 mmol/L    ANION GAP 10 5 - 18                    GLUCOSE 192 (H) 70 - 105 mg/dL    CALCIUM 9.6 8.6 - 10.3 mg/dL    BUN 55 (H) 7 - 25 mg/dL    CREATININE 1.77 (H) 0.70 - 1.30 mg/dL    BUN/CREAT RATIO           31                    GFR if African American 45 (L) >60 ml/min/1.73m2    GFR if not African American 37 (L) >60 ml/min/1.73m2      ASSESSMENT/PLAN:    ICD-10-CM   1. Controlled type 2 diabetes mellitus with stage 3 chronic kidney disease, with long-term current use of insulin (HC) E11.22     N18.3     Z79.4   2. Diabetes mellitus with multiple complications (HC) E11.8   3. CKD (chronic kidney disease) stage 3, GFR 30-59 ml/min N18.3   4. History of TIA (transient ischemic attack) Z86.73   5. Chronic systolic congestive heart failure (HC) I50.22     BS now running high in general. Previous reduction due to borderline hypoglycemia. Goal still 100-300 for readings. Will increase AM from 32 to 34 units. Keep 10 units at night. Had hospitalization for hypoglycemia about 2 mo ago.    BUN increased, Cr stable. Recommend reducing furosemide to 40 mg in AM and 20 mg in PM - just like last appt. Start lisinopril 2.5 mg daily to help lower BP. Keep on same  potassium doses.    Follow up in 3 weeks.

## 2018-01-03 NOTE — PATIENT INSTRUCTIONS
Patient Information     Patient Name MRN Sukhdeep Victoria [id number] Male 1934      Patient Instructions by Cesar Sanders MD at 3/1/2017  9:30 AM     Author:  Cesar Sanders MD Service:  (none) Author Type:  Physician     Filed:  3/1/2017 10:11 AM Encounter Date:  3/1/2017 Status:  Signed     :  Cesar Sanders MD (Physician)            Continue same medication doses and insulin dosing  Monitor weight at least a couple times weekly. If weight is going up, like 5 lbs in a week, then call for advice  Monitor BP at least weekly  Follow up in 2 months

## 2018-01-03 NOTE — TELEPHONE ENCOUNTER
Patient Information     Patient Name MRN Sukhdeep Victoria [id number] Male 1934      Telephone Encounter by Fredy Becerra RN at 2017 10:16 AM     Author:  Fredy Becerra RN Service:  (none) Author Type:  NURS- Registered Nurse     Filed:  2017 10:18 AM Encounter Date:  2017 Status:  Signed     :  Fredy Becerra RN (NURS- Registered Nurse)            Diabetic Supplies    Office visit in the past 12 months.    Last visit with ARLENE GALLEGOS was on: 2017 in [facility]  Next visit with ARLENE GALLEGOS is on: 2017 in [facility]  Next visit with Family Practice is on: 2017 in [employer] [facility]    Max refill for 12 months from last office visit.    Needs not be listed on Med List to fill.  Need new RX every 12 months or if exceeds the limitations set by Medicare, then every 6 months.  Also when testing frequency is changed is there a need to obtain a new order.  A refill request does not need to be approved by the ordering physician-a beneficiary or their caregiver may request refills.  Physicians are not required to fill out additional forms such as home testing results for suppliers or provide additional documentation unless the supplier is audited and the  is requesting such documentation.      Last office visit with PCP to address diabetes on 17 per chart review. Will refill rx as requested.    Prescription refilled per RN Medication Refill Policy.................... Fredy Becerra RN ....................  2017   10:17 AM

## 2018-01-04 NOTE — PROGRESS NOTES
Patient Information     Patient Name MRN Sex Sukhdeep Cline [id number] Male 1934      Progress Notes by Jerson Shannon PT at 3/30/2017 10:31 AM     Author:  Jerson Shannon PT Service:  (none) Author Type:  PT- Physical Therapist     Filed:  3/30/2017  5:16 PM Date of Service:  3/30/2017 10:31 AM Status:  Signed     :  Jerson Shannon PT (PT- Physical Therapist)            [facility]  Outpatient PT - Daily Note      Date of Service: 3/30/2017   Visit 9 of 10  PSFS completed: 2/15/2017      Patient Name: Sukhdeep Gomez   YOB: 1934   Referring MD/Provider: Zacarias  Diagnosis: Hamstring tightness of left lower extremity [M62.9]    Treatment Diagnosis: decreased knee extension ROM, tightness in hams and gastroc and in posterior knee soft tissue  Insurance: Medicare: G Codes/C Modifiers at Initial Assessment:   and BCBS  Start of Care Date: 2/15/2017   Certification Dates: From: 2/15/2017  Re-Cert Due: 17    Subjective      Pain Rating:     3 = Mild Pain, (Bothersome, Annoying, Irritating, Nagging) and  6 = Severe Pain, (Miserable, Gnawing, Fierce, Piercing) / Location:  Knee  And feet  Other:   Starting to feel a little under the weather.    Functional Improvement:    Objective    Today's Intervention:    NuStep 6 min L5  Walk in adan 150ft with 4WW  Side step at railing 2x40 ft  Walk with 4WW 150ft  Leg Press 140/150/160 x12 each  Supine   stm at hams   Knee extension stretch   Ham stretch    Home Exercise Program:  Supine ham stretch, seated ham stretch, seated gastroc stretch with strap, squats.       Assessment    Therapist Assessment:    Knee extension is slowly getting looser, he has years of lack of motion and reducing strength.    Goals:  Patient goal: Have improved ability to standing and walk with less tightness in knee in 8 weeks.     Functional Short Term Goals (4 weeks):      Patient will have 25% less tightness in hams/knee for  stnading and walking in home.  Patient will have 25% improved bedmobility.  Patient is to have improved knee ROM to have 5 deg or better Extension ROM.        Functional Long Term Goals (8 weeks):      Patient will be independent in their Home Exercise Program without exacerbation of symptoms.  Patient will have full extension ROM.  Patient will tolerate all transfers with walker with no need of assist.  Patient will complete walking with walker over 300 ft.    Response to Intervention:  tired       Plan    Treatment Plan / Targeted Outcomes:     Frequency:   16 visits     Duration of Treatment: 3 months    Planned Interventions:    Home Exercise Program development  Therapeutic Exercise (ROM & Strengthening)  Therapeutic Activities  Manual Therapy  Neuromuscular Re-education  Ultrasound  Electrical Stimulation  Phonophoresis with Ketoprofen  Iontophoresis with Dexamethasone  Warm/Cold Pack  Vasopneumatic Device    Plan for next visit:  Progress note.      Student or PTA has been instructed in and demonstrates skills necessary to carry out above stated treatment plan: Yes    Thank you for your referral to [facility].  Please call with any questions, concerns or comments.  [phone]

## 2018-01-04 NOTE — PROGRESS NOTES
Patient Information     Patient Name MRN Sex Sukhdeep Cline [id number] Male 1934      Progress Notes by Jerson Shannon PT at 4/10/2017 10:00 AM     Author:  Jerson Shannon PT Service:  (none) Author Type:  PT- Physical Therapist     Filed:  4/10/2017  4:44 PM Date of Service:  4/10/2017 10:00 AM Status:  Signed     :  Jerson Shannon PT (PT- Physical Therapist)            [facility]  Outpatient PT - Daily Note      Date of Service: 4/10/2017   Visit 2 of 10  PSFS completed: 2/15/2017, 17     Patient Name: Sukhdeep Gomez   YOB: 1934   Referring MD/Provider: Zacarias  Diagnosis: Hamstring tightness of left lower extremity [M62.9]    Treatment Diagnosis: decreased knee extension ROM, tightness in hams and gastroc and in posterior knee soft tissue  Insurance: Medicare: G Codes/C Modifiers at Initial Assessment:   and BCBS  Start of Care Date: 2/15/2017   Certification Dates: From: 2/15/2017  Re-Cert Due: 17    Subjective      Pain Rating:     3 = Mild Pain, (Bothersome, Annoying, Irritating, Nagging),  5 = Moderate Pain, (Aggravating, Grueling, Upsetting, Frustrating) and  7 = Severe Pain, (Miserable, Gnawing, Fierce, Piercing) / Location:  Knee  And feet  Other:   Knee has been doing ok, still a little stiffness, but not limiting.  Back is sore, when walking with cane.    Functional Improvement:    Objective    Today's Intervention:    Walk with cane 4x25 ft  Walk with walk 50 ft, then cane 50ft  Leg Press 120/140/160/170 x12 each  Lumbar Ext 40/50# x10 each  BioStep 2 min,   Rows Tband blue   2 min    Sit/stand x5   1 min   Standing balance eyes closed.      Home Exercise Program:  Supine ham stretch, seated ham stretch, seated gastroc stretch with strap, squats.       Assessment    Therapist Assessment:    Lumbar spine was more limiting today than knee.      Goals:  Patient goal: Have improved ability to standing and walk with less  tightness in knee in 8 weeks.     Functional Short Term Goals (4 weeks):      Patient will have 25% less tightness in hams/knee for stnading and walking in home. 4/5/2017 met, reported by pt.  Patient will have 25% improved bedmobility. 4/5/2017 met reported by pt.  Patient is to have improved knee ROM to have 5 deg or better Extension ROM. 4/5/2017 progressing, can with overpressure.        Functional Long Term Goals (8 weeks):      Patient will be independent in their Home Exercise Program without exacerbation of symptoms.  Patient will have full extension ROM.  Patient will tolerate all transfers with walker with no need of assist.  Patient will complete walking with walker over 300 ft.    Response to Intervention:  Fatigue lumbar spine.      Plan    Treatment Plan / Targeted Outcomes:     Frequency:   16 visits     Duration of Treatment: 3 months    Planned Interventions:    Home Exercise Program development  Therapeutic Exercise (ROM & Strengthening)  Therapeutic Activities  Manual Therapy  Neuromuscular Re-education  Ultrasound  Electrical Stimulation  Phonophoresis with Ketoprofen  Iontophoresis with Dexamethasone  Warm/Cold Pack  Vasopneumatic Device    Plan for next visit:  Discuss PEAK, lumbar machine.    Student or PTA has been instructed in and demonstrates skills necessary to carry out above stated treatment plan: Yes    Thank you for your referral to [facility].  Please call with any questions, concerns or comments.  [phone]

## 2018-01-04 NOTE — PROGRESS NOTES
Patient Information     Patient Name MRN Sex Sukhdeep Cline [id number] Male 1934      Progress Notes by Jerson Shannon PT at 2017 10:51 AM     Author:  Jerson Shannon PT Service:  (none) Author Type:  PT- Physical Therapist     Filed:  2017  2:17 PM Date of Service:  2017 10:51 AM Status:  Signed     :  Jerson Shannon PT (PT- Physical Therapist)            [facility]  Outpatient PT - Progress/ Daily Note      Date of Service: 2017   Visit 10 of 10  PSFS completed: 2/15/2017      Patient Name: Sukhdeep Gomez   YOB: 1934   Referring MD/Provider: Zacarias  Diagnosis: Hamstring tightness of left lower extremity [M62.9]    Treatment Diagnosis: decreased knee extension ROM, tightness in hams and gastroc and in posterior knee soft tissue  Insurance: Medicare: G Codes/C Modifiers at Initial Assessment:   and BCBS  Start of Care Date: 2/15/2017   Certification Dates: From: 2/15/2017  Re-Cert Due: 17    Subjective      Pain Rating:     3 = Mild Pain, (Bothersome, Annoying, Irritating, Nagging) and  6 = Severe Pain, (Miserable, Gnawing, Fierce, Piercing) / Location:  Knee  And feet  Other:   Starting to feel a little under the weather.    Patient Specific Functional and Pain Scales (PSFS): 2017   Clinician Instructions: Complete after the history and before the exam.   Initial Assessment: We want to know what 3 activities in your life you are unable to perform, or are having the most difficulty performing, as a result of your chief problem. Please list and score at least 3 activities that you are unable to perform, or having the most difficulty performing, because of your chief problem.   Patient Specific Activity Scoring Scheme (score one number for each activity):   Activity Score (0-10)  0= Unable to perform activity  10= Able to perform activity at same level as before injury or problem   1. Walking with walker 6/10   2.  Transfers in/out of car, bed, toilet 9/10   3. Stand for tasks in kitchen 6/10   4.  /10   5.  /10   Totals:  21/30 = 70 % ability which relates to 30% impairment   Patient verbally states that they understand that the information they have provided above is current and complete to the best of their knowledge.   Patient Specific Functional Scale Modifier Scale Conversion: (patient's modifier that correlates with pt's score on PSFS): 7-CJ (30% Impaired).     G codes and Modifier taken from patient completing the PSFS:   Initial Primary G Code and Modifier:   Per the Patient's intake and/or assessment the Primary G Code is: Mobility .  The Patient's Impairment, Limitation or Restriction Modifier would be best described as: CJ - 20% - 40% Impairment.   Goal Primary G Code and Modifier:   The Patient's G Code Goal would be: Mobility   The Patient's Impairment, Limitation or Restriction Modifier goal would be best described as: CJ - 20% - 40% Impairment.        Functional Improvement:    Objective    Today's Intervention:    TU, 20, 21:    Walk in adan 250ft with walker, cues for stride and posture  Seated knee extension stretch and ham stretch  DynaVision x3 with and without hands on walker.  Walk with walker 150ft.  NuStep 5 min, 3x30 sec in the 5 minutes of steps for speed. L 3-5      Home Exercise Program:  Supine ham stretch, seated ham stretch, seated gastroc stretch with strap, squats.       Assessment    Therapist Assessment:    Improved knee extension and improving back tolerance to walking distances.      Goals:  Patient goal: Have improved ability to standing and walk with less tightness in knee in 8 weeks.     Functional Short Term Goals (4 weeks):      Patient will have 25% less tightness in hams/knee for stnading and walking in home. 2017 met, reported by pt.  Patient will have 25% improved bedmobility. 2017 met reported by pt.  Patient is to have improved knee ROM to have 5 deg or  better Extension ROM. 4/5/2017 progressing, can with overpressure.        Functional Long Term Goals (8 weeks):      Patient will be independent in their Home Exercise Program without exacerbation of symptoms.  Patient will have full extension ROM.  Patient will tolerate all transfers with walker with no need of assist.  Patient will complete walking with walker over 300 ft.    Response to Intervention:  Tired, back was better today.      Plan    Treatment Plan / Targeted Outcomes:     Frequency:   16 visits     Duration of Treatment: 3 months    Planned Interventions:    Home Exercise Program development  Therapeutic Exercise (ROM & Strengthening)  Therapeutic Activities  Manual Therapy  Neuromuscular Re-education  Ultrasound  Electrical Stimulation  Phonophoresis with Ketoprofen  Iontophoresis with Dexamethasone  Warm/Cold Pack  Vasopneumatic Device    Plan for next visit:  Progress note.      Student or PTA has been instructed in and demonstrates skills necessary to carry out above stated treatment plan: Yes    Thank you [facility] your [facility] to [facility].  Please call with any questions, concerns or comments.  [phone]

## 2018-01-04 NOTE — PATIENT INSTRUCTIONS
Patient Information     Patient Name MRN Sukhdeep Victoria [id number] Male 1934      Patient Instructions by Shasta Restrepo RN at 3/30/2017  9:45 AM     Author:  Shasta Restrepo RN Service:  (none) Author Type:  NURS- Registered Nurse     Filed:  3/30/2017  9:58 AM Encounter Date:  3/30/2017 Status:  Signed     :  Shasta Restrepo RN (NURS- Registered Nurse)            2017 Details    Sun Mon Tue Wed Thu Fri Sat        1               2               3               4                 5               6               7               8               9               10               11                 12               13               14               15               16               17               18                 19               20               21               22               23               24               25                 26               27               28               29               30      2 mg   See details      31      2 mg           Date Details    This INR check               How to take your warfarin dose     To take:  2 mg Take one of the 2 mg tablets.           2017 Details    Sun Mon Tue Wed Thu Fri Sat           1      2 mg           2      2 mg         3      2 mg         4      2 mg         5      3 mg         6      2 mg         7      2 mg         8      2 mg           9      2 mg         10      2 mg         11      2 mg         12      3 mg         13      2 mg         14      2 mg         15      2 mg           16      2 mg         17      2 mg         18      2 mg         19      3 mg         20               21               22                 23               24               25               26               27               28               29                 30                      Date Details   No additional details    Date of next INR:  2017         How to take your warfarin dose     To take:  2 mg Take one of the 2 mg  tablets.    To take:  3 mg Take one and a half of the 2 mg tablets.             Description          Continue same dose and recheck in 3 weeks.  KELBY KEVIN RN ....................  3/30/2017   9:57 AM                Anticoagulation Summary as of 3/30/2017     INR goal 2.0-3.0    Today's INR 3.1!    Next INR check 4/19/2017          Call your Anticoagulation Clinic at Dept: [phone]   if:   1. Any medications are started, stopped, or there is a change in dose.  2. You experience any bleeding that is not easily stopped or if it is recurrent.  3. You notice an increase in bruising or any bruising that does not heal.  You are scheduled for surgery, colonoscopy, dental extraction or any other procedure where you may need to stop your Coumadin (warfarin).

## 2018-01-04 NOTE — PATIENT INSTRUCTIONS
Patient Information     Patient Name MRN Sex Sukhdeep Cline [id number] Male 1934      Patient Instructions by Cesar Sanders MD at 2017  9:30 AM     Author:  Cesar Sanders MD  Service:  (none) Author Type:  Physician     Filed:  2017 10:05 AM  Encounter Date:  2017 Status:  Addendum     :  Cesar Sanders MD (Physician)        Related Notes: Original Note by Cesar Sanders MD (Physician) filed at 2017 10:03 AM            Checking lab to see if kidneys improved  Furosemide should have been 20 mg twice daily. Let me know if different dosing at home.  Assuming lab is better, then keep on the same medications  Plan to follow up in 3 months if lab is good

## 2018-01-04 NOTE — TELEPHONE ENCOUNTER
Patient Information     Patient Name MRN Sex Sukhdeep Cline [id number] Male 1934      Telephone Encounter by Cesar Sanders MD at 2017  2:31 PM     Author:  Cesar Sanders MD Service:  (none) Author Type:  Physician     Filed:  2017  2:33 PM Encounter Date:  2017 Status:  Signed     :  Cesar Sanders MD (Physician)            Called son with results as requested. He believes that patient has been taking 60 mg total furosemide daily (40, 20) instead of 20 mg BID as I recommended. He was supposed to check on dosing.    If Sukhdeep has been taking 20 mg twice daily, then will need ultrasound to check kidneys. If taking more Lasix than 20 mg BID, then reduce to 20 mg BID. Plan appt and to check lab in 2 mo on the lower furosemide dose.     Please let me know dosing and will order ultrasound if needed.

## 2018-01-04 NOTE — PATIENT INSTRUCTIONS
Patient Information     Patient Name MRN Sukhdeep Victoria [id number] Male 1934      Patient Instructions by Yolanda Avila RN at 2017  9:45 AM     Author:  Yolanda Avila RN Service:  (none) Author Type:  NURS- Registered Nurse     Filed:  2017  9:55 AM Encounter Date:  2017 Status:  Signed     :  Yolanda Avila RN (NURS- Registered Nurse)            2017 Details    Sun Mon Tue Wed Thu Fri Sat           1                 2               3               4               5               6               7               8                 9               10               11               12               13               14               15                 16               17               18               19      2 mg   See details      20      2 mg         21      2 mg         22      2 mg           23      2 mg         24      2 mg         25      2 mg         26      2 mg         27      2 mg         28      2 mg         29      2 mg           30      2 mg                Date Details    This INR check               How to take your warfarin dose     To take:  2 mg Take one of the 2 mg tablets.           May 2017 Details    Sun Mon Tue Wed Thu Fri Sat      1      2 mg         2      2 mg         3      2 mg         4      2 mg         5      2 mg         6      2 mg           7      2 mg         8      2 mg         9      2 mg         10      2 mg         11      2 mg         12      2 mg         13      2 mg           14      2 mg         15      2 mg         16      2 mg         17      2 mg         18               19               20                 21               22               23               24               25               26               27                 28               29               30               31                   Date Details   No additional details    Date of next INR:  2017         How to take your warfarin dose     To take:  2  mg Take one of the 2 mg tablets.             Description          Decrease dose and recheck in 4 weeks.  ..............Yolanda Avila RN.............  4/19/2017    9:55 AM                Anticoagulation Summary as of 4/19/2017     INR goal 2.0-3.0    Today's INR 3.6    Next INR check 5/17/2017          Call your Anticoagulation Clinic at Dept: [phone]   if:   1. Any medications are started, stopped, or there is a change in dose.  2. You experience any bleeding that is not easily stopped or if it is recurrent.  3. You notice an increase in bruising or any bruising that does not heal.  4. You are scheduled for surgery, colonoscopy, dental extraction or any other procedure where you may need to stop your Coumadin (warfarin).

## 2018-01-04 NOTE — PROGRESS NOTES
Patient Information     Patient Name MRN Sex Sukhdeep Cline [id number] Male 1934      Progress Notes by Jerson Shannon PT at 3/24/2017 10:36 AM     Author:  Jerson Shannon PT Service:  (none) Author Type:  PT- Physical Therapist     Filed:  3/28/2017  5:10 PM Date of Service:  3/24/2017 10:36 AM Status:  Signed     :  Jerson Shannon PT (PT- Physical Therapist)            [facility]  Outpatient PT - Daily Note      Date of Service: 3/24/2017   Visit 8 of 10  PSFS completed: 2/15/2017      Patient Name: Sukhdeep Gomez   YOB: 1934   Referring MD/Provider: Zacarias  Diagnosis: Hamstring tightness of left lower extremity [M62.9]    Treatment Diagnosis: decreased knee extension ROM, tightness in hams and gastroc and in posterior knee soft tissue  Insurance: Medicare: G Codes/C Modifiers at Initial Assessment:   and BCBS  Start of Care Date: 2/15/2017   Certification Dates: From: 2/15/2017  Re-Cert Due: 17    Subjective      Pain Rating:     3 = Mild Pain, (Bothersome, Annoying, Irritating, Nagging) and  6 = Severe Pain, (Miserable, Gnawing, Fierce, Piercing) / Location:  Knee  And feet  Other:   Starting to feel a little under the weather.    Functional Improvement:    Objective    Today's Intervention:    NuStep 8 min L5  Leg press 130# 3x12 each  Knee ext 30# 2x10, cues for full ext  Hip abduct 40#  Seated STM and stretch/ext mobs of knee extension.  Tband rows.    Home Exercise Program:  Supine ham stretch, seated ham stretch, seated gastroc stretch with strap, squats.       Assessment    Therapist Assessment:    Has improved knee extension tolerance, better posture with effort during gait.    Goals:  Patient goal: Have improved ability to standing and walk with less tightness in knee in 8 weeks.     Functional Short Term Goals (4 weeks):      Patient will have 25% less tightness in hams/knee for stnading and walking in home.  Patient will  have 25% improved bedmobility.  Patient is to have improved knee ROM to have 5 deg or better Extension ROM.        Functional Long Term Goals (8 weeks):      Patient will be independent in their Home Exercise Program without exacerbation of symptoms.  Patient will have full extension ROM.  Patient will tolerate all transfers with walker with no need of assist.  Patient will complete walking with walker over 300 ft.    Response to Intervention:  tired       Plan    Treatment Plan / Targeted Outcomes:     Frequency:   16 visits     Duration of Treatment: 3 months    Planned Interventions:    Home Exercise Program development  Therapeutic Exercise (ROM & Strengthening)  Therapeutic Activities  Manual Therapy  Neuromuscular Re-education  Ultrasound  Electrical Stimulation  Phonophoresis with Ketoprofen  Iontophoresis with Dexamethasone  Warm/Cold Pack  Vasopneumatic Device    Plan for next visit:  Walk endurance, hams, knee ext.      Student or PTA has been instructed in and demonstrates skills necessary to carry out above stated treatment plan: Yes    Thank you for your referral to [facility].  Please call with any questions, concerns or comments.  [phone]

## 2018-01-04 NOTE — NURSING NOTE
Patient Information     Patient Name MRN Sukhdeep Victoria [id number] Male 1934      Nursing Note by Misty Davison at 2017  9:30 AM     Author:  Misty Davison Service:  (none) Author Type:  (none)     Filed:  2017  9:50 AM Encounter Date:  2017 Status:  Signed     :  Misty Davison GIA Gomez is a 82 y.o. male presenting for his diabetes check.  Misty Davison LPN 2017 9:40 AM

## 2018-01-04 NOTE — PROGRESS NOTES
"Patient Information     Patient Name MRN Sukhdeep Victoria [id number] Male 1934      Progress Notes by Jerson Shannon PT at 2017 10:29 AM     Author:  Jerson Shannon PT Service:  (none) Author Type:  PT- Physical Therapist     Filed:  2017 11:45 AM Date of Service:  2017 10:29 AM Status:  Signed     :  Jerson Shannon PT (PT- Physical Therapist)            [facility]  Outpatient PT - Progress/D/C/Daily Note      Date of Service: 2017   Visit 3 of 10  PSFS completed: 2/15/2017, 17     Patient Name: Sukhdeep Gomez   YOB: 1934   Referring MD/Provider: Zacarias  Diagnosis: Hamstring tightness of left lower extremity [M62.9]    Treatment Diagnosis: decreased knee extension ROM, tightness in hams and gastroc and in posterior knee soft tissue  Insurance: Medicare: G Codes/C Modifiers at Initial Assessment:   and BCBS  Start of Care Date: 2/15/2017   Certification Dates: From: 2/15/2017  Re-Cert Due: 17    Subjective      Pain Rating:     3 = Mild Pain, (Bothersome, Annoying, Irritating, Nagging),  5 = Moderate Pain, (Aggravating, Grueling, Upsetting, Frustrating) and  7 = Severe Pain, (Miserable, Gnawing, Fierce, Piercing) / Location:  Knee  And feet  Other:   Ready to graduate to [facility].  He has some unsteady feeling today, not weak, just a little \"off\".      Patient Specific Functional and Pain Scales (PSFS): 2017   Clinician Instructions: Complete after the history and before the exam.   Initial Assessment: We want to know what 3 activities in your life you are unable to perform, or are having the most difficulty performing, as a result of your chief problem. Please list and score at least 3 activities that you are unable to perform, or having the most difficulty performing, because of your chief problem.   Patient Specific Activity Scoring Scheme (score one number for each activity):   Activity Score (0-10)  0= " Unable to perform activity  10= Able to perform activity at same level as before injury or problem   1. Walking with walker 6/10   2. Transfers in/out of car, bed, toilet 9/10   3. Stand for tasks in kitchen 6/10   4.  /10   5.  /10   Totals:  21/30 = 70 % ability which relates to 30% impairment   Patient verbally states that they understand that the information they have provided above is current and complete to the best of their knowledge.   Patient Specific Functional Scale Modifier Scale Conversion: (patient's modifier that correlates with pt's score on PSFS): 7-CJ (30% Impaired).      G codes and Modifier taken from patient completing the PSFS:   Initial Primary G Code and Modifier:   Per the Patient's intake and/or assessment the Primary G Code is: Mobility .  The Patient's Impairment, Limitation or Restriction Modifier would be best described as: CJ - 20% - 40% Impairment.   Goal Primary G Code and Modifier:   The Patient's G Code Goal would be: Mobility   The Patient's Impairment, Limitation or Restriction Modifier goal would be best described as: CJ - 20% - 40% Impairment.     The Patient's status upon Discharge is Mobility     The Patient's Impairment, Limitation or Restriction Modifier would be best described as CJ - 20% - 40% Impairment.     Functional Improvement:    Objective    Today's Intervention:    Walk with cane 2x75 ft  Leg Press 130/150/170/190 x12 each  Lumbar Ext 40/50# x10 each  Sit/stand at railing 2x10  Seated STM at UT/LS  Sit/stand x10  Standing eyes closed balance   Wt shift L to R.      Home Exercise Program:  Supine ham stretch, seated ham stretch, seated gastroc stretch with strap, squats.       Assessment    Therapist Assessment:    Increased confidence with walking with cane.  Has improved gait stamina with less leg fatigue and less back pain.      Goals:  Patient goal: Have improved ability to standing and walk with less tightness in knee in 8 weeks.     Functional  Short Term Goals (4 weeks):      Patient will have 25% less tightness in hams/knee for stnading and walking in home. 4/5/2017 met, reported by pt.  Patient will have 25% improved bedmobility. 4/5/2017 met reported by pt.  Patient is to have improved knee ROM to have 5 deg or better Extension ROM. 4/5/2017 progressing, can with overpressure.        Functional Long Term Goals (8 weeks):      Patient will be independent in their Home Exercise Program without exacerbation of symptoms.  Patient will have full extension ROM. 4/12/2017 not met, may not get it.  Patient will tolerate all transfers with walker with no need of assist. 4/12/2017 met  Patient will complete walking with walker over 300 ft. 4/12/2017 met    Response to Intervention:  Understands [facility].      Plan    Treatment Plan / Targeted Outcomes:     Frequency:   16 visits     Duration of Treatment: 3 months    Planned Interventions:    Home Exercise Program development  Therapeutic Exercise (ROM & Strengthening)  Therapeutic Activities  Manual Therapy  Neuromuscular Re-education  Ultrasound  Electrical Stimulation  Phonophoresis with Ketoprofen  Iontophoresis with Dexamethasone  Warm/Cold Pack  Vasopneumatic Device    Plan: D/C to [facility].    Student or PTA has been instructed in and demonstrates skills necessary to carry out above stated treatment plan: Yes    Thank you for your referral to [facility].  Please call with any questions, concerns or comments.  [phone]

## 2018-01-04 NOTE — PROGRESS NOTES
Patient Information     Patient Name MRN Sex Sukhdeep Cline [id number] Male 1934      Progress Notes by Jerson Shannon PT at 2017 10:16 AM     Author:  Jerson Shannon PT Service:  (none) Author Type:  PT- Physical Therapist     Filed:  4/10/2017  4:58 PM Date of Service:  2017 10:16 AM Status:  Signed     :  Jerson Shannon PT (PT- Physical Therapist)            [facility]  Outpatient PT - Daily Note      Date of Service: 2017   Visit 1 of 10  PSFS completed: 2/15/2017, 17     Patient Name: Sukhdeep Gomez   YOB: 1934   Referring MD/Provider: Zacarias  Diagnosis: Hamstring tightness of left lower extremity [M62.9]    Treatment Diagnosis: decreased knee extension ROM, tightness in hams and gastroc and in posterior knee soft tissue  Insurance: Medicare: G Codes/C Modifiers at Initial Assessment:   and BCBS  Start of Care Date: 2/15/2017   Certification Dates: From: 2/15/2017  Re-Cert Due: 17    Subjective      Pain Rating:     3 = Mild Pain, (Bothersome, Annoying, Irritating, Nagging) and  6 = Severe Pain, (Miserable, Gnawing, Fierce, Piercing) / Location:  Knee  And feet  Other:   Knee is less sore with walking. Getting stronger.    Functional Improvement:    Objective    Today's Intervention:    Walk with walker, 150ft  Leg Press   130/150/160   unilat 80#  Supine   LTR   Passive ham stretch   Bridge  Walk with walker for endurance.      Home Exercise Program:  Supine ham stretch, seated ham stretch, seated gastroc stretch with strap, squats.       Assessment    Therapist Assessment:    Does well with transfers onto machines when has a hand on object. Not good without hand hold.      Goals:  Patient goal: Have improved ability to standing and walk with less tightness in knee in 8 weeks.     Functional Short Term Goals (4 weeks):      Patient will have 25% less tightness in hams/knee for stnading and walking in home. 2017  met, reported by pt.  Patient will have 25% improved bedmobility. 4/5/2017 met reported by pt.  Patient is to have improved knee ROM to have 5 deg or better Extension ROM. 4/5/2017 progressing, can with overpressure.        Functional Long Term Goals (8 weeks):      Patient will be independent in their Home Exercise Program without exacerbation of symptoms.  Patient will have full extension ROM.  Patient will tolerate all transfers with walker with no need of assist.  Patient will complete walking with walker over 300 ft.    Response to Intervention:  Tired, back was better today.      Plan    Treatment Plan / Targeted Outcomes:     Frequency:   16 visits     Duration of Treatment: 3 months    Planned Interventions:    Home Exercise Program development  Therapeutic Exercise (ROM & Strengthening)  Therapeutic Activities  Manual Therapy  Neuromuscular Re-education  Ultrasound  Electrical Stimulation  Phonophoresis with Ketoprofen  Iontophoresis with Dexamethasone  Warm/Cold Pack  Vasopneumatic Device    Plan for next visit: D/C soone?      Student or PTA has been instructed in and demonstrates skills necessary to carry out above stated treatment plan: Yes    Thank you for your referral to [facility].  Please call with any questions, concerns or comments.  [phone]

## 2018-01-04 NOTE — PROGRESS NOTES
Patient Information     Patient Name MRN Sukhdeep Victoria [id number] Male 1934      Progress Notes by Cesar Sanders MD at 2017  9:30 AM     Author:  Cesar Sanders MD Service:  (none) Author Type:  Physician     Filed:  2017  6:59 AM Encounter Date:  2017 Status:  Signed     :  Cesar Sanders MD (Physician)            SUBJECTIVE:  82 y.o. male here with son for follow up on CHF, htn, CKD, and diabetes.     Weight is down overall. Was 172 lbs last time, today is 164. Was to reduce furosemide from 40 mg in AM with 20 mg in afternoon down to 20 mg BID. His son did not get this message even though we called and sent a letter. Has likely still been on the previous dosing. Feels fine. Denies SOB or leg edema.   ECHO Dec '16 obtained with EF of 19%.    Increased AM 70/30 mix using from 34 to 35 units. Still on 10 units at night.     REVIEW OF SYSTEMS:    Constitutional: Negative  Respiratory: Negative  Cardiac: Negative      Past Medical History:     Diagnosis  Date     Acute on chronic systolic congestive heart failure (HC) 2012    15-20% EF 2012      Acute urinary retention     with stricture      Aortic root enlargement (HC)     moderate, with mild increase in diameter of the ascending aorta      Aortic valve sclerosis     with mild Al      Arthritis      Atherosclerotic coronary vascular disease      Atrial fibrillation (HC)      Biatrial enlargement      Cardiomyopathy (HC)      Chronic kidney disease     Stage III      Chronic low back pain     lumbar spinal stenosis      Diabetes mellitus type II     A1C 7.6       Gout     tophaceous, right foot, right fingers      Hamstring tightness of left lower extremity 2017     Hyperlipidemia      Hypertension     chronic      Peripheral neuropathy (HC)     via  EMG in [place]      Prostate cancer (HC)      Systolic heart failure (HC) 2009    echo, mild to moderate left ventricular enlargement with  "marked decreased in systolic function.      TIA (transient ischemic attack) 11/17/2014     Upper GI bleeding 2009    secondary to NSAID gastritis         Current Outpatient Prescriptions       Medication  Sig Dispense Refill     acetaminophen (TYLENOL) 325 mg tablet Take 975 mg by mouth every 6 hours if needed. Max acetaminophen dose: 4000mg in 24 hrs.       allopurinol (ZYLOPRIM) 100 mg tablet Take 1 tablet by mouth once daily. 90 tablet 3     amoxicillin (AMOXIL) 500 mg capsule TAKE 4 CAPSULES BY MOUTH ONE HOUR PRIOR TO APPT  99     aspirin 81 mg tablet Take one tablet by mouth daily  0     cholecalciferol (VITAMIN D) 1,000 unit tablet Take one tablet by mouth daily  0     furosemide (LASIX) 20 mg tablet Take 1 tablet by mouth twice daily. 60 tablet 2     insulin aspart protamine-insulin aspart (NOVOLOG MIX 70-30) 100 unit/mL (70-30) FLEXPEN 34 units every morning and 10 units every evening 10 pen 11     lisinopril (PRINIVIL; ZESTRIL) 2.5 mg tablet Take 1 tablet by mouth once daily. 30 tablet 11     metoprolol succinate (TOPROL XL) 50 mg sustained-release tablet Take 1.5 tablets by mouth once daily. 135 tablet 4     pen needle, diabetic (BD INSULIN PEN NEEDLE UF) 31 gauge x 5/16\" FOR ADMINISTERING INSULIN AT HOME. USE WITH NOVOLOG INJECTIONS (TWICE DAILY) Dx: E11.8 200 Each 3     potassium chloride (KLOR-CON M20) 20 mEq Extended-Release tablet Take 1 tablet by mouth once daily with a meal. 90 tablet 0     simvastatin (ZOCOR) 20 mg tablet Take 1 tablet by mouth at bedtime. 90 tablet 0     warfarin (COUMADIN) 2 mg tablet Take 3mg on Wednesday 1 x a day/week and 2mg rest of the days 90 tablet 1     Wheel Chair Wheelchair with elevating leg rests/foot rest. For home use. Length of need: 99 mo 1 Device 0     Allergies as of 04/05/2017 - Jeremy as Reviewed 03/30/2017      Allergen  Reaction Noted     Codeine *Unknown 08/23/2012     Naproxen Other - Describe In Comment Field 08/23/2012     Sulfa (sulfonamide antibiotics) " *Unknown 08/23/2012     Tramadol Nausea Only 08/23/2012     Vancomycin *Unknown 08/23/2012       OBJECTIVE:  /68  Pulse 58  Wt 74.4 kg (164 lb) Comment: Patient stated  BMI 26.47 kg/m2    General Appearance: Alert. No acute distress  Chest/Respiratory Exam: Clear to auscultation bilaterally  Cardiovascular Exam: Regular rate and rhythm. S1, S2, no murmur, gallop, or rubs.  Extremities: No pitting lower extremity edema  Psychiatric: Normal pleasant affect    Results for orders placed or performed in visit on 04/05/17      BASIC METABOLIC PANEL      Result  Value Ref Range    SODIUM 135 133 - 143 mmol/L    POTASSIUM 4.9 3.5 - 5.1 mmol/L    CHLORIDE 107 98 - 107 mmol/L    CO2,TOTAL 22 21 - 31 mmol/L    ANION GAP 6 5 - 18                    GLUCOSE 111 (H) 70 - 105 mg/dL    CALCIUM 9.2 8.6 - 10.3 mg/dL    BUN 69 (H) 7 - 25 mg/dL    CREATININE 2.20 (H) 0.70 - 1.30 mg/dL    BUN/CREAT RATIO           31                    GFR if African American 35 (L) >60 ml/min/1.73m2    GFR if not African American 29 (L) >60 ml/min/1.73m2   CBC W PLT NO DIFF      Result  Value Ref Range    WHITE BLOOD COUNT         7.8 4.5 - 11.0 thou/cu mm    RED BLOOD COUNT           4.46 4.30 - 5.90 mil/cu mm    HEMOGLOBIN                14.2 13.5 - 17.5 g/dL    HEMATOCRIT                45.5 37.0 - 53.0 %    MCV                       102 (H) 80 - 100 fL    MCH                       31.7 26.0 - 34.0 pg    MCHC                      31.1 (L) 32.0 - 36.0 g/dL    RDW                       17.5 (H) 11.5 - 15.5 %    PLATELET COUNT            152 140 - 440 thou/cu mm    MPV                       8.0 6.5 - 11.0 fL   Hgb A1c      Result  Value Ref Range    HEMOGLOBIN A1C MONITORING (POCT) 7.5 (H) 4.0 - 6.2 %    ESTIMATED AVERAGE GLUCOSE  169 mg/dL         ASSESSMENT/PLAN:    ICD-10-CM   1. Chronic systolic congestive heart failure (HC) I50.22   2. Essential hypertension I10   3. Ischemic cardiomyopathy I25.5   4. 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 Cr and BUN remain elevated. He is overdiuresed. Decrease furosemide to 20 mg BID. Keep on low dose lisinopril for heart failure. BP tolerating.   Since he is relatively stable, waiting a couple months to recheck seems reasonable.    A1c looks good with current insulin dosing. Keep on same doses.    F/U 2 mo

## 2018-01-04 NOTE — TELEPHONE ENCOUNTER
Patient Information     Patient Name MRN Sex Sukhdeep Cline [id number] Male 1934      Telephone Encounter by Stacey Esqueda at 2017  3:12 PM     Author:  Stacey Esqueda Service:  (none) Author Type:  (none)     Filed:  2017  3:14 PM Encounter Date:  2017 Status:  Signed     :  Stacey Esqueda says Sukhdeep was taking 60 mg daily and he understands that they should drop it to 40 mg total daily.   Stacey Esqueda [facility]  ..................2017   3:13 PM

## 2018-01-05 NOTE — PATIENT INSTRUCTIONS
Patient Information     Patient Name MRN Sukhdeep Victoria [id number] Male 1934      Patient Instructions by Yolanda Avila RN at 2017  9:00 AM     Author:  Yolanda Avila RN Service:  (none) Author Type:  NURS- Registered Nurse     Filed:  2017  9:36 AM Encounter Date:  2017 Status:  Signed     :  Yolanda Avila RN (NURS- Registered Nurse)            May 2017 Details    Sun Mon Tue Wed Thu Fri Sat      1               2               3               4               5               6                 7               8               9               10               11               12               13                 14               15               16               17      2 mg   See details      18      2 mg         19      2 mg         20      2 mg           21      2 mg         22      2 mg         23      2 mg         24      2 mg         25      2 mg         26      2 mg         27      2 mg           28      2 mg         29      2 mg         30      2 mg         31      2 mg             Date Details    This INR check               How to take your warfarin dose     To take:  2 mg Take one of the 2 mg tablets.           2017 Details    Sun Mon Tue Wed Thu Fri Sat         1      2 mg         2      2 mg         3      2 mg           4      2 mg         5      2 mg         6      2 mg         7      2 mg         8      2 mg         9      2 mg         10      2 mg           11      2 mg         12      2 mg         13      2 mg         14      2 mg         15               16               17                 18               19               20               21               22               23               24                 25               26               27               28               29               30                 Date Details   No additional details    Date of next INR:  2017         How to take your warfarin dose     To take:  2 mg Take  one of the 2 mg tablets.             Description          Continue same Warfarin dose and recheck in 1 month.  Yolanda Avila RN   5/17/2017    9:36 AM                  Anticoagulation Summary as of 5/17/2017     INR goal 2.0-3.0    Today's INR 2.1    Next INR check 6/14/2017          Call your Anticoagulation Clinic at Dept: [phone]   if:   1. Any medications are started, stopped, or there is a change in dose.  2. You experience any bleeding that is not easily stopped or if it is recurrent.  3. You notice an increase in bruising or any bruising that does not heal.  4. You are scheduled for surgery, colonoscopy, dental extraction or any other procedure where you may need to stop your Coumadin (warfarin).

## 2018-01-05 NOTE — TELEPHONE ENCOUNTER
Patient Information     Patient Name MRN Sex Sukhdeep Cline [id number] Male 1934      Telephone Encounter by Fredy Becerra RN at 2017  8:03 AM     Author:  Fredy Becerra RN Service:  (none) Author Type:  NURS- Registered Nurse     Filed:  2017  8:31 AM Encounter Date:  2017 Status:  Signed     :  Fredy Becerra RN (NURS- Registered Nurse)            [facility]    Office visit in the past 12 months.    Last visit with ARLENE GALLEGOS was on: 2017 in [facility]  Next visit with ARLENE GALLEGOS is on: No future appointment listed with this provider  Next visit with Family Practice is on: No future appointment listed in this department    Last Lipids:  Chol: 87    10/22/2014  T    10/22/2014  HDL:   33    10/22/2014  LDL:  31    10/22/2014  LDL DIRECT:  No results found in past 5 years    .    Max refills 12 months from last office visit.    Chart review shows that patient was sent reminder letter to be seen by PCP with last refill of requested rx on 17. Patient was seen by PCP x 2 since then, however continued use of simvastatin not addressed at those office visits per review of office visit notes. Patient is due for follow up in the beginning of 2017. Writer will refill rx as requested for a 30 day supply and send patient an additional reminder letter.    Prescription refilled per RN Medication Refill Policy.................... Fredy Becerra RN ....................  2017   8:27 AM

## 2018-01-18 PROBLEM — M54.50 LUMBAGO: Status: ACTIVE | Noted: 2018-01-01

## 2018-01-18 PROBLEM — E78.2 MIXED HYPERLIPIDEMIA: Status: ACTIVE | Noted: 2018-01-01

## 2018-01-18 PROBLEM — E11.8: Status: ACTIVE | Noted: 2018-01-01

## 2018-01-18 PROBLEM — I48.91 ATRIAL FIBRILLATION (H): Status: ACTIVE | Noted: 2018-01-01

## 2018-01-18 PROBLEM — M19.90 OSTEOARTHROSIS: Status: ACTIVE | Noted: 2018-01-01

## 2018-01-18 PROBLEM — I08.0 MITRAL VALVE INSUFFICIENCY AND AORTIC VALVE INSUFFICIENCY: Status: ACTIVE | Noted: 2018-01-01

## 2018-01-18 PROBLEM — M10.372: Status: ACTIVE | Noted: 2018-01-01

## 2018-01-18 PROBLEM — Z98.890 POSTOPERATIVE STATE: Status: ACTIVE | Noted: 2017-11-01

## 2018-01-18 PROBLEM — C44.92 SQUAMOUS CELL CARCINOMA OF SKIN: Status: ACTIVE | Noted: 2018-01-01

## 2018-01-18 PROBLEM — M62.89 HAMSTRING TIGHTNESS OF LEFT LOWER EXTREMITY: Status: ACTIVE | Noted: 2017-02-07

## 2018-01-18 PROBLEM — I50.22 CHRONIC SYSTOLIC HEART FAILURE (H): Status: ACTIVE | Noted: 2017-11-08

## 2018-01-18 PROBLEM — Z95.810 ICD (IMPLANTABLE CARDIOVERTER-DEFIBRILLATOR) IN PLACE: Status: ACTIVE | Noted: 2017-10-20

## 2018-01-18 PROBLEM — M77.50 ENTHESOPATHY OF ANKLE AND TARSUS: Status: ACTIVE | Noted: 2018-01-01

## 2018-01-18 PROBLEM — I25.5 ISCHEMIC CARDIOMYOPATHY: Status: ACTIVE | Noted: 2018-01-01

## 2018-01-18 PROBLEM — K92.2 GI BLEEDING: Status: ACTIVE | Noted: 2018-01-01

## 2018-01-18 PROBLEM — Z95.1 S/P CABG (CORONARY ARTERY BYPASS GRAFT): Status: ACTIVE | Noted: 2018-01-01

## 2018-01-18 PROBLEM — M48.00 SPINAL STENOSIS: Status: ACTIVE | Noted: 2018-01-01

## 2018-01-18 PROBLEM — C44.319 BASAL CELL CARCINOMA OF RIGHT FOREHEAD: Status: ACTIVE | Noted: 2017-10-23

## 2018-01-18 PROBLEM — I10 ESSENTIAL HYPERTENSION: Status: ACTIVE | Noted: 2018-01-01

## 2018-02-11 PROBLEM — I48.91 UNSPECIFIED ATRIAL FIBRILLATION: Status: ACTIVE | Noted: 2018-01-01

## 2018-02-11 PROBLEM — Z79.01 LONG TERM (CURRENT) USE OF ANTICOAGULANTS: Status: ACTIVE | Noted: 2018-01-01

## 2018-02-12 NOTE — NURSING NOTE
Patient Information     Patient Name MRN Sex Sukhdeep Cline [id number] Male 1934      Nursing Note by Ainsley Cardona at 2017  1:45 PM     Author:  Ainsley Cardona  Service:  (none) Author Type:  (none)     Filed:  2017  1:58 PM  Encounter Date:  2017 Status:  Addendum     :  Ainsley Cardona        Related Notes: Original Note by Ainsley Cardona filed at 2017  1:45 PM            Here today for re-excision of a basal cell on forehead.  Ainsley Cardona LPN..........2017  1:43 PM    Universal Protocol    A. Pre-procedure verification complete yes  1-relevant information / documentation available, reviewed and properly matched to the patient; 2-consent accurate and complete, 3-equipment and supplies available    B. Site marking complete Yes  Site marked if not in continuous attendance with patient    C. TIME OUT completed yes  Time Out was conducted just prior to starting procedure to verify the eight required elements: 1-patient identity, 2-consent accurate and complete, 3-position, 4-correct side/site marked (if applicable), 5-procedure, 6-relevant images / results properly labeled and displayed (if applicable), 7-antibiotics / irrigation fluids (if applicable), 8-safety precautions.    Ainsley Cardona LPN..........2017  1:58 PM

## 2018-02-12 NOTE — PATIENT INSTRUCTIONS
Patient Information     Patient Name MRN Sukhdeep Victoria [id number] Male 1934      Patient Instructions by Shasta Restrepo RN at 2017  9:30 AM     Author:  Shasta Restrepo RN Service:  (none) Author Type:  NURS- Registered Nurse     Filed:  2017  9:44 AM Encounter Date:  2017 Status:  Signed     :  Shasta Restrepo RN (NURS- Registered Nurse)            2017 Details    Sun Mon Tue Wed Thu Fri Sat          1               2                 3               4               5               6      1 mg   See details      7      2 mg         8      2 mg         9      1 mg           10      2 mg         11      2 mg         12      2 mg         13      1 mg         14      2 mg         15      2 mg         16      1 mg           17      2 mg         18      2 mg         19      2 mg         20      1 mg         21      2 mg         22      2 mg         23      1 mg           24      2 mg         25      2 mg         26      2 mg         27      1 mg         28      2 mg         29      2 mg         30      1 mg           31      2 mg                Date Details    This INR check               How to take your warfarin dose     To take:  1 mg Take half of a 2 mg tablet.    To take:  2 mg Take one of the 2 mg tablets.           2018 Details    Sun Mon Tue Wed Thu Fri Sat      1      2 mg         2      2 mg         3      1 mg         4               5               6                 7               8               9               10               11               12               13                 14               15               16               17               18               19               20                 21               22               23               24               25               26               27                 28               29               30               31                   Date Details   No additional details    Date of next  INR:  1/3/2018         How to take your warfarin dose     To take:  1 mg Take half of a 2 mg tablet.    To take:  2 mg Take one of the 2 mg tablets.             Description          Continue same dose and recheck in one month.  KELBY KEVIN RN ....................  12/6/2017   9:44 AM    Scheduled excision 12/18/17, will be holding Warfarin.        Anticoagulation Summary as of 12/6/2017     INR goal 2.0-3.0    Today's INR 2.4    Next INR check 1/3/2018          Call your Anticoagulation Clinic at Dept: [phone]   if:   1. Any medications are started, stopped, or there is a change in dose.  2. You experience any bleeding that is not easily stopped or if it is recurrent.  3. You notice an increase in bruising or any bruising that does not heal.  4. You are scheduled for surgery, colonoscopy, dental extraction or any other procedure where you may need to stop your Coumadin (warfarin).

## 2018-02-12 NOTE — TELEPHONE ENCOUNTER
Patient Information     Patient Name MRN Sex Sukhdeep Cline [id number] Male 1934      Telephone Encounter by Patricia Davis RN at 2017  2:35 PM     Author:  Patricia Davis RN Service:  (none) Author Type:  NURS- Registered Nurse     Filed:  2017  2:49 PM Encounter Date:  2017 Status:  Signed     :  Patricia Davis RN (NURS- Registered Nurse)            Office visit in the past 12 months or per provider note.  Last visit with ARLENE GALLEGOS was on: 10/06/2017 in [facility]-follow up 3 month  Next visit with ARLENE GALLEGOS is on: No future appointment listed with this provider  Next visit with Family Practice is on: No future appointment listed in this department  Lab test requirements:  Annual potassium level.  POTASSIUM (mmol/L)    Date Value   10/06/2017 4.9   Max refill for 12 months from last office visit or per provider note.  Prescription refilled per RN Medication Refill Policy.................... Patricia Davis RN ....................  2017   2:43 PM

## 2018-02-12 NOTE — NURSING NOTE
Patient Information     Patient Name MRN Sex Sukhdeep Cline [id number] Male 1934      Nursing Note by Misty Davison at 2018 10:00 AM     Author:  Misty Davison Service:  (none) Author Type:  (none)     Filed:  2018 10:40 AM Encounter Date:  2018 Status:  Signed     :  Misty Davisonalex Gomez is a 83 y.o. male presenting for diabetic care.  Previous A1C is at goal of <8  HEMOGLOBIN A1C MONITORING (POCT) (%)    Date Value   10/06/2017 6.6 (H)   2014 6.5 (H)     Urine microalbumin:creatine: 72.5  Foot exam DUE  Eye exam     Tobacco User NO  Patient is on a daily aspirin  Patient is on a Statin.  Blood pressure today of (!) 78/53 is at the goal of <139/89 for diabetics.    Misty Davison LPN..............2018 10:27 AM

## 2018-02-12 NOTE — PROGRESS NOTES
Patient Information     Patient Name MRN Sex Sukhdeep Cline [id number] Male 1934      Progress Notes by Emmanuel Snyder MD at 2017  1:45 PM     Author:  Emmanuel Snyder MD Service:  (none) Author Type:  Physician     Filed:  2017  2:17 PM Encounter Date:  2017 Status:  Signed     :  Emmanuel Snyder MD (Physician)            Procedure Note  Pre/Post Operative Diagnosis:   Residual Basal Cell Carcinoma Right Forehead    Procedure:    Re-excision    Surgeon: Emmanuel Snyder MD FACS    Local Anesthesia: 1% lidocaine with 0.25%Marcaine with epinephrine    Indication for the procedure:    This is a 83 y.o. male patient  With residual BCCA right forehead with positive deep and peripheral margins.   After explaining the risks to include bleeding, infection, recurrence or need for reexcision,and scarring the patient wished to proceed.    Procedure:   The area was prepped and draped in usual sterile fashion with ChloraPrep . After, adequate local anesthesia,an elliptical skin incision,1.5 cm (wide with margins) by 5 cm long,  was made to encompass the lesion and tissue deep to galea and the length of the previous scar .   The subcutaneous tissue was approximated with 3-0 Vicryl suture. The skin was closed with 5-0 Nylon.  A clean dry dressing was applied.    Plan:  The patient will followup in one week for suture removal and to review the pathology. Patient will follow up if there any problems with the wound including redness or drainage.

## 2018-02-12 NOTE — PROGRESS NOTES
Patient Information     Patient Name MRN Sex Sukhdeep Cline [id number] Male 1934      Progress Notes by Emmanuel Snyder MD at 2017 10:40 AM     Author:  Emmanuel Snyder MD Service:  (none) Author Type:  Physician     Filed:  2017 11:01 AM Encounter Date:  2017 Status:  Signed     :  Emmanuel Snyder MD (Physician)            Subjective:  This is a follow up after re-excision BCCA on right forehead. The patient has no complaints. Pathology reviewed with patient. No bleeding this time.     Objective: /76 (Cuff Site: Left Arm, Position: Sitting, Cuff Size: Adult Large)  Pulse 66  Resp 18  The incision is healing well without erythema. Some ecchymosis. Sutures removed    Assessment:   Doing well after re-excision of BCCA, margins free    Plan:  Follow-up as needed

## 2018-02-12 NOTE — NURSING NOTE
Patient Information     Patient Name MRN Sex Sukhdeep Cline [id number] Male 1934      Nursing Note by Ainsley Cardona at 2017 10:40 AM     Author:  Ainsley Cardona Service:  (none) Author Type:  (none)     Filed:  2017 10:51 AM Encounter Date:  2017 Status:  Signed     :  Ainsley Cardona            Here today to follow up for suture removal.  Ainsley Cardona LPN..........2017  10:48 AM

## 2018-02-12 NOTE — PATIENT INSTRUCTIONS
Patient Information     Patient Name MRN Sukhdeep Victoria [id number] Male 1934      Patient Instructions by Yolanda Avila RN at 1/3/2018  9:30 AM     Author:  Yolanda Avila RN Service:  (none) Author Type:  NURS- Registered Nurse     Filed:  1/3/2018  9:34 AM Encounter Date:  1/3/2018 Status:  Signed     :  Yolanda Avila RN (NURS- Registered Nurse)            2018 Details    Sun Mon  Thu Fri Sat      1               2               3      1 mg   See details      4      2 mg         5      2 mg         6      1 mg           7      2 mg         8      2 mg         9      2 mg         10      1 mg         11      2 mg         12      2 mg         13      1 mg           14      2 mg         15      2 mg         16      2 mg         17      1 mg         18      2 mg         19      2 mg         20      1 mg           21      2 mg         22      2 mg         23      2 mg         24      1 mg         25      2 mg         26      2 mg         27      1 mg           28      2 mg         29      2 mg         30      2 mg         31      1 mg             Date Details    This INR check       Date of next INR:  2018         How to take your warfarin dose     To take:  1 mg Take half of a 2 mg tablet.    To take:  2 mg Take one of the 2 mg tablets.             Description          Continue same Warfarin dose and recheck in 1 month.  Yolanda Avila RN   1/3/2018    9:33 AM          Anticoagulation Summary as of 1/3/2018     INR goal 2.0-3.0    Today's INR 2.9    Next INR check 2018          Call your Anticoagulation Clinic at Dept: [phone]   if:   1. Any medications are started, stopped, or there is a change in dose.  2. You experience any bleeding that is not easily stopped or if it is recurrent.  3. You notice an increase in bruising or any bruising that does not heal.  4. You are scheduled for surgery, colonoscopy, dental extraction or any other procedure  where you may need to stop your Coumadin (warfarin).

## 2018-02-13 NOTE — TELEPHONE ENCOUNTER
Patient Information     Patient Name MRN Sukhdeep Victoria [id number] Male 1934      Telephone Encounter by Misty Davison at 1/10/2018 11:04 AM     Author:  Misty Davison Service:  (none) Author Type:  (none)     Filed:  1/10/2018 11:05 AM Encounter Date:  1/10/2018 Status:  Signed     :  Misty Davison            See result note.  Misty Davison LPN 1/10/2018 11:04 AM

## 2018-02-13 NOTE — PATIENT INSTRUCTIONS
Patient Information     Patient Name MRN Sex Sukhdeep Cline [id number] Male 1934      Patient Instructions by Cesar Sanders MD at 2018 10:00 AM     Author:  Cesar Sanders MD Service:  (none) Author Type:  Physician     Filed:  2018 10:58 AM Encounter Date:  2018 Status:  Signed     :  Cesar Sanders MD (Physician)            Check lab and we will call with results  May need to adjust blood pressure medication  Have the [facility] staff check your blood pressure if possible  Referral for a PT session about thigh pain

## 2018-02-13 NOTE — PROGRESS NOTES
Patient Information     Patient Name MRN Sukhdeep Victoria [id number] Male 1934      Progress Notes by Cesar Sanders MD at 2018 10:00 AM     Author:  Cesar Sanders MD Service:  (none) Author Type:  Physician     Filed:  2018 11:17 AM Encounter Date:  2018 Status:  Signed     :  Cesar Sanders MD (Physician)            SUBJECTIVE:  83 y.o. male here with son Mert for follow up on diabetes, CHF, a-fib on warfarin, CKD, gout.    BP low today. He feels fine. Denies lightheadedness at any time. No problems standing today. Has not been tracking BP at home. Had lisinopril dose increased by cardiology in November. BP checked twice since when in to see surgery and was 120s/70s. No other med changes. Took furosemide this AM.  He has dropped weight, but stablized on current meds. Required elevated dose of furosemide a year ago due to more edema, but scaled back on dosing recently.  In a-fib and anti-coagulated with warfarin.    Blood sugars are more variable than last check, but he's been eating more holiday treats. AM sugars are , before dinner , and before bed .    No recent gout flares.    REVIEW OF SYSTEMS:    Constitutional: Negative  Respiratory: Negative  Cardiac: Negative  Neurological: stable neuropathy pain      Past Medical History:     Diagnosis  Date     Acute on chronic systolic congestive heart failure (HC) 2012    15-20% EF 2012      Acute urinary retention     with stricture      Aortic root enlargement (HC)     moderate, with mild increase in diameter of the ascending aorta      Aortic valve sclerosis     with mild Al      Arthritis      Atherosclerotic coronary vascular disease      Atrial fibrillation (HC)      Biatrial enlargement      Cardiomyopathy (HC)      Chronic kidney disease     Stage III      Chronic low back pain     lumbar spinal stenosis      Diabetes mellitus type II     A1C 7.6       Gout     tophaceous,  "right foot, right fingers      Hamstring tightness of left lower extremity 2/7/2017     Hyperlipidemia      Hypertension     chronic      Peripheral neuropathy (HC)     via 2008 EMG in [place]      Prostate cancer (HC) 2003     Systolic heart failure (HC) 2009    echo, mild to moderate left ventricular enlargement with marked decreased in systolic function.      TIA (transient ischemic attack) 11/17/2014     Upper GI bleeding 2009    secondary to NSAID gastritis         Current Outpatient Prescriptions       Medication  Sig Dispense Refill     acetaminophen (TYLENOL) 325 mg tablet Take 975 mg by mouth every 6 hours if needed. Max acetaminophen dose: 4000mg in 24 hrs.       allopurinol (ZYLOPRIM) 100 mg tablet TAKE ONE TABLET BY MOUTH EVERY DAY 90 tablet 0     amoxicillin (AMOXIL) 500 mg capsule TAKE 4 CAPSULES BY MOUTH ONE HOUR PRIOR TO APPT  99     aspirin 81 mg tablet Take one tablet by mouth daily  0     atorvastatin (LIPITOR) 10 mg tablet Take 1 tablet by mouth at bedtime. 60 tablet 3     cholecalciferol (VITAMIN D) 1,000 unit tablet Take one tablet by mouth daily  0     furosemide (LASIX) 20 mg tablet Take 1 tablet every morning and 1 tablet every other afternoon. 135 tablet 1     insulin aspart protamine-insulin aspart (NOVOLOG MIX 70-30) 100 unit/mL (70-30) FLEXPEN 35 units every morning and 10 units every evening 10 pen 11     lisinopril (PRINIVIL; ZESTRIL) 5 mg tablet Take 1 tablet by mouth once daily. 90 tablet 3     metoprolol succinate (TOPROL XL) 50 mg sustained-release tablet Take 1.5 tablets by mouth once daily. 135 tablet 4     pen needle, diabetic (BD INSULIN PEN NEEDLE UF) 31 gauge x 5/16\" FOR ADMINISTERING INSULIN AT HOME. USE WITH NOVOLOG INJECTIONS (TWICE DAILY) Dx: E11.8 200 Each 3     potassium chloride (KLOR-CON M20) 20 mEq Extended-Release tablet TAKE 1 TABLET BY MOUTH ONCE DAILY WITH A MEAL 90 tablet 0     simvastatin (ZOCOR) 20 mg tablet Take 20 mg by mouth at bedtime.  4     warfarin " "(COUMADIN) 2 mg tablet Take 1 mg x 2 days and 2 mg x 5 days/week 90 tablet 0     Wheel Chair Wheelchair with elevating leg rests/foot rest. For home use. Length of need: 99 mo 1 Device 0     Allergies as of 01/08/2018 - Jeremy as Reviewed 12/18/2017      Allergen  Reaction Noted     Codeine *Unknown 08/23/2012     Naproxen Other - Describe In Comment Field 08/23/2012     Sulfa (sulfonamide antibiotics) *Unknown 08/23/2012     Tramadol Nausea Only 08/23/2012     Vancomycin *Unknown 08/23/2012       OBJECTIVE:  BP (!) 78/53 (Cuff Site: Right Arm, Position: Sitting, Cuff Size: Adult Regular)  Pulse 68  Ht 1.68 m (5' 6.14\")  Wt 71.2 kg (157 lb)  BMI 25.23 kg/m2    General Appearance: Alert. No acute distress  Chest/Respiratory Exam: Clear to auscultation bilaterally  Cardiovascular Exam: Regular rate and rhythm. S1, S2, no murmur, gallop, or rubs.  Extremities: No lower extremity edema.  Musculoskeletal: Tender in L distal thigh  Foot Exam: Left and right foot: monofilament sensation decreased, trace pedal pulses, no lesions, nail hygiene good.  Psychiatric: Normal pleasant affect    Results for orders placed or performed in visit on 01/08/18      URIC ACID      Result  Value Ref Range    URIC ACID 5.0 4.4 - 7.6 mg/dL   BASIC METABOLIC PANEL      Result  Value Ref Range    SODIUM 130 (L) 133 - 143 mmol/L    POTASSIUM 4.8 3.5 - 5.1 mmol/L    CHLORIDE 116 (H) 98 - 107 mmol/L    CO2,TOTAL 17 (L) 21 - 31 mmol/L    ANION GAP Unable to calculate     GLUCOSE 200 (H) 70 - 105 mg/dL    CALCIUM 8.2 (L) 8.6 - 10.3 mg/dL    BUN 63 (H) 7 - 25 mg/dL    CREATININE 2.16 (H) 0.70 - 1.30 mg/dL    BUN/CREAT RATIO           29                    GFR if African American 36 (L) >60 ml/min/1.73m2    GFR if not African American 29 (L) >60 ml/min/1.73m2   Hgb A1c      Result  Value Ref Range    HEMOGLOBIN A1C MONITORING (POCT) 6.6 (H) 4.0 - 6.2 %    ESTIMATED AVERAGE GLUCOSE  143 mg/dL         ASSESSMENT/PLAN:    ICD-10-CM   1. Controlled " type 2 diabetes mellitus with stage 3 chronic kidney disease, with long-term current use of insulin (HC) E11.22     N18.3     Z79.4   2. Chronic systolic congestive heart failure (HC) I50.22   3. Essential hypertension I10   4. CKD (chronic kidney disease) stage 3, GFR 30-59 ml/min N18.3   5. Pain of left thigh M79.652   6. 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 May benefit from slight decrease in insulin, but has large increases to 300 with dietary indescretion, so will keep same dosing. Reduce doses if hypoglycemia occurs.    BP very low today. Appears legitimate on recheck, but hard to hear. He is asymptomatic. Recent BP readings are goal range of 120s/70s. Patient believes pressure will increase at PT. Recommend recheck and monitoring at home. If remaining low, then reduce BP medication. May need to discuss options with cardiology.    Lab stable. Does not appear overdiuresed as BUN and CR slightly decreased. He is at baseline diuretic dose. In the past with lower dosing had increase in leg edema and SOB. No med changes made.    Referral to PT for thigh pain    Uric acid looks good. Refilled allopurinol.     Refilled simvastatin and potassium    F/U 3 mo, sooner if BP low

## 2018-02-13 NOTE — PATIENT INSTRUCTIONS
Patient Information     Patient Name MRN Sukhdeep Victoria [id number] Male 1934      Patient Instructions by Shasta Restrepo RN at 2018  9:30 AM     Author:  Shasta Restrepo RN  Service:  (none) Author Type:  NURS- Registered Nurse     Filed:  2018  9:58 AM  Encounter Date:  2018 Status:  Addendum     :  Shasta Restrepo RN (NURS- Registered Nurse)        Related Notes: Original Note by Shasta Restrepo RN (NURS- Registered Nurse) filed at 2018  9:55 AM            2018 Details    Sun Mon Tue Wed Thu Fri Sat      1               2               3               4               5               6                 7               8               9               10               11               12               13                 14               15               16               17               18               19               20                 21               22               23               24               25               26               27                 28               29               30               31      2 mg   See details          Date Details    This INR check               How to take your warfarin dose     To take:  2 mg Take one of the 2 mg tablets.           2018 Details    Sun Mon Tue Wed Thu Fri Sat         1      2 mg         2      2 mg         3      1 mg           4      2 mg         5      2 mg         6      2 mg         7      1 mg         8      2 mg         9      2 mg         10      1 mg           11      2 mg         12      2 mg         13      2 mg         14      1 mg         15      2 mg         16      2 mg         17      1 mg           18      2 mg         19      2 mg         20      2 mg         21      1 mg         22               23               24                 25               26               27               28                   Date Details   No additional details    Date of next INR:  2018          How to take your warfarin dose     To take:  1 mg Take half of a 2 mg tablet.    To take:  2 mg Take one of the 2 mg tablets.             Description          Take extra Warfarin today (2 mg instead of 1 mg), then continue same Warfarin dose and recheck INR in 2 weeks. KELBY KEVIN RN ....................  1/31/2018   9:55 AM          Anticoagulation Summary as of 1/31/2018     INR goal 2.0-3.0    Today's INR 1.8!    Next INR check 2/21/2018          Call your Anticoagulation Clinic at Dept: [phone]   if:   1. Any medications are started, stopped, or there is a change in dose.  2. You experience any bleeding that is not easily stopped or if it is recurrent.  3. You notice an increase in bruising or any bruising that does not heal.  4. You are scheduled for surgery, colonoscopy, dental extraction or any other procedure where you may need to stop your Coumadin (warfarin).

## 2018-02-13 NOTE — TELEPHONE ENCOUNTER
Patient Information     Patient Name MRN Sex Sukhdeep Cline [id number] Male 1934      Telephone Encounter by Sada Houston at 2018  2:43 PM     Author:  Sada Houston Service:  (none) Author Type:  (none)     Filed:  2018  2:45 PM Encounter Date:  2018 Status:  Signed     :  Sada Houston            Closing encounter. Results note open regarding this.    Sada Houston LPN..............2018 2:44 PM

## 2018-02-13 NOTE — NURSING NOTE
Patient Information     Patient Name MRSukhdeep Porter [id number] Male 1934      Nursing Note by Vibha Perez at 2018 11:15 AM     Author:  Vibha Perez Service:  (none) Author Type:  (none)     Filed:  2018 11:37 AM Encounter Date:  2018 Status:  Signed     :  Vibha Perez            Patient comes in for follow up from ER - chronic systolic heart failure.  Vibha Perez LPN ....................  2018   11:28 AM

## 2018-02-21 PROBLEM — I48.0 PAROXYSMAL ATRIAL FIBRILLATION (H): Status: ACTIVE | Noted: 2018-01-01

## 2018-02-21 PROBLEM — I77.810 ASCENDING AORTA DILATATION (H): Status: ACTIVE | Noted: 2018-01-01

## 2018-02-21 PROBLEM — I25.2 H/O MYOCARDIAL INFARCTION, GREATER THAN 8 WEEKS: Status: ACTIVE | Noted: 2018-01-01

## 2018-02-21 PROBLEM — Z95.5 STENTED CORONARY ARTERY: Status: ACTIVE | Noted: 2018-01-01

## 2018-02-21 PROBLEM — Z95.1 S/P CABG X 4: Status: ACTIVE | Noted: 2018-01-01

## 2018-02-21 NOTE — PROGRESS NOTES
ANTICOAGULATION FOLLOW-UP CLINIC VISIT    Patient Name:  Sukhdeep Gomez  Date:  2/21/2018  Contact Type:  Face to Face    SUBJECTIVE:     Patient Findings     Positives Change in medications (lisinopril decreased)           OBJECTIVE    INR Protime   Date Value Ref Range Status   02/21/2018 2.0 2 - 3 Final       ASSESSMENT / PLAN  INR assessment THER    Recheck INR In: 4 WEEKS    INR Location Clinic      Anticoagulation Summary as of 2/21/2018     INR goal 2.0-3.0   Today's INR 2.0   Maintenance plan 1 mg (2 mg x 0.5) on Wed, Sat; 2 mg (2 mg x 1) all other days   Full instructions 1 mg on Wed, Sat; 2 mg all other days   Weekly total 12 mg   No change documented Yolanda Avila, CHARY   Next INR check 3/21/2018   Priority INR   Target end date Indefinite    Indications   Long term (current) use of anticoagulants [Z79.01]  Unspecified atrial fibrillation [I48.91]         Anticoagulation Episode Summary     INR check location     Preferred lab     Send INR reminders to  INR    Comments       Anticoagulation Care Providers     Provider Role Specialty Phone number    Cesar Sanders MD [facility] [phone]            See the Encounter Report to view Anticoagulation Flowsheet and Dosing Calendar (Go to Encounters tab in chart review, and find the Anticoagulation Therapy Visit)        Yolanda Avila, RN

## 2018-02-21 NOTE — MR AVS SNAPSHOT
Tarango GIA Patricia   2/21/2018 9:45 AM   Anticoagulation Therapy Visit    Description:  83 year old male   Provider:  [facility]   Department:  [facility]           INR as of 2/21/2018     Today's INR 2.0      Anticoagulation Summary as of 2/21/2018     INR goal 2.0-3.0   Today's INR 2.0   Full instructions 1 mg on Wed, Sat; 2 mg all other days   Next INR check 3/21/2018    Indications   Long term (current) use of anticoagulants [Z79.01]  Unspecified atrial fibrillation [I48.91]         Description     Continue same Warfarin dose and recheck in 1 month.  Yolanda Avila RN   2/21/2018    9:54 AM        February 2018 Details    Sun Mon Tue Wed Thu Fri Sat         1               2               3                 4               5               6               7               8               9               10                 11               12               13               14               15               16               17                 18               19               20               21      1 mg   See details      22      2 mg         23      2 mg         24      1 mg           25      2 mg         26      2 mg         27      2 mg         28      1 mg             Date Details   02/21 This INR check               How to take your warfarin dose     To take:  1 mg Take 0.5 of a 2 mg tablet.    To take:  2 mg Take 1 of the 2 mg tablets.           March 2018 Details    Sun Mon Tue Wed Thu Fri Sat         1      2 mg         2      2 mg         3      1 mg           4      2 mg         5      2 mg         6      2 mg         7      1 mg         8      2 mg         9      2 mg         10      1 mg           11      2 mg         12      2 mg         13      2 mg         14      1 mg         15      2 mg         16      2 mg         17      1 mg           18      2 mg         19      2 mg         20      2 mg         21            22               23               24                 25               26                27               28               29               30               31                Date Details   No additional details    Date of next INR:  3/21/2018         How to take your warfarin dose     To take:  1 mg Take 0.5 of a 2 mg tablet.    To take:  2 mg Take 1 of the 2 mg tablets.

## 2018-02-27 NOTE — TELEPHONE ENCOUNTER
Chart review shows that patient was recently seen by Dr. Grant on 2/8/18. Office visit notes on that date indicate that labs as completed on 2/1/18 in the ED were reviewed, as well as patient's lasix was changed from 20 mg BID as pharmacy is requesting to 20 mg daily. Dr. Grant makes no changes in relation to patient's lasix dosing, but does ask that the patient follow up in 6 months time. Writer will refill rx as requested at 20 mg daily for a 6 month supply at this time as per Dr. Grant's note on 2/8/18.    Prescription refilled per RN Medication Refill Policy..................Fredy Becerra 2/27/2018 9:09 AM

## 2018-03-05 NOTE — PROGRESS NOTES
Patient Information     Patient Name MRN Sex Sukhdeep Cline [id number] Male 1934      Progress Notes by Jerson Shannon PT at 2018  3:23 PM     Author:  Jerson Shannon PT Service:  (none) Author Type:  PT- Physical Therapist     Filed:  2018 12:06 PM Date of Service:  2018  3:23 PM Status:  Signed     :  Jerson Shannon PT (PT- Physical Therapist)            [facility]  Outpatient PT - Daily Note      Date of Service: 2018    Visit 5  PSFS 18    Patient Name: Sukhdeep Gomez   YOB: 1934   Referring MD/Provider: Salvatore  Diagnosis: Pain of left thigh [M79.652]    Treatment Diagnosis: weakness, generalized decondition, muscle tightness, neuropathy  Insurance:  Medicare: G Codes/C Modifiers at Initial Assessment:     Start of Care Date: 2018   Certification Dates: From: 2018   Re-Cert Due: 18       Subjective      Pain Ratin = Mild Pain, (Bothersome, Annoying, Irritating, Nagging) and  5 = Moderate Pain, (Aggravating, Grueling, Upsetting, Frustrating) / Location:  Bilateral feet  Other:   Sore in legs, not feeling the best.    Functional Improvement:    Objective    Today's Intervention:    NuStep 5 min total, Level 5  Sit/stand at railing x5  Tband Row  Heel/toe raises  Stand on foam pad  Sit/stand x5  Standing hip abduct      Home Exercise Program:  Ham stretch, LTR, bridge, squats.    Assessment    Therapist Assessment:      Not as much energy today, noted with endruance and tolerance to activities.    Goals:  Patient goal:  Walk and transfer with less leg discusmofort in 8 weeks.      Functional Short Term Goals (4 weeks):       Patient will have improved transfers from chair to chair with less need of hands on assist of device.  Patient will tolerate walking 75ft, to meals and back to chair. With walker  Patient is to have improved leg strength 4/5 for all LE.          Functional Long Term Goals (8  weeks):       Patient will be independent in their Home Exercise Program without exacerbation of symptoms.  Patient will have 75% leg pain/stiffness.  Patient will tolerate all walking in home with 4WW.  Patient will complete sit/stand     Response to Intervention:  Tired.       Plan    Treatment Plan / Targeted Outcomes:   Frequency:   16 visits     Duration of Treatment: 3 months       Planned Interventions:    Home Exercise Program development  Therapeutic Exercise (ROM & Strengthening)  Therapeutic Activities  Manual Therapy  Neuromuscular Re-education  Ultrasound  Electrical Stimulation  Phonophoresis with Ketoprofen  Iontophoresis with Dexamethasone  Warm/Cold Pack  Vasopneumatic Device  Mechanical Traction    Plan for next visit:  Endurance work    Student or PTA has been instructed in and demonstrates skills necessary to carry out above stated treatment plan: Yes    Thank you for your referral to [facility].  Please call with any questions, concerns or comments.  [phone]

## 2018-03-05 NOTE — PROGRESS NOTES
Patient Information     Patient Name MRN Sex Sukhdeep Cline [id number] Male 1934      Progress Notes by Jerson Shannon PT at 2018 12:27 PM     Author:  Jerson Shannon PT Service:  (none) Author Type:  PT- Physical Therapist     Filed:  2018  8:09 AM Date of Service:  2018 12:27 PM Status:  Signed     :  Jerson Shannon PT (PT- Physical Therapist)            [facility]  Outpatient PT - Daily Note      Date of Service: 18    Visit 2 of 10  PSFS completed: 18     Patient Name: Sukhdeep Gomez   YOB: 1934   Referring MD/Provider: Salvatore  Diagnosis: Pain of left thigh [M79.652]    Treatment Diagnosis: weakness, generalized decondition, muscle tightness, neuropathy  Insurance:  Medicare: G Codes/C Modifiers at Initial Assessment:     Start of Care Date: 2018   Certification Dates: From: 2018   Re-Cert Due: 18       Subjective        Pain Ratin = Moderate Pain, (Aggravating, Grueling, Upsetting, Frustrating) / Location:  Feet, back  Other:   Legs are getting stronger, still overcoming ilness.  Feet felt good after STM    Functional Improvement:    Objective    Today's Intervention:    NuStep 5 min.  Gait with walker 50ft  Sit/stand x10 at railing  Step taps to 4 inch.  Supine   Passive ham stretch   STM at lower leg and feet.   LTR   Bridge  Walk with walk 100ft  Railing/cane 50 ft.    Home Exercise Program:  Squats, hip abduct, heel raise, sit/stand, supine LTR and bridge  Assessment    Therapist Assessment:      He is working hard, but is deconditioned and weak, neuropathy is large balance issue.    Goals:  Patient goal:  Walk and transfer with less leg discusmofort in 8 weeks.     Functional Short Term Goals (4 weeks):      Patient will have improved transfers from chair to chair with less need of hands on assist of device.  Patient will tolerate walking 75ft, to meals and back to chair. With  walker  Patient is to have improved leg strength 4/5 for all LE.        Functional Long Term Goals (8 weeks):      Patient will be independent in their Home Exercise Program without exacerbation of symptoms.  Patient will have 75% leg pain/stiffness.  Patient will tolerate all walking in home with 4WW.  Patient will complete sit/stand and sit to toilet with 50% less need for 4WW.    Response to Intervention:  Tired.       Plan    Treatment Plan / Targeted Outcomes:     Frequency:   16 visits     Duration of Treatment: 3 months    Planned Interventions:    Home Exercise Program development  Therapeutic Exercise (ROM & Strengthening)  Therapeutic Activities  Manual Therapy  Neuromuscular Re-education  Ultrasound  Electrical Stimulation  Phonophoresis with Ketoprofen  Iontophoresis with Dexamethasone  Warm/Cold Pack  Vasopneumatic Device    Plan for next visit:  Endurance work    Student or PTA has been instructed in and demonstrates skills necessary to carry out above stated treatment plan: Yes    Thank you for your referral to [facility].  Please call with any questions, concerns or comments.  [phone]

## 2018-03-05 NOTE — PROGRESS NOTES
Patient Information     Patient Name MRN Sex Sukhdeep Cline [id number] Male 1934      Progress Notes by Jerson Shannon PT at 2018  3:05 PM     Author:  Jerson Shannon PT Service:  (none) Author Type:  PT- Physical Therapist     Filed:  2018 11:56 AM Date of Service:  2018  3:05 PM Status:  Signed     :  Jerson Shannon PT (PT- Physical Therapist)            [facility]  Outpatient PT - Daily Note      Date of Service: 2018    Visit 4 of 10  PSFS completed: 18      Patient Name: Sukhdeep Gomez   YOB: 1934   Referring MD/Provider: Salvatore  Diagnosis: Pain of left thigh [M79.652]    Treatment Diagnosis: weakness, generalized decondition, muscle tightness, neuropathy  Insurance:  Medicare: G Codes/C Modifiers at Initial Assessment:     Start of Care Date: 2018   Certification Dates: From: 2018   Re-Cert Due: 18      Subjective        Pain Ratin = Moderate Pain, (Aggravating, Grueling, Upsetting, Frustrating) / Location:  Bilateral feet, lumbar spine,   Other:   Neuropathy of feet is always an issue, knee is a little stiff.    Functional Improvement: in/out of bed.    Objective    Today's Intervention:    NuStep 6 min total, Level 5,  Walk with walker, cues for posture 3x100 ft.   Side step along railing.  Sit/stand at railing. 2x5  Supine   Ham stretch   LTR   Bridge   Ham stretch    Home Exercise Program:  Ham stretch, LTR, bridge, squats.    Assessment    Therapist Assessment:      Has to continue with strength and endurance work, but he is moving better.    Goals:  Patient goal:  Walk and transfer with less leg discusmofort in 8 weeks.      Functional Short Term Goals (4 weeks):       Patient will have improved transfers from chair to chair with less need of hands on assist of device.  Patient will tolerate walking 75ft, to meals and back to chair. With walker  Patient is to have improved leg strength  4/5 for all LE.          Functional Long Term Goals (8 weeks):       Patient will be independent in their Home Exercise Program without exacerbation of symptoms.  Patient will have 75% leg pain/stiffness.  Patient will tolerate all walking in home with 4WW.  Patient will complete sit/stand     Response to Intervention:  Tired.       Plan    Treatment Plan / Targeted Outcomes:     Frequency:   16 visits     Duration of Treatment: 3 months    Planned Interventions:    Home Exercise Program development  Therapeutic Exercise (ROM & Strengthening)  Therapeutic Activities  Manual Therapy  Neuromuscular Re-education  Ultrasound  Electrical Stimulation  Phonophoresis with Ketoprofen  Iontophoresis with Dexamethasone  Warm/Cold Pack  Vasopneumatic Device  Mechanical Traction    Plan for next visit:  Endurance work    Student or PTA has been instructed in and demonstrates skills necessary to carry out above stated treatment plan: Yes       Thank you for your referral to [facility].  Please call with any questions, concerns or comments.  [phone]

## 2018-03-05 NOTE — PROGRESS NOTES
Patient Information     Patient Name MRN Sex Sukhdeep Cline [id number] Male 1934      Progress Notes by Jerson Shannon, PT at 2018  1:24 PM     Author:  Jerson Shannon PT Service:  (none) Author Type:  PT- Physical Therapist     Filed:  2018  8:53 AM Date of Service:  2018  1:24 PM Status:  Signed     :  Jerson Shannon PT (PT- Physical Therapist)            [facility]  Outpatient PT - Daily Note        Date of Service: 18     Visit 3 of 10  PSFS completed: 18      Patient Name: Sukhdeep Gomez   YOB: 1934   Referring MD/Provider: Salvatore  Diagnosis: Pain of left thigh [M79.652]    Treatment Diagnosis: weakness, generalized decondition, muscle tightness, neuropathy  Insurance:  Medicare: G Codes/C Modifiers at Initial Assessment:     Start of Care Date: 2018   Certification Dates: From: 2018   Re-Cert Due: 18        Subjective          Pain Ratin = Moderate Pain, (Aggravating, Grueling, Upsetting, Frustrating) / Location:  Feet, back  Other:   Legs are getting stronger, still overcoming ilness.  Feet felt good after STM  Feeling off with blood pressure.     Functional Improvement:     Objective     Today's Intervention:    NuStep 6 min total, Level 5,  BP taken at 3 min and at end. 99/49, and 97/48 respectively, HR was 62.  Leg press 2x120/2x140 x10 each  Assisted in bathroom,   Needed to change brief and clean up some.   Supine   Ham stretch   LTR   Bridge   Ham stretch     Home Exercise Program:  Squats, hip abduct, heel raise, sit/stand, supine LTR and bridge  Assessment     Therapist Assessment:      He is working hard, but is deconditioned and weak, neuropathy is large balance issue.     Goals:  Patient goal:  Walk and transfer with less leg discusmofort in 8 weeks.      Functional Short Term Goals (4 weeks):       Patient will have improved transfers from chair to chair with less need of hands  on assist of device.  Patient will tolerate walking 75ft, to meals and back to chair. With walker  Patient is to have improved leg strength 4/5 for all LE.          Functional Long Term Goals (8 weeks):       Patient will be independent in their Home Exercise Program without exacerbation of symptoms.  Patient will have 75% leg pain/stiffness.  Patient will tolerate all walking in home with 4WW.  Patient will complete sit/stand and sit to toilet with 50% less need for 4WW.     Response to Intervention:  Tired.         Plan     Treatment Plan / Targeted Outcomes:     Frequency:   16 visits     Duration of Treatment: 3 months     Planned Interventions:    Home Exercise Program development  Therapeutic Exercise (ROM & Strengthening)  Therapeutic Activities  Manual Therapy  Neuromuscular Re-education  Ultrasound  Electrical Stimulation  Phonophoresis with Ketoprofen  Iontophoresis with Dexamethasone  Warm/Cold Pack  Vasopneumatic Device     Plan for next visit:  Endurance work     Student or PTA has been instructed in and demonstrates skills necessary to carry out above stated treatment plan: Yes     Thank you for your referral to [facility].  Please call with any questions, concerns or comments.  [phone]

## 2018-03-09 NOTE — PROGRESS NOTES
Outpatient Physical Therapy Progress Note     Patient: Sukhdeep Gomez  : 1934    Beginning/End Dates of Reporting Period:  18 to 3/7/2018    Referring Provider: Imholte    Therapy Diagnosis: decreased mobility, weakness, gait and balance deficits.     Client Self Report: would like to pursue more THerapy appoinntments to work on strngth, balance and gait tolerance.      Outcome Measures (most recent score):  PSFS reports a 60% ability with mobility.    Goals:  Goal Identifier walk   Goal Description walk to meals with walker, 75 ft. (Can walk 75 ft with walker, needs to be more active at home.)   Target Date 18   Date Met      Progress:     Goal Identifier sit/stand   Goal Description sit/stand at toilet (Has a hard time still with self hygiene at toitlet at times.)   Target Date 18   Date Met      Progress:     Goal Identifier leg strength   Goal Description progress leg strength to 4/5 (progresssing, slowly.)   Target Date 18   Date Met      Progress:       Progress Toward Goals:   Progress limited due to some illness, chronic neuropathy.      Plan:  Continue therapy per current plan of care.   Additional visits and new Cert Period of   3/7/18 through 18    Discharge:  No

## 2018-03-20 NOTE — TELEPHONE ENCOUNTER
Chart review shows that patient was last seen by PCP on 1/8/18 for diagnosis of diabetes. Rx as requested was noted in office visit notes on that date. No changes noted. Patient is to follow up in 3 months and appointment is scheduled for 4/12/18. Writer will refill rx as requested at this time.    Prescription refilled per RN Medication Refill Policy..................Fredy Becerra 3/20/2018 8:58 AM

## 2018-03-21 NOTE — PROGRESS NOTES
ANTICOAGULATION FOLLOW-UP CLINIC VISIT    Patient Name:  Sukhdeep Gomez  Date:  3/21/2018  Contact Type:  Face to Face    SUBJECTIVE:     Patient Findings     Positives No Problem Findings           OBJECTIVE    INR Protime   Date Value Ref Range Status   03/21/2018 2.3 2.0 - 3.0 Final       ASSESSMENT / PLAN  INR assessment THER    Recheck INR In: 6 WEEKS    INR Location Clinic      Anticoagulation Summary as of 3/21/2018     INR goal 2.0-3.0   Today's INR 2.3   Maintenance plan 1 mg (2 mg x 0.5) on Wed, Sat; 2 mg (2 mg x 1) all other days   Full instructions 1 mg on Wed, Sat; 2 mg all other days   Weekly total 12 mg   No change documented Yolanda Avila, CHARY   Next INR check 5/2/2018   Priority INR   Target end date Indefinite    Indications   Long term (current) use of anticoagulants [Z79.01]  Unspecified atrial fibrillation [I48.91]         Anticoagulation Episode Summary     INR check location     Preferred lab     Send INR reminders to  INR    Comments       Anticoagulation Care Providers     Provider Role Specialty Phone number    Cesar Sanders MD [facility] [phone]            See the Encounter Report to view Anticoagulation Flowsheet and Dosing Calendar (Go to Encounters tab in chart review, and find the Anticoagulation Therapy Visit)        Yolanda Avila, RN

## 2018-03-21 NOTE — MR AVS SNAPSHOT
Sukhdeep NIELSEN Patricia   3/21/2018 1:45 PM   Anticoagulation Therapy Visit    Description:  83 year old male   Provider:  [facility]   Department:  [facility]           INR as of 3/21/2018     Today's INR 2.3      Anticoagulation Summary as of 3/21/2018     INR goal 2.0-3.0   Today's INR 2.3   Full instructions 1 mg on Wed, Sat; 2 mg all other days   Next INR check 5/2/2018    Indications   Long term (current) use of anticoagulants [Z79.01]  Unspecified atrial fibrillation [I48.91]         Description     Continue same Warfarin dose and recheck in 6 weeks.  Yolanda Avila RN   3/21/2018    1:53 PM        March 2018 Details    Sun Mon Tue Wed Thu Fri Sat         1               2               3                 4               5               6               7               8               9               10                 11               12               13               14               15               16               17                 18               19               20               21      1 mg   See details      22      2 mg         23      2 mg         24      1 mg           25      2 mg         26      2 mg         27      2 mg         28      1 mg         29      2 mg         30      2 mg         31      1 mg          Date Details   03/21 This INR check               How to take your warfarin dose     To take:  1 mg Take 0.5 of a 2 mg tablet.    To take:  2 mg Take 1 of the 2 mg tablets.           April 2018 Details    Sun Mon Tue Wed Thu Fri Sat     1      2 mg         2      2 mg         3      2 mg         4      1 mg         5      2 mg         6      2 mg         7      1 mg           8      2 mg         9      2 mg         10      2 mg         11      1 mg         12      2 mg         13      2 mg         14      1 mg           15      2 mg         16      2 mg         17      2 mg         18      1 mg         19      2 mg         20      2 mg         21      1 mg           22      2 mg          23      2 mg         24      2 mg         25      1 mg         26      2 mg         27      2 mg         28      1 mg           29      2 mg         30      2 mg               Date Details   No additional details            How to take your warfarin dose     To take:  1 mg Take 0.5 of a 2 mg tablet.    To take:  2 mg Take 1 of the 2 mg tablets.           May 2018 Details    Sun Mon Tue Wed Thu Fri Sat       1      2 mg         2            3               4               5                 6               7               8               9               10               11               12                 13               14               15               16               17               18               19                 20               21               22               23               24               25               26                 27               28               29               30               31                  Date Details   No additional details    Date of next INR:  5/2/2018         How to take your warfarin dose     To take:  1 mg Take 0.5 of a 2 mg tablet.    To take:  2 mg Take 1 of the 2 mg tablets.

## 2018-03-28 NOTE — MR AVS SNAPSHOT
After Visit Summary   3/28/2018    Sukhdeep Gomez    MRN: [id number]           Patient Information     Date Of Birth          8/19/1934        Visit Information        Provider Department      3/28/2018 6:00 AM  ICD REMOTE [facility]        Today's Diagnoses     Ischemic cardiomyopathy    -  1       Follow-ups after your visit        Your next 10 appointments already scheduled     Apr 04, 2018 10:30 AM CDT   Treatment with Jerson Shannon PT   [facility] ([facility])    [address]  [place] [id number] [phone]            Apr 09, 2018 10:30 AM CDT   Ech Complete with [facility] [place] [address] ([facility])    [address] [facility] [id number] [phone]           1. Please bring or wear a comfortable two-piece outfit. 2. You may eat, drink and take your normal medicines. 3. For any questions that cannot be answered, please contact the ordering physician            Apr 12, 2018 10:30 AM CDT   Office Visit with Cesar Sanders MD   [facility] ([facility])    [address] [facility] [id number] [phone]           Bring a current list of meds and any records pertaining to this visit. For Physicals, please bring immunization records and any forms needing to be filled out. Please arrive 10 minutes early to complete paperwork.            May 02, 2018 10:15 AM CDT   Anticoagulation Visit with  ANTI COAG [address] [place] [address] ([facility])    [address] [facility] [id number] [phone]              Who to contact     If you have questions or need follow up information about today's clinic visit or your schedule please contact [facility] directly at [phone].  Normal or non-critical lab and imaging results will be communicated to you by MyChart, letter or phone  "within 4 business days after the clinic has received the results. If you do not hear from us within 7 days, please contact the clinic through [email or web address] or phone. If you have a critical or abnormal lab result, we will notify you by phone as soon as possible.  Submit refill requests through [email or web address] or call your pharmacy and they will forward the refill request to us. Please allow 3 business days for your refill to be completed.          Additional Information About Your Visit        conXtharIORevolution Information     [email or web address] lets you send messages to your doctor, view your test results, renew your prescriptions, schedule appointments and more. To sign up, go to [email or web address] . Click on \"Log in\" on the left side of the screen, which will take you to the Welcome page. Then click on \"Sign up Now\" on the right side of the page.     You will be asked to enter the access code listed below, as well as some personal information. Please follow the directions to create your username and password.     Your access code is: [email or web address]  Expires: 2018  3:03 PM     Your access code will  in 90 days. If you need help or a new code, please call your [facility] clinic or [phone].        Care EveryWhere ID     This is your Care EveryWhere ID. This could be used by other organizations to access your [facility] medical records  [id number]         Blood Pressure from Last 3 Encounters:   18 96/78   18 (!) 78/53   17 124/76    Weight from Last 3 Encounters:   18 71.2 kg (157 lb)   17 71.2 kg (157 lb)   17 71.7 kg (158 lb)              We Performed the Following     INTERROGATION DEVICE EVAL REMOTE, PACER/ICD        Primary Care Provider Office Phone # Fax #    Cesar Sanders [phone] [email or web address] [facility] [address]        Equal Access to Services     BIANCA ARMANDO AH: Hadii shona Lerma, wajosephda omar, qaybta griffin coronado " adry pate ah. So [facility] [phone].    ATENCIÓN: Si nelsonla wai, tiene a cohn disposición servicios gratuitos de asistencia lingüística. Ten stuart [phone].    We comply with applicable federal civil rights laws and [place] laws. We do not discriminate on the basis of race, color, national origin, age, disability, sex, sexual orientation, or gender identity.            Thank you!     Thank you for choosing [facility]  for your care. Our goal is always to provide you with excellent care. Hearing back from our patients is one way we can continue to improve our services. Please take a few minutes to complete the written survey that you may receive in the mail after your visit with us. Thank you!             Your Updated Medication List - Protect others around you: Learn how to safely use, store and throw away your medicines at www.disposemymeds.org.          This list is accurate as of 3/28/18 11:59 PM.  Always use your most recent med list.                   Brand Name Dispense Instructions for use Diagnosis    acetaminophen 325 MG tablet    TYLENOL     Take 975 mg by mouth every 6 hours if needed. Max acetaminophen dose: 4000mg in 24 hrs.        allopurinol 100 MG tablet    ZYLOPRIM     Take 1 tablet by mouth once daily.        amoxicillin 500 MG capsule    AMOXIL     TAKE 4 CAPSULES BY MOUTH ONE HOUR PRIOR TO APPT        ASPIRIN 81 PO      Take one tablet by mouth daily        atorvastatin 10 MG tablet    LIPITOR     Take 1 tablet by mouth at bedtime.        * furosemide 20 MG tablet    LASIX     Take 1 tablet every morning and 1 tablet every other afternoon.        * furosemide 20 MG tablet    LASIX    90 tablet    Take 1 tablet (20 mg) by mouth every morning    Chronic systolic heart failure (H)       insulin aspart prot & aspart injection    NovoLOG MIX 70/30 PEN     35 units every morning and 10 units every evening        insulin pen needle 31G X 8 MM    EASY TOUCH PEN NEEDLES     200 each    FOR ADMINISTERING INSULIN AT HOME TWICE DAILY. Dx: E11.8    Diabetes mellitus with multiple complications (H)       * lisinopril 5 MG tablet    PRINIVIL/ZESTRIL     Take 1 tablet by mouth once daily.        * lisinopril 2.5 MG tablet    PRINIVIL/Zestril     Take 2.5 mg by mouth daily        metoprolol succinate 50 MG 24 hr tablet    TOPROL-XL     Take 1.5 tablets by mouth once daily.        nystatin-triamcinolone cream    MYCOLOG II    60 g    APPLY TOPICALLY TO AFFECTED AREA(S) THREE TIMES DAILY    Yeast dermatitis of penis       order for DME      Wheelchair with elevating leg rests/foot rest. For home use. Length of need: 99 mo        potassium chloride SA 20 MEQ CR tablet    K-DUR/KLOR-CON M     Take 1 tablet by mouth once daily with a meal.        simvastatin 20 MG tablet    ZOCOR     Take 20 mg by mouth at bedtime.        VITAMIN D-1000 MAX ST 1000 UNITS Tabs   Generic drug:  cholecalciferol      Take one tablet by mouth daily        warfarin 2 MG tablet    COUMADIN     Take 1 mg x 2 days and 2 mg x 5 days/week        * Notice:  This list has 4 medication(s) that are the same as other medications prescribed for you. Read the directions carefully, and ask your doctor or other care provider to review them with you.

## 2018-03-29 NOTE — PROGRESS NOTES
Preliminary Device Interrogation Results.  Final physician signed paceart report to be scanned and attached.    Remote ICD transmission received and reviewed.  Device transmission sent per MD orders.  Patient has a BS single lead ICD.  Normal ICD function.  No episodes recorded.  Presenting EGM = irregular VS @ 42-83 bpm.  = 1%.   Estimated battery longevity to RENETTA = 9 years.  RV lead impedance trends appear stable.  Patient notified of interrogation results.  Patient reports that he is feeling well and denies complaints.  Plan for patient to send remote transmission in 3 months return to clinic in 6 months as scheduled.    Remote ICD transmission

## 2018-04-09 NOTE — TELEPHONE ENCOUNTER
Chart review shows that patient is overdue for 3 month and 2 week follow up appointment with PCP (see 1/8/18 office visit notes and 2/1/18 ED visit notes respectively). Does have office visit scheduled with PCP for 4/12/18. Writer will refill rx as requested for a limited supply at this time to get patient through his appointment date as noted.    Prescription refilled per RN Medication Refill Policy..................Fredy Becerra 4/9/2018 1:23 PM

## 2018-04-11 NOTE — TELEPHONE ENCOUNTER
Chart review shows that patient was last seen by PCP on 1/8/18 for diagnosis of diabetes. Rx as noted below noted in office visit notes on that date with relation to rx as requested by pharmacy:    insulin aspart protamine-insulin aspart (NOVOLOG MIX 70-30) 100 unit/mL (70-30) FLEXPEN 35 units every morning and 10 units every evening     No changes in dosing noted, and patient was to follow up in 3 months. Patient is seeing PCP in follow up tomorrow. Writer will bethanie up and route rx request to PCP for his consideration/approval at this time.    Unable to complete prescription refill per RN Medication Refill Policy. Fredy Becerra 4/11/2018 9:24 AM

## 2018-04-12 NOTE — MR AVS SNAPSHOT
After Visit Summary   4/12/2018    Sukhdeep Gomez    MRN: [id number]           Patient Information     Date Of Birth          8/19/1934        Visit Information        Provider Department      4/12/2018 10:30 AM Cesar Sanders MD [facility]        Today's Diagnoses     Diabetes mellitus with multiple complications (H)    -  1       Follow-ups after your visit        Your next 10 appointments already scheduled     Apr 18, 2018  4:00 PM CDT   Treatment with Jerson Shannon, PT   [facility] ([facility])    [address] [facility] [address]   [phone]            Apr 25, 2018  1:30 PM CDT   Treatment with Jerson Shannon, PT   [facility] ([facility])    [address] [facility] [address]   [phone]            Apr 27, 2018 10:15 AM CDT   Treatment with Jerson Shannon, PT   [facility] ([facility])    [address] [facility] [address]   [phone]            May 02, 2018 10:15 AM CDT   Anticoagulation Visit with  ANTI COAG 1   [facility] ([facility])    [address]  [place] [address]   [phone]            Oct 23, 2018  1:00 PM CDT   Pacemaker Check with  PACEMAKER   [facility] ([facility])    [address]  [place] [address]   [phone]              Who to contact     If you have questions or need follow up information about today's clinic visit or your schedule please contact [facility] directly at [phone].  Normal or non-critical lab and imaging results will be communicated to you by MyChart, letter or phone within 4 business days after the clinic has received the results. If you do not hear from us within 7 days, please contact the clinic through  "[employer]isticshart or phone. If you have a critical or abnormal lab result, we will notify you by phone as soon as possible.  Submit refill requests through [employer] or call your pharmacy and they will forward the refill request to us. Please allow 3 business days for your refill to be completed.          Additional Information About Your Visit        Newgisticshart Information     [employer] lets you send messages to your doctor, view your test results, renew your prescriptions, schedule appointments and more. To sign up, go to [email or web address] . Click on \"Log in\" on the left side of the screen, which will take you to the Welcome page. Then click on \"Sign up Now\" on the right side of the page.     You will be asked to enter the access code listed below, as well as some personal information. Please follow the directions to create your username and password.     Your access code is: [email or web address]  Expires: 2018  3:03 PM     Your access code will  in 90 days. If you need help or a new code, please call your [facility] clinic or [phone].        Care EveryWhere ID     This is your Care EveryWhere ID. This could be used by other organizations to access your [facility] medical records  [id number]        Your Vitals Were     Pulse BMI (Body Mass Index)                72 24.35 kg/m2           Blood Pressure from Last 3 Encounters:   18 103/63   18 96/78   18 (!) 78/53    Weight from Last 3 Encounters:   18 151 lb 8 oz (68.7 kg)   18 157 lb (71.2 kg)   17 157 lb (71.2 kg)                 Today's Medication Changes          These changes are accurate as of 18 11:59 PM.  If you have any questions, ask your nurse or doctor.               These medicines have changed or have updated prescriptions.        Dose/Directions    insulin aspart prot & aspart injection   Commonly known as:  NovoLOG MIX 70/30 PEN   This may have changed:  See the new instructions.   Used for:  Diabetes mellitus with " multiple complications (H)   Changed by:  Cesar Sanders MD        35 units every morning and 10 units every evening   Quantity:  45 mL   Refills:  3            Where to get your medicines      These medications were sent to [facility] - [place], [place] - [address]  [address] [place], [facility] [address]     Phone:  [phone]     insulin aspart prot & aspart injection                Primary Care Provider Office Phone # Fax #    Cesar Sanders [phone]       [address]  [facility] [address]        Equal Access to Services     [facility]: Hadii aad ku hadasho Soomaali, waaxda luqadaha, qaybta kaalmada adeegyada, waxay idiin hayaan adeeg richelle pate . So [facility] [phone].    ATENCIÓN: Si habla español, tiene a cohn disposición servicios gratuitos de asistencia lingüística. Llame al [phone].    We comply with applicable federal civil rights laws and [place] laws. We do not discriminate on the basis of race, color, national origin, age, disability, sex, sexual orientation, or gender identity.            Thank you!     Thank you for choosing [facility]  for your care. Our goal is always to provide you with excellent care. Hearing back from our patients is one way we can continue to improve our services. Please take a few minutes to complete the written survey that you may receive in the mail after your visit with us. Thank you!             Your Updated Medication List - Protect others around you: Learn how to safely use, store and throw away your medicines at www.disposemymeds.org.          This list is accurate as of 4/12/18 11:59 PM.  Always use your most recent med list.                   Brand Name Dispense Instructions for use Diagnosis    acetaminophen 325 MG tablet    TYLENOL     Take 975 mg by mouth every 6 hours if needed. Max acetaminophen dose: 4000mg in 24 hrs.        allopurinol 100 MG tablet     ZYLOPRIM     Take 1 tablet by mouth once daily.        amoxicillin 500 MG capsule    AMOXIL     TAKE 4 CAPSULES BY MOUTH ONE HOUR PRIOR TO APPT        ASPIRIN 81 PO      Take one tablet by mouth daily        atorvastatin 10 MG tablet    LIPITOR     Take 1 tablet by mouth at bedtime.        furosemide 20 MG tablet    LASIX    90 tablet    Take 1 tablet (20 mg) by mouth every morning    Chronic systolic heart failure (H)       insulin aspart prot & aspart injection    NovoLOG MIX 70/30 PEN    45 mL    35 units every morning and 10 units every evening    Diabetes mellitus with multiple complications (H)       insulin pen needle 31G X 8 MM    EASY TOUCH PEN NEEDLES    200 each    FOR ADMINISTERING INSULIN AT HOME TWICE DAILY. Dx: E11.8    Diabetes mellitus with multiple complications (H)       lisinopril 2.5 MG tablet    PRINIVIL/Zestril     Take 2.5 mg by mouth daily        metoprolol succinate 50 MG 24 hr tablet    TOPROL-XL    135 tablet    Take 1.5 tablets (75 mg) by mouth daily    Essential hypertension       nystatin-triamcinolone cream    MYCOLOG II    60 g    APPLY TOPICALLY TO AFFECTED AREA(S) THREE TIMES DAILY    Yeast dermatitis of penis       order for DME      Wheelchair with elevating leg rests/foot rest. For home use. Length of need: 99 mo        potassium chloride SA 20 MEQ CR tablet    K-DUR/KLOR-CON M     Take 1 tablet by mouth once every other day with a meal.        simvastatin 20 MG tablet    ZOCOR     Take 20 mg by mouth at bedtime.        VITAMIN D-1000 MAX ST 1000 units Tabs   Generic drug:  cholecalciferol      Take one tablet by mouth daily        warfarin 2 MG tablet    COUMADIN     Take 1 mg x 2 days and 2 mg x 5 days/week

## 2018-04-12 NOTE — PROGRESS NOTES
SUBJECTIVE:  83 year old male presents with son Mert for follow up on diabetes, CKD. He also has CHF, a-fib on warfarin and has been seeing cardiology for adjustment in medication.    BP was low at last visit to 78/53. He was asymptomatic. BP was again low when he saw cardiology and so lisinopril was decreased from 5 to 2.5 mg daily. Doing well with adjustment.     Blood sugars remain in a similar zone. He is concerned about elevated readings during the middle of the day, but takes 70/30 and we discussed this is a consequence of twice daily dosing vs a different combination. AM sugars are , before dinner 191-347, and before bed , pretty similar to last check.      REVIEW OF SYSTEMS:    Constitutional: negative  Respiratory: negative  Cardiovascular: negative    Current Outpatient Prescriptions   Medication Sig Dispense Refill     metoprolol succinate (TOPROL-XL) 50 MG 24 hr tablet Take 1.5 tablets (75 mg) by mouth daily 135 tablet 0     insulin pen needle (EASY TOUCH PEN NEEDLES) 31G X 8 MM FOR ADMINISTERING INSULIN AT HOME TWICE DAILY. Dx: E11.8 200 each 1     nystatin-triamcinolone (MYCOLOG II) cream APPLY TOPICALLY TO AFFECTED AREA(S) THREE TIMES DAILY 60 g 11     furosemide (LASIX) 20 MG tablet Take 1 tablet (20 mg) by mouth every morning 90 tablet 1     lisinopril (PRINIVIL/ZESTRIL) 2.5 MG tablet Take 2.5 mg by mouth daily       acetaminophen (TYLENOL) 325 MG tablet Take 975 mg by mouth every 6 hours if needed. Max acetaminophen dose: 4000mg in 24 hrs.       allopurinol (ZYLOPRIM) 100 MG tablet Take 1 tablet by mouth once daily.       amoxicillin (AMOXIL) 500 MG capsule TAKE 4 CAPSULES BY MOUTH ONE HOUR PRIOR TO APPT       ASPIRIN 81 PO Take one tablet by mouth daily       atorvastatin (LIPITOR) 10 MG tablet Take 1 tablet by mouth at bedtime.       cholecalciferol (VITAMIN D-1000 MAX ST) 1000 UNITS TABS Take one tablet by mouth daily       insulin aspart prot & aspart (NOVOLOG MIX 70/30 PEN)  "injection 35 units every morning and 10 units every evening       potassium chloride SA (K-DUR/KLOR-CON M) 20 MEQ CR tablet Take 1 tablet by mouth once every other day with a meal.       simvastatin (ZOCOR) 20 MG tablet Take 20 mg by mouth at bedtime.       warfarin (COUMADIN) 2 MG tablet Take 1 mg x 2 days and 2 mg x 5 days/week       order for DME Wheelchair with elevating leg rests/foot rest. For home use. Length of need: 99 mo       Allergies   Allergen Reactions     Codeine Unknown     Naproxen Other (See Comments)     \"kidneys shut down\"     Sulfa Drugs Unknown     Tramadol Nausea     Vancomycin Unknown       OBJECTIVE:  /63 (BP Location: Right arm, Patient Position: Sitting, Cuff Size: Adult Regular)  Pulse 72  Wt 151 lb 8 oz (68.7 kg)  BMI 24.35 kg/m2    EXAM:  General Appearance: Alert. No acute distress  Chest/Respiratory Exam: Clear to auscultation bilaterally  Cardiovascular Exam: Regular rate and rhythm. S1, S2, no murmur, gallop, or rubs.  Extremities: No lower extremity edema.  Psychiatric: Normal affect and mentation      ASSESSMENT/PLAN:    ICD-10-CM    1. Diabetes mellitus with multiple complications (H) E11.8 insulin aspart prot & aspart (NOVOLOG MIX 70/30 PEN) injection     **A1C FUTURE anytime     CANCELED: Hemoglobin A1c     Sugars are similar to last appointment. He is due to follow up with cardiology and so we will delay drawing A1c today. Last A1c 6.6 and so hypoglycemia has been more of a concern than hyperglycemia.    We discussed that Novolog 70//30 could result in an elevated daytime sugar if he is eating large lunches. He does not want to change insulin and based on age and co-morbities, that is fine.     No changes today. Follow up in 3 months. Keep appointment with cardiology.       Cesar Sanders MD    This document was prepared using a combination of typing and voice generated software.  While every attempt was made for accuracy, spelling and grammatical errors may exist. "

## 2018-04-12 NOTE — NURSING NOTE
Patient presenting for a follow up.  Previous A1C is at goal of <8  6.6 1/8/18  Urine microalbumin:creatine: 0.28  Foot exam 1/19/18  Eye exam 4/3/18    Tobacco User No  Patient is on a daily aspirin  Patient is on a Statin.  Blood pressure today of:     BP Readings from Last 1 Encounters:   04/12/18 103/63      is at the goal of <139/89 for diabetics.    Misty Davison LPN on 4/12/2018 at 10:34 AM

## 2018-04-18 NOTE — PROGRESS NOTES
Outpatient Physical Therapy Progress Note     Patient: Sukhdeep Gomez  : 1934    Beginning/End Dates of Reporting Period:  3/9/18 to 2018    Referring Provider: BALAJI Sanders Diagnosis: weakness, balance deficits, gait limitations, transfer difficulties.     Client Self Report: would like to work on walking more, has hard time sleeping so is tired often.    Objective Measurements:   TUG: not measured today      Goals:  Goal Identifier walk   Goal Description walk to meals with walker, 75 ft. (Can walk 75 ft with walker, needs to be more active at home.)   Target Date 18   Date Met      Progress: He has the ability to walk this distance but not performing regular meal treks at this time.     Goal Identifier sit/stand   Goal Description sit/stand at toilet (manages transfers to wheel chair from toilet well.)   Target Date 18   Date Met      Progress: Has the ability, but would prefer to use wheelchair and stand/pivot transfer.     Goal Identifier leg strength   Goal Description progress leg strength to 4/5 (progresssing, slowly.)   Target Date 18   Date Met      Progress: has made some strength improvements, but not to this measurable degree.        Progress Toward Goals:   Progress limited due to laziness at home, patients health, age,         Plan:  Other: Continue with PT, for another few weeks, and then d/c to [facility].    Discharge:  No

## 2018-04-19 NOTE — TELEPHONE ENCOUNTER
Last INR 2.3:  03/21/18  Last office visit with Dr. Woodall:  04/12/18  Prescription approved per [facility] Refill Protocol.

## 2018-04-24 NOTE — TELEPHONE ENCOUNTER
Prescription approved per [facility] Refill Protocol.  LOV 4/12/18  CMP 2/1/18  Per LOV meds reviewed with no changes and FU in 3 mos. Limited refill given. Heather Morales RN on 4/24/2018 at 11:31 AM

## 2018-04-24 NOTE — ADDENDUM NOTE
Encounter addended by: Jerson Shannon, PT on: 4/24/2018  7:51 AM<BR>     Actions taken: Episode edited, Flowsheet data copied forward, Flowsheet accepted, Charge Capture section accepted

## 2018-04-24 NOTE — ADDENDUM NOTE
Encounter addended by: Jerson Shannon, PT on: 4/24/2018  8:19 AM<BR>     Actions taken: Flowsheet data copied forward, Flowsheet accepted, Charge Capture section accepted

## 2018-04-25 NOTE — MR AVS SNAPSHOT
After Visit Summary   4/25/2018    Sukhdeep Gomez    MRN: [id number]           Patient Information     Date Of Birth          8/19/1934        Visit Information        Provider Department      4/25/2018 2:45 PM Eileen Mclean APRN CNP [facility]        Today's Diagnoses     Skin tear of left forearm without complication, initial encounter    -  1       Follow-ups after your visit        Follow-up notes from your care team     Return if symptoms worsen or fail to improve.      Your next 10 appointments already scheduled     May 02, 2018 10:15 AM CDT   Anticoagulation Visit with  ANTI COAG 1   [facility]    [address] [address]   [phone]            May 03, 2018   Procedure with Edgardo Sanchez MD   [facility]    [address] [address]   [phone]            Oct 23, 2018  1:00 PM CDT   Pacemaker Check with  PACEMAKER   [facility]    [address] [address]   [phone]              Who to contact     If you have questions or need follow up information about today's clinic visit or your schedule please contact [facility] directly at [phone].  Normal or non-critical lab and imaging results will be communicated to you by MyChart, letter or phone within 4 business days after the clinic has received the results. If you do not hear from us within 7 days, please contact the clinic through MyChart or phone. If you have a critical or abnormal lab result, we will notify you by phone as soon as possible.  Submit refill requests through [employer] or call your pharmacy and they will forward the refill request to us. Please allow 3 business days for your refill to be completed.          Additional  "Information About Your Visit        MyChart Information     [employer] lets you send messages to your doctor, view your test results, renew your prescriptions, schedule appointments and more. To sign up, go to [email or web address] . Click on \"Log in\" on the left side of the screen, which will take you to the Welcome page. Then click on \"Sign up Now\" on the right side of the page.     You will be asked to enter the access code listed below, as well as some personal information. Please follow the directions to create your username and password.     Your access code is: [email or web address]  Expires: 2018  3:03 PM     Your access code will  in 90 days. If you need help or a new code, please call your [place] clinic or [phone].        Care EveryWhere ID     This is your Care EveryWhere ID. This could be used by other organizations to access your [place] medical records  [id number]        Your Vitals Were     Pulse Temperature Respirations BMI (Body Mass Index)          70 97.1  F (36.2  C) (Tympanic) 20 25.71 kg/m2         Blood Pressure from Last 3 Encounters:   18 102/80   18 122/80   18 103/63    Weight from Last 3 Encounters:   18 155 lb (70.3 kg)   18 155 lb (70.3 kg)   18 160 lb (72.6 kg)              Today, you had the following     No orders found for display       Primary Care Provider Office Phone # Fax #    Cesar Sanders [phone] [address] [place] [facility] [address]        Equal Access to Services     [facility] Stanford[facility]NS[facility] : Hadabiel Lerma, francesco nelson, griffin phelan. So [facility] [phone].    ATENCIÓN: Si habla español, tiene a cohn disposición servicios gratuitos de asistencia lingüística. Llame al [phone].    We comply with applicable federal civil rights laws and [place] laws. We do not discriminate on the basis of race, color, national origin, age, " disability, sex, sexual orientation, or gender identity.            Thank you!     Thank you for choosing [facility] AND [facility]  for your care. Our goal is always to provide you with excellent care. Hearing back from our patients is one way we can continue to improve our services. Please take a few minutes to complete the written survey that you may receive in the mail after your visit with us. Thank you!             Your Updated Medication List - Protect others around you: Learn how to safely use, store and throw away your medicines at www.disposemymeds.org.          This list is accurate as of 4/25/18 11:59 PM.  Always use your most recent med list.                   Brand Name Dispense Instructions for use Diagnosis    acetaminophen 325 MG tablet    TYLENOL     Take 975 mg by mouth every 6 hours if needed. Max acetaminophen dose: 4000mg in 24 hrs.        allopurinol 100 MG tablet    ZYLOPRIM     Take 1 tablet by mouth once daily.        amoxicillin 500 MG capsule    AMOXIL     TAKE 4 CAPSULES BY MOUTH ONE HOUR PRIOR TO APPT        ASPIRIN 81 PO      Take one tablet by mouth daily        atorvastatin 10 MG tablet    LIPITOR     Take 1 tablet by mouth at bedtime.        insulin aspart prot & aspart injection    NovoLOG MIX 70/30 PEN    45 mL    35 units every morning and 10 units every evening    Diabetes mellitus with multiple complications (H)       insulin pen needle 31G X 8 MM    EASY TOUCH PEN NEEDLES    200 each    FOR ADMINISTERING INSULIN AT HOME TWICE DAILY. Dx: E11.8    Diabetes mellitus with multiple complications (H)       lisinopril 2.5 MG tablet    PRINIVIL/Zestril     Take 2.5 mg by mouth daily        metoprolol succinate 50 MG 24 hr tablet    TOPROL-XL    135 tablet    Take 1.5 tablets (75 mg) by mouth daily    Essential hypertension       nystatin-triamcinolone cream    MYCOLOG II    60 g    APPLY TOPICALLY TO AFFECTED AREA(S) THREE TIMES DAILY    Yeast dermatitis of penis       order  for DME      Wheelchair with elevating leg rests/foot rest. For home use. Length of need: 99 mo        potassium chloride SA 20 MEQ CR tablet    K-DUR/KLOR-CON M     Take 1 tablet by mouth once every other day with a meal.        simvastatin 20 MG tablet    ZOCOR     Take 20 mg by mouth at bedtime.        VITAMIN D-1000 MAX ST 1000 units Tabs   Generic drug:  cholecalciferol      Take one tablet by mouth daily        warfarin 2 MG tablet    COUMADIN    90 tablet    TAKE 1MG BY MOUTH FOR TWO DAYS PER WEEK AND TAKE 2MG FOR FIVE DAYS PER WEEK    Atrial fibrillation (H)

## 2018-04-25 NOTE — NURSING NOTE
Patient presents to the clinic for skin tear located to left forearm. States he was riding along and scraped it on something. Injury occurred last Thursday, but can't seem to get it to stop bleeding.   Cinthya Wadsworth LPN............. April 25, 2018 3:08 PM

## 2018-04-25 NOTE — PROGRESS NOTES
"HPI Comments: Nursing Notes:   Cinthya Wadsworth, LPN  4/25/2018  3:08 PM  Unsigned  Patient presents to the clinic for skin tear located to left forearm.   States he was riding along and scraped it on something. Injury occurred   last Thursday, but can't seem to get it to stop bleeding.   Cinthya Wadsworth LPN............. April 25, 2018 3:08 PM     Abrasion on left forearm and skin tore about six days ago. Won't stop bleeding since that time, currently on ASA, and Coumadin daily. No signs of infection. Has been putting wound spray and bandages on arm. Bleeding more now then it was last week.         Review of Systems   Skin:        Skin tear on left forearm         Physical Exam   Constitutional: He is oriented to person, place, and time and well-developed, well-nourished, and in no distress.   Neurological: He is alert and oriented to person, place, and time.   Skin: Skin is warm.   5 cm  x 2 cm skin tear dorsal left forearm   Psychiatric: Affect normal.       On exam: elderly male with 5 cm x 2 cm skin tear on dorsal left forearm with \"oozing\" of blood noted from several areas of tear   Two sticks of silver nitrate used to stop bleeding. Patient tolerated well and no more active bleeding noted.  Nonstick dressing and curlix applied    Follow up as needed  "

## 2018-04-26 NOTE — NURSING NOTE
Pt presents to clinic for urinary retention  Review of Systems:    Weight loss:    No     Recent fever/chills:  No   Night sweats:   No  Current skin rash:  Yes   Recent hair loss:  No  Heat intolerance:  No   Cold intolerance:  No  Chest pain:   No   Palpitations:   No  Shortness of breath:  No   Wheezing:   No  Constipation:    No   Diarrhea:   No   Nausea:   No   Vomiting:   No   Kidney/side pain:  No   Back pain:   No  Frequent headaches:  No   Dizziness:     No  Leg swelling:   No   Calf pain:    No    Parents, brothers or sisters with history of kidney cancer:   No  Parents, brothers or sisters with history of bladder cancer: No  Father or brother with history of prostate cancer:  No    Post-Void Residual  A post-void residual was measured by ultrasonic bladder scanner.  >219 mL

## 2018-04-26 NOTE — MR AVS SNAPSHOT
After Visit Summary   4/26/2018    Sukhdeep Gomez    MRN: [id number]           Patient Information     Date Of Birth          8/19/1934        Visit Information        Provider Department      4/26/2018 10:45 AM Edgardo Sanchez MD [facility]        Today's Diagnoses     Balanitis    -  1    History of urethral stricture           Follow-ups after your visit        Your next 10 appointments already scheduled     Apr 27, 2018  9:00 AM CDT   Office Visit with Annie Valenzuela, CNP   [facility]    [address] [address]   [phone]           Bring a current list of meds and any records pertaining to this visit. For Physicals, please bring immunization records and any forms needing to be filled out. Please arrive 10 minutes early to complete paperwork.            Apr 27, 2018 10:15 AM CDT   Treatment with Jerson Shannon, PT   [facility] ([facility]    [address]  [place] [address]   [phone]            May 02, 2018 10:15 AM CDT   Anticoagulation Visit with  ANTI COAG 1   [facility]    [address] [address]   [phone]            Oct 23, 2018  1:00 PM CDT   Pacemaker Check with  PACEMAKER   [facility]    [address] [address] [phone]              Who to contact     If you have questions or need follow up information about today's clinic visit or your schedule please contact [facility] directly at [phone].  Normal or non-critical lab and imaging results will be communicated to you by MyChart, letter or phone within 4 business days after the clinic has received the results. If you do not hear from us within 7 days, please contact the  "clinic through [employer] or phone. If you have a critical or abnormal lab result, we will notify you by phone as soon as possible.  Submit refill requests through [employer] or call your pharmacy and they will forward the refill request to us. Please allow 3 business days for your refill to be completed.          Additional Information About Your Visit        [employer] Information     [employer] lets you send messages to your doctor, view your test results, renew your prescriptions, schedule appointments and more. To sign up, go to [email or web address] . Click on \"Log in\" on the left side of the screen, which will take you to the Welcome page. Then click on \"Sign up Now\" on the right side of the page.     You will be asked to enter the access code listed below, as well as some personal information. Please follow the directions to create your username and password.     Your access code is: [email or web address]  Expires: 2018  3:03 PM     Your access code will  in 90 days. If you need help or a new code, please call your [place] clinic or [phone].        Care EveryWhere ID     This is your Care EveryWhere ID. This could be used by other organizations to access your [place] medical records  [id number]        Your Vitals Were     Temperature Respirations Height BMI (Body Mass Index)          97.5  F (36.4  C) (Temporal) 16 1.676 m (5' 6\") 25.02 kg/m2         Blood Pressure from Last 3 Encounters:   18 122/80   18 103/63   18 96/78    Weight from Last 3 Encounters:   18 70.3 kg (155 lb)   18 72.6 kg (160 lb)   18 68.7 kg (151 lb 8 oz)              We Performed the Following     POST-VOID RESIDUAL BLADDER SCAN     UA reflex to Microscopic     Urine Microscopic        Primary Care Provider Office Phone # Fax #    Cesar Sanders [phone] [address] [facility] [address]        Equal Access to Services     BIANCA ARMANDO AH: Bartolo chun " Sosivakumarali, wajosephda luqadaha, qaybta kaskye francois, griffin rider cecynicolette siennahunter laArturoalvarez darrin. So [facility] [phone].    ATENCIÓN: Si indiana carreno, tiene a cohn disposición servicios gratuitos de asistencia lingüística. Ten al [phone].    We comply with applicable federal civil rights laws and [place] laws. We do not discriminate on the basis of race, color, national origin, age, disability, sex, sexual orientation, or gender identity.            Thank you!     Thank you for choosing [facility] AND [facility]  for your care. Our goal is always to provide you with excellent care. Hearing back from our patients is one way we can continue to improve our services. Please take a few minutes to complete the written survey that you may receive in the mail after your visit with us. Thank you!             Your Updated Medication List - Protect others around you: Learn how to safely use, store and throw away your medicines at www.disposemymeds.org.          This list is accurate as of 4/26/18  2:51 PM.  Always use your most recent med list.                   Brand Name Dispense Instructions for use Diagnosis    acetaminophen 325 MG tablet    TYLENOL     Take 975 mg by mouth every 6 hours if needed. Max acetaminophen dose: 4000mg in 24 hrs.        allopurinol 100 MG tablet    ZYLOPRIM     Take 1 tablet by mouth once daily.        amoxicillin 500 MG capsule    AMOXIL     TAKE 4 CAPSULES BY MOUTH ONE HOUR PRIOR TO APPT        ASPIRIN 81 PO      Take one tablet by mouth daily        atorvastatin 10 MG tablet    LIPITOR     Take 1 tablet by mouth at bedtime.        furosemide 20 MG tablet    LASIX    90 tablet    TAKE 1 TABLET BY MOUTH EVERY MORNING    Chronic systolic heart failure (H)       insulin aspart prot & aspart injection    NovoLOG MIX 70/30 PEN    45 mL    35 units every morning and 10 units every evening    Diabetes mellitus with multiple complications (H)       insulin pen needle 31G X 8 MM    EASY  TOUCH PEN NEEDLES    200 each    FOR ADMINISTERING INSULIN AT HOME TWICE DAILY. Dx: E11.8    Diabetes mellitus with multiple complications (H)       lisinopril 2.5 MG tablet    PRINIVIL/Zestril     Take 2.5 mg by mouth daily        metoprolol succinate 50 MG 24 hr tablet    TOPROL-XL    135 tablet    Take 1.5 tablets (75 mg) by mouth daily    Essential hypertension       nystatin-triamcinolone cream    MYCOLOG II    60 g    APPLY TOPICALLY TO AFFECTED AREA(S) THREE TIMES DAILY    Yeast dermatitis of penis       order for DME      Wheelchair with elevating leg rests/foot rest. For home use. Length of need: 99 mo        potassium chloride SA 20 MEQ CR tablet    K-DUR/KLOR-CON M     Take 1 tablet by mouth once every other day with a meal.        simvastatin 20 MG tablet    ZOCOR     Take 20 mg by mouth at bedtime.        VITAMIN D-1000 MAX ST 1000 units Tabs   Generic drug:  cholecalciferol      Take one tablet by mouth daily        warfarin 2 MG tablet    COUMADIN    90 tablet    TAKE 1MG BY MOUTH FOR TWO DAYS PER WEEK AND TAKE 2MG FOR FIVE DAYS PER WEEK    Atrial fibrillation (H)

## 2018-04-26 NOTE — PROGRESS NOTES
"Type of Visit  EST    Chief Complaint  Urethral stricture  History of prostate cancer  Penile infection    HPI  Mr. Gomez is a 83 y.o. male follows up with history of urethral stricture s/p dilation last 2/2015 who follows up with phimosis and severe balanitis.  Prior to that was 11/2013.  He has a history of prostate cancer treated in 2003 with EBRT.  Starting 4-6 weeks ago he noticed some pain of the penis, inability to retract his foreskin and a sense of difficulty with voiding.  He has been prescribed topical nystatin.  He denies fevers or chills.      Review of Systems  I reviewed the ROS the patient today.    .Nursing Notes:   Annie Bond, LPN  4/26/2018 11:15 AM  Incomplete  Pt presents to clinic for urinary retention  Review of Systems:    Weight loss:    No     Recent fever/chills:  No   Night sweats:   No  Current skin rash:  Yes   Recent hair loss:  No  Heat intolerance:  No   Cold intolerance:  No  Chest pain:   No   Palpitations:   No  Shortness of breath:  No   Wheezing:   No  Constipation:    No   Diarrhea:   No   Nausea:   No   Vomiting:   No   Kidney/side pain:  No   Back pain:   No  Frequent headaches:  No   Dizziness:     No  Leg swelling:   No   Calf pain:    No    Parents, brothers or sisters with history of kidney cancer:   No  Parents, brothers or sisters with history of bladder cancer: No  Father or brother with history of prostate cancer:  No    Post-Void Residual  A post-void residual was measured by ultrasonic bladder scanner.  >219 mL      Family History  Family History   Problem Relation Age of Onset     Heart Disease Father      Cancer-prostate Brother        Physical Exam  /80 (BP Location: Left arm, Patient Position: Sitting, Cuff Size: Adult Regular)  Temp 97.5  F (36.4  C) (Temporal)  Resp 16  Ht 1.676 m (5' 6\")  Wt 70.3 kg (155 lb)  BMI 25.02 kg/m2    Constitutional: NAD, WDWN.  Cardiovascular: Regular rate.  Pulmonary/Chest: Respirations are even and " non-labored bilaterally.  Abdominal: Soft. No distension, tenderness, masses or guarding. No CVA tenderness.  Extremities: TREV x 4, Warm. No clubbing.  No cyanosis.    Skin: Pink, warm and dry.  No rashes noted.  Genitourinary: nonpalpable bladder  Phimosis  Induration of the glans  Purulence    Labs  Results for JOSE GOMEZ (MRN [id number]) as of 10/12/2017 10:54   12/17/2014 10:10 11/11/2015 13:53 10/26/2016 09:58 10/6/2017 14:50   PSA TOTAL (DIAGNOSTIC) 0.920 1.355 1.006 1.587     Results for JOSE GOMEZ (MRN [id number]) as of 4/26/2018 13:56   4/26/2018 11:10   Color Urine Yellow   Appearance Urine Cloudy   Glucose Urine Negative   Bilirubin Urine Negative   Ketones Urine Negative   Specific Gravity Urine 1.015   pH Urine 5.5   Protein Albumin Urine Negative   Urobilinogen Urine 0.2   Nitrite Urine Negative   Blood Urine Large (A)   Leukocyte Esterase Urine Moderate (A)   Source Unspecified Urine   WBC Urine 25-50 (A)   RBC Urine 2-5 (A)   Bacteria Urine Many (A)       Assessment & Plan  Mr. Gomez is a 83 y.o. male follows up with history of urethral stricture s/p dilation last 2/2015 who follows up with phimosis and severe balanitis.  I recommended circumcision as he is unable to retract the foreskin completely and the infection appears severe on exam.    We discussed the surgery and risks in detail today including, but are not limited to, infection, bleeding, injury to adjacent structures including the urethra, issues with wound healing, altered penile sensation, appearance of decrease in penile size and need to abstain from sexual activity for 6 weeks to allow complete healing.    Additionally, medical complications such as heart attack, stroke, DVT, pulmonary embolus and even death were also discussed.    His urinalysis does reveal pyuria however it is virtually impossible to collect an appropriate bladder sample given the presence of purulence and phimosis.  Clinically he is doing quite well  and so I recommended no antibiotics at this time given the risks and potential side effects.    Plan  The patient was scheduled and consented for circumcision in the OR.   -Possible biopsy of the glands, possible urethral dilation   -Preop through PCP please for perioperative medication management including anticoagulation

## 2018-04-27 NOTE — NURSING NOTE
This patient presents today for a Preoperative exam for this procedure: Circumcision   Date of Surgery: 5/03/2018   Surgeon:  Dr. Sanchez  Facility:  [facility]    Justice Tobar LPN..............4/27/2018 9:03 AM

## 2018-04-27 NOTE — MR AVS SNAPSHOT
After Visit Summary   4/27/2018    Sukhdeep Gomez    MRN: [id number]           Patient Information     Date Of Birth          8/19/1934        Visit Information        Provider Department      4/27/2018 9:00 AM Annie Valenzuela CNP [facility]        Today's Diagnoses     Pre-op exam          Care Instructions    - Stop Coumadin 3 days prior to procedure. Which would be starting April 22, 2018.  - Stop aspirin today.          Follow-ups after your visit        Your next 10 appointments already scheduled     Apr 27, 2018 10:15 AM CDT   Treatment with Jerson Shannon, PT   [facility] ([facility])    [address]  [place] [address]   [phone]            May 02, 2018 10:15 AM CDT   Anticoagulation Visit with  ANTI COAG 1   [facility]    [address]  [facility] [address]   [phone]            Oct 23, 2018  1:00 PM CDT   Pacemaker Check with  PACEMAKER   [facility] ([facility])    [address]  [place] [address]   [phone]              Who to contact     If you have questions or need follow up information about today's clinic visit or your schedule please contact [facility] directly at [phone].  Normal or non-critical lab and imaging results will be communicated to you by MyChart, letter or phone within 4 business days after the clinic has received the results. If you do not hear from us within 7 days, please contact the clinic through MyChart or phone. If you have a critical or abnormal lab result, we will notify you by phone as soon as possible.  Submit refill requests through [email or web address] or call your pharmacy and they will forward the refill request to us. Please allow 3 business days for your refill to be completed.          Additional Information About  "Your Visit        MyChart Information     [employer]ura lets you send messages to your doctor, view your test results, renew your prescriptions, schedule appointments and more. To sign up, go to [email or web address] . Click on \"Log in\" on the left side of the screen, which will take you to the Welcome page. Then click on \"Sign up Now\" on the right side of the page.     You will be asked to enter the access code listed below, as well as some personal information. Please follow the directions to create your username and password.     Your access code is: [email or web address]  Expires: 2018  3:03 PM     Your access code will  in 90 days. If you need help or a new code, please call your [facility] clinic or [phone].        Care EveryWhere ID     This is your [facility] EveryWhere ID. This could be used by other organizations to access your [facility] medical records  [id number]        Your Vitals Were     Pulse Temperature Height BMI (Body Mass Index)          60 96.3  F (35.7  C) 5' 6\" (1.676 m) 25.02 kg/m2         Blood Pressure from Last 3 Encounters:   18 102/80   18 122/80   18 103/63    Weight from Last 3 Encounters:   18 155 lb (70.3 kg)   18 155 lb (70.3 kg)   18 160 lb (72.6 kg)              We Performed the Following     CBC W PLT No Diff     Comprehensive metabolic panel     EKG 12-lead complete w/read - Clinics        Primary Care Provider Office Phone # Fax #    Cesar Sanders [phone] [address] [facility] [address]        Equal Access to Services     [facility] : Bartolo Lerma, francesco nelson, richy francois, griffin clifford. So [facility] [phone].    ATENCIÓN: Si habla español, tiene a cohn disposición servicios gratuitos de asistencia lingüística. Llame al [phone].    We comply with applicable federal civil rights laws and [place] laws. We do not discriminate on the " basis of race, color, national origin, age, disability, sex, sexual orientation, or gender identity.            Thank you!     Thank you for choosing [facility] AND [facility]  for your care. Our goal is always to provide you with excellent care. Hearing back from our patients is one way we can continue to improve our services. Please take a few minutes to complete the written survey that you may receive in the mail after your visit with us. Thank you!             Your Updated Medication List - Protect others around you: Learn how to safely use, store and throw away your medicines at www.disposemymeds.org.          This list is accurate as of 4/27/18  9:37 AM.  Always use your most recent med list.                   Brand Name Dispense Instructions for use Diagnosis    acetaminophen 325 MG tablet    TYLENOL     Take 975 mg by mouth every 6 hours if needed. Max acetaminophen dose: 4000mg in 24 hrs.        allopurinol 100 MG tablet    ZYLOPRIM     Take 1 tablet by mouth once daily.        amoxicillin 500 MG capsule    AMOXIL     TAKE 4 CAPSULES BY MOUTH ONE HOUR PRIOR TO APPT        ASPIRIN 81 PO      Take one tablet by mouth daily        atorvastatin 10 MG tablet    LIPITOR     Take 1 tablet by mouth at bedtime.        furosemide 20 MG tablet    LASIX    90 tablet    TAKE 1 TABLET BY MOUTH EVERY MORNING    Chronic systolic heart failure (H)       insulin aspart prot & aspart injection    NovoLOG MIX 70/30 PEN    45 mL    35 units every morning and 10 units every evening    Diabetes mellitus with multiple complications (H)       insulin pen needle 31G X 8 MM    EASY TOUCH PEN NEEDLES    200 each    FOR ADMINISTERING INSULIN AT HOME TWICE DAILY. Dx: E11.8    Diabetes mellitus with multiple complications (H)       lisinopril 2.5 MG tablet    PRINIVIL/Zestril     Take 2.5 mg by mouth daily        metoprolol succinate 50 MG 24 hr tablet    TOPROL-XL    135 tablet    Take 1.5 tablets (75 mg) by mouth daily     Essential hypertension       nystatin-triamcinolone cream    MYCOLOG II    60 g    APPLY TOPICALLY TO AFFECTED AREA(S) THREE TIMES DAILY    Yeast dermatitis of penis       order for DME      Wheelchair with elevating leg rests/foot rest. For home use. Length of need: 99 mo        potassium chloride SA 20 MEQ CR tablet    K-DUR/KLOR-CON M     Take 1 tablet by mouth once every other day with a meal.        simvastatin 20 MG tablet    ZOCOR     Take 20 mg by mouth at bedtime.        VITAMIN D-1000 MAX ST 1000 units Tabs   Generic drug:  cholecalciferol      Take one tablet by mouth daily        warfarin 2 MG tablet    COUMADIN    90 tablet    TAKE 1MG BY MOUTH FOR TWO DAYS PER WEEK AND TAKE 2MG FOR FIVE DAYS PER WEEK    Atrial fibrillation (H)

## 2018-04-27 NOTE — PROGRESS NOTES
"[facility] AND [facility]  [address]  [place] [address]  [phone]    PRE-OP EVALUATION:  Today's date: 2018    Sukhdeep Gomez (: 1934) presents for pre-operative evaluation assessment as requested by  ***.  He requires evaluation and anesthesia risk assessment prior to undergoing surgery/procedure for treatment of *** .    Proposed Surgery/ Procedure: Circumcision  Date of Surgery/ Procedure: May 3, 2018  Time of Surgery/ Procedure: ***  Hospital/Surgical Facility: ***  Primary Physician: Cesar Sanders  Type of Anesthesia Anticipated: {ANESTHESIA:507336}    Patient has a Health Care Directive or Living Will:  {YES/NO:666799::\"NO\"}    1. YES - DO YOU HAVE A HISTORY OF HEART ATTACK, STROKE, STENT, BYPASS OR SURGERY ON AN ARTERY IN THE HEAD, NECK, HEART OR LEG? Heart attack, Bypass surgery   2. NO - Do you ever have any pain or discomfort in your chest?  3. Yes - Do you have a history of  Heart Failure? Most recent echo showed EF of 40%  4. NO - Are you troubled by shortness of breath when: walking on the level, up a slight hill or at night?  5. NO - Do you currently have a cold, bronchitis or other respiratory infection?  6. NO - Do you have a cough, shortness of breath or wheezing?  7. NO - Do you sometimes get pains in the calves of your legs when you walk?  8. NO - Do you or anyone in your family have previous history of blood clots?  9. NO - Do you or does anyone in your family have a serious bleeding problem such as prolonged bleeding following surgeries or cuts?  10. NO - Have you ever had problems with anemia or been told to take iron pills?  11. NO - Have you had any abnormal blood loss such as black, tarry or bloody stools, or abnormal vaginal bleeding?  12. NO - Have you ever had a blood transfusion?  13. NO - Have you or any of your relatives ever had problems with anesthesia?  14. NO - Do you have sleep apnea, excessive snoring or daytime " drowsiness?  15. NO - Do you have any prosthetic heart valves?  16. Yes- Do you have prosthetic joints? Left knee arthroplasty  17. NO - Is there any chance that you may be pregnant?      HPI:     HPI related to upcoming procedure: ***      {. Problems:942524}    MEDICAL HISTORY:     Patient Active Problem List    Diagnosis Date Noted     Paroxysmal atrial fibrillation (H) 02/21/2018     Priority: Medium     Overview:   chronic warfarin anticoagulation and toprol for rate control       S/P CABG x 4 02/21/2018     Priority: Medium     Overview:   status post 4 vessel CABG, [facility]       Long term (current) use of anticoagulants 02/11/2018     Priority: Medium     Unspecified atrial fibrillation 02/11/2018     Priority: Medium     Ascending aorta dilatation (H) 02/08/2018     Priority: Medium     H/O myocardial infarction, greater than 8 weeks 02/08/2018     Priority: Medium     Stented coronary artery 02/08/2018     Priority: Medium     Atrial fibrillation (H) 01/18/2018     Priority: Medium     Overview:   chronic warfarin anticoagulation and toprol for rate control       Osteoarthrosis 01/18/2018     Priority: Medium     Diabetes mellitus with multiple complications (H) 01/18/2018     Priority: Medium     Essential hypertension 01/18/2018     Priority: Medium     GI bleeding 01/18/2018     Priority: Medium     Overview:   upper/anemia 8.1       Gout of left foot due to renal impairment 01/18/2018     Priority: Medium     Overview:   tophaceous       Ischemic cardiomyopathy 01/18/2018     Priority: Medium     Lumbago 01/18/2018     Priority: Medium     Enthesopathy of ankle and tarsus 01/18/2018     Priority: Medium     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 01/18/2018     Priority: Medium     Overview:   status post TTE       Mixed hyperlipidemia 01/18/2018     Priority: Medium     S/P CABG (coronary artery bypass graft) 01/18/2018     Priority: Medium     Overview:   status  post 4 vessel CABG, [facility]       Spinal stenosis 01/18/2018     Priority: Medium     Squamous cell carcinoma of skin 01/18/2018     Priority: Medium     Chronic systolic heart failure (H) 11/08/2017     Priority: Medium     Postoperative state 11/01/2017     Priority: Medium     Basal cell carcinoma of right forehead 10/23/2017     Priority: Medium     Implantable cardioverter-defibrillator, [employer], Single lead 10/20/2017     Priority: Medium     [employer] Incepta [id number]  SN#[id number]  4/25/13   RV lead CPI [id number]  SN#[id number]  5/30/07  CHF/Madit II       Hamstring tightness of left lower extremity 02/07/2017     Priority: Medium     Altered level of consciousness 12/04/2016     Priority: Medium     DM II (diabetes mellitus, type II), controlled (H) 12/04/2016     Priority: Medium     Elevated INR 12/04/2016     Priority: Medium     Elevated troponin 12/04/2016     Priority: Medium     Hypoglycemia associated with diabetes (H) 12/04/2016     Priority: Medium     Hypokalemia 12/04/2016     Priority: Medium     Yeast dermatitis 12/04/2016     Priority: Medium     Anticoagulation monitoring, INR range 2-3 05/13/2015     Priority: Medium     History of TIA (transient ischemic attack) 11/17/2014     Priority: Medium     CKD (chronic kidney disease) stage 3, GFR 30-59 ml/min 10/17/2014     Priority: Medium     CKD (chronic kidney disease) stage 4, GFR 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       Diabetic neuropathy (H) 11/11/2013     Priority: Medium     Prostate  CA (H) 11/11/2013     Priority: Medium     History of urethral stricture 11/07/2013     Priority: Medium     Orthostatic hypotension 11/05/2013     Priority: Medium     Hematoma of right hip 11/21/2012     Priority: Medium     Systolic congestive heart failure (H) 11/14/2012     Priority: Medium     Overview:   9/19/2012 ECHO  Final Impression:   1) Dilated left ventricle to a moderate degree with severely depressed left ventricular systolic function and ejection fraction of 15%. See comments in text regarding question of left ventricular thrombus. Echo density noted there is felt to be artifactual, but the possibility of LV thrombus cannot be fully excluded. Consider repeat study with contrast.   2) Severe left atrial enlargement.   3) Sclerotic aortic valve with mild aortic insufficiency.   4) Sclerotic mitral valve with mild insufficiency.   5) Sclerotic tricuspid valve with mild to moderate regurgitation.   6) Probable moderate elevation right ventricular systolic pressure to 48 mmHg plus right atrial pressure.   7) Study is performed with patient in atrial fibrillation.   8) When compared with prior study of August 7, 2009, there has been further increase in right ventricular systolic pressure from 29 mmHg plus right atrial to 48 mmHg plus right atrial pressure and the aortic root and ascending aorta appear to have further increased in size from prior 4 cm in the mid ascending aorta to 4.4 cm. Additionally, the issue of possible LV thrombus was not raised in the report of the prior study.   Interpretation: M-Mode, two-dimensional, spectral and color flow Doppler evaluations were performed with standard echocardiographic images with the patient in atrial fibrillation. The study is technically adequate for interpretation.   Aortic root and ascending aorta are mildly to moderately enlarged at 4.5 and 4.4 cm, respectively. There is severe left atrial enlargement. The right atrium is probably within normal limits.  The right ventricle is also probably of normal size with normal wall motion. A device lead is noted throughout the right heart.   The left ventricular chamber is moderately enlarged at 6.2 cm. Left ventricular wall thickness appears to be probably mildly increased in the intraventricular septum.   The inferior vena cava is not well visualized.   All cardiac valves appear to be sclerotic. There is mild aortic insufficiency and probable mild mitral regurgitation. There is mild to moderate tricuspid regurgitation with regurgitation jet velocity suggesting right ventricular systolic pressure to be elevated to 48 mmHg plus indeterminate right atrial pressure, suggesting at least probable moderate pulmonary hypertension.   Left ventricular ejection fraction is severely reduced with severe global hypokinesis with estimated ejection fraction in the range of 15%. There is obstruction noted on apical views within the left ventricular apex which is felt to be artifactual; however, the possibility of left ventricular thrombus cannot be excluded. Consider repeat study with contrast for better definition.   No pericardial effusion is noted.   Left ventricular diastolic function is indeterminate in the setting of the atrial fibrillation.        Chronic systolic congestive heart failure (H) 11/14/2012     Priority: Medium     Overview:   9/19/2012 ECHO  Final Impression:   1) Dilated left ventricle to a moderate degree with severely depressed left ventricular systolic function and ejection fraction of 15%. See comments in text regarding question of left ventricular thrombus. Echo density noted there is felt to be artifactual, but the possibility of LV thrombus cannot be fully excluded. Consider repeat study with contrast.   2) Severe left atrial enlargement.   3) Sclerotic aortic valve with mild aortic insufficiency.   4) Sclerotic mitral valve with mild insufficiency.   5) Sclerotic tricuspid valve with mild to moderate  regurgitation.   6) Probable moderate elevation right ventricular systolic pressure to 48 mmHg plus right atrial pressure.   7) Study is performed with patient in atrial fibrillation.   8) When compared with prior study of August 7, 2009, there has been further increase in right ventricular systolic pressure from 29 mmHg plus right atrial to 48 mmHg plus right atrial pressure and the aortic root and ascending aorta appear to have further increased in size from prior 4 cm in the mid ascending aorta to 4.4 cm. Additionally, the issue of possible LV thrombus was not raised in the report of the prior study.   Interpretation: M-Mode, two-dimensional, spectral and color flow Doppler evaluations were performed with standard echocardiographic images with the patient in atrial fibrillation. The study is technically adequate for interpretation.   Aortic root and ascending aorta are mildly to moderately enlarged at 4.5 and 4.4 cm, respectively. There is severe left atrial enlargement. The right atrium is probably within normal limits. The right ventricle is also probably of normal size with normal wall motion. A device lead is noted throughout the right heart.   The left ventricular chamber is moderately enlarged at 6.2 cm. Left ventricular wall thickness appears to be probably mildly increased in the intraventricular septum.   The inferior vena cava is not well visualized.   All cardiac valves appear to be sclerotic. There is mild aortic insufficiency and probable mild mitral regurgitation. There is mild to moderate tricuspid regurgitation with regurgitation jet velocity suggesting right ventricular systolic pressure to be elevated to 48 mmHg plus indeterminate right atrial pressure, suggesting at least probable moderate pulmonary hypertension.   Left ventricular ejection fraction is severely reduced with severe global hypokinesis with estimated ejection fraction in the range of 15%. There is obstruction noted on apical views  within the left ventricular apex which is felt to be artifactual; however, the possibility of left ventricular thrombus cannot be excluded. Consider repeat study with contrast for better definition.   No pericardial effusion is noted.   Left ventricular diastolic function is indeterminate in the setting of the atrial fibrillation.        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     Priority: Medium     Long term current use of anticoagulant therapy 10/20/2010     Priority: Medium     Onychomycosis 08/05/2010     Priority: Medium     Acute gastritis with hemorrhage 08/10/2009     Priority: Medium     Overview:   NSAID       Muscle weakness (generalized) 06/09/2009     Priority: Medium     Overview:   IMO Update 10/11       Myopathy 04/08/2009     Priority: Medium      Past Medical History:   Diagnosis Date     Acute on chronic systolic congestive heart failure (H)     11/14/2012,15-20% EF 11/2012     Atherosclerotic heart disease of native coronary artery without angina pectoris      Atrial fibrillation (H)      Cardiomyopathy (H)      Chronic kidney disease     Stage III     Disorder of muscle     2/7/2017     Essential (primary) hypertension     chronic     Gastrointestinal hemorrhage     2009,secondary to NSAID gastritis     Gout     tophaceous, right foot, right fingers     Hyperlipidemia     No Comments Provided     Low back pain     lumbar spinal stenosis     Malignant neoplasm of prostate (H)     2003     Osteoarthritis      Other nonrheumatic aortic valve disorders     with mild Al     Other retention of urine (CODE)     2010,with stricture     Other specified disorders of arteries and arterioles (CODE) (H)     moderate, with mild increase in diameter of the ascending aorta     Polyneuropathy     via 2008 EMG in [place]     Systolic congestive heart failure (H) 2009    mild to moderate left ventricular enlargement  with marked decreased in systolic function.     Transient cerebral ischemic attack 11/17/2014     Type 2 diabetes mellitus without complications (H) 2009     Past Surgical History:   Procedure Laterality Date     ARTHROPLASTY KNEE      2011,left     BYPASS GRAFT ARTERY CORONARY      4 vessel, [facility], 1989     CYSTOSCOPY      2009,& dilation of urethral stricture, Dr. Wallace     CYSTOSCOPY      2010,& catheter placement for acute obstruction, Dr. Elise     ESOPHAGOSCOPY, GASTROSCOPY, DUODENOSCOPY (EGD), COMBINED      2007,& colonoscopy with polypectomy, [facility] essetnially normal EGD with small polyp removed     HEART CATH, ANGIOPLASTY      approx. 2003,angiogram, 2 stents, [facility]     IMPLANT PACEMAKER      2007,[employer] ICD, CPI Vitality 2 EL, model #T177, serial #[id number], which was implanted on 05/30/2007 at [facility].     LAMINECTOMY LUMBAR ONE LEVEL      2006,decompressive, L3, L4 & L5 with improvement, Dr. Lopes     Current Outpatient Prescriptions   Medication Sig Dispense Refill     acetaminophen (TYLENOL) 325 MG tablet Take 975 mg by mouth every 6 hours if needed. Max acetaminophen dose: 4000mg in 24 hrs.       allopurinol (ZYLOPRIM) 100 MG tablet Take 1 tablet by mouth once daily.       amoxicillin (AMOXIL) 500 MG capsule TAKE 4 CAPSULES BY MOUTH ONE HOUR PRIOR TO APPT       ASPIRIN 81 PO Take one tablet by mouth daily       atorvastatin (LIPITOR) 10 MG tablet Take 1 tablet by mouth at bedtime.       cholecalciferol (VITAMIN D-1000 MAX ST) 1000 UNITS TABS Take one tablet by mouth daily       furosemide (LASIX) 20 MG tablet TAKE 1 TABLET BY MOUTH EVERY MORNING 90 tablet 0     insulin aspart prot & aspart (NOVOLOG MIX 70/30 PEN) injection 35 units every morning and 10 units every evening 45 mL 3     insulin pen needle (EASY TOUCH PEN NEEDLES) 31G X 8 MM FOR ADMINISTERING INSULIN AT HOME TWICE DAILY. Dx: E11.8 200 each 1     lisinopril (PRINIVIL/ZESTRIL) 2.5 MG tablet Take 2.5 mg by mouth daily        "metoprolol succinate (TOPROL-XL) 50 MG 24 hr tablet Take 1.5 tablets (75 mg) by mouth daily 135 tablet 0     nystatin-triamcinolone (MYCOLOG II) cream APPLY TOPICALLY TO AFFECTED AREA(S) THREE TIMES DAILY 60 g 11     order for DME Wheelchair with elevating leg rests/foot rest. For home use. Length of need: 99 mo       potassium chloride SA (K-DUR/KLOR-CON M) 20 MEQ CR tablet Take 1 tablet by mouth once every other day with a meal.       simvastatin (ZOCOR) 20 MG tablet Take 20 mg by mouth at bedtime.       warfarin (COUMADIN) 2 MG tablet TAKE 1MG BY MOUTH FOR TWO DAYS PER WEEK AND TAKE 2MG FOR FIVE DAYS PER WEEK 90 tablet 1     OTC products: {OTC ANALGESICS:767225}    Allergies   Allergen Reactions     Codeine Unknown     Naproxen Other (See Comments)     \"kidneys shut down\"     Sulfa Drugs Unknown     Tramadol Nausea     Vancomycin Unknown      Latex Allergy: NO    Social History   Substance Use Topics     Smoking status: Never Smoker     Smokeless tobacco: Never Used     Alcohol use No     History   Drug Use No     Comment: Drug use: No       REVIEW OF SYSTEMS:   {ROS Preop Choices:122851}    EXAM:   /80 (BP Location: Right arm, Patient Position: Sitting, Cuff Size: Adult Large)  Pulse 60  Temp 96.3  F (35.7  C)  Ht 5' 6\" (1.676 m)  Wt 155 lb (70.3 kg)  BMI 25.02 kg/m2  {EXAM Preop Choices:121529}    DIAGNOSTICS:   {DIAGNOSTIC FOR PREOP:021631}    Recent Labs   Lab Test 03/21/18 02/21/18 02/01/18   1241   02/01/18   1219   01/08/18   1626   10/06/17   1514   HGB   --    --    --    --   12.0*   --    --    --   13.3*   PLT   --    --    --    --   126*   --    --    --   143   INR  2.3  2.0   --    < >   --    < >   --    < >   --    NA   --    --   138   --    --    --   130*   < >   --    POTASSIUM   --    --   5.0   --    --    --   4.8   < >   --    CR   --    --   2.39*   --    --    --   2.16*   < >   --     < > = values in this interval not displayed.        IMPRESSION:   {PREOP " "REASONS:594521::\"Reason for surgery/procedure: ***\",\"Diagnosis/reason for consult: ***\"}    The proposed surgical procedure is considered {HIGH=major cardiovascular or procedures requiring prolonged anesthesia >4 hours or large fluid shifts;    INTERMEDIATE=abdominal, most orthopedic and intrathoracic surgery; LOW= endoscopy, cataract and breast surgery:135089} risk.    REVISED CARDIAC RISK INDEX  The patient has the following serious cardiovascular risks for perioperative complications such as (MI, PE, VFib and 3  AV Block):  {PREOP REVISED CARDIAC INDEX (RCI):707165:p:\"No serious cardiac risks\"}  INTERPRETATION: {REVISED CARDIAC RISK INTERPRETATION:946141}    The patient has the following additional risks for perioperative complications:  {Additional perioperative risks:391489:p:\"No identified additional risks\"}    No diagnosis found.    RECOMMENDATIONS:     {IMPORTANT - Conditions - complete carefully!!:861127}    {IMPORTANT - Medications:296804::\"--Patient is to take all scheduled medications on the day of surgery EXCEPT for modifications listed below.\"}    {IMPORTANT - Approval:373288:p:\"APPROVAL GIVEN to proceed with proposed procedure, without further diagnostic evaluation\"}       Signed Electronically by: Annie Valenzuela CNP    Copy of this evaluation report is provided to requesting physician.    [facility] Preop Guidelines    Revised Cardiac Risk Index  "

## 2018-04-27 NOTE — PATIENT INSTRUCTIONS
RECOMMENDATIONS:     Cardiovascular Risk  Performs 2-4 METs exercise without symptoms: participates in therapy, ambulates short distances.   Patient is already on a Beta Blocker. Continue Betablocker therapy after surgery, using Beta blocker order set as necessary for NPO status.       --Patient is to take all scheduled medications on the day of surgery EXCEPT for modifications listed below.    Diabetes Medication Use  -----Hold mixed insulins (e.g. Insulin 70/30) while NPO (fasting)      Anticoagulant or Antiplatelet Medication Use  ASPIRIN: Discontinue ASA 7-10 days prior to procedure to reduce bleeding risk.  It should be resumed post-operatively.  WARFARIN: Warfarin may be discontinued in the perioperative period 3 days prior to surgery.        APPROVAL GIVEN to proceed with proposed procedure, without further diagnostic evaluation       Signed Electronically by: Annie Valenzuela CNP

## 2018-04-27 NOTE — TELEPHONE ENCOUNTER
Writer received a faxed rx request from [facility] with relation to patient's lasix. Per faxed rx request:    Patient is taking 1 additional tab every other day and is going to run out before the insurance will pay. Can we get a new script with updated directions? Thank you!    Writer notes that patient was seen by Nicolas Valencia NP today for a preop. Writer will bethanie up rx as requested by pharmacy and will route rx request to NP for her consideration/approval at this time.    Unable to complete prescription refill per RN Medication Refill Policy. Fredy Becerra 4/27/2018 10:21 AM

## 2018-04-27 NOTE — PROGRESS NOTES
[facility] AND [facility]  [address]  [place] [address]  [phone]    PRE-OP EVALUATION:  Today's date: 2018    Sukhdeep Gomez (: 1934) presents for pre-operative evaluation assessment as requested by Dr. Sanchez.  He requires evaluation and anesthesia risk assessment prior to undergoing surgery/procedure for treatment of Balanitis .    Proposed Surgery/ Procedure: Circumcision  Date of Surgery/ Procedure: May 3, 2018  Time of Surgery/ Procedure: [facility]  Hospital/Surgical Facility: [facility]  Primary Physician: Cesar Sanders      1. YES - DO YOU HAVE A HISTORY OF HEART ATTACK, STROKE, STENT, BYPASS OR SURGERY ON AN ARTERY IN THE HEAD, NECK, HEART OR LEG? Heart attack, Bypass surgery   2. NO - Do you ever have any pain or discomfort in your chest?  3. Yes - Do you have a history of  Heart Failure? Most recent echo showed EF of 40% on 2018  4. NO - Are you troubled by shortness of breath when: walking on the level, up a slight hill or at night?  5. NO - Do you currently have a cold, bronchitis or other respiratory infection?  6. NO - Do you have a cough, shortness of breath or wheezing?  7. NO - Do you sometimes get pains in the calves of your legs when you walk?  8. NO - Do you or anyone in your family have previous history of blood clots?  9. NO - Do you or does anyone in your family have a serious bleeding problem such as prolonged bleeding following surgeries or cuts?  10. NO - Have you ever had problems with anemia or been told to take iron pills?  11. NO - Have you had any abnormal blood loss such as black, tarry or bloody stools, or abnormal vaginal bleeding?  12. NO - Have you ever had a blood transfusion?  13. NO - Have you or any of your relatives ever had problems with anesthesia?  14. NO - Do you have sleep apnea, excessive snoring or daytime drowsiness?  15. NO - Do you have any prosthetic heart valves? Has an implantable defibrillator  device  16. Yes- Do you have prosthetic joints? Left knee arthroplasty  17. NO - Is there any chance that you may be pregnant?      HPI:     HPI related to upcoming procedure: has history of urethral stricture which he has had s/p dilation of last in 2015. He has phimosis and severe balanitis. Circumcision was recommended to him as he is unable to retract foreskin and severity of balanitis.      A-FIB - Patient has a longstanding history of chronic A-fib currently on rate control with metoprolol succinate. Current treatment regimen includes Warfarin and Aspirin for stroke prevention and denies significant symptoms of lightheadedness, palpitations or dyspnea.                                                                                                                                                                             .  CAD - Patient has a longstanding history of moderate-severe CAD. Patient denies recent chest pain or NTG use, denies exercise induced dyspnea or PND. Last EKG 2/1/2018 and today during visit.                                                                                                                                                    .  CHF - Patient has a longstanding history of moderate-severe CHF. Exacerbating conditions include hypertension, dystolic dysfunction and atrial fibrilation. Currently the patient's condition is stable. Current treatment regimen includes beta blocker, ASA and diuretic: furosemide. The patient denies chest pain, edema, orthopnea, SOB or recent weight gain. Last Echocardiogram 4/9/2018, EKG 2/1/2018 and today. Has implantable cardioverter defibrillator.                                                                                                                                                                             .  DIABETES - Patient has a longstanding history of DiabetesType Type II . Patient is being treated with insulin injections and ASA and  denies significant side effects. Control has been good. Complicating factors include but are not limited to: hypertension, hyperlipidemia, neuropathy and chronic kidney disease.                                                                                                                           .  HYPERLIPIDEMIA - Patient has a long history of significant Hyperlipidemia requiring medication for treatment with recent good control. Patient reports no problems or side effects with the medication.                                                                                                                             .                          .  HYPERTENSION - Patient has longstanding history of HTN , currently denies any symptoms referable to elevated blood pressure. Specifically denies chest pain, palpitations, dyspnea, orthopnea, PND or peripheral edema. Blood pressure readings have been in normal range. Current medication regimen is as listed below. Patient denies any side effects of medication.                                                                            BP Readings from Last 6 Encounters:   04/27/18 102/80   04/26/18 122/80   04/12/18 103/63   02/08/18 96/78   01/08/18 (!) 78/53   12/27/17 124/76                                                                                                                       .  RENAL INSUFFICIENCY - Patient has a longstanding history of moderate-severe chronic renal insufficiency. Last Cr 2.39 on 2/1/2018, GFR 26 on 2/1/2018.                                                                                                                                                                               .    MEDICAL HISTORY:     Patient Active Problem List    Diagnosis Date Noted     Paroxysmal atrial fibrillation (H) 02/21/2018     Priority: Medium     Overview:   chronic warfarin anticoagulation and toprol for rate control       S/P CABG x 4 02/21/2018      Priority: Medium     Overview:   status post 4 vessel CABG, [facility]       Long term (current) use of anticoagulants 02/11/2018     Priority: Medium     Unspecified atrial fibrillation 02/11/2018     Priority: Medium     Ascending aorta dilatation (H) 02/08/2018     Priority: Medium     H/O myocardial infarction, greater than 8 weeks 02/08/2018     Priority: Medium     Stented coronary artery 02/08/2018     Priority: Medium     Atrial fibrillation (H) 01/18/2018     Priority: Medium     Overview:   chronic warfarin anticoagulation and toprol for rate control       Osteoarthrosis 01/18/2018     Priority: Medium     Diabetes mellitus with multiple complications (H) 01/18/2018     Priority: Medium     Essential hypertension 01/18/2018     Priority: Medium     GI bleeding 01/18/2018     Priority: Medium     Overview:   upper/anemia 8.1       Gout of left foot due to renal impairment 01/18/2018     Priority: Medium     Overview:   tophaceous       Ischemic cardiomyopathy 01/18/2018     Priority: Medium     Lumbago 01/18/2018     Priority: Medium     Enthesopathy of ankle and tarsus 01/18/2018     Priority: Medium     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 01/18/2018     Priority: Medium     Overview:   status post TTE       Mixed hyperlipidemia 01/18/2018     Priority: Medium     S/P CABG (coronary artery bypass graft) 01/18/2018     Priority: Medium     Overview:   status post 4 vessel CABG, [facility]       Spinal stenosis 01/18/2018     Priority: Medium     Squamous cell carcinoma of skin 01/18/2018     Priority: Medium     Chronic systolic heart failure (H) 11/08/2017     Priority: Medium     Postoperative state 11/01/2017     Priority: Medium     Basal cell carcinoma of right forehead 10/23/2017     Priority: Medium     Implantable cardioverter-defibrillator, [employer], Single lead 10/20/2017     Priority: Medium     [employer] Incepta  [id number]  #[id number]  4/25/13   RV lead CPI [id number]  #[id number]  5/30/07  CHF/Madit II       Hamstring tightness of left lower extremity 02/07/2017     Priority: Medium     Altered level of consciousness 12/04/2016     Priority: Medium     DM II (diabetes mellitus, type II), controlled (H) 12/04/2016     Priority: Medium     Elevated INR 12/04/2016     Priority: Medium     Elevated troponin 12/04/2016     Priority: Medium     Hypoglycemia associated with diabetes (H) 12/04/2016     Priority: Medium     Hypokalemia 12/04/2016     Priority: Medium     Yeast dermatitis 12/04/2016     Priority: Medium     Anticoagulation monitoring, INR range 2-3 05/13/2015     Priority: Medium     History of TIA (transient ischemic attack) 11/17/2014     Priority: Medium     CKD (chronic kidney disease) stage 3, GFR 30-59 ml/min 10/17/2014     Priority: Medium     CKD (chronic kidney disease) stage 4, GFR 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       Diabetic neuropathy (H) 11/11/2013     Priority: Medium     Prostate CA (H) 11/11/2013     Priority: Medium     History of urethral stricture 11/07/2013     Priority: Medium     Orthostatic hypotension 11/05/2013     Priority: Medium     Hematoma of right hip 11/21/2012     Priority: Medium     Systolic congestive heart failure (H) 11/14/2012     Priority: Medium     Overview:   9/19/2012 ECHO  Final Impression:   1) Dilated left ventricle to a moderate degree with severely depressed left ventricular systolic function and ejection fraction of 15%. See comments in  text regarding question of left ventricular thrombus. Echo density noted there is felt to be artifactual, but the possibility of LV thrombus cannot be fully excluded. Consider repeat study with contrast.   2) Severe left atrial enlargement.   3) Sclerotic aortic valve with mild aortic insufficiency.   4) Sclerotic mitral valve with mild insufficiency.   5) Sclerotic tricuspid valve with mild to moderate regurgitation.   6) Probable moderate elevation right ventricular systolic pressure to 48 mmHg plus right atrial pressure.   7) Study is performed with patient in atrial fibrillation.   8) When compared with prior study of August 7, 2009, there has been further increase in right ventricular systolic pressure from 29 mmHg plus right atrial to 48 mmHg plus right atrial pressure and the aortic root and ascending aorta appear to have further increased in size from prior 4 cm in the mid ascending aorta to 4.4 cm. Additionally, the issue of possible LV thrombus was not raised in the report of the prior study.   Interpretation: M-Mode, two-dimensional, spectral and color flow Doppler evaluations were performed with standard echocardiographic images with the patient in atrial fibrillation. The study is technically adequate for interpretation.   Aortic root and ascending aorta are mildly to moderately enlarged at 4.5 and 4.4 cm, respectively. There is severe left atrial enlargement. The right atrium is probably within normal limits. The right ventricle is also probably of normal size with normal wall motion. A device lead is noted throughout the right heart.   The left ventricular chamber is moderately enlarged at 6.2 cm. Left ventricular wall thickness appears to be probably mildly increased in the intraventricular septum.   The inferior vena cava is not well visualized.   All cardiac valves appear to be sclerotic. There is mild aortic insufficiency and probable mild mitral regurgitation. There is mild to moderate tricuspid  regurgitation with regurgitation jet velocity suggesting right ventricular systolic pressure to be elevated to 48 mmHg plus indeterminate right atrial pressure, suggesting at least probable moderate pulmonary hypertension.   Left ventricular ejection fraction is severely reduced with severe global hypokinesis with estimated ejection fraction in the range of 15%. There is obstruction noted on apical views within the left ventricular apex which is felt to be artifactual; however, the possibility of left ventricular thrombus cannot be excluded. Consider repeat study with contrast for better definition.   No pericardial effusion is noted.   Left ventricular diastolic function is indeterminate in the setting of the atrial fibrillation.        Chronic systolic congestive heart failure (H) 11/14/2012     Priority: Medium     Overview:   9/19/2012 ECHO  Final Impression:   1) Dilated left ventricle to a moderate degree with severely depressed left ventricular systolic function and ejection fraction of 15%. See comments in text regarding question of left ventricular thrombus. Echo density noted there is felt to be artifactual, but the possibility of LV thrombus cannot be fully excluded. Consider repeat study with contrast.   2) Severe left atrial enlargement.   3) Sclerotic aortic valve with mild aortic insufficiency.   4) Sclerotic mitral valve with mild insufficiency.   5) Sclerotic tricuspid valve with mild to moderate regurgitation.   6) Probable moderate elevation right ventricular systolic pressure to 48 mmHg plus right atrial pressure.   7) Study is performed with patient in atrial fibrillation.   8) When compared with prior study of August 7, 2009, there has been further increase in right ventricular systolic pressure from 29 mmHg plus right atrial to 48 mmHg plus right atrial pressure and the aortic root and ascending aorta appear to have further increased in size from prior 4 cm in the mid ascending aorta to 4.4  cm. Additionally, the issue of possible LV thrombus was not raised in the report of the prior study.   Interpretation: M-Mode, two-dimensional, spectral and color flow Doppler evaluations were performed with standard echocardiographic images with the patient in atrial fibrillation. The study is technically adequate for interpretation.   Aortic root and ascending aorta are mildly to moderately enlarged at 4.5 and 4.4 cm, respectively. There is severe left atrial enlargement. The right atrium is probably within normal limits. The right ventricle is also probably of normal size with normal wall motion. A device lead is noted throughout the right heart.   The left ventricular chamber is moderately enlarged at 6.2 cm. Left ventricular wall thickness appears to be probably mildly increased in the intraventricular septum.   The inferior vena cava is not well visualized.   All cardiac valves appear to be sclerotic. There is mild aortic insufficiency and probable mild mitral regurgitation. There is mild to moderate tricuspid regurgitation with regurgitation jet velocity suggesting right ventricular systolic pressure to be elevated to 48 mmHg plus indeterminate right atrial pressure, suggesting at least probable moderate pulmonary hypertension.   Left ventricular ejection fraction is severely reduced with severe global hypokinesis with estimated ejection fraction in the range of 15%. There is obstruction noted on apical views within the left ventricular apex which is felt to be artifactual; however, the possibility of left ventricular thrombus cannot be excluded. Consider repeat study with contrast for better definition.   No pericardial effusion is noted.   Left ventricular diastolic function is indeterminate in the setting of the atrial fibrillation.        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     Priority: Medium     Long  term current use of anticoagulant therapy 10/20/2010     Priority: Medium     Onychomycosis 08/05/2010     Priority: Medium     Acute gastritis with hemorrhage 08/10/2009     Priority: Medium     Overview:   NSAID       Muscle weakness (generalized) 06/09/2009     Priority: Medium     Overview:   IMO Update 10/11       Myopathy 04/08/2009     Priority: Medium      Past Medical History:   Diagnosis Date     Acute on chronic systolic congestive heart failure (H)     11/14/2012,15-20% EF 11/2012     Atherosclerotic heart disease of native coronary artery without angina pectoris      Atrial fibrillation (H)      Cardiomyopathy (H)      Chronic kidney disease     Stage III     Disorder of muscle     2/7/2017     Essential (primary) hypertension     chronic     Gastrointestinal hemorrhage     2009,secondary to NSAID gastritis     Gout     tophaceous, right foot, right fingers     Hyperlipidemia     No Comments Provided     Low back pain     lumbar spinal stenosis     Malignant neoplasm of prostate (H)     2003     Osteoarthritis      Other nonrheumatic aortic valve disorders     with mild Al     Other retention of urine (CODE)     2010,with stricture     Other specified disorders of arteries and arterioles (CODE) (H)     moderate, with mild increase in diameter of the ascending aorta     Polyneuropathy     via 2008 EMG in [place]     Systolic congestive heart failure (H) 2009    mild to moderate left ventricular enlargement with marked decreased in systolic function.     Transient cerebral ischemic attack 11/17/2014     Type 2 diabetes mellitus without complications (H) 2009     Past Surgical History:   Procedure Laterality Date     ARTHROPLASTY KNEE      2011,left     BYPASS GRAFT ARTERY CORONARY      4 vessel, [facility], 1989     CYSTOSCOPY      2009,& dilation of urethral stricture, Dr. Wallace     CYSTOSCOPY      2010,& catheter placement for acute obstruction, Dr. Elise     ESOPHAGOSCOPY, GASTROSCOPY, DUODENOSCOPY (EGD),  COMBINED      2007,& colonoscopy with polypectomy, [facility] essetnially normal EGD with small polyp removed     HEART CATH, ANGIOPLASTY      approx. 2003,angiogram, 2 stents, [facility]     IMPLANT PACEMAKER      2007,[employer] ICD, CPI Vitality 2 EL, model #T177, serial #[id number], which was implanted on 05/30/2007 at [facility].     LAMINECTOMY LUMBAR ONE LEVEL      2006,decompressive, L3, L4 & L5 with improvement, Dr. Lopes     Current Outpatient Prescriptions   Medication Sig Dispense Refill     acetaminophen (TYLENOL) 325 MG tablet Take 975 mg by mouth every 6 hours if needed. Max acetaminophen dose: 4000mg in 24 hrs.       allopurinol (ZYLOPRIM) 100 MG tablet Take 1 tablet by mouth once daily.       amoxicillin (AMOXIL) 500 MG capsule TAKE 4 CAPSULES BY MOUTH ONE HOUR PRIOR TO APPT       ASPIRIN 81 PO Take one tablet by mouth daily       atorvastatin (LIPITOR) 10 MG tablet Take 1 tablet by mouth at bedtime.       cholecalciferol (VITAMIN D-1000 MAX ST) 1000 UNITS TABS Take one tablet by mouth daily       furosemide (LASIX) 20 MG tablet TAKE 1 TABLET BY MOUTH EVERY MORNING 90 tablet 0     insulin aspart prot & aspart (NOVOLOG MIX 70/30 PEN) injection 35 units every morning and 10 units every evening 45 mL 3     insulin pen needle (EASY TOUCH PEN NEEDLES) 31G X 8 MM FOR ADMINISTERING INSULIN AT HOME TWICE DAILY. Dx: E11.8 200 each 1     lisinopril (PRINIVIL/ZESTRIL) 2.5 MG tablet Take 2.5 mg by mouth daily       metoprolol succinate (TOPROL-XL) 50 MG 24 hr tablet Take 1.5 tablets (75 mg) by mouth daily 135 tablet 0     nystatin-triamcinolone (MYCOLOG II) cream APPLY TOPICALLY TO AFFECTED AREA(S) THREE TIMES DAILY 60 g 11     order for DME Wheelchair with elevating leg rests/foot rest. For home use. Length of need: 99 mo       potassium chloride SA (K-DUR/KLOR-CON M) 20 MEQ CR tablet Take 1 tablet by mouth once every other day with a meal.       simvastatin (ZOCOR) 20 MG tablet Take 20 mg by mouth at bedtime.        "warfarin (COUMADIN) 2 MG tablet TAKE 1MG BY MOUTH FOR TWO DAYS PER WEEK AND TAKE 2MG FOR FIVE DAYS PER WEEK 90 tablet 1     OTC products: Aspirin    Allergies   Allergen Reactions     Codeine Unknown     Naproxen Other (See Comments)     \"kidneys shut down\"     Sulfa Drugs Unknown     Tramadol Nausea     Vancomycin Unknown      Latex Allergy: NO    Social History   Substance Use Topics     Smoking status: Never Smoker     Smokeless tobacco: Never Used     Alcohol use No     History   Drug Use No     Comment: Drug use: No       REVIEW OF SYSTEMS:   CONSTITUTIONAL: NEGATIVE for fever, chills, change in weight  INTEGUMENTARY/SKIN: NEGATIVE for worrisome rashes, moles or lesions  EYES: POSITIVE for Hx cataracts and NEGATIVE for discharge, eye pain and redness  ENT/MOUTH: NEGATIVE for ear, mouth and throat problems  RESP: NEGATIVE for significant cough or SOB  CV: NEGATIVE for chest pain, palpitations or peripheral edema  GI: NEGATIVE for nausea, abdominal pain, heartburn, or change in bowel habits   male: Positive for pain and burning with urination.   MUSCULOSKELETAL: NEGATIVE for significant arthralgias or myalgia  NEURO: POSITIVE for neuropathy in feet secondary to diabetes NEGATIVE for weakness, dizziness  ENDOCRINE: NEGATIVE for temperature intolerance, skin/hair changes  HEME: NEGATIVE for bleeding disorders but does bleed easier being on warfarin and ASA  PSYCHIATRIC: NEGATIVE for changes in mood or affect    EXAM:   /80 (BP Location: Right arm, Patient Position: Sitting, Cuff Size: Adult Large)  Pulse 60  Temp 96.3  F (35.7  C)  Ht 5' 6\" (1.676 m)  Wt 155 lb (70.3 kg)  BMI 25.02 kg/m2    GENERAL APPEARANCE: alert and no distress, frail elderly male in wheelchair     EYES: EOMI,  PERRL, sclera and conjunctivae clear, no drainage     HENT: ear canals and TM's normal and nose and mouth without ulcers or lesions     NECK: no adenopathy, no asymmetry, masses, or scars. No carotid bruit.     RESP: lungs " clear to auscultation - no rales, rhonchi or wheezes     CV: Regular rates and Irregular rhythm, no murmur, click or rub. No peripheral e     ABDOMEN:  soft, nontender, no HSM or masses and bowel sounds normal     MS: extremities normal- no gross deformities noted, no evidence of inflammation in joints, FROM in all extremities.     SKIN: no suspicious lesions or rashes, multiple areas of ecchymosis on arms and legs. A few scattered areas of ecchymosis on abdomen from insulin injections.     NEURO: Normal strength and tone, mentation intact and speech normal     PSYCH: mentation appears normal. and affect normal     LYMPHATICS: No cervical or supraclavicular adenopathy    DIAGNOSTICS:     EKG: atrial fibrillation with PVC, rate 70, nonspecific ST-T changes, unchanged from previous tracings  Hemoglobin A1C: 6.6 on 01/08/2018  Labs Resulted Today:   Results for orders placed or performed in visit on 04/27/18   CBC W PLT No Diff   Result Value Ref Range    WBC 8.3 4.0 - 11.0 10e9/L    RBC Count 3.24 (L) 4.4 - 5.9 10e12/L    Hemoglobin 10.5 (L) 13.3 - 17.7 g/dL    Hematocrit 32.0 (L) 40.0 - 53.0 %    MCV 99 78 - 100 fl    MCH 32.4 26.5 - 33.0 pg    MCHC 32.8 31.5 - 36.5 g/dL    RDW 15.2 (H) 10.0 - 15.0 %    Platelet Count 191 150 - 450 10e9/L   Comprehensive metabolic panel   Result Value Ref Range    Sodium 138 134 - 144 mmol/L    Potassium 4.5 3.5 - 5.1 mmol/L    Chloride 112 (H) 98 - 107 mmol/L    Carbon Dioxide 19 (L) 21 - 31 mmol/L    Anion Gap 7 3 - 14 mmol/L    Glucose 115 (H) 70 - 105 mg/dL    Urea Nitrogen 48 (H) 7 - 25 mg/dL    Creatinine 1.52 (H) 0.70 - 1.30 mg/dL    GFR Estimate 44 (L) >60 mL/min/1.7m2    GFR Estimate If Black 53 (L) >60 mL/min/1.7m2    Calcium 8.6 8.6 - 10.3 mg/dL    Bilirubin Total 0.4 0.3 - 1.0 mg/dL    Albumin 2.9 (L) 3.5 - 5.7 g/dL    Protein Total 5.5 (L) 6.4 - 8.9 g/dL    Alkaline Phosphatase 97 34 - 104 U/L    ALT 21 7 - 52 U/L    AST 24 13 - 39 U/L       Recent Labs   Lab Test  03/21/18 02/21/18 02/01/18   1241   02/01/18   1219   01/08/18   1626   10/06/17   1514   HGB   --    --    --    --   12.0*   --    --    --   13.3*   PLT   --    --    --    --   126*   --    --    --   143   INR  2.3  2.0   --    < >   --    < >   --    < >   --    NA   --    --   138   --    --    --   130*   < >   --    POTASSIUM   --    --   5.0   --    --    --   4.8   < >   --    CR   --    --   2.39*   --    --    --   2.16*   < >   --     < > = values in this interval not displayed.        IMPRESSION:   Reason for surgery/procedure: Severe balanitis  Diagnosis/reason for consult: Pre-operative physical examination    The proposed surgical procedure is considered INTERMEDIATE risk.    REVISED CARDIAC RISK INDEX  The patient has the following serious cardiovascular risks for perioperative complications such as (MI, PE, VFib and 3  AV Block):  Coronary Artery Disease (MI, positive stress test, angina, Qs on EKG)  Congestive Heart Failure (pulmonary edema, PND, s3 zeeshan, CXR with pulmonary congestion, basilar rales)  Diabetes Mellitus (on Insulin)  Serum Creatinine >2.0 mg/dl  INTERPRETATION: 3 risks: Class IV (high risk - >11% complication rate)    The patient has the following additional risks for perioperative complications:  No identified additional risks    No diagnosis found.    RECOMMENDATIONS:     Cardiovascular Risk  Performs 2-4 METs exercise without symptoms: participates in therapy, ambulates short distances.   Patient is already on a Beta Blocker. Continue Betablocker therapy after surgery, using Beta blocker order set as necessary for NPO status.       --Patient is to take all scheduled medications on the day of surgery EXCEPT for modifications listed below.    Diabetes Medication Use  -----Hold mixed insulins (e.g. Insulin 70/30) while NPO (fasting)      Anticoagulant or Antiplatelet Medication Use  ASPIRIN: Discontinue ASA 7-10 days prior to procedure to reduce bleeding risk.  It should be  resumed post-operatively.  WARFARIN: Warfarin may be discontinued in the perioperative period 3 days prior to surgery.        APPROVAL GIVEN to proceed with proposed procedure, without further diagnostic evaluation       Signed Electronically by: Annie Valenzulea CNP    Copy of this evaluation report is provided to requesting physician.    [facility] Preop Guidelines    Revised Cardiac Risk Index

## 2018-05-01 NOTE — TELEPHONE ENCOUNTER
Spoke with Mert and notified per Cesar Sanders MD.Day Lenz LPN......................5/1/2018 12:52 PM

## 2018-05-01 NOTE — TELEPHONE ENCOUNTER
Spoke with patient's son Mert he is wondering what medications patient should hold on Thursday the day of his Circumcision surgery, he is already holding his Coumadin and baby Aspirin. Please advise. Day Lenz LPN......................5/1/2018 11:20 AM

## 2018-05-01 NOTE — TELEPHONE ENCOUNTER
He should hold insulin while NPO. BP meds (lisinopril and metoprolol) should be taken. Hold furosemide and potassium

## 2018-05-02 NOTE — PROGRESS NOTES
ANTICOAGULATION FOLLOW-UP CLINIC VISIT    Patient Name:  Sukhdeep Gomez  Date:  5/2/2018  Contact Type:  Face to Face    SUBJECTIVE:     Patient Findings     Positives Other complaints (will be having procedure done on 5/8/18), Intentional hold of therapy           OBJECTIVE    INR Protime   Date Value Ref Range Status   05/02/2018 3.1 (A) 0.86 - 1.14 Final       ASSESSMENT / PLAN  INR assessment THER    Recheck INR In: 5 DAYS    INR Location Clinic      Anticoagulation Summary as of 5/2/2018     INR goal 2.0-3.0   Today's INR 3.1!   Maintenance plan 1 mg (2 mg x 0.5) on Wed, Sat; 2 mg (2 mg x 1) all other days   Full instructions 5/2: Hold; 5/3: Hold; 5/4: Hold; 5/5: Hold; 5/6: Hold; Otherwise 1 mg on Wed, Sat; 2 mg all other days   Weekly total 12 mg   Next INR check 5/7/2018   Priority INR   Target end date Indefinite    Indications   Long term (current) use of anticoagulants [Z79.01]  Unspecified atrial fibrillation [I48.91]         Anticoagulation Episode Summary     INR check location     Preferred lab     Send INR reminders to  INR    Comments       Anticoagulation Care Providers     Provider Role Specialty Phone number    Cesar Sanders MD [facility] [phone]            See the Encounter Report to view Anticoagulation Flowsheet and Dosing Calendar (Go to Encounters tab in chart review, and find the Anticoagulation Therapy Visit)        Yolanda Avila RN

## 2018-05-07 NOTE — PROGRESS NOTES
ANTICOAGULATION FOLLOW-UP CLINIC VISIT    Patient Name:  Sukhdeep Gomez  Date:  5/7/2018  Contact Type:  Face to Face    SUBJECTIVE:     Patient Findings     Positives Intentional hold of therapy (surgery tomorrow)           OBJECTIVE    INR Protime   Date Value Ref Range Status   05/07/2018 1.4 (A) 2.0 - 3.0 Final       ASSESSMENT / PLAN  INR assessment SUB held for procedure tomorrow   Recheck INR In: 1 WEEK    INR Location Clinic      Anticoagulation Summary as of 5/7/2018     INR goal 2.0-3.0   Today's INR 1.4!   Maintenance plan 1 mg (2 mg x 0.5) on Wed, Sat; 2 mg (2 mg x 1) all other days   Full instructions 5/7: Hold; Otherwise 1 mg on Wed, Sat; 2 mg all other days   Weekly total 12 mg   Next INR check 5/14/2018   Priority INR   Target end date Indefinite    Indications   Long term (current) use of anticoagulants [Z79.01]  Unspecified atrial fibrillation [I48.91]         Anticoagulation Episode Summary     INR check location     Preferred lab     Send INR reminders to  INR    Comments       Anticoagulation Care Providers     Provider Role Specialty Phone number    Cesar Sanders MD [facility] [phone]            See the Encounter Report to view Anticoagulation Flowsheet and Dosing Calendar (Go to Encounters tab in chart review, and find the Anticoagulation Therapy Visit)        Shasta Restrepo RN

## 2018-05-07 NOTE — MR AVS SNAPSHOT
Sukhdeep Gomez   5/7/2018 11:45 AM   Anticoagulation Therapy Visit    Description:  83 year old male   Provider:  [facility]AG 2   Department:  [facility]           INR as of 5/7/2018     Today's INR 1.4!      Anticoagulation Summary as of 5/7/2018     INR goal 2.0-3.0   Today's INR 1.4!   Full instructions 5/7: Hold; Otherwise 1 mg on Wed, Sat; 2 mg all other days   Next INR check 5/14/2018    Indications   Long term (current) use of anticoagulants [Z79.01]  Unspecified atrial fibrillation [I48.91]         Description     HOLD Warfarin for procedure tomorrow.  After procedure, restart Coumadin at current instucted dose.  Recheck INR one week after restarting Warfarin/Coumadin, unless otherwise instructed by MD. Shasta Restrepo ....................  5/7/2018   11:41 AM          Your next Anticoagulation Clinic appointment(s)     May 07, 2018 11:45 AM CDT   Anticoagulation Visit with [facility]   [facility] [place] [facility] ([facility])    [address]  [place] [address]   [phone]              May 2018 Details    Sun Mon Tue Wed Thu Fri Sat       1               2               3               4               5                 6               7      Hold   See details      8      2 mg         9      1 mg         10      2 mg         11      2 mg         12      1 mg           13      2 mg         14            15               16               17               18               19                 20               21               22               23               24               25               26                 27               28               29               30               31                  Date Details   05/07 This INR check       Date of next INR:  5/14/2018         How to take your warfarin dose     To take:  1 mg Take 0.5 of a 2 mg tablet.    To take:  2 mg Take 1 of the 2 mg tablets.    Hold Do not take your warfarin dose. See the Details  table to the right for additional instructions.

## 2018-05-08 NOTE — ADDENDUM NOTE
Addendum  created 05/08/18 1021 by Johnnie Cole DO    Anesthesia Attestations filed, Sign clinical note

## 2018-05-08 NOTE — ANESTHESIA POSTPROCEDURE EVALUATION
Patient: Sukhdeep Gomez    Procedure(s):  Circumcision ,  Glans Biopsy Cystoscopy - Wound Class: II-Clean Contaminated    Diagnosis:phimosis  Diagnosis Additional Information: No value filed.    Anesthesia Type:  MAC    Note:  Anesthesia Post Evaluation    Patient location during evaluation: Phase 2 and Bedside  Patient participation: Able to fully participate in evaluation  Level of consciousness: awake and alert  Pain management: adequate  Airway patency: patent  Cardiovascular status: acceptable  Respiratory status: acceptable  Hydration status: acceptable  PONV: none     Anesthetic complications: None          Last vitals:  Vitals:    05/08/18 0855 05/08/18 0910 05/08/18 0915   BP: (!) 77/56 106/63 98/65   Resp:   20   Temp: 97.6  F (36.4  C)  97.4  F (36.3  C)   SpO2: 99%           Electronically Signed By: LEXUS García CRNA  May 8, 2018  9:30 AM

## 2018-05-08 NOTE — OP NOTE
Preoperative diagnosis  Phimosis  Balanitis    Postoperative diagnosis  Phimosis  Clinical penile cancer    Procedure performed  Circumcision    Surgeon  Edgardo Sanchez MD    Surgeon(s)/Proceduralist(s) and Assistants (if any)  Surgeon(s):  Edgardo Sanchez MD  Circulator: Damari Olsen; Steph Ramos RN  Relief Circulator: Damari Olsen  Scrub Person: Yanet Verde  2nd Scrub: Sada Lewis  Pre-Op Nurse: Hector Shirley RN  Post-Op Nurse: Lizabeth Huertas RN    Specimen(s)  Yes    (EBL) Estimated blood loss (ml)  10    Anesthesia  General endotracheal anesthesia  10 mL bupivicaine 0.5% local injection    Complications  None    Specimen  Foreskin    Indications  83 year old male agreed to undergo the above named procedure after discussion of the alternatives, risks and benefits.  Informed consent was obtained.    Procedure  The patient was taken to the operating room and placed supine on the operating table.  Pre-operative antibiotics were administered.  Bilateral lower extremity SCDs were placed.  After induction of general anesthesia the patient was maintained in supine position.  The patient's genitalia was trimmed, prepped and draped in a sterile fashion and a time-out was performed.      A penile block was performed prior to the procedure with instillation of 5mL of 0.5% bupivacaine at the 10 and 2 o'clock position as well as circumferentially around the base of the penis.  The foreskin was excised by incising the proximal and distal shaft with a scalpel.  With the aid of electrocautery and a hemostat the foreskin was removed.  Upon exam of the glans I was concerned for penile cancer, glans was necrotic and ulcerated.  Bleeding vessels were cauterized.  The proximal and distal shaft skin was reapproximated using interrupted 3-0 chromic suture.      Using a scalpel I excised a wedge biopsy of the glans and closed this with 5-0 chromic.      Ointment was applied to the incisions.  The patient was  awakened, extubated and taken to recovery unit without difficulty.     At the end of the procedure all instrument, needle and sponge counts were correct x 2.    Plan  Follow up for path review in 1 week

## 2018-05-08 NOTE — IP AVS SNAPSHOT
MRN:[id number]                      After Visit Summary   5/8/2018    Sukhdeep Gomez    MRN: [id number]           Thank you!     Thank you for choosing [facility] for your care. Our goal is always to provide you with excellent care. Hearing back from our patients is one way we can continue to improve our services. Please take a few minutes to complete the written survey that you may receive in the mail after you visit with us. Thank you!        Patient Information     Date Of Birth          8/19/1934        About your hospital stay     You were admitted on:  May 8, 2018 You last received care in the:  [facility]    You were discharged on:  May 8, 2018       Who to Call     For medical emergencies, please call 911.  For non-urgent questions about your medical care, please call your primary care provider or clinic, [phone]  For questions related to your surgery, please call your surgery clinic        Attending Provider     Provider Specialty    Edgardo Sanchez MD Urology       Primary Care Provider Office Phone # Fax #    Cesar Sanders [phone]      After Care Instructions     Diet Instructions       Resume pre procedure diet            Discharge Instructions       Follow up appointment in about 1 week for path review and PVR            Encourage fluids       Encourage fluids at home to keep urine clear to light pink            No Alcohol       for 24 hours post procedure            No driving or operating machinery        until the day after procedure                  Your next 10 appointments already scheduled     May 14, 2018 11:30 AM CDT   Anticoagulation Visit with  ANTI COAG 2   [facility] ([facility])    [address]  [facility] [id number] [phone]            Oct 23, 2018  1:00 PM CDT   Pacemaker Check with  PACEMAKER   [facility] ([facility]  "Hospital)    [address]  [place] [address] [phone]              Pending Results     No orders found from 2018 to 2018.            Admission Information     Date & Time Provider Department Dept. Phone    2018 Edgardo Sanchez MD [facility] [phone]      Your Vitals Were     Blood Pressure Temperature Respirations Weight Pulse Oximetry BMI (Body Mass Index)    77/56 97.6  F (36.4  C) (Temporal) 20 70.3 kg (155 lb) 99% 25.02 kg/m2      MyChart Information     [employer] lets you send messages to your doctor, view your test results, renew your prescriptions, schedule appointments and more. To sign up, go to [email or web address] . Click on \"Log in\" on the left side of the screen, which will take you to the Welcome page. Then click on \"Sign up Now\" on the right side of the page.     You will be asked to enter the access code listed below, as well as some personal information. Please follow the directions to create your username and password.     Your access code is: [email or web address]  Expires: 2018  3:03 PM     Your access code will  in 90 days. If you need help or a new code, please call your [facility] clinic or [phone].        Care EveryWhere ID     This is your Care EveryWhere ID. This could be used by other organizations to access your [facility] medical records  [id number]        Equal Access to Services     [facility] : Hadii shona ku hadasho Soomaali, waaxda luqadaha, qaybta kaalmada adeegyada, griffin pate . So [facility] [phone].    ATENCIÓN: Si habla español, tiene a cohn disposición servicios gratuitos de asistencia lingüística. Llame al [phone].    We comply with applicable federal civil rights laws and [place] laws. We do not discriminate on the basis of race, color, national origin, age, disability, sex, sexual orientation, or gender identity.               Review of your medicines      UNREVIEWED " medicines. Ask your doctor about these medicines        Dose / Directions    acetaminophen 325 MG tablet   Commonly known as:  TYLENOL        Take 975 mg by mouth every 6 hours if needed. Max acetaminophen dose: 4000mg in 24 hrs.   Refills:  0       allopurinol 100 MG tablet   Commonly known as:  ZYLOPRIM        Take 1 tablet by mouth once daily.   Refills:  0       amoxicillin 500 MG capsule   Commonly known as:  AMOXIL        TAKE 4 CAPSULES BY MOUTH ONE HOUR PRIOR TO APPT   Refills:  0       ASPIRIN 81 PO        Take one tablet by mouth daily   Refills:  0       furosemide 20 MG tablet   Commonly known as:  LASIX   Used for:  Chronic systolic heart failure (H)        Take one tablet by mouth every morning and take an additional tab every other morning   Quantity:  137 tablet   Refills:  0       insulin aspart prot & aspart injection   Commonly known as:  NovoLOG MIX 70/30 PEN   Used for:  Diabetes mellitus with multiple complications (H)        35 units every morning and 10 units every evening   Quantity:  45 mL   Refills:  3       lisinopril 2.5 MG tablet   Commonly known as:  PRINIVIL/Zestril        Dose:  2.5 mg   Take 2.5 mg by mouth daily   Refills:  0       metoprolol succinate 50 MG 24 hr tablet   Commonly known as:  TOPROL-XL   Used for:  Essential hypertension        Dose:  75 mg   Take 1.5 tablets (75 mg) by mouth daily   Quantity:  135 tablet   Refills:  0       nystatin-triamcinolone cream   Commonly known as:  MYCOLOG II   Used for:  Yeast dermatitis of penis        APPLY TOPICALLY TO AFFECTED AREA(S) THREE TIMES DAILY   Quantity:  60 g   Refills:  11       potassium chloride SA 20 MEQ CR tablet   Commonly known as:  K-DUR/KLOR-CON M        Take 1 tablet by mouth once every other day with a meal.   Refills:  0       simvastatin 20 MG tablet   Commonly known as:  ZOCOR        Take 20 mg by mouth at bedtime.   Refills:  0       VITAMIN D-1000 MAX ST 1000 units Tabs   Generic drug:  cholecalciferol         Take one tablet by mouth daily   Refills:  0       warfarin 2 MG tablet   Commonly known as:  COUMADIN   Used for:  Atrial fibrillation (H)        TAKE 1MG BY MOUTH FOR TWO DAYS PER WEEK AND TAKE 2MG FOR FIVE DAYS PER WEEK   Quantity:  90 tablet   Refills:  1         CONTINUE these medicines which have NOT CHANGED        Dose / Directions    insulin pen needle 31G X 8 MM   Commonly known as:  EASY TOUCH PEN NEEDLES   Used for:  Diabetes mellitus with multiple complications (H)        FOR ADMINISTERING INSULIN AT HOME TWICE DAILY. Dx: E11.8   Quantity:  200 each   Refills:  1       order for DME        Wheelchair with elevating leg rests/foot rest. For home use. Length of need: 99 mo   Refills:  0                Protect others around you: Learn how to safely use, store and throw away your medicines at www.disposemymeds.org.             Medication List: This is a list of all your medications and when to take them. Check marks below indicate your daily home schedule. Keep this list as a reference.      Medications           Morning Afternoon Evening Bedtime As Needed    acetaminophen 325 MG tablet   Commonly known as:  TYLENOL   Take 975 mg by mouth every 6 hours if needed. Max acetaminophen dose: 4000mg in 24 hrs.                                allopurinol 100 MG tablet   Commonly known as:  ZYLOPRIM   Take 1 tablet by mouth once daily.                                amoxicillin 500 MG capsule   Commonly known as:  AMOXIL   TAKE 4 CAPSULES BY MOUTH ONE HOUR PRIOR TO APPT                                ASPIRIN 81 PO   Take one tablet by mouth daily                                furosemide 20 MG tablet   Commonly known as:  LASIX   Take one tablet by mouth every morning and take an additional tab every other morning                                insulin aspart prot & aspart injection   Commonly known as:  NovoLOG MIX 70/30 PEN   35 units every morning and 10 units every evening                                 insulin pen needle 31G X 8 MM   Commonly known as:  EASY TOUCH PEN NEEDLES   FOR ADMINISTERING INSULIN AT HOME TWICE DAILY. Dx: E11.8                                lisinopril 2.5 MG tablet   Commonly known as:  PRINIVIL/Zestril   Take 2.5 mg by mouth daily                                metoprolol succinate 50 MG 24 hr tablet   Commonly known as:  TOPROL-XL   Take 1.5 tablets (75 mg) by mouth daily                                nystatin-triamcinolone cream   Commonly known as:  MYCOLOG II   APPLY TOPICALLY TO AFFECTED AREA(S) THREE TIMES DAILY                                order for DME   Wheelchair with elevating leg rests/foot rest. For home use. Length of need: 99 mo                                potassium chloride SA 20 MEQ CR tablet   Commonly known as:  K-DUR/KLOR-CON M   Take 1 tablet by mouth once every other day with a meal.                                simvastatin 20 MG tablet   Commonly known as:  ZOCOR   Take 20 mg by mouth at bedtime.                                VITAMIN D-1000 MAX ST 1000 units Tabs   Take one tablet by mouth daily   Generic drug:  cholecalciferol                                warfarin 2 MG tablet   Commonly known as:  COUMADIN   TAKE 1MG BY MOUTH FOR TWO DAYS PER WEEK AND TAKE 2MG FOR FIVE DAYS PER WEEK

## 2018-05-08 NOTE — ANESTHESIA PREPROCEDURE EVALUATION
Anesthesia Evaluation     . Pt has had prior anesthetic. Type: General           ROS/MED HX    ENT/Pulmonary:       Neurologic:       Cardiovascular:     (+) hypertension--CAD, --. : . CHF . . :. .       METS/Exercise Tolerance:     Hematologic:         Musculoskeletal:         GI/Hepatic:         Renal/Genitourinary:     (+) chronic renal disease,       Endo:     (+) type I DM, .      Psychiatric:         Infectious Disease:         Malignancy:   (+) Malignancy History of Skin          Other:                     Physical Exam  Normal systems: cardiovascular, pulmonary and dental    Airway   Mallampati: II    Dental     Cardiovascular       Pulmonary                     Anesthesia Plan      History & Physical Review      ASA Status:  4 .    NPO Status:  > 6 hours    Plan for MAC Maintenance will be TIVA.           Postoperative Care      Consents  Anesthetic plan, risks, benefits and alternatives discussed with: .  Use of blood products discussed: No .   .                         .

## 2018-05-08 NOTE — IP AVS SNAPSHOT
[facility]    [address]    [place] [address]    Phone:  [phone]    Fax:  [phone]                                       After Visit Summary   5/8/2018    Sukhdeep Gomez    MRN: [id number]           After Visit Summary Signature Page     I have received my discharge instructions, and my questions have been answered. I have discussed any challenges I see with this plan with the nurse or doctor.    ..........................................................................................................................................  Patient/Patient Representative Signature      ..........................................................................................................................................  Patient Representative Print Name and Relationship to Patient    ..................................................               ................................................  Date                                            Time    ..........................................................................................................................................  Reviewed by Signature/Title    ...................................................              ..............................................  Date                                                            Time

## 2018-05-08 NOTE — H&P (VIEW-ONLY)
[facility] AND [facility] [place]  [address]  [place] [address]  [phone]    PRE-OP EVALUATION:  Today's date: 2018    Sukhdeep Gomez (: 1934) presents for pre-operative evaluation assessment as requested by Dr. Sanchez.  He requires evaluation and anesthesia risk assessment prior to undergoing surgery/procedure for treatment of Balanitis .    Proposed Surgery/ Procedure: Circumcision  Date of Surgery/ Procedure: May 3, 2018  Time of Surgery/ Procedure: [facility]  Hospital/Surgical Facility: [facility]  Primary Physician: Cesar Sanders      1. YES - DO YOU HAVE A HISTORY OF HEART ATTACK, STROKE, STENT, BYPASS OR SURGERY ON AN ARTERY IN THE HEAD, NECK, HEART OR LEG? Heart attack, Bypass surgery   2. NO - Do you ever have any pain or discomfort in your chest?  3. Yes - Do you have a history of  Heart Failure? Most recent echo showed EF of 40% on 2018  4. NO - Are you troubled by shortness of breath when: walking on the level, up a slight hill or at night?  5. NO - Do you currently have a cold, bronchitis or other respiratory infection?  6. NO - Do you have a cough, shortness of breath or wheezing?  7. NO - Do you sometimes get pains in the calves of your legs when you walk?  8. NO - Do you or anyone in your family have previous history of blood clots?  9. NO - Do you or does anyone in your family have a serious bleeding problem such as prolonged bleeding following surgeries or cuts?  10. NO - Have you ever had problems with anemia or been told to take iron pills?  11. NO - Have you had any abnormal blood loss such as black, tarry or bloody stools, or abnormal vaginal bleeding?  12. NO - Have you ever had a blood transfusion?  13. NO - Have you or any of your relatives ever had problems with anesthesia?  14. NO - Do you have sleep apnea, excessive snoring or daytime drowsiness?  15. NO - Do you have any prosthetic heart valves? Has an implantable defibrillator  device  16. Yes- Do you have prosthetic joints? Left knee arthroplasty  17. NO - Is there any chance that you may be pregnant?      HPI:     HPI related to upcoming procedure: has history of urethral stricture which he has had s/p dilation of last in 2015. He has phimosis and severe balanitis. Circumcision was recommended to him as he is unable to retract foreskin and severity of balanitis.      A-FIB - Patient has a longstanding history of chronic A-fib currently on rate control with metoprolol succinate. Current treatment regimen includes Warfarin and Aspirin for stroke prevention and denies significant symptoms of lightheadedness, palpitations or dyspnea.                                                                                                                                                                             .  CAD - Patient has a longstanding history of moderate-severe CAD. Patient denies recent chest pain or NTG use, denies exercise induced dyspnea or PND. Last EKG 2/1/2018 and today during visit.                                                                                                                                                    .  CHF - Patient has a longstanding history of moderate-severe CHF. Exacerbating conditions include hypertension, dystolic dysfunction and atrial fibrilation. Currently the patient's condition is stable. Current treatment regimen includes beta blocker, ASA and diuretic: furosemide. The patient denies chest pain, edema, orthopnea, SOB or recent weight gain. Last Echocardiogram 4/9/2018, EKG 2/1/2018 and today. Has implantable cardioverter defibrillator.                                                                                                                                                                             .  DIABETES - Patient has a longstanding history of DiabetesType Type II . Patient is being treated with insulin injections and ASA and  denies significant side effects. Control has been good. Complicating factors include but are not limited to: hypertension, hyperlipidemia, neuropathy and chronic kidney disease.                                                                                                                           .  HYPERLIPIDEMIA - Patient has a long history of significant Hyperlipidemia requiring medication for treatment with recent good control. Patient reports no problems or side effects with the medication.                                                                                                                             .                          .  HYPERTENSION - Patient has longstanding history of HTN , currently denies any symptoms referable to elevated blood pressure. Specifically denies chest pain, palpitations, dyspnea, orthopnea, PND or peripheral edema. Blood pressure readings have been in normal range. Current medication regimen is as listed below. Patient denies any side effects of medication.                                                                            BP Readings from Last 6 Encounters:   04/27/18 102/80   04/26/18 122/80   04/12/18 103/63   02/08/18 96/78   01/08/18 (!) 78/53   12/27/17 124/76                                                                                                                       .  RENAL INSUFFICIENCY - Patient has a longstanding history of moderate-severe chronic renal insufficiency. Last Cr 2.39 on 2/1/2018, GFR 26 on 2/1/2018.                                                                                                                                                                               .    MEDICAL HISTORY:     Patient Active Problem List    Diagnosis Date Noted     Paroxysmal atrial fibrillation (H) 02/21/2018     Priority: Medium     Overview:   chronic warfarin anticoagulation and toprol for rate control       S/P CABG x 4 02/21/2018      Priority: Medium     Overview:   status post 4 vessel CABG, [facility]       Long term (current) use of anticoagulants 02/11/2018     Priority: Medium     Unspecified atrial fibrillation 02/11/2018     Priority: Medium     Ascending aorta dilatation (H) 02/08/2018     Priority: Medium     H/O myocardial infarction, greater than 8 weeks 02/08/2018     Priority: Medium     Stented coronary artery 02/08/2018     Priority: Medium     Atrial fibrillation (H) 01/18/2018     Priority: Medium     Overview:   chronic warfarin anticoagulation and toprol for rate control       Osteoarthrosis 01/18/2018     Priority: Medium     Diabetes mellitus with multiple complications (H) 01/18/2018     Priority: Medium     Essential hypertension 01/18/2018     Priority: Medium     GI bleeding 01/18/2018     Priority: Medium     Overview:   upper/anemia 8.1       Gout of left foot due to renal impairment 01/18/2018     Priority: Medium     Overview:   tophaceous       Ischemic cardiomyopathy 01/18/2018     Priority: Medium     Lumbago 01/18/2018     Priority: Medium     Enthesopathy of ankle and tarsus 01/18/2018     Priority: Medium     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 01/18/2018     Priority: Medium     Overview:   status post TTE       Mixed hyperlipidemia 01/18/2018     Priority: Medium     S/P CABG (coronary artery bypass graft) 01/18/2018     Priority: Medium     Overview:   status post 4 vessel CABG, [facility]       Spinal stenosis 01/18/2018     Priority: Medium     Squamous cell carcinoma of skin 01/18/2018     Priority: Medium     Chronic systolic heart failure (H) 11/08/2017     Priority: Medium     Postoperative state 11/01/2017     Priority: Medium     Basal cell carcinoma of right forehead 10/23/2017     Priority: Medium     Implantable cardioverter-defibrillator, [employer], Single lead 10/20/2017     Priority: Medium     [employer] Incepta  [id number]  #[id number]  4/25/13   RV lead CPI [id number]  #[id number]  5/30/07  CHF/Madit II       Hamstring tightness of left lower extremity 02/07/2017     Priority: Medium     Altered level of consciousness 12/04/2016     Priority: Medium     DM II (diabetes mellitus, type II), controlled (H) 12/04/2016     Priority: Medium     Elevated INR 12/04/2016     Priority: Medium     Elevated troponin 12/04/2016     Priority: Medium     Hypoglycemia associated with diabetes (H) 12/04/2016     Priority: Medium     Hypokalemia 12/04/2016     Priority: Medium     Yeast dermatitis 12/04/2016     Priority: Medium     Anticoagulation monitoring, INR range 2-3 05/13/2015     Priority: Medium     History of TIA (transient ischemic attack) 11/17/2014     Priority: Medium     CKD (chronic kidney disease) stage 3, GFR 30-59 ml/min 10/17/2014     Priority: Medium     CKD (chronic kidney disease) stage 4, GFR 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       Diabetic neuropathy (H) 11/11/2013     Priority: Medium     Prostate CA (H) 11/11/2013     Priority: Medium     History of urethral stricture 11/07/2013     Priority: Medium     Orthostatic hypotension 11/05/2013     Priority: Medium     Hematoma of right hip 11/21/2012     Priority: Medium     Systolic congestive heart failure (H) 11/14/2012     Priority: Medium     Overview:   9/19/2012 ECHO  Final Impression:   1) Dilated left ventricle to a moderate degree with severely depressed left ventricular systolic function and ejection fraction of 15%. See comments in  text regarding question of left ventricular thrombus. Echo density noted there is felt to be artifactual, but the possibility of LV thrombus cannot be fully excluded. Consider repeat study with contrast.   2) Severe left atrial enlargement.   3) Sclerotic aortic valve with mild aortic insufficiency.   4) Sclerotic mitral valve with mild insufficiency.   5) Sclerotic tricuspid valve with mild to moderate regurgitation.   6) Probable moderate elevation right ventricular systolic pressure to 48 mmHg plus right atrial pressure.   7) Study is performed with patient in atrial fibrillation.   8) When compared with prior study of August 7, 2009, there has been further increase in right ventricular systolic pressure from 29 mmHg plus right atrial to 48 mmHg plus right atrial pressure and the aortic root and ascending aorta appear to have further increased in size from prior 4 cm in the mid ascending aorta to 4.4 cm. Additionally, the issue of possible LV thrombus was not raised in the report of the prior study.   Interpretation: M-Mode, two-dimensional, spectral and color flow Doppler evaluations were performed with standard echocardiographic images with the patient in atrial fibrillation. The study is technically adequate for interpretation.   Aortic root and ascending aorta are mildly to moderately enlarged at 4.5 and 4.4 cm, respectively. There is severe left atrial enlargement. The right atrium is probably within normal limits. The right ventricle is also probably of normal size with normal wall motion. A device lead is noted throughout the right heart.   The left ventricular chamber is moderately enlarged at 6.2 cm. Left ventricular wall thickness appears to be probably mildly increased in the intraventricular septum.   The inferior vena cava is not well visualized.   All cardiac valves appear to be sclerotic. There is mild aortic insufficiency and probable mild mitral regurgitation. There is mild to moderate tricuspid  regurgitation with regurgitation jet velocity suggesting right ventricular systolic pressure to be elevated to 48 mmHg plus indeterminate right atrial pressure, suggesting at least probable moderate pulmonary hypertension.   Left ventricular ejection fraction is severely reduced with severe global hypokinesis with estimated ejection fraction in the range of 15%. There is obstruction noted on apical views within the left ventricular apex which is felt to be artifactual; however, the possibility of left ventricular thrombus cannot be excluded. Consider repeat study with contrast for better definition.   No pericardial effusion is noted.   Left ventricular diastolic function is indeterminate in the setting of the atrial fibrillation.        Chronic systolic congestive heart failure (H) 11/14/2012     Priority: Medium     Overview:   9/19/2012 ECHO  Final Impression:   1) Dilated left ventricle to a moderate degree with severely depressed left ventricular systolic function and ejection fraction of 15%. See comments in text regarding question of left ventricular thrombus. Echo density noted there is felt to be artifactual, but the possibility of LV thrombus cannot be fully excluded. Consider repeat study with contrast.   2) Severe left atrial enlargement.   3) Sclerotic aortic valve with mild aortic insufficiency.   4) Sclerotic mitral valve with mild insufficiency.   5) Sclerotic tricuspid valve with mild to moderate regurgitation.   6) Probable moderate elevation right ventricular systolic pressure to 48 mmHg plus right atrial pressure.   7) Study is performed with patient in atrial fibrillation.   8) When compared with prior study of August 7, 2009, there has been further increase in right ventricular systolic pressure from 29 mmHg plus right atrial to 48 mmHg plus right atrial pressure and the aortic root and ascending aorta appear to have further increased in size from prior 4 cm in the mid ascending aorta to 4.4  cm. Additionally, the issue of possible LV thrombus was not raised in the report of the prior study.   Interpretation: M-Mode, two-dimensional, spectral and color flow Doppler evaluations were performed with standard echocardiographic images with the patient in atrial fibrillation. The study is technically adequate for interpretation.   Aortic root and ascending aorta are mildly to moderately enlarged at 4.5 and 4.4 cm, respectively. There is severe left atrial enlargement. The right atrium is probably within normal limits. The right ventricle is also probably of normal size with normal wall motion. A device lead is noted throughout the right heart.   The left ventricular chamber is moderately enlarged at 6.2 cm. Left ventricular wall thickness appears to be probably mildly increased in the intraventricular septum.   The inferior vena cava is not well visualized.   All cardiac valves appear to be sclerotic. There is mild aortic insufficiency and probable mild mitral regurgitation. There is mild to moderate tricuspid regurgitation with regurgitation jet velocity suggesting right ventricular systolic pressure to be elevated to 48 mmHg plus indeterminate right atrial pressure, suggesting at least probable moderate pulmonary hypertension.   Left ventricular ejection fraction is severely reduced with severe global hypokinesis with estimated ejection fraction in the range of 15%. There is obstruction noted on apical views within the left ventricular apex which is felt to be artifactual; however, the possibility of left ventricular thrombus cannot be excluded. Consider repeat study with contrast for better definition.   No pericardial effusion is noted.   Left ventricular diastolic function is indeterminate in the setting of the atrial fibrillation.        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     Priority: Medium     Long  term current use of anticoagulant therapy 10/20/2010     Priority: Medium     Onychomycosis 08/05/2010     Priority: Medium     Acute gastritis with hemorrhage 08/10/2009     Priority: Medium     Overview:   NSAID       Muscle weakness (generalized) 06/09/2009     Priority: Medium     Overview:   IMO Update 10/11       Myopathy 04/08/2009     Priority: Medium      Past Medical History:   Diagnosis Date     Acute on chronic systolic congestive heart failure (H)     11/14/2012,15-20% EF 11/2012     Atherosclerotic heart disease of native coronary artery without angina pectoris      Atrial fibrillation (H)      Cardiomyopathy (H)      Chronic kidney disease     Stage III     Disorder of muscle     2/7/2017     Essential (primary) hypertension     chronic     Gastrointestinal hemorrhage     2009,secondary to NSAID gastritis     Gout     tophaceous, right foot, right fingers     Hyperlipidemia     No Comments Provided     Low back pain     lumbar spinal stenosis     Malignant neoplasm of prostate (H)     2003     Osteoarthritis      Other nonrheumatic aortic valve disorders     with mild Al     Other retention of urine (CODE)     2010,with stricture     Other specified disorders of arteries and arterioles (CODE) (H)     moderate, with mild increase in diameter of the ascending aorta     Polyneuropathy     via 2008 EMG in [place]     Systolic congestive heart failure (H) 2009    mild to moderate left ventricular enlargement with marked decreased in systolic function.     Transient cerebral ischemic attack 11/17/2014     Type 2 diabetes mellitus without complications (H) 2009     Past Surgical History:   Procedure Laterality Date     ARTHROPLASTY KNEE      2011,left     BYPASS GRAFT ARTERY CORONARY      4 vessel, [facility], 1989     CYSTOSCOPY      2009,& dilation of urethral stricture, Dr. Wallace     CYSTOSCOPY      2010,& catheter placement for acute obstruction, Dr. Elise     ESOPHAGOSCOPY, GASTROSCOPY, DUODENOSCOPY (EGD),  COMBINED      2007,& colonoscopy with polypectomy, [facility] essetnially normal EGD with small polyp removed     HEART CATH, ANGIOPLASTY      approx. 2003,angiogram, 2 stents, [facility]     IMPLANT PACEMAKER      2007,[employer] ICD, CPI Vitality 2 EL, model #T177, serial #[id number], which was implanted on 05/30/2007 at [facility].     LAMINECTOMY LUMBAR ONE LEVEL      2006,decompressive, L3, L4 & L5 with improvement, Dr. Lopes     Current Outpatient Prescriptions   Medication Sig Dispense Refill     acetaminophen (TYLENOL) 325 MG tablet Take 975 mg by mouth every 6 hours if needed. Max acetaminophen dose: 4000mg in 24 hrs.       allopurinol (ZYLOPRIM) 100 MG tablet Take 1 tablet by mouth once daily.       amoxicillin (AMOXIL) 500 MG capsule TAKE 4 CAPSULES BY MOUTH ONE HOUR PRIOR TO APPT       ASPIRIN 81 PO Take one tablet by mouth daily       atorvastatin (LIPITOR) 10 MG tablet Take 1 tablet by mouth at bedtime.       cholecalciferol (VITAMIN D-1000 MAX ST) 1000 UNITS TABS Take one tablet by mouth daily       furosemide (LASIX) 20 MG tablet TAKE 1 TABLET BY MOUTH EVERY MORNING 90 tablet 0     insulin aspart prot & aspart (NOVOLOG MIX 70/30 PEN) injection 35 units every morning and 10 units every evening 45 mL 3     insulin pen needle (EASY TOUCH PEN NEEDLES) 31G X 8 MM FOR ADMINISTERING INSULIN AT HOME TWICE DAILY. Dx: E11.8 200 each 1     lisinopril (PRINIVIL/ZESTRIL) 2.5 MG tablet Take 2.5 mg by mouth daily       metoprolol succinate (TOPROL-XL) 50 MG 24 hr tablet Take 1.5 tablets (75 mg) by mouth daily 135 tablet 0     nystatin-triamcinolone (MYCOLOG II) cream APPLY TOPICALLY TO AFFECTED AREA(S) THREE TIMES DAILY 60 g 11     order for DME Wheelchair with elevating leg rests/foot rest. For home use. Length of need: 99 mo       potassium chloride SA (K-DUR/KLOR-CON M) 20 MEQ CR tablet Take 1 tablet by mouth once every other day with a meal.       simvastatin (ZOCOR) 20 MG tablet Take 20 mg by mouth at bedtime.        "warfarin (COUMADIN) 2 MG tablet TAKE 1MG BY MOUTH FOR TWO DAYS PER WEEK AND TAKE 2MG FOR FIVE DAYS PER WEEK 90 tablet 1     OTC products: Aspirin    Allergies   Allergen Reactions     Codeine Unknown     Naproxen Other (See Comments)     \"kidneys shut down\"     Sulfa Drugs Unknown     Tramadol Nausea     Vancomycin Unknown      Latex Allergy: NO    Social History   Substance Use Topics     Smoking status: Never Smoker     Smokeless tobacco: Never Used     Alcohol use No     History   Drug Use No     Comment: Drug use: No       REVIEW OF SYSTEMS:   CONSTITUTIONAL: NEGATIVE for fever, chills, change in weight  INTEGUMENTARY/SKIN: NEGATIVE for worrisome rashes, moles or lesions  EYES: POSITIVE for Hx cataracts and NEGATIVE for discharge, eye pain and redness  ENT/MOUTH: NEGATIVE for ear, mouth and throat problems  RESP: NEGATIVE for significant cough or SOB  CV: NEGATIVE for chest pain, palpitations or peripheral edema  GI: NEGATIVE for nausea, abdominal pain, heartburn, or change in bowel habits   male: Positive for pain and burning with urination.   MUSCULOSKELETAL: NEGATIVE for significant arthralgias or myalgia  NEURO: POSITIVE for neuropathy in feet secondary to diabetes NEGATIVE for weakness, dizziness  ENDOCRINE: NEGATIVE for temperature intolerance, skin/hair changes  HEME: NEGATIVE for bleeding disorders but does bleed easier being on warfarin and ASA  PSYCHIATRIC: NEGATIVE for changes in mood or affect    EXAM:   /80 (BP Location: Right arm, Patient Position: Sitting, Cuff Size: Adult Large)  Pulse 60  Temp 96.3  F (35.7  C)  Ht 5' 6\" (1.676 m)  Wt 155 lb (70.3 kg)  BMI 25.02 kg/m2    GENERAL APPEARANCE: alert and no distress, frail elderly male in wheelchair     EYES: EOMI,  PERRL, sclera and conjunctivae clear, no drainage     HENT: ear canals and TM's normal and nose and mouth without ulcers or lesions     NECK: no adenopathy, no asymmetry, masses, or scars. No carotid bruit.     RESP: lungs " clear to auscultation - no rales, rhonchi or wheezes     CV: Regular rates and Irregular rhythm, no murmur, click or rub. No peripheral e     ABDOMEN:  soft, nontender, no HSM or masses and bowel sounds normal     MS: extremities normal- no gross deformities noted, no evidence of inflammation in joints, FROM in all extremities.     SKIN: no suspicious lesions or rashes, multiple areas of ecchymosis on arms and legs. A few scattered areas of ecchymosis on abdomen from insulin injections.     NEURO: Normal strength and tone, mentation intact and speech normal     PSYCH: mentation appears normal. and affect normal     LYMPHATICS: No cervical or supraclavicular adenopathy    DIAGNOSTICS:     EKG: atrial fibrillation with PVC, rate 70, nonspecific ST-T changes, unchanged from previous tracings  Hemoglobin A1C: 6.6 on 01/08/2018  Labs Resulted Today:   Results for orders placed or performed in visit on 04/27/18   CBC W PLT No Diff   Result Value Ref Range    WBC 8.3 4.0 - 11.0 10e9/L    RBC Count 3.24 (L) 4.4 - 5.9 10e12/L    Hemoglobin 10.5 (L) 13.3 - 17.7 g/dL    Hematocrit 32.0 (L) 40.0 - 53.0 %    MCV 99 78 - 100 fl    MCH 32.4 26.5 - 33.0 pg    MCHC 32.8 31.5 - 36.5 g/dL    RDW 15.2 (H) 10.0 - 15.0 %    Platelet Count 191 150 - 450 10e9/L   Comprehensive metabolic panel   Result Value Ref Range    Sodium 138 134 - 144 mmol/L    Potassium 4.5 3.5 - 5.1 mmol/L    Chloride 112 (H) 98 - 107 mmol/L    Carbon Dioxide 19 (L) 21 - 31 mmol/L    Anion Gap 7 3 - 14 mmol/L    Glucose 115 (H) 70 - 105 mg/dL    Urea Nitrogen 48 (H) 7 - 25 mg/dL    Creatinine 1.52 (H) 0.70 - 1.30 mg/dL    GFR Estimate 44 (L) >60 mL/min/1.7m2    GFR Estimate If Black 53 (L) >60 mL/min/1.7m2    Calcium 8.6 8.6 - 10.3 mg/dL    Bilirubin Total 0.4 0.3 - 1.0 mg/dL    Albumin 2.9 (L) 3.5 - 5.7 g/dL    Protein Total 5.5 (L) 6.4 - 8.9 g/dL    Alkaline Phosphatase 97 34 - 104 U/L    ALT 21 7 - 52 U/L    AST 24 13 - 39 U/L       Recent Labs   Lab Test  03/21/18 02/21/18 02/01/18   1241   02/01/18   1219   01/08/18   1626   10/06/17   1514   HGB   --    --    --    --   12.0*   --    --    --   13.3*   PLT   --    --    --    --   126*   --    --    --   143   INR  2.3  2.0   --    < >   --    < >   --    < >   --    NA   --    --   138   --    --    --   130*   < >   --    POTASSIUM   --    --   5.0   --    --    --   4.8   < >   --    CR   --    --   2.39*   --    --    --   2.16*   < >   --     < > = values in this interval not displayed.        IMPRESSION:   Reason for surgery/procedure: Severe balanitis  Diagnosis/reason for consult: Pre-operative physical examination    The proposed surgical procedure is considered INTERMEDIATE risk.    REVISED CARDIAC RISK INDEX  The patient has the following serious cardiovascular risks for perioperative complications such as (MI, PE, VFib and 3  AV Block):  Coronary Artery Disease (MI, positive stress test, angina, Qs on EKG)  Congestive Heart Failure (pulmonary edema, PND, s3 zeeshan, CXR with pulmonary congestion, basilar rales)  Diabetes Mellitus (on Insulin)  Serum Creatinine >2.0 mg/dl  INTERPRETATION: 3 risks: Class IV (high risk - >11% complication rate)    The patient has the following additional risks for perioperative complications:  No identified additional risks    No diagnosis found.    RECOMMENDATIONS:     Cardiovascular Risk  Performs 2-4 METs exercise without symptoms: participates in therapy, ambulates short distances.   Patient is already on a Beta Blocker. Continue Betablocker therapy after surgery, using Beta blocker order set as necessary for NPO status.       --Patient is to take all scheduled medications on the day of surgery EXCEPT for modifications listed below.    Diabetes Medication Use  -----Hold mixed insulins (e.g. Insulin 70/30) while NPO (fasting)      Anticoagulant or Antiplatelet Medication Use  ASPIRIN: Discontinue ASA 7-10 days prior to procedure to reduce bleeding risk.  It should be  resumed post-operatively.  WARFARIN: Warfarin may be discontinued in the perioperative period 3 days prior to surgery.        APPROVAL GIVEN to proceed with proposed procedure, without further diagnostic evaluation       Signed Electronically by: Annie Valenzuela CNP    Copy of this evaluation report is provided to requesting physician.    Jose Preop Guidelines    Revised Cardiac Risk Index

## 2018-05-14 NOTE — MR AVS SNAPSHOT
Tarango GIA Patricia   5/14/2018 10:30 AM   Anticoagulation Therapy Visit    Description:  83 year old male   Provider:  [facility]   Department:  [facility]           INR as of 5/14/2018     Today's INR 1.5!      Anticoagulation Summary as of 5/14/2018     INR goal 2.0-3.0   Today's INR 1.5!   Full instructions 1 mg on Wed, Sat; 2 mg all other days   Next INR check 5/23/2018    Indications   Long term (current) use of anticoagulants [Z79.01]  Unspecified atrial fibrillation [I48.91]         Description     Continue same Warfarin dose and recheck in 1 week.  Yolanda Avila RN   5/14/2018    11:07 AM        May 2018 Details    Sun Mon Tue Wed Thu Fri Sat       1               2               3               4               5                 6               7               8               9               10               11               12                 13               14      2 mg   See details      15      2 mg         16      1 mg         17      2 mg         18      2 mg         19      1 mg           20      2 mg         21      2 mg         22      2 mg         23            24               25               26                 27               28               29               30               31                  Date Details   05/14 This INR check       Date of next INR:  5/23/2018         How to take your warfarin dose     To take:  1 mg Take 0.5 of a 2 mg tablet.    To take:  2 mg Take 1 of the 2 mg tablets.

## 2018-05-14 NOTE — PROGRESS NOTES
ANTICOAGULATION FOLLOW-UP CLINIC VISIT    Patient Name:  Sukhdeep Gomez  Date:  5/14/2018  Contact Type:  Face to Face    SUBJECTIVE:     Patient Findings     Positives Other complaints (restarting after 1 week after surgery)           OBJECTIVE    INR Protime   Date Value Ref Range Status   05/14/2018 1.5 (A) 0.86 - 1.14 Final       ASSESSMENT / PLAN  INR assessment SUB    Recheck INR In: 10 DAYS    INR Location Clinic      Anticoagulation Summary as of 5/14/2018     INR goal 2.0-3.0   Today's INR 1.5!   Maintenance plan 1 mg (2 mg x 0.5) on Wed, Sat; 2 mg (2 mg x 1) all other days   Full instructions 1 mg on Wed, Sat; 2 mg all other days   Weekly total 12 mg   No change documented Yolanda Avila, RN   Next INR check 5/23/2018   Priority INR   Target end date Indefinite    Indications   Long term (current) use of anticoagulants [Z79.01]  Unspecified atrial fibrillation [I48.91]         Anticoagulation Episode Summary     INR check location     Preferred lab     Send INR reminders to  INR    Comments       Anticoagulation Care Providers     Provider Role Specialty Phone number    Cesar Sanders MD [facility] [phone]            See the Encounter Report to view Anticoagulation Flowsheet and Dosing Calendar (Go to Encounters tab in chart review, and find the Anticoagulation Therapy Visit)        Yolanda Avila, RN

## 2018-05-17 NOTE — NURSING NOTE
Pt presents to clinic for one week follow up and path review    Review of Systems:    Weight loss:    No     Recent fever/chills:  No   Night sweats:   No  Current skin rash:  Yes   Recent hair loss:  No  Heat intolerance:  No   Cold intolerance:  No  Chest pain:   No   Palpitations:   No  Shortness of breath:  No   Wheezing:   No  Constipation:    No   Diarrhea:   No   Nausea:   No   Vomiting:   No   Kidney/side pain:  No   Back pain:   No  Frequent headaches:  No   Dizziness:     No  Leg swelling:   No   Calf pain:    No

## 2018-05-17 NOTE — PROGRESS NOTES
Type of Visit  EST    Chief Complaint  Urinary retention  Urethral stricture  History of prostate cancer  Penile infection    HPI  Mr. Gomez is a 83 y.o. male follows up with history of urethral stricture s/p dilation last 2/2015 who follows up after circumcision and glans biopsy.  Prior to that was 11/2013.  He has a history of prostate cancer treated in 2003 with EBRT.    Fortunately he continues to have symptomatic urinary retention in spite of the circumcision.  He voids every hour both day and night.  He does experience dull pelvic pain.  He denies fevers or chills.  The retention is quite bothersome for him.      Review of Systems  I reviewed the ROS the patient today.    .Nursing Notes:   Annie Bond, LPN  5/17/2018 11:08 AM  Signed  Pt presents to clinic for one week follow up and path review    Review of Systems:    Weight loss:    No     Recent fever/chills:  No   Night sweats:   No  Current skin rash:  Yes   Recent hair loss:  No  Heat intolerance:  No   Cold intolerance:  No  Chest pain:   No   Palpitations:   No  Shortness of breath:  No   Wheezing:   No  Constipation:    No   Diarrhea:   No   Nausea:   No   Vomiting:   No   Kidney/side pain:  No   Back pain:   No  Frequent headaches:  No   Dizziness:     No  Leg swelling:   No   Calf pain:    No        Family History  Family History   Problem Relation Age of Onset     Heart Disease Father      Cancer-prostate Brother        Physical Exam  Pulse 64  Temp 97.2  F (36.2  C) (Temporal)  Resp 16    Constitutional: NAD, WDWN.  Cardiovascular: Regular rate.  Pulmonary/Chest: Respirations are even and non-labored bilaterally.  Abdominal: Soft. No distension, tenderness, masses or guarding. No CVA tenderness.  Extremities: TREV x 4, Warm. No clubbing.  No cyanosis.    Skin: Pink, warm and dry.  No rashes noted.  Genitourinary: nonpalpable bladder  Healing incision  Obliterated meatus    Labs  Results for JOSE GOMEZ (MRN [id number]) as of  10/12/2017 10:54   12/17/2014 10:10 11/11/2015 13:53 10/26/2016 09:58 10/6/2017 14:50   PSA TOTAL (DIAGNOSTIC) 0.920 1.355 1.006 1.587     Results for JOSE GOMEZ (MRN [id number]) as of 4/26/2018 13:56   4/26/2018 11:10   Color Urine Yellow   Appearance Urine Cloudy   Glucose Urine Negative   Bilirubin Urine Negative   Ketones Urine Negative   Specific Gravity Urine 1.015   pH Urine 5.5   Protein Albumin Urine Negative   Urobilinogen Urine 0.2   Nitrite Urine Negative   Blood Urine Large (A)   Leukocyte Esterase Urine Moderate (A)   Source Unspecified Urine   WBC Urine 25-50 (A)   RBC Urine 2-5 (A)   Bacteria Urine Many (A)     Pathology  5/8/2018  Penile/glans biopsy - negative    Imaging  CT Pelvis  5/17/2018  Very large capacity distended bladder  No visualized bowel between the anterior abdominal wall and bladder    Assessment & Plan  Mr. Gomez is a 83 y.o. male follows up with history of urethral stricture s/p dilation last 2/2015 who follows up after circumcision and glans biopsy.  Pathology was negative.  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  He continues to have urinary retention.    I recommended SP tube placement in the operating room under MAC sedation.  Reviewed the imaging which suggests safe passage of catheter through the anterior abdominal wall.  Discussed risks and benefits of the procedure including potential bowel injury.    Plan  The patient was scheduled and consented for SPT placement in the OR with MAC.

## 2018-05-17 NOTE — MR AVS SNAPSHOT
"              After Visit Summary   5/17/2018    Sukhdeep Gomez    MRN: [id number]           Patient Information     Date Of Birth          8/19/1934        Visit Information        Provider Department      5/17/2018 10:45 AM Edgardo Sanchez MD [facility]        Today's Diagnoses     Prostate CA (H)    -  1    Urinary retention           Follow-ups after your visit        Your next 10 appointments already scheduled     May 23, 2018 10:30 AM CDT   [facility] with  [facility]   [facility]    [address]  [place] [address] [phone]            Oct 23, 2018  1:00 PM CDT   Pacemaker Check with  PACEMAKER   [facility]    [address]  [place] [id number] [phone]              Who to contact     If you have questions or need follow up information about today's clinic visit or your schedule please contact [facility] directly at [phone].  Normal or non-critical lab and imaging results will be communicated to you by "[employer]hart, letter or phone within 4 business days after the clinic has received the results. If you do not hear from us within 7 days, please contact the clinic through [employer] or phone. If you have a critical or abnormal lab result, we will notify you by phone as soon as possible.  Submit refill requests through [employer] or call your pharmacy and they will forward the refill request to us. Please allow 3 business days for your refill to be completed.          Additional Information About Your Visit        "[employer]hart Information     [employer] lets you send messages to your doctor, view your test results, renew your prescriptions, schedule appointments and more. To sign up, go to [email or web address] . Click on \"Log in\" on the left side of the screen, which will take you to the Welcome page. Then " "click on \"Sign up Now\" on the right side of the page.     You will be asked to enter the access code listed below, as well as some personal information. Please follow the directions to create your username and password.     Your access code is: [email or web address]  Expires: 2018  3:03 PM     Your access code will  in 90 days. If you need help or a new code, please call your [place] clinic or [phone].        Care EveryWhere ID     This is your Care EveryWhere ID. This could be used by other organizations to access your [place] medical records  [id number]        Your Vitals Were     Pulse Temperature Respirations             64 97.2  F (36.2  C) (Temporal) 16          Blood Pressure from Last 3 Encounters:   18 98/65   18 102/80   18 122/80    Weight from Last 3 Encounters:   18 70.3 kg (155 lb)   18 70.3 kg (155 lb)   18 70.3 kg (155 lb)              We Performed the Following     CT Pelvis w/o Contrast        Primary Care Provider Office Phone # Fax #    Cesar Sanders [phone] [address] [facility] [address]        Equal Access to Services     BIANCA ARMANDO : Hadii shona rickettso Sosivakumarali, waaxda luqadaha, qaybta kaalmada adeegyada, griffin pate . So [facility] [phone].    ATENCIÓN: Si habla español, tiene a cohn disposición servicios gratuitos de asistencia lingüística. Llame al [phone].    We comply with applicable federal civil rights laws and [place] laws. We do not discriminate on the basis of race, color, national origin, age, disability, sex, sexual orientation, or gender identity.            Thank you!     Thank you for choosing [facility]  for your care. Our goal is always to provide you with excellent care. Hearing back from our patients is one way we can continue to improve our services. Please take a few minutes to complete the written survey that you may receive " in the mail after your visit with us. Thank you!             Your Updated Medication List - Protect others around you: Learn how to safely use, store and throw away your medicines at www.disposemymeds.org.          This list is accurate as of 5/17/18 12:44 PM.  Always use your most recent med list.                   Brand Name Dispense Instructions for use Diagnosis    acetaminophen 325 MG tablet    TYLENOL     Take 975 mg by mouth every 6 hours if needed. Max acetaminophen dose: 4000mg in 24 hrs.        allopurinol 100 MG tablet    ZYLOPRIM     Take 1 tablet by mouth once daily.        amoxicillin 500 MG capsule    AMOXIL     TAKE 4 CAPSULES BY MOUTH ONE HOUR PRIOR TO APPT        ASPIRIN 81 PO      Take one tablet by mouth daily        furosemide 20 MG tablet    LASIX    137 tablet    Take one tablet by mouth every morning and take an additional tab every other morning    Chronic systolic heart failure (H)       insulin aspart prot & aspart injection    NovoLOG MIX 70/30 PEN    45 mL    35 units every morning and 10 units every evening    Diabetes mellitus with multiple complications (H)       insulin pen needle 31G X 8 MM    EASY TOUCH PEN NEEDLES    200 each    FOR ADMINISTERING INSULIN AT HOME TWICE DAILY. Dx: E11.8    Diabetes mellitus with multiple complications (H)       lisinopril 2.5 MG tablet    PRINIVIL/Zestril     Take 2.5 mg by mouth daily        metoprolol succinate 50 MG 24 hr tablet    TOPROL-XL    135 tablet    Take 1.5 tablets (75 mg) by mouth daily    Essential hypertension       nystatin-triamcinolone cream    MYCOLOG II    60 g    APPLY TOPICALLY TO AFFECTED AREA(S) THREE TIMES DAILY    Yeast dermatitis of penis       order for DME      Wheelchair with elevating leg rests/foot rest. For home use. Length of need: 99 mo        potassium chloride SA 20 MEQ CR tablet    K-DUR/KLOR-CON M     Take 1 tablet by mouth once every other day with a meal.        simvastatin 20 MG tablet    ZOCOR     Take 20  mg by mouth at bedtime.        VITAMIN D-1000 MAX ST 1000 units Tabs   Generic drug:  cholecalciferol      Take one tablet by mouth daily        warfarin 2 MG tablet    COUMADIN    90 tablet    TAKE 1MG BY MOUTH FOR TWO DAYS PER WEEK AND TAKE 2MG FOR FIVE DAYS PER WEEK    Atrial fibrillation (H)

## 2018-05-22 NOTE — IP AVS SNAPSHOT
[facility]    [address]    [place] [address]    Phone:  [phone]    Fax:  [phone]                                       After Visit Summary   5/22/2018    Sukhdeep Gomez    MRN: [id number]           After Visit Summary Signature Page     I have received my discharge instructions, and my questions have been answered. I have discussed any challenges I see with this plan with the nurse or doctor.    ..........................................................................................................................................  Patient/Patient Representative Signature      ..........................................................................................................................................  Patient Representative Print Name and Relationship to Patient    ..................................................               ................................................  Date                                            Time    ..........................................................................................................................................  Reviewed by Signature/Title    ...................................................              ..............................................  Date                                                            Time

## 2018-05-22 NOTE — OP NOTE
Preoperative diagnosis  Urinary retention    Postoperative diagnosis  Urinary retention    Procedure performed  Suprapubic tube placement    Surgeon  Edgardo Sanchez MD    Surgeon(s)/Proceduralist(s) and Assistants (if any)  Surgeon(s):  Edgardo Sanchez MD  Circulator: Steph Ramos RN; Shasta Duran RN  Scrub Person: Gorgexu Sada  X-Ray Technologist: Yvette Wong  2nd Scrub: Yanet Verde  Pre-Op Nurse: Hector Shirley RN    Specimen(s)  No  Urine for culture    (EBL) Estimated blood loss (ml)  <5mL    Anesthesia  MAC    Complications  None    Findings  There were no abnormalities within the bladder with no bladder tumors or stones.  Ureteral orifices were in the normal anatomic position.  Post-procedure cystoscopy revealed minimal to no bleeding at the entry site.    Pelvic ultrasound revealed no identifiable bowel between the bladder or abdominal wall at the expected site of entry.    Indications  83 year old male agreed to undergo the above named procedure after discussion of the alternatives, risks and benefits.    The patient was found to have urinary retention and his meatus is obliterated from repeated balanitis.  Informed consent was obtained.      Procedure  The patient was taken to the operating room and placed supine on the operating table.  Pre-operative antibiotics were administered.  Bilateral lower extremity SCDs were placed.  After induction of general anesthesia the patient was positioned in dorsal lithotomy.  A time-out was performed.  The patient was prepped and draped in a sterile fashion.    The patient was placed in steep Trendelenburg followed by a small 1 cm incision was created in the skin using a scalpel.  Using the Chiou SP tube set, the puncture needle was guided into the bladder without difficulty and the OR bed was then leveled.  A wire was placed through this needle into the bladder.  The needle was removed and serial dilation performed from 8 to 10 Fr, followed by  placement of the catheter introducer sheath.  Once in place and confirmed visually with the cystoscope, the wire was removed and an 18 Fr Samish tip catheter inserted.  This balloon was filled with 10 mL of saline.  The sheath was then removed.  The catheter was withdrawn in order to pull the balloon against the abdominal wall to assist with hemostasis.  The catheter was sewn to the skin with a 2-0 Nylon.    The patient tolerated the procedure well.  All sponge and instrument counts were correct x 2. The patient was taken to the recovery unit without incident.     Plan  The patient will return in 4 weeks for SP tube exchange in the office over a wire.

## 2018-05-22 NOTE — ANESTHESIA CARE TRANSFER NOTE
Patient: Sukhdeep Gomez    Procedure(s):  Supra tube Placement    . - Wound Class: II-Clean Contaminated    Diagnosis: urinary Retnetion  Diagnosis Additional Information: No value filed.    Anesthesia Type:   MAC     Note:  Airway :Nasal Cannula  Patient transferred to:Phase II  Handoff Report: Identifed the Patient, Identified the Reponsible Provider, Reviewed the pertinent medical history, Discussed the surgical course, Reviewed Intra-OP anesthesia mangement and issues during anesthesia, Set expectations for post-procedure period and Allowed opportunity for questions and acknowledgement of understanding      Vitals: (Last set prior to Anesthesia Care Transfer)    CRNA VITALS  5/22/2018 1108 - 5/22/2018 1145      5/22/2018             Resp Rate (set): 10                Electronically Signed By: LEXUS García CRNA  May 22, 2018  11:45 AM

## 2018-05-22 NOTE — ANESTHESIA PREPROCEDURE EVALUATION
Anesthesia Evaluation     . Pt has had prior anesthetic. Type: General and MAC    No history of anesthetic complications          ROS/MED HX    ENT/Pulmonary:  - neg pulmonary ROS     Neurologic:  - neg neurologic ROS     Cardiovascular:     (+) hypertension--CAD, -CABG-. : . CHF . . pacemaker :. dysrhythmias a-fib, .       METS/Exercise Tolerance:  1 - Eating, dressing   Hematologic:  - neg hematologic  ROS       Musculoskeletal:  - neg musculoskeletal ROS       GI/Hepatic:  - neg GI/hepatic ROS       Renal/Genitourinary:     (+) chronic renal disease, type: CRI, Pt does not require dialysis,       Endo:  - neg endo ROS       Psychiatric:  - neg psychiatric ROS       Infectious Disease:  - neg infectious disease ROS       Malignancy:      - no malignancy   Other:    (+) No chance of pregnancy C-spine cleared: N/A, no H/O Chronic Pain,no other significant disability   - neg other ROS                 Physical Exam  Normal systems: cardiovascular, pulmonary and dental    Airway   Mallampati: II  TM distance: >3 FB  Neck ROM: full    Dental     Cardiovascular   Rhythm and rate: regular and normal      Pulmonary    breath sounds clear to auscultation                    Anesthesia Plan      History & Physical Review      ASA Status:  4 .    NPO Status:  > 6 hours    Plan for MAC with Propofol induction. Reason for MAC:  Chronic cardiopulmonary disease (G9)         Postoperative Care      Consents  Anesthetic plan, risks, benefits and alternatives discussed with:  Patient..                          .

## 2018-05-22 NOTE — IP AVS SNAPSHOT
MRN:[id number]                      After Visit Summary   5/22/2018    Sukhdeep Gomez    MRN: [id number]           Thank you!     Thank you for choosing [facility] for your care. Our goal is always to provide you with excellent care. Hearing back from our patients is one way we can continue to improve our services. Please take a few minutes to complete the written survey that you may receive in the mail after you visit with us. Thank you!        Patient Information     Date Of Birth          8/19/1934        About your hospital stay     You were admitted on:  May 22, 2018 You last received care in the:  [facility] and [facility]    You were discharged on:  May 22, 2018       Who to Call     For medical emergencies, please call 911.  For non-urgent questions about your medical care, please call your primary care provider or clinic, [phone]  For questions related to your surgery, please call your surgery clinic        Attending Provider     Provider Specialty    Edgardo Sanchez MD Urology       Primary Care Provider Office Phone # Fax #    Cesar SABRINA Sanders [phone]      After Care Instructions     Diet Instructions       Resume pre procedure diet            Discharge Instructions       The patient will return in 4 weeks for SP tube exchange in the office over a wire.            Encourage fluids       Encourage fluids at home to keep urine clear to light pink            No Alcohol       for 24 hours post procedure            No driving or operating machinery        until the day after procedure                  Your next 10 appointments already scheduled     May 23, 2018 10:30 AM CDT   Anticoagulation Visit with  ANTI COAG 1   [facility] ([facility])    [address]  [place] [address] [phone]            Jun 20, 2018 11:15 AM CDT   Return Visit with Edgardo Sanchez MD   [facility]  ([facility])    [address]  [place] [address]   [phone]            Oct 23, 2018  1:00 PM CDT   Pacemaker Check with GH PACEMAKER   [facility] ([facility]    [address]  [place] [address]   [phone]              Further instructions from your care team       [facility]   Adult Discharge Orders & Instructions     For 12 hours after surgery    1. Get plenty of rest.  A responsible adult must stay with you for at least 12 hours after you leave the hospital.   2. Do not drive or use heavy equipment.  If you have weakness or tingling, don't drive or use heavy equipment until this feeling goes away.  3. Do not drink alcohol.  4. Avoid strenuous or risky activities.  Ask for help when climbing stairs.   5. You may feel lightheaded.  IF so, sit for a few minutes before standing.  Have someone help you get up.   6. If you have nausea (feel sick to your stomach): Drink only clear liquids such as apple juice, ginger ale, broth or 7-Up.  Rest may also help.  Be sure to drink enough fluids.  Move to a regular diet as you feel able.  7. You may have a slight fever. Call the doctor if your fever is over 101 F (38.3 C) (taken under the tongue) or lasts longer than 24 hours.  8. You may have a dry mouth, a sore throat, muscle aches or trouble sleeping.  These should go away after 24 hours.  9. Do not make important or legal decisions.   Call your doctor for any of the followin.  Signs of infection (fever, growing tenderness at the surgery site, a large amount of drainage or bleeding, severe pain, foul-smelling drainage, redness, swelling).    2. It has been over 8 to 10 hours since surgery and you are still not able to urinate (pass water).    3.  Headache for over 24 hours.    4.  Numbness, tingling or weakness the day after surgery (if you had spinal anesthesia).  To contact a doctor, call  "________[phone]_______________________    Pending Results     No orders found from 2018 to 2018.            Admission Information     Date & Time Provider Department Dept. Phone    2018 Edgardo Sanchez MD [facility] [phone]      Your Vitals Were     Blood Pressure Temperature Respirations Pulse Oximetry          91/57 98  F (36.7  C) (Tympanic) 16 100%        MyChart Information     [employer] lets you send messages to your doctor, view your test results, renew your prescriptions, schedule appointments and more. To sign up, go to [email or web address] . Click on \"Log in\" on the left side of the screen, which will take you to the Welcome page. Then click on \"Sign up Now\" on the right side of the page.     You will be asked to enter the access code listed below, as well as some personal information. Please follow the directions to create your username and password.     Your access code is: [email or web address]  Expires: 2018  3:03 PM     Your access code will  in 90 days. If you need help or a new code, please call your [facility] clinic or [phone].        Care EveryWhere ID     This is your Care EveryWhere ID. This could be used by other organizations to access your [facility] medical records  [id number]        Equal Access to Services     FRANCINE ARMANDO AH: Bartolo rickettso Sohema, waaxda luqadaha, qaybta kaalmada alessandro, griffin pate . So [facility] [phone].    ATENCIÓN: Si nelsonla wai, tiene a cohn disposición servicios gratuitos de asistencia lingüística. Llame al [phone].    We comply with applicable federal civil rights laws and [place] laws. We do not discriminate on the basis of race, color, national origin, age, disability, sex, sexual orientation, or gender identity.               Review of your medicines      UNREVIEWED medicines. Ask your doctor about these medicines        Dose / Directions    acetaminophen 325 MG " tablet   Commonly known as:  TYLENOL        Take 975 mg by mouth every 6 hours if needed. Max acetaminophen dose: 4000mg in 24 hrs.   Refills:  0       allopurinol 100 MG tablet   Commonly known as:  ZYLOPRIM        Take 1 tablet by mouth once daily.   Refills:  0       amoxicillin 500 MG capsule   Commonly known as:  AMOXIL        TAKE 4 CAPSULES BY MOUTH ONE HOUR PRIOR TO APPT   Refills:  0       ASPIRIN 81 PO        Take one tablet by mouth daily   Refills:  0       furosemide 20 MG tablet   Commonly known as:  LASIX   Used for:  Chronic systolic heart failure (H)        Take one tablet by mouth every morning and take an additional tab every other morning   Quantity:  137 tablet   Refills:  0       insulin aspart prot & aspart injection   Commonly known as:  NovoLOG MIX 70/30 PEN   Used for:  Diabetes mellitus with multiple complications (H)        35 units every morning and 10 units every evening   Quantity:  45 mL   Refills:  3       lisinopril 2.5 MG tablet   Commonly known as:  PRINIVIL/Zestril        Dose:  2.5 mg   Take 2.5 mg by mouth daily   Refills:  0       metoprolol succinate 50 MG 24 hr tablet   Commonly known as:  TOPROL-XL   Used for:  Essential hypertension        Dose:  75 mg   Take 1.5 tablets (75 mg) by mouth daily   Quantity:  135 tablet   Refills:  0       nystatin-triamcinolone cream   Commonly known as:  MYCOLOG II   Used for:  Yeast dermatitis of penis        APPLY TOPICALLY TO AFFECTED AREA(S) THREE TIMES DAILY   Quantity:  60 g   Refills:  11       potassium chloride SA 20 MEQ CR tablet   Commonly known as:  K-DUR/KLOR-CON M        Take 1 tablet by mouth once every other day with a meal.   Refills:  0       simvastatin 20 MG tablet   Commonly known as:  ZOCOR        Take 20 mg by mouth at bedtime.   Refills:  0       VITAMIN D-1000 MAX ST 1000 units Tabs   Generic drug:  cholecalciferol        Take one tablet by mouth daily   Refills:  0       warfarin 2 MG tablet   Commonly known as:   COUMADIN   Used for:  Atrial fibrillation (H)        TAKE 1MG BY MOUTH FOR TWO DAYS PER WEEK AND TAKE 2MG FOR FIVE DAYS PER WEEK   Quantity:  90 tablet   Refills:  1         CONTINUE these medicines which have NOT CHANGED        Dose / Directions    insulin pen needle 31G X 8 MM   Commonly known as:  EASY TOUCH PEN NEEDLES   Used for:  Diabetes mellitus with multiple complications (H)        FOR ADMINISTERING INSULIN AT HOME TWICE DAILY. Dx: E11.8   Quantity:  200 each   Refills:  1       order for DME        Wheelchair with elevating leg rests/foot rest. For home use. Length of need: 99 mo   Refills:  0                Protect others around you: Learn how to safely use, store and throw away your medicines at www.disposemymeds.org.             Medication List: This is a list of all your medications and when to take them. Check marks below indicate your daily home schedule. Keep this list as a reference.      Medications           Morning Afternoon Evening Bedtime As Needed    acetaminophen 325 MG tablet   Commonly known as:  TYLENOL   Take 975 mg by mouth every 6 hours if needed. Max acetaminophen dose: 4000mg in 24 hrs.                                allopurinol 100 MG tablet   Commonly known as:  ZYLOPRIM   Take 1 tablet by mouth once daily.                                amoxicillin 500 MG capsule   Commonly known as:  AMOXIL   TAKE 4 CAPSULES BY MOUTH ONE HOUR PRIOR TO APPT                                ASPIRIN 81 PO   Take one tablet by mouth daily                                furosemide 20 MG tablet   Commonly known as:  LASIX   Take one tablet by mouth every morning and take an additional tab every other morning                                insulin aspart prot & aspart injection   Commonly known as:  NovoLOG MIX 70/30 PEN   35 units every morning and 10 units every evening                                insulin pen needle 31G X 8 MM   Commonly known as:  EASY TOUCH PEN NEEDLES   FOR ADMINISTERING INSULIN  AT HOME TWICE DAILY. Dx: E11.8                                lisinopril 2.5 MG tablet   Commonly known as:  PRINIVIL/Zestril   Take 2.5 mg by mouth daily                                metoprolol succinate 50 MG 24 hr tablet   Commonly known as:  TOPROL-XL   Take 1.5 tablets (75 mg) by mouth daily                                nystatin-triamcinolone cream   Commonly known as:  MYCOLOG II   APPLY TOPICALLY TO AFFECTED AREA(S) THREE TIMES DAILY                                order for DME   Wheelchair with elevating leg rests/foot rest. For home use. Length of need: 99 mo                                potassium chloride SA 20 MEQ CR tablet   Commonly known as:  K-DUR/KLOR-CON M   Take 1 tablet by mouth once every other day with a meal.                                simvastatin 20 MG tablet   Commonly known as:  ZOCOR   Take 20 mg by mouth at bedtime.                                VITAMIN D-1000 MAX ST 1000 units Tabs   Take one tablet by mouth daily   Generic drug:  cholecalciferol                                warfarin 2 MG tablet   Commonly known as:  COUMADIN   TAKE 1MG BY MOUTH FOR TWO DAYS PER WEEK AND TAKE 2MG FOR FIVE DAYS PER WEEK                                          More Information        Discharge Instructions: Caring for Your Leg Bag  You are going home with a urinary catheter and collection device (drainage bag) in place. One type of collection device is called a leg bag. This is a smaller drainage bag that you can wear on your leg to collect urine during the day. The bag can fit under your clothing. You can move around with greater ease when using a leg bag instead of a larger collection bag.  You were shown how to care for your catheter in the hospital. This sheet will help you remember those steps when you are at home.  Home care    Wash your hands thoroughly before and after you care for your catheter or collection device.    Gather your supplies:  ? Alcohol wipes  ? Soap and water  ? Towel  and washcloth  ? Leg strap and leg bag    Use soap and water to wash the area where your catheter enters your body. Rinse well.    Secure the bag ruelas to your leg:  ? Position the leg band high on your thigh with the product label pointing away from your leg.  ? Stretch the leg band in place and fasten.  ? Place the catheter tubing over the bag and secure it. You may secure it with a Velcro tab or other method, depending on the product you use. Be sure to leave enough loop in the catheter above the leg band to avoid pulling on the tube.  ? Every 4 to 6 hours, reposition the band to prevent pressure from the elastic on your leg. You can do this by changing the bag to the other leg or by raising or lowering the leg band.  ? Wash the band as frequently as needed. You can hand wash and dry the leg band.    Place the bag in the bag ruelas.    Clean the urine bag end of the catheter and your catheter port with an alcohol wipe.    Place a towel under the bag and port to keep urine from dripping onto your leg.    Before connecting the outlet valve at the bottom of the bag to the catheter, make sure that it is firmly closed. Flip the valve upward toward the bag; it needs to snap firmly in place. Be sure not to tug on the tubing. Be gentle.    Attach the urine bag to the end of the catheter; insert the connector snugly into the catheter port. You can avoid dribbling urine by bending the catheter tubing just below the tip and holding it while you disconnect it from the catheter. Be careful to keep the tip clean while connecting the leg bag tubing to the catheter--this keeps germs from getting into the system.    Drain the bag when it is full. To drain the bag, flip the clamp downward. Direct the flexible outlet tube to control the flow of urine. You don t have to disconnect the leg bag from the catheter to empty it. Raise your leg up to the edge of the toilet to reach the leg bag. Then you can empty the bag directly into the  toilet. This way, you won t need to bend over, which may be uncomfortable.    Keep the leg bag clean. Rinse daily with equal parts water and vinegar to reduce odor and keep the bag free of germs.    Remember to keep the drainage bag below the level of your bladder for proper drainage.  Follow-up  Make a follow-up appointment as directed by your healthcare provider.  When to call your healthcare provider  Call your healthcare provider right away if you have any of the following:    Redness, swelling, or warmth around the catheter entry site    Pus draining from your catheter entry site or into the catheter tubing and bag    Blood, clots, or floating debris in the urine    Nausea and vomiting    Shaking chills    Fever above 100.4 F (38 C)    Pain that is not relieved by medicine     Catheter that falls out or is dislodged   Date Last Reviewed: 1/1/2017 2000-2017 [employer]. [address] [place] [address]. All rights reserved. This information is not intended as a substitute for professional medical care. Always follow your healthcare professional's instructions.                Discharge Instructions: Caring for Your Indwelling Urinary Catheter  You have been discharged with an indwelling urinary catheter (also called a [place] catheter). A catheter is a thin, flexible tube. An indwelling urinary catheter has two parts. The first part is a tube that drains urine from your bladder. The second part is a bag or other device that collects the urine.  The most important thing to remember is that you want to prevent infection. Always wash your hands before handling your catheter bag or tubing.  Draining the bedside bag    Wash your hands with soap and clean, running water or use an alcohol-based hand  that contains at least 60% alcohol.    Hold the drainage tube over a toilet or measuring container.    Unclamp the tube and let the bag drain.    Don t touch the tip of the drainage tube or  let it touch the toilet or container.  Cleaning the drainage tube    When the bag is empty, clean the tip of the drainage tube with an alcohol wipe.    Clamp the tube.    Reinsert the tube into the pocket on the drainage bag.  Cleaning your skin and tubing    Clean the skin near the catheter with soap and water.    Wash your genital area from front to back.    Wash the catheter tubing. Always wash the catheter in the direction away from your body.    You will be told when and how to change your bag and tubing.    Don t try to remove the catheter by yourself.    You may shower with the catheter in place.  Emptying a leg bag    Wash your hands.    Remove the stopper on the bag.    Drain the bag into the toilet or a measuring container. Don t let the tip of the drainage tube touch anything, including your fingers.    Clean the tip of the drainage tube with alcohol.    Replace the stopper.  Follow-up care  Make a follow-up appointment as directed by your healthcare provider  When to call your healthcare provider  Call your healthcare provider right away if you have any of the following:    Chills or fever above 100.4 F (38 C)    Leakage around the catheter insertion site    Increased spasms (uncontrollable twitching) in your legs, abdomen, or bladder. Occasional mild spasms are normal.    Burning in the urinary tract, penis, or genital area    Nausea and vomiting    Aching in the lower back    Cloudy or bloody (pink or red) urine, sediment or mucus in the urine, or foul-smelling urine   Date Last Reviewed: 1/1/2017 2000-2017 [employer]. [address] [place] [address], [place] [address]. All rights reserved. This information is not intended as a substitute for professional medical care. Always follow your healthcare professional's instructions.

## 2018-05-22 NOTE — ANESTHESIA POSTPROCEDURE EVALUATION
Patient: Sukhdeep Gomez    Procedure(s):  Supra tube Placement    . - Wound Class: II-Clean Contaminated    Diagnosis:urinary Retnetion  Diagnosis Additional Information: No value filed.    Anesthesia Type:  MAC    Note:  Anesthesia Post Evaluation    Patient location during evaluation: Phase 2  Patient participation: Able to fully participate in evaluation  Level of consciousness: awake and alert  Pain management: adequate  Airway patency: patent  Cardiovascular status: acceptable  Respiratory status: acceptable  Hydration status: acceptable  PONV: none     Anesthetic complications: None          Last vitals:  Vitals:    05/22/18 0953 05/22/18 1140 05/22/18 1145   BP: 126/62 91/57 91/57   Resp: 16     Temp: 98.2  F (36.8  C) 98  F (36.7  C)    SpO2: 100% 100% 100%         Electronically Signed By: LEXUS BOB CRNA  May 22, 2018  11:59 AM

## 2018-05-22 NOTE — TELEPHONE ENCOUNTER
Spoke to son, INR was elevated today at 3.53, directed him to give patient 1 mg daily and recheck this Friday  At 10:00.  Verbalized understanding and getting him in the schedule.  Daphney Kessler RN on 5/22/2018 at 3:33 PM

## 2018-05-22 NOTE — DISCHARGE INSTRUCTIONS
[facility]   Adult Discharge Orders & Instructions     For 12 hours after surgery    1. Get plenty of rest.  A responsible adult must stay with you for at least 12 hours after you leave the hospital.   2. Do not drive or use heavy equipment.  If you have weakness or tingling, don't drive or use heavy equipment until this feeling goes away.  3. Do not drink alcohol.  4. Avoid strenuous or risky activities.  Ask for help when climbing stairs.   5. You may feel lightheaded.  IF so, sit for a few minutes before standing.  Have someone help you get up.   6. If you have nausea (feel sick to your stomach): Drink only clear liquids such as apple juice, ginger ale, broth or 7-Up.  Rest may also help.  Be sure to drink enough fluids.  Move to a regular diet as you feel able.  7. You may have a slight fever. Call the doctor if your fever is over 101 F (38.3 C) (taken under the tongue) or lasts longer than 24 hours.  8. You may have a dry mouth, a sore throat, muscle aches or trouble sleeping.  These should go away after 24 hours.  9. Do not make important or legal decisions.   Call your doctor for any of the followin.  Signs of infection (fever, growing tenderness at the surgery site, a large amount of drainage or bleeding, severe pain, foul-smelling drainage, redness, swelling).    2. It has been over 8 to 10 hours since surgery and you are still not able to urinate (pass water).    3.  Headache for over 24 hours.    4.  Numbness, tingling or weakness the day after surgery (if you had spinal anesthesia).  To contact a doctor, call _________[phone]_______________________

## 2018-05-22 NOTE — H&P (VIEW-ONLY)
Type of Visit  EST    Chief Complaint  Urinary retention  Urethral stricture  History of prostate cancer  Penile infection    HPI  Mr. Gomez is a 83 y.o. male follows up with history of urethral stricture s/p dilation last 2/2015 who follows up after circumcision and glans biopsy.  Prior to that was 11/2013.  He has a history of prostate cancer treated in 2003 with EBRT.    Fortunately he continues to have symptomatic urinary retention in spite of the circumcision.  He voids every hour both day and night.  He does experience dull pelvic pain.  He denies fevers or chills.  The retention is quite bothersome for him.      Review of Systems  I reviewed the ROS the patient today.    .Nursing Notes:   Annie Bond, LPN  5/17/2018 11:08 AM  Signed  Pt presents to clinic for one week follow up and path review    Review of Systems:    Weight loss:    No     Recent fever/chills:  No   Night sweats:   No  Current skin rash:  Yes   Recent hair loss:  No  Heat intolerance:  No   Cold intolerance:  No  Chest pain:   No   Palpitations:   No  Shortness of breath:  No   Wheezing:   No  Constipation:    No   Diarrhea:   No   Nausea:   No   Vomiting:   No   Kidney/side pain:  No   Back pain:   No  Frequent headaches:  No   Dizziness:     No  Leg swelling:   No   Calf pain:    No        Family History  Family History   Problem Relation Age of Onset     Heart Disease Father      Cancer-prostate Brother        Physical Exam  Pulse 64  Temp 97.2  F (36.2  C) (Temporal)  Resp 16    Constitutional: NAD, WDWN.  Cardiovascular: Regular rate.  Pulmonary/Chest: Respirations are even and non-labored bilaterally.  Abdominal: Soft. No distension, tenderness, masses or guarding. No CVA tenderness.  Extremities: TREV x 4, Warm. No clubbing.  No cyanosis.    Skin: Pink, warm and dry.  No rashes noted.  Genitourinary: nonpalpable bladder  Healing incision  Obliterated meatus    Labs  Results for JOSE GOMEZ (MRN [id number]) as of  10/12/2017 10:54   12/17/2014 10:10 11/11/2015 13:53 10/26/2016 09:58 10/6/2017 14:50   PSA TOTAL (DIAGNOSTIC) 0.920 1.355 1.006 1.587     Results for JOSE GOMEZ (MRN [id number]) as of 4/26/2018 13:56   4/26/2018 11:10   Color Urine Yellow   Appearance Urine Cloudy   Glucose Urine Negative   Bilirubin Urine Negative   Ketones Urine Negative   Specific Gravity Urine 1.015   pH Urine 5.5   Protein Albumin Urine Negative   Urobilinogen Urine 0.2   Nitrite Urine Negative   Blood Urine Large (A)   Leukocyte Esterase Urine Moderate (A)   Source Unspecified Urine   WBC Urine 25-50 (A)   RBC Urine 2-5 (A)   Bacteria Urine Many (A)     Pathology  5/8/2018  Penile/glans biopsy - negative    Imaging  CT Pelvis  5/17/2018  Very large capacity distended bladder  No visualized bowel between the anterior abdominal wall and bladder    Assessment & Plan  Mr. Gomez is a 83 y.o. male follows up with history of urethral stricture s/p dilation last 2/2015 who follows up after circumcision and glans biopsy.  Pathology was negative.  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  He continues to have urinary retention.    I recommended SP tube placement in the operating room under MAC sedation.  Reviewed the imaging which suggests safe passage of catheter through the anterior abdominal wall.  Discussed risks and benefits of the procedure including potential bowel injury.    Plan  The patient was scheduled and consented for SPT placement in the OR with MAC.

## 2018-05-25 NOTE — PROGRESS NOTES
ANTICOAGULATION FOLLOW-UP CLINIC VISIT    Patient Name:  Sukhdeep Gomez  Date:  5/25/2018  Contact Type:  Face to Face    SUBJECTIVE:     Patient Findings     Positives No Problem Findings           OBJECTIVE    INR Protime   Date Value Ref Range Status   05/25/2018 2.4 (A) 0.86 - 1.14 Final       ASSESSMENT / PLAN  INR assessment THER    Recheck INR In: 1 WEEK    INR Location Clinic      Anticoagulation Summary as of 5/25/2018     INR goal 2.0-3.0   Today's INR 2.4   Warfarin maintenance plan 2 mg (2 mg x 1) on Mon, Wed, Fri; 1 mg (2 mg x 0.5) all other days   Full warfarin instructions 2 mg on Mon, Wed, Fri; 1 mg all other days   Weekly warfarin total 10 mg   Plan last modified Yolanda Avila, RN (5/25/2018)   Next INR check 5/31/2018   Priority INR   Target end date Indefinite    Indications   Long term (current) use of anticoagulants [Z79.01]  Unspecified atrial fibrillation [I48.91]         Anticoagulation Episode Summary     INR check location     Preferred lab     Send INR reminders to  INR    Comments       Anticoagulation Care Providers     Provider Role Specialty Phone number    Cesar Sanders MD [facility] [phone]            See the Encounter Report to view Anticoagulation Flowsheet and Dosing Calendar (Go to Encounters tab in chart review, and find the Anticoagulation Therapy Visit)        Yolanda Avila, RN

## 2018-05-25 NOTE — MR AVS SNAPSHOT
Sukhdeep NIELSEN Patricia   5/25/2018 10:00 AM   Anticoagulation Therapy Visit    Description:  83 year old male   Provider:  [facility]   Department:  [facility]           INR as of 5/25/2018     Today's INR 2.4      Anticoagulation Summary as of 5/25/2018     INR goal 2.0-3.0   Today's INR 2.4   Full warfarin instructions 2 mg on Mon, Wed, Fri; 1 mg all other days   Next INR check 5/31/2018    Indications   Long term (current) use of anticoagulants [Z79.01]  Unspecified atrial fibrillation [I48.91]         Description     Take 2 mg x 3 days and 1 mg x 4 days and recheck on 5/31/18. Yolanda Avila RN    5/25/2018  11:32 AM        May 2018 Details    Sun Mon Tue Wed Thu Fri Sat       1               2               3               4               5                 6               7               8               9               10               11               12                 13               14               15               16               17               18               19                 20               21               22               23               24               25      2 mg   See details      26      1 mg           27      1 mg         28      2 mg         29      1 mg         30      2 mg         31               Date Details   05/25 This INR check       Date of next INR:  5/31/2018         How to take your warfarin dose     To take:  1 mg Take 0.5 of a 2 mg tablet.    To take:  2 mg Take 1 of the 2 mg tablets.

## 2018-05-27 NOTE — ED TRIAGE NOTES
Pt comes in with right knee redness, swelling and pain, also has red left elbow with pain. States it started a few days ago and now today pain is increased

## 2018-05-27 NOTE — DISCHARGE INSTRUCTIONS
1.  Take Keflex 500 mg 3 times a day for 10 day  2.  May also take Tylenol 1000 mg 3-4 times a day as needed for the pain; do not take more than a total of 4000 mg in 24 hours of this medication  3.  If you having worsening symptoms despite taking the antibiotics return to ER.  Otherwise follow-up with your primary care physician later this week preferably by Tuesday.

## 2018-05-27 NOTE — ED PROVIDER NOTES
History     Chief Complaint   Patient presents with     Knee Pain     redness right knee      Joint Swelling     left elbow     HPI Comments: This is a 83-year-old male who is coming to the emergency room with acute nontraumatic frontal right knee pain with some redness which he noticed that last night and feels is getting worse, both the redness and the pain.  Patient states the area of redness is quite tender and hot to the touch.  He denies any trauma or fall.  Denies fever or chills or body aches.  He said he is able to put some weight on that extremity but the pain gets worse with that activity.      Problem List:    Patient Active Problem List    Diagnosis Date Noted     Urinary retention 06/18/2018     Priority: Medium     Paroxysmal atrial fibrillation (H) 02/21/2018     Priority: Medium     Overview:   chronic warfarin anticoagulation and toprol for rate control       S/P CABG x 4 02/21/2018     Priority: Medium     Overview:   status post 4 vessel CABG, [facility]       Unspecified atrial fibrillation 02/11/2018     Priority: Medium     Ascending aorta dilatation (H) 02/08/2018     Priority: Medium     H/O myocardial infarction, greater than 8 weeks 02/08/2018     Priority: Medium     Stented coronary artery 02/08/2018     Priority: Medium     Atrial fibrillation (H) 01/18/2018     Priority: Medium     Overview:   chronic warfarin anticoagulation and toprol for rate control       Osteoarthrosis 01/18/2018     Priority: Medium     Diabetes mellitus with multiple complications (H) 01/18/2018     Priority: Medium     Essential hypertension 01/18/2018     Priority: Medium     Gout of left foot due to renal impairment 01/18/2018     Priority: Medium     Overview:   tophaceous       Ischemic cardiomyopathy 01/18/2018     Priority: Medium     Lumbago 01/18/2018     Priority: Medium     Enthesopathy of ankle and tarsus 01/18/2018     Priority: Medium     Mitral valve insufficiency and aortic valve  insufficiency 01/18/2018     Priority: Medium     Overview:   status post TTE       Mixed hyperlipidemia 01/18/2018     Priority: Medium     S/P CABG (coronary artery bypass graft) 01/18/2018     Priority: Medium     Overview:   status post 4 vessel CABG, [facility]       Spinal stenosis 01/18/2018     Priority: Medium     Squamous cell carcinoma of skin 01/18/2018     Priority: Medium     Chronic systolic heart failure (H) 11/08/2017     Priority: Medium     Basal cell carcinoma of right forehead 10/23/2017     Priority: Medium     Implantable cardioverter-defibrillator, [employer], Single lead 10/20/2017     Priority: Medium     [employer] Incepta [id number]  SN#[id number]  4/25/13   RV lead CPI [id number]  SN#[id number]  5/30/07  CHF/Madit II       Hamstring tightness of left lower extremity 02/07/2017     Priority: Medium     Altered level of consciousness 12/04/2016     Priority: Medium     DM II (diabetes mellitus, type II), controlled (H) 12/04/2016     Priority: Medium     Elevated INR 12/04/2016     Priority: Medium     Elevated troponin 12/04/2016     Priority: Medium     Hypoglycemia associated with diabetes (H) 12/04/2016     Priority: Medium     Yeast dermatitis 12/04/2016     Priority: Medium     Anticoagulation monitoring, INR range 2-3 05/13/2015     Priority: Medium     History of TIA (transient ischemic attack) 11/17/2014     Priority: Medium     CKD (chronic kidney disease) stage 3, GFR 30-59 ml/min 10/17/2014     Priority: Medium     CKD (chronic kidney disease) stage 4, GFR 15-29 ml/min (H) 10/17/2014     Priority: Medium     ACP (advance care planning) 12/10/2013     Priority: Medium     Overview:   Patient has identified Health Care Agent(s): No  Add Health Care Agents: No  Patient has Advance Care Plan Documents (Health Care Directive, POLST): Yes    Advance Care Plan Documents:  Health Care Directive    Patient has identified Specific Treatment Preferences: No   Specific limits to  treatment preferences NOT identified: ASSUME FULL TREATMENT.       Diabetic neuropathy (H) 11/11/2013     Priority: Medium     Prostate CA (H) 11/11/2013     Priority: Medium     Orthostatic hypotension 11/05/2013     Priority: Medium     Hematoma of right hip 11/21/2012     Priority: Medium     Systolic congestive heart failure (H) 11/14/2012     Priority: Medium     Overview:   9/19/2012 ECHO  Final Impression:   1) Dilated left ventricle to a moderate degree with severely depressed left ventricular systolic function and ejection fraction of 15%. See comments in text regarding question of left ventricular thrombus. Echo density noted there is felt to be artifactual, but the possibility of LV thrombus cannot be fully excluded. Consider repeat study with contrast.   2) Severe left atrial enlargement.   3) Sclerotic aortic valve with mild aortic insufficiency.   4) Sclerotic mitral valve with mild insufficiency.   5) Sclerotic tricuspid valve with mild to moderate regurgitation.   6) Probable moderate elevation right ventricular systolic pressure to 48 mmHg plus right atrial pressure.   7) Study is performed with patient in atrial fibrillation.   8) When compared with prior study of August 7, 2009, there has been further increase in right ventricular systolic pressure from 29 mmHg plus right atrial to 48 mmHg plus right atrial pressure and the aortic root and ascending aorta appear to have further increased in size from prior 4 cm in the mid ascending aorta to 4.4 cm. Additionally, the issue of possible LV thrombus was not raised in the report of the prior study.   Interpretation: M-Mode, two-dimensional, spectral and color flow Doppler evaluations were performed with standard echocardiographic images with the patient in atrial fibrillation. The study is technically adequate for interpretation.   Aortic root and ascending aorta are mildly to moderately enlarged at 4.5 and 4.4 cm, respectively. There is severe left  atrial enlargement. The right atrium is probably within normal limits. The right ventricle is also probably of normal size with normal wall motion. A device lead is noted throughout the right heart.   The left ventricular chamber is moderately enlarged at 6.2 cm. Left ventricular wall thickness appears to be probably mildly increased in the intraventricular septum.   The inferior vena cava is not well visualized.   All cardiac valves appear to be sclerotic. There is mild aortic insufficiency and probable mild mitral regurgitation. There is mild to moderate tricuspid regurgitation with regurgitation jet velocity suggesting right ventricular systolic pressure to be elevated to 48 mmHg plus indeterminate right atrial pressure, suggesting at least probable moderate pulmonary hypertension.   Left ventricular ejection fraction is severely reduced with severe global hypokinesis with estimated ejection fraction in the range of 15%. There is obstruction noted on apical views within the left ventricular apex which is felt to be artifactual; however, the possibility of left ventricular thrombus cannot be excluded. Consider repeat study with contrast for better definition.   No pericardial effusion is noted.   Left ventricular diastolic function is indeterminate in the setting of the atrial fibrillation.        Chronic systolic congestive heart failure (H) 11/14/2012     Priority: Medium     Overview:   9/19/2012 ECHO  Final Impression:   1) Dilated left ventricle to a moderate degree with severely depressed left ventricular systolic function and ejection fraction of 15%. See comments in text regarding question of left ventricular thrombus. Echo density noted there is felt to be artifactual, but the possibility of LV thrombus cannot be fully excluded. Consider repeat study with contrast.   2) Severe left atrial enlargement.   3) Sclerotic aortic valve with mild aortic insufficiency.   4) Sclerotic mitral valve with mild  insufficiency.   5) Sclerotic tricuspid valve with mild to moderate regurgitation.   6) Probable moderate elevation right ventricular systolic pressure to 48 mmHg plus right atrial pressure.   7) Study is performed with patient in atrial fibrillation.   8) When compared with prior study of August 7, 2009, there has been further increase in right ventricular systolic pressure from 29 mmHg plus right atrial to 48 mmHg plus right atrial pressure and the aortic root and ascending aorta appear to have further increased in size from prior 4 cm in the mid ascending aorta to 4.4 cm. Additionally, the issue of possible LV thrombus was not raised in the report of the prior study.   Interpretation: M-Mode, two-dimensional, spectral and color flow Doppler evaluations were performed with standard echocardiographic images with the patient in atrial fibrillation. The study is technically adequate for interpretation.   Aortic root and ascending aorta are mildly to moderately enlarged at 4.5 and 4.4 cm, respectively. There is severe left atrial enlargement. The right atrium is probably within normal limits. The right ventricle is also probably of normal size with normal wall motion. A device lead is noted throughout the right heart.   The left ventricular chamber is moderately enlarged at 6.2 cm. Left ventricular wall thickness appears to be probably mildly increased in the intraventricular septum.   The inferior vena cava is not well visualized.   All cardiac valves appear to be sclerotic. There is mild aortic insufficiency and probable mild mitral regurgitation. There is mild to moderate tricuspid regurgitation with regurgitation jet velocity suggesting right ventricular systolic pressure to be elevated to 48 mmHg plus indeterminate right atrial pressure, suggesting at least probable moderate pulmonary hypertension.   Left ventricular ejection fraction is severely reduced with severe global hypokinesis with estimated ejection  fraction in the range of 15%. There is obstruction noted on apical views within the left ventricular apex which is felt to be artifactual; however, the possibility of left ventricular thrombus cannot be excluded. Consider repeat study with contrast for better definition.   No pericardial effusion is noted.   Left ventricular diastolic function is indeterminate in the setting of the atrial fibrillation.        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     Priority: Medium     Onychomycosis 08/05/2010     Priority: Medium     Muscle weakness (generalized) 06/09/2009     Priority: Medium     Overview:   IMO Update 10/11       Myopathy 04/08/2009     Priority: Medium        Past Medical History:    Past Medical History:   Diagnosis Date     Acute gastritis with hemorrhage 8/10/2009     Acute on chronic systolic congestive heart failure (H)      Atherosclerotic heart disease of native coronary artery without angina pectoris      Atrial fibrillation (H)      Cardiomyopathy (H)      Chronic kidney disease      Disorder of muscle      Essential (primary) hypertension      Gastrointestinal hemorrhage      GI bleeding 1/18/2018     Gout      Hyperlipidemia      Low back pain      Malignant neoplasm of prostate (H)      Osteoarthritis      Other nonrheumatic aortic valve disorders      Other retention of urine (CODE)      Other specified disorders of arteries and arterioles (CODE) (H)      Polyneuropathy      Systolic congestive heart failure (H) 2009     Transient cerebral ischemic attack 11/17/2014     Type 2 diabetes mellitus without complications (H) 2009       Past Surgical History:    Past Surgical History:   Procedure Laterality Date     ARTHROPLASTY KNEE      2011,left     BYPASS GRAFT ARTERY CORONARY      4 vessel, [facility], 1989     CIRCUMCISION N/A 5/8/2018    Procedure: CIRCUMCISION;  Circumcision ,  Glans Biopsy  Cystoscopy;  Surgeon: Edgardo Sanchez MD;  Location:  OR     CYSTOSCOPY      2009,& dilation of urethral stricture, Dr. Wallace     CYSTOSCOPY      2010,& catheter placement for acute obstruction, Dr. Elise     CYSTOSTOMY, INSERT TUBE SUPRAPUBIC, COMBINED N/A 5/22/2018    Procedure: COMBINED CYSTOSTOMY, INSERT TUBE SUPRAPUBIC;  Supra tube Placement    .;  Surgeon: Edgardo Sanchez MD;  Location:  OR     ESOPHAGOSCOPY, GASTROSCOPY, DUODENOSCOPY (EGD), COMBINED      2007,& colonoscopy with polypectomy, [facility] essetnially normal EGD with small polyp removed     HEART CATH, ANGIOPLASTY      approx. 2003,angiogram, 2 stents, [facility]     IMPLANT PACEMAKER      2007,[employer] ICD, CPI Vitality 2 EL, model #T177, serial #[id number], which was implanted on 05/30/2007 at [facility].     LAMINECTOMY LUMBAR ONE LEVEL      2006,decompressive, L3, L4 & L5 with improvement, Dr. Lopes       Family History:    Family History   Problem Relation Age of Onset     HEART DISEASE Father      Heart Disease     Prostate Cancer Brother      Cancer-prostate       Social History:  Marital Status:   [2]  Social History   Substance Use Topics     Smoking status: Never Smoker     Smokeless tobacco: Never Used     Alcohol use No        Medications:      acetaminophen (TYLENOL) 325 MG tablet   allopurinol (ZYLOPRIM) 100 MG tablet   amoxicillin (AMOXIL) 500 MG capsule   ASPIRIN 81 PO   cholecalciferol (VITAMIN D-1000 MAX ST) 1000 UNITS TABS   furosemide (LASIX) 20 MG tablet   insulin aspart prot & aspart (NOVOLOG MIX 70/30 PEN) injection   insulin pen needle (EASY TOUCH PEN NEEDLES) 31G X 8 MM   lisinopril (PRINIVIL/ZESTRIL) 2.5 MG tablet   nystatin-triamcinolone (MYCOLOG II) cream   potassium chloride SA (K-DUR/KLOR-CON M) 20 MEQ CR tablet   DUREZOL 0.05 % ophthalmic emulsion   ILEVRO 0.3 % ophthalmic suspension   metoprolol succinate (TOPROL-XL) 50 MG 24 hr tablet   moxifloxacin (VIGAMOX) 0.5 % ophthalmic solution   order for DME  "  simvastatin (ZOCOR) 20 MG tablet   warfarin (COUMADIN) 2 MG tablet         Review of Systems   Constitutional: Negative for chills, fatigue and fever.   Musculoskeletal:        Painful swelling frontal right knee with redness which is hot to the touch   All other systems reviewed and are negative.      Physical Exam   BP: 122/72  Heart Rate: 84  Temp: 98.1  F (36.7  C)  Height: 167.6 cm (5' 6\")  Weight: 70.3 kg (155 lb)  SpO2: 97 %      Physical Exam   Constitutional: He appears well-developed and well-nourished. No distress.   Cardiovascular: Normal rate, regular rhythm, normal heart sounds and intact distal pulses.    Pulmonary/Chest: Effort normal and breath sounds normal. No respiratory distress. He has no wheezes. He has no rales. He exhibits no tenderness.   Musculoskeletal:   Decreased range of motion both passive and active of the right knee.  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  The patellar skin is erythematous and warm to the touch with significant tenderness on palpation.  There is a small infrapatellar area over the skin where there is a tiny blisterlike lesion containing what appears to be purulent discharge       ED Course     Patient seems to have acute cellulitis involving the prepatellar skin on the right side with significant tenderness and increased warmth to touch but no crepitance.  Patient on examination and history I do not think he has acute septic knee joint.  He is afebrile and does not appear toxic.  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  He denies any trauma to suggest acute fracture of the patella.  We will get an x-ray.  X-ray revealed prepatellar calcification but no patellar fracture.  She discharged home with prescription for Keflex 10 day course and advised to follow with his primary care physician on Tuesday.  He understands if he develops worsening knee pain or swelling or spreading redness or fever or inability to apply any " weight on the extremity he should return to ER.    ED Course     Procedures               Critical Care time:  none               No results found for this or any previous visit (from the past 24 hour(s)).    Medications   cephalexin (KEFLEX) capsule 500 mg (500 mg Oral Given 5/27/18 0958)       Assessments & Plan (with Medical Decision Making)     I have reviewed the nursing notes.    I have reviewed the findings, diagnosis, plan and need for follow up with the patient.       Discharge Medication List as of 5/27/2018 10:57 AM      START taking these medications    Details   cephALEXin (KEFLEX) 500 MG capsule Take 1 capsule (500 mg) by mouth 3 times daily for 10 days, Disp-29 capsule, R-0, Local Print             Final diagnoses:   Cellulitis of right lower extremity       5/27/2018   [facility]     Cristóbal Jenkins MD  06/09/18 0793       Cristóbal Jenkins MD  07/25/18 [id number]

## 2018-05-27 NOTE — ED AVS SNAPSHOT
[facility]    [address]    [place] [address]    Phone:  [phone]    Fax:  [phone]                                       Sukhdeep Gomez   MRN: [id number]    Department:  [facility]   Date of Visit:  5/27/2018           Patient Information     Date Of Birth          8/19/1934        Your diagnoses for this visit were:     Cellulitis of right lower extremity        You were seen by Cristóbal Jenkins MD.        Discharge Instructions       1.  Take Keflex 500 mg 3 times a day for 10 day  2.  May also take Tylenol 1000 mg 3-4 times a day as needed for the pain; do not take more than a total of 4000 mg in 24 hours of this medication  3.  If you having worsening symptoms despite taking the antibiotics return to ER.  Otherwise follow-up with your primary care physician later this week preferably by Tuesday.    Your next 10 appointments already scheduled     May 31, 2018 12:00 PM CDT   Anticoagulation Visit with  ANTI COAG 2   [facility] ([facility]    [address]  [place] [address] [phone]            Jun 20, 2018 11:15 AM CDT   Return Visit with Edgardo Sanchez MD   [facility] ([facility]    [address]  [place] [address] [phone]            Oct 23, 2018  1:00 PM CDT   Pacemaker Check with  PACEMAKER   [facility]    [address] [facility] [id number] [phone]              24 Hour Appointment Hotline       To make an appointment at any [facility], call [phone] ([phone]). If you don't have a family doctor or clinic, we will help you find one. [facility] are conveniently located to serve the needs of you and your family.             Review of your medicines      START taking        Dose / Directions Last dose taken     cephALEXin 500 MG capsule   Commonly known as:  KEFLEX   Dose:  500 mg   Quantity:  29 capsule        Take 1 capsule (500 mg) by mouth 3 times daily for 10 days   Refills:  0          Our records show that you are taking the medicines listed below. If these are incorrect, please call your family doctor or clinic.        Dose / Directions Last dose taken    acetaminophen 325 MG tablet   Commonly known as:  TYLENOL        Take 975 mg by mouth every 6 hours if needed. Max acetaminophen dose: 4000mg in 24 hrs.   Refills:  0        allopurinol 100 MG tablet   Commonly known as:  ZYLOPRIM        Take 1 tablet by mouth once daily.   Refills:  0        amoxicillin 500 MG capsule   Commonly known as:  AMOXIL        TAKE 4 CAPSULES BY MOUTH ONE HOUR PRIOR TO APPT   Refills:  0        ASPIRIN 81 PO        Take one tablet by mouth daily   Refills:  0        furosemide 20 MG tablet   Commonly known as:  LASIX   Quantity:  137 tablet        Take one tablet by mouth every morning and take an additional tab every other morning   Refills:  0        insulin aspart prot & aspart injection   Commonly known as:  NovoLOG MIX 70/30 PEN   Quantity:  45 mL        35 units every morning and 10 units every evening   Refills:  3        insulin pen needle 31G X 8 MM   Commonly known as:  EASY TOUCH PEN NEEDLES   Quantity:  200 each        FOR ADMINISTERING INSULIN AT HOME TWICE DAILY. Dx: E11.8   Refills:  1        lisinopril 2.5 MG tablet   Commonly known as:  PRINIVIL/Zestril   Dose:  2.5 mg        Take 2.5 mg by mouth daily   Refills:  0        metoprolol succinate 50 MG 24 hr tablet   Commonly known as:  TOPROL-XL   Dose:  75 mg   Quantity:  135 tablet        Take 1.5 tablets (75 mg) by mouth daily   Refills:  0        nystatin-triamcinolone cream   Commonly known as:  MYCOLOG II   Quantity:  60 g        APPLY TOPICALLY TO AFFECTED AREA(S) THREE TIMES DAILY   Refills:  11        order for DME        Wheelchair with elevating leg rests/foot  rest. For home use. Length of need: 99 mo   Refills:  0        potassium chloride SA 20 MEQ CR tablet   Commonly known as:  K-DUR/KLOR-CON M        Take 1 tablet by mouth once every other day with a meal.   Refills:  0        simvastatin 20 MG tablet   Commonly known as:  ZOCOR        Take 20 mg by mouth at bedtime.   Refills:  0        VITAMIN D-1000 MAX ST 1000 units Tabs   Generic drug:  cholecalciferol        Take one tablet by mouth daily   Refills:  0        warfarin 2 MG tablet   Commonly known as:  COUMADIN   Quantity:  90 tablet        TAKE 1MG BY MOUTH FOR TWO DAYS PER WEEK AND TAKE 2MG FOR FIVE DAYS PER WEEK   Refills:  1                Prescriptions were sent or printed at these locations (1 Prescription)                   [facility] - [place], [place] - [address]   [address] [facility] [address]    Telephone:  [phone]   Fax:  [phone]   Hours:                  Printed at Department/Unit printer (1 of 1)         cephALEXin (KEFLEX) 500 MG capsule                Procedures and tests performed during your visit     Basic metabolic panel    CBC with platelets differential    XR Knee Right 3 Views      Orders Needing Specimen Collection     None      Pending Results     No orders found from 5/25/2018 to 5/28/2018.            Pending Culture Results     No orders found from 5/25/2018 to 5/28/2018.            Pending Results Instructions     If you had any lab results that were not finalized at the time of your Discharge, you can call the ED Lab Result RN at [phone]. You will be contacted by this team for any positive Lab results or changes in treatment. The nurses are available 7 days a week from 10A to 6:30P.  You can leave a message 24 hours per day and they will return your call.        Thank you for choosing [facility]       Thank you for choosing [facility] for your care. Our goal is always to provide you with excellent care. Hearing back from our  "patients is one way we can continue to improve our services. Please take a few minutes to complete the written survey that you may receive in the mail after you visit with us. Thank you!        [employer] Information     [employer] lets you send messages to your doctor, view your test results, renew your prescriptions, schedule appointments and more. To sign up, go to [email or web address] . Click on \"Log in\" on the left side of the screen, which will take you to the Welcome page. Then click on \"Sign up Now\" on the right side of the page.     You will be asked to enter the access code listed below, as well as some personal information. Please follow the directions to create your username and password.     Your access code is: [email or web address]  Expires: 2018  3:03 PM     Your access code will  in 90 days. If you need help or a new code, please call your [place] clinic or [phone].        Care EveryWhere ID     This is your Care EveryWhere ID. This could be used by other organizations to access your [place] medical records  [id number]        Equal Access to Services     BIANCA ARMANDO : Hadabiel Lerma, francesco nelson, richy francois, griffin pate . So [facility] [phone].    ATENCIÓN: Si habla español, tiene a cohn disposición servicios gratuitos de asistencia lingüística. Ten al [phone].    We comply with applicable federal civil rights laws and [place] laws. We do not discriminate on the basis of race, color, national origin, age, disability, sex, sexual orientation, or gender identity.            After Visit Summary       This is your record. Keep this with you and show to your community pharmacist(s) and doctor(s) at your next visit.                  "

## 2018-05-27 NOTE — ED AVS SNAPSHOT
[facility]    [address]    [place] [address]    Phone:  [phone]    Fax:  [phone]                                       Sukhdeep Gomez   MRN: [id number]    Department:  [facility]   Date of Visit:  5/27/2018           After Visit Summary Signature Page     I have received my discharge instructions, and my questions have been answered. I have discussed any challenges I see with this plan with the nurse or doctor.    ..........................................................................................................................................  Patient/Patient Representative Signature      ..........................................................................................................................................  Patient Representative Print Name and Relationship to Patient    ..................................................               ................................................  Date                                            Time    ..........................................................................................................................................  Reviewed by Signature/Title    ...................................................              ..............................................  Date                                                            Time

## 2018-05-30 NOTE — MR AVS SNAPSHOT
Sukhdeep Gomez   5/2/2018 10:15 AM   Anticoagulation Therapy Visit    Description:  83 year old male   Provider:  [facility] 1   Department:  [facility]           INR as of 5/2/2018     Today's INR 3.1!      Anticoagulation Summary as of 5/2/2018     INR goal 2.0-3.0   Today's INR 3.1!   Full instructions 5/2: Hold; 5/3: Hold; 5/4: Hold; 5/5: Hold; 5/6: Hold; Otherwise 1 mg on Wed, Sat; 2 mg all other days   Next INR check 5/7/2018    Indications   Long term (current) use of anticoagulants [Z79.01]  Unspecified atrial fibrillation [I48.91]         Description     Continue to hold dose through the weekend and recheck INR on 5/7/18. Yolanda Avila RN    5/2/2018  10:08 AM        Your next Anticoagulation Clinic appointment(s)     May 02, 2018 10:15 AM CDT   Anticoagulation Visit with [facility] 1   [facility] ([facility])    [address]  [place] [address]   [phone]              May 2018 Details    Sun Mon Tue Wed Thu Fri Sat       1               2      Hold   See details      3      Hold         4      Hold         5      Hold           6      Hold         7            8               9               10               11               12                 13               14               15               16               17               18               19                 20               21               22               23               24               25               26                 27               28               29               30               31                  Date Details   05/02 This INR check       Date of next INR:  5/7/2018         How to take your warfarin dose     To take:  2 mg Take 1 of the 2 mg tablets.    Hold Do not take your warfarin dose. See the Details table to the right for additional instructions.                
Never smoker

## 2018-06-01 NOTE — MR AVS SNAPSHOT
Sukhdeep Gomez   6/1/2018 1:45 PM   Anticoagulation Therapy Visit    Description:  83 year old male   Provider:  [facility] 1   Department:  [facility]           INR as of 6/1/2018     Today's INR 2.9      Anticoagulation Summary as of 6/1/2018     INR goal 2.0-3.0   Today's INR 2.9   Full warfarin instructions 2 mg on Mon, Wed; 1 mg all other days   Next INR check 6/8/2018    Indications   Long term (current) use of anticoagulants [Z79.01]  Unspecified atrial fibrillation [I48.91]         Description     Decrease dose and recheck in 1 week  ..............Yolanda Avila RN.............  6/1/2018    1:52 PM        June 2018 Details    Sun Mon Tue Wed Thu Fri Sat          1      1 mg   See details      2      1 mg           3      1 mg         4      2 mg         5      1 mg         6      2 mg         7      1 mg         8            9                 10               11               12               13               14               15               16                 17               18               19               20               21               22               23                 24               25               26               27               28               29               30                Date Details   06/01 This INR check       Date of next INR:  6/8/2018         How to take your warfarin dose     To take:  1 mg Take 0.5 of a 2 mg tablet.    To take:  2 mg Take 1 of the 2 mg tablets.

## 2018-06-01 NOTE — PROGRESS NOTES
ANTICOAGULATION FOLLOW-UP CLINIC VISIT    Patient Name:  Sukhdeep Gomez  Date:  6/1/2018  Contact Type:  Face to Face    SUBJECTIVE:     Patient Findings     Positives Antibiotic use or infection (started keflex for cellulitis on 5/27/18 x 10 days)           OBJECTIVE    INR Protime   Date Value Ref Range Status   06/01/2018 2.9 (A) 0.86 - 1.14 Final       ASSESSMENT / PLAN  INR assessment THER    Recheck INR In: 1 WEEK    INR Location Clinic      Anticoagulation Summary as of 6/1/2018     INR goal 2.0-3.0   Today's INR 2.9   Warfarin maintenance plan 2 mg (2 mg x 1) on Mon, Wed; 1 mg (2 mg x 0.5) all other days   Full warfarin instructions 2 mg on Mon, Wed; 1 mg all other days   Weekly warfarin total 9 mg   Plan last modified Yolanda Avila, CHARY (6/1/2018)   Next INR check 6/8/2018   Priority INR   Target end date Indefinite    Indications   Long term (current) use of anticoagulants [Z79.01]  Unspecified atrial fibrillation [I48.91]         Anticoagulation Episode Summary     INR check location     Preferred lab     Send INR reminders to  INR    Comments       Anticoagulation Care Providers     Provider Role Specialty Phone number    Cesar Sanders MD [facility] [phone]            See the Encounter Report to view Anticoagulation Flowsheet and Dosing Calendar (Go to Encounters tab in chart review, and find the Anticoagulation Therapy Visit)        Yolanda Avila, RN

## 2018-06-08 NOTE — PROGRESS NOTES
ANTICOAGULATION FOLLOW-UP CLINIC VISIT    Patient Name:  Sukhdeep Gomez  Date:  6/8/2018  Contact Type:  Face to Face    SUBJECTIVE:     Patient Findings     Positives No Problem Findings           OBJECTIVE    INR Protime   Date Value Ref Range Status   06/08/2018 1.9 (A) 2.0 - 3.0 Final       ASSESSMENT / PLAN  INR assessment SUB    Recheck INR In: 2 WEEKS    INR Location Clinic      Anticoagulation Summary as of 6/8/2018     INR goal 2.0-3.0   Today's INR 1.9!   Warfarin maintenance plan 2 mg (2 mg x 1) on Mon, Wed, Fri; 1 mg (2 mg x 0.5) all other days   Full warfarin instructions 2 mg on Mon, Wed, Fri; 1 mg all other days   Weekly warfarin total 10 mg   Plan last modified Shasta Restrepo RN (6/8/2018)   Next INR check 6/27/2018   Priority INR   Target end date Indefinite    Indications   Long term (current) use of anticoagulants [Z79.01]  Unspecified atrial fibrillation [I48.91]         Anticoagulation Episode Summary     INR check location     Preferred lab     Send INR reminders to  INR    Comments       Anticoagulation Care Providers     Provider Role Specialty Phone number    Cesar Sanders MD [facility] [phone]            See the Encounter Report to view Anticoagulation Flowsheet and Dosing Calendar (Go to Encounters tab in chart review, and find the Anticoagulation Therapy Visit)        Shasta Restrepo RN

## 2018-06-08 NOTE — MR AVS SNAPSHOT
Sukhdeep Bradyfidencio   6/8/2018 10:30 AM   Anticoagulation Therapy Visit    Description:  83 year old male   Provider:  [facility] 1   Department:  [facility]           INR as of 6/8/2018     Today's INR 1.9!      Anticoagulation Summary as of 6/8/2018     INR goal 2.0-3.0   Today's INR 1.9!   Full warfarin instructions 2 mg on Mon, Wed, Fri; 1 mg all other days   Next INR check 6/27/2018    Indications   Long term (current) use of anticoagulants [Z79.01]  Unspecified atrial fibrillation [I48.91]         Description     Increase Warfarin/Coumadin and recheck INR in 2 weeks.  Shasta Restrepo ....................  6/8/2018   11:24 AM  Check one week following cataract surgery (06/20/18)        June 2018 Details    Sun Mon Tue Wed Thu Fri Sat          1               2                 3               4               5               6               7               8      2 mg   See details      9      1 mg           10      1 mg         11      2 mg         12      1 mg         13      2 mg         14      1 mg         15      2 mg         16      1 mg           17      1 mg         18      2 mg         19      1 mg         20      2 mg         21      1 mg         22      2 mg         23      1 mg           24      1 mg         25      2 mg         26      1 mg         27            28               29               30                Date Details   06/08 This INR check       Date of next INR:  6/27/2018         How to take your warfarin dose     To take:  1 mg Take 0.5 of a 2 mg tablet.    To take:  2 mg Take 1 of the 2 mg tablets.

## 2018-06-12 PROBLEM — K92.2 GI BLEEDING: Status: RESOLVED | Noted: 2018-01-01 | Resolved: 2018-01-01

## 2018-06-12 PROBLEM — Z79.01 LONG TERM (CURRENT) USE OF ANTICOAGULANTS: Status: RESOLVED | Noted: 2018-01-01 | Resolved: 2018-01-01

## 2018-06-12 PROBLEM — Z98.890 POSTOPERATIVE STATE: Status: RESOLVED | Noted: 2017-11-01 | Resolved: 2018-01-01

## 2018-06-12 NOTE — NURSING NOTE
"Date of Surgery: 6/20/18  Type of Surgery: Left Cataract and glaucoma stent surgery  Surgeon: Dr. Irwin  Hospital:  [facility]  Fax: [phone]    Fever/Chills or other infectious symptoms in past month: NO  >10lb weight loss in past two months: NO  O2 SAT: 97    Health Care Directive/Code status:  Full  Hx of blood transfusions:   YES   Td up to date:  6/30/11  History of VRE/MRSA:  NO Date:    Preoperative Evaluation: Obstructive Sleep Apnea screening    S: Snore -  Do you snore loudly? (louder than talking or loud enough to be heard through closed doors)NO  T: Tired - Do you often feel tired, fatigued, or sleepy during the daytime?NO  O: Observed - Has anyone ever observed you stop breathing during your sleep?NO  P: Pressure - Do you have or are you being treated for high blood pressure? NO  B: BMI - BMI greater than 35kg/m2? NO  A: Age - Age over 50 years old?YEs  N: Neck - Neck circumference greater than 40 cm? NO  G: Gender - Gender: Male?YEs    Total number of \"YES\" responses:  2    Scoring: Low risk of RANGEL 0-2  At Risk of RANGEL: >3 High Risk of RANGEL: 5-8    Maddy Torres LPN ...... 6/12/2018 11:32 AM      "

## 2018-06-12 NOTE — PATIENT INSTRUCTIONS
Patient should take the following medications the morning of surgery with a small sip of water: furosemide, lisinopril, metoprolol.  Patient was instructed to hold the following medications the morning of surgery: hold all meds.     Patient was advised to call our office and the surgical services with any change in condition or new symptoms if they were to develop between today and their surgical date.  Especially any cardiopulmonary symptoms or symptoms concerning for an infection.     Hold warfarin 3 days prior to surgery.     Hold insulin the morning of surgery.     Continue taking aspirin up to surgery.     Discontinue aleve, naproxen and ibuprofen 7 days prior to surgery to reduce bleeding risk.  It is fine to take tylenol the week before surgery.  Hold vitamins and herbal remedies for 7 days before surgery.

## 2018-06-12 NOTE — MR AVS SNAPSHOT
After Visit Summary   6/12/2018    Sukhdeep Gomez    MRN: [id number]           Patient Information     Date Of Birth          8/19/1934        Visit Information        Provider Department      6/12/2018 11:15 AM Velia Corbett PA-C [facility]        Care Instructions    Patient should take the following medications the morning of surgery with a small sip of water: furosemide, lisinopril, metoprolol.  Patient was instructed to hold the following medications the morning of surgery: hold all meds.     Patient was advised to call our office and the surgical services with any change in condition or new symptoms if they were to develop between today and their surgical date.  Especially any cardiopulmonary symptoms or symptoms concerning for an infection.     Hold warfarin 3 days prior to surgery.     Hold insulin the morning of surgery.     Continue taking aspirin up to surgery.     Discontinue aleve, naproxen and ibuprofen 7 days prior to surgery to reduce bleeding risk.  It is fine to take tylenol the week before surgery.  Hold vitamins and herbal remedies for 7 days before surgery.            Follow-ups after your visit        Follow-up notes from your care team     Return if symptoms worsen or fail to improve.      Your next 10 appointments already scheduled     Jun 18, 2018 10:45 AM CDT   Return Visit with Edgardo Sanchez MD   [facility] ([facility])    [address]  [place] [address]   [phone]            Jun 27, 2018 10:30 AM CDT   Anticoagulation Visit with  ANTI COAG 1   [facility] ([facility])    [address]  [place] [address]   [phone]            Oct 23, 2018  1:00 PM CDT   Pacemaker Check with  PACEMAKER   [facility] ([facility])    [address]  [place] [address]   [phone]  "             Who to contact     If you have questions or need follow up information about today's clinic visit or your schedule please contact [facility] directly at [phone].  Normal or non-critical lab and imaging results will be communicated to you by [employer], letter or phone within 4 business days after the clinic has received the results. If you do not hear from us within 7 days, please contact the clinic through [employer] or phone. If you have a critical or abnormal lab result, we will notify you by phone as soon as possible.  Submit refill requests through [employer] or call your pharmacy and they will forward the refill request to us. Please allow 3 business days for your refill to be completed.          Additional Information About Your Visit        [employer] Information     [employer] lets you send messages to your doctor, view your test results, renew your prescriptions, schedule appointments and more. To sign up, go to [email or web address] . Click on \"Log in\" on the left side of the screen, which will take you to the Welcome page. Then click on \"Sign up Now\" on the right side of the page.     You will be asked to enter the access code listed below, as well as some personal information. Please follow the directions to create your username and password.     Your access code is: [email or web address]  Expires: 2018  3:03 PM     Your access code will  in 90 days. If you need help or a new code, please call your [place] clinic or [phone].        Care EveryWhere ID     This is your Care EveryWhere ID. This could be used by other organizations to access your [place] medical records  [id number]        Your Vitals Were     Pulse Height Pulse Oximetry BMI (Body Mass Index)          82 5' 6\" (1.676 m) 97% 24.69 kg/m2         Blood Pressure from Last 3 Encounters:   18 96/56   18 125/69   18 103/64    Weight from Last 3 Encounters:   18 153 lb (69.4 kg) "   05/27/18 155 lb (70.3 kg)   05/08/18 155 lb (70.3 kg)              Today, you had the following     No orders found for display       Primary Care Provider Office Phone # Fax #    Cesar Sanders [phone]       [address]  [facility] [address]        Equal Access to Services     [facility]: Hadii aad ku hadasho Soomaali, waaxda luqadaha, qaybta kaalmada adeegyada, waxay idiin hayaan adeeg yordanlucian lagila . So [facility] [phone].    ATENCIÓN: Si habla español, tiene a cohn disposición servicios gratuitos de asistencia lingüística. Llame al [phone].    We comply with applicable federal civil rights laws and [place] laws. We do not discriminate on the basis of race, color, national origin, age, disability, sex, sexual orientation, or gender identity.            Thank you!     Thank you for choosing [facility]  for your care. Our goal is always to provide you with excellent care. Hearing back from our patients is one way we can continue to improve our services. Please take a few minutes to complete the written survey that you may receive in the mail after your visit with us. Thank you!             Your Updated Medication List - Protect others around you: Learn how to safely use, store and throw away your medicines at www.disposemymeds.org.          This list is accurate as of 6/12/18 12:19 PM.  Always use your most recent med list.                   Brand Name Dispense Instructions for use Diagnosis    acetaminophen 325 MG tablet    TYLENOL     Take 975 mg by mouth every 6 hours if needed. Max acetaminophen dose: 4000mg in 24 hrs.        allopurinol 100 MG tablet    ZYLOPRIM     Take 1 tablet by mouth once daily.        amoxicillin 500 MG capsule    AMOXIL     TAKE 4 CAPSULES BY MOUTH ONE HOUR PRIOR TO APPT        ASPIRIN 81 PO      Take one tablet by mouth daily        DUREZOL 0.05 % ophthalmic emulsion   Generic drug:  difluprednate      INSTILL ONE DROP  IN THE LEFT EYE TWICE DAILY, USE TWICE DAILY FOR 4 WEEKS, BEGIN AFTER SURGERY        furosemide 20 MG tablet    LASIX    137 tablet    Take one tablet by mouth every morning and take an additional tab every other morning    Chronic systolic heart failure (H)       ILEVRO 0.3 % ophthalmic suspension   Generic drug:  nepafenac      INSTILL ONE DROP IN THE LEFT EYE DAILY. USE EVERY DAY FOR 3 DAYS BEFORE, THEN FOR 6 WEEKS AFTER.        insulin aspart prot & aspart injection    NovoLOG MIX 70/30 PEN    45 mL    35 units every morning and 10 units every evening    Diabetes mellitus with multiple complications (H)       insulin pen needle 31G X 8 MM    EASY TOUCH PEN NEEDLES    200 each    FOR ADMINISTERING INSULIN AT HOME TWICE DAILY. Dx: E11.8    Diabetes mellitus with multiple complications (H)       lisinopril 2.5 MG tablet    PRINIVIL/Zestril     Take 2.5 mg by mouth daily        metoprolol succinate 50 MG 24 hr tablet    TOPROL-XL    135 tablet    Take 1.5 tablets (75 mg) by mouth daily    Essential hypertension       moxifloxacin 0.5 % ophthalmic solution    VIGAMOX     INSTILL ONE DROP IN THE LEFT EYE FOUR TIMES DAILY. USE FOUR TIMES DAILY 3 DAYS BEFORE SURGERY, THEN FOUR TIMES DAILY FOR 1 WEEK AFTER        nystatin-triamcinolone cream    MYCOLOG II    60 g    APPLY TOPICALLY TO AFFECTED AREA(S) THREE TIMES DAILY    Yeast dermatitis of penis       order for DME      Wheelchair with elevating leg rests/foot rest. For home use. Length of need: 99 mo        potassium chloride SA 20 MEQ CR tablet    K-DUR/KLOR-CON M     Take 1 tablet by mouth once every other day with a meal.        simvastatin 20 MG tablet    ZOCOR     Take 20 mg by mouth at bedtime.        VITAMIN D-1000 MAX ST 1000 units Tabs   Generic drug:  cholecalciferol      Take one tablet by mouth daily        warfarin 2 MG tablet    COUMADIN    90 tablet    Take 1 mg x four days/week and 2 mg x three days/week (Mondays, Wednesdays, and Fridays).  Or as  directed by the [facility].    Atrial fibrillation (H)

## 2018-06-12 NOTE — LETTER
Sukhdeep Gomez  [address] [address] [address]    6/12/2018      Dear Mr. Gomez,      We've received the results back from the laboratory for the samples you gave in clinic.  Your labs are stable. Please contact us at [phone] with any questions or concerns that you have.    I attached your lab results for your records.        Take Care,         Velia Corbett PA-C    Resulted Orders   Hemoglobin   Result Value Ref Range    Hemoglobin 10.5 (L) 13.3 - 17.7 g/dL   Basic metabolic panel   Result Value Ref Range    Sodium 137 134 - 144 mmol/L    Potassium 4.0 3.5 - 5.1 mmol/L    Chloride 106 98 - 107 mmol/L    Carbon Dioxide 20 (L) 21 - 31 mmol/L    Anion Gap 11 3 - 14 mmol/L    Glucose 250 (H) 70 - 105 mg/dL    Urea Nitrogen 56 (H) 7 - 25 mg/dL    Creatinine 1.53 (H) 0.70 - 1.30 mg/dL    GFR Estimate 44 (L) >60 mL/min/1.7m2    GFR Estimate If Black 53 (L) >60 mL/min/1.7m2    Calcium 8.6 8.6 - 10.3 mg/dL

## 2018-06-12 NOTE — PROGRESS NOTES
----------------- PREOPERATIVE EXAM ------------------  6/12/2018    SUBJECTIVE:  Sukhdeep Gomez is a 83 year old male here for preoperative optimization.    I was asked to see Sukhdeep Gomez by Dr. Irwin for a preoperative optimization due to history of heart disease, diabetes mellitus and hypertension.    Patient has a history of left eye issues for 5 years.  Has a cataract in the left eye.  He also needs to have a stent placed due to glaucoma.  Patient has decreased vision in the left eye.      No recent cough or cold symptoms.  No recent fevers, chills, headaches, nausea, vomiting, lightheadedness, dizziness, chest pain, palpitations, problems breathing, GI symptoms, urinary symptoms.      A-FIB - Patient has a longstanding history of chronic A-fib currently on rate control with metoprolol succinate. Current treatment regimen includes Warfarin and Aspirin for stroke prevention and denies significant symptoms of lightheadedness, palpitations or dyspnea.  No chest pain, palpitations, problems breathing, GI symptoms, urinary symptoms, headaches, arm pain, jaw pain.                                                                                                                                                                            .  CAD - Patient has a longstanding history of moderate-severe CAD. Patient denies recent chest pain or NTG use, denies exercise induced dyspnea or PND. Last EKG 4/27/2018.                                                                                                                                                    .  CHF - Patient has a longstanding history of moderate-severe CHF. Exacerbating conditions include hypertension, dystolic dysfunction and atrial fibrilation. Currently the patient's condition is stable. Current treatment regimen includes beta blocker, ASA and diuretic: furosemide. The patient denies chest pain, edema, orthopnea, SOB or recent weight gain. Last  Echocardiogram 4/9/2018 which showed an ejection fraction of 40%.  Has implantable cardioverterdefibrillator.                                                                                                                                                                             .  DIABETES - Patient has a longstanding history of diabetes mellitus type II . Patient is being treated with insulin injections and ASA and denies significant side effects. Control has been good.  Blood pressures have been stable in the morning around 170.  Evening blood sugars are around 190.  Complicating factors include but are not limited to: hypertension, hyperlipidemia, neuropathy and chronic kidney disease.                                                                                                                           .  HYPERLIPIDEMIA - Patient has a long history of significant Hyperlipidemia requiring medication for treatment with recent good control. Patient reports no problems or side effects with the medication.                                                                                                                             .                          .  HYPERTENSION - Patient has longstanding history of HTN , currently denies any symptoms referable to elevated blood pressure. Specifically denies chest pain, palpitations, dyspnea, orthopnea, PND or peripheral edema. Blood pressure readings have been in normal range. Current medication regimen is as listed below. Patient denies any side effects of medication.                   BP Readings from Last 6 Encounters:   06/12/18 96/56   05/27/18 125/69   05/22/18 103/64   05/08/18 98/65   04/27/18 102/80   04/26/18 122/80                                                                                                                      .  RENAL INSUFFICIENCY - Patient has a longstanding history of moderate-severe chronic renal insufficiency.  No dysuria, frequency,  "urgency, hematuria.                                       Nursing Notes:   Julia Torres LPN  6/12/2018 11:50 AM  Addendum  Date of Surgery: 6/20/18  Type of Surgery: Left Cataract and glaucoma stent surgery  Surgeon: Dr. Irwin  Hospital:  [facility]  Fax: [phone]    Fever/Chills or other infectious symptoms in past month: NO  >10lb weight loss in past two months: NO  O2 SAT: 97    Health Care Directive/Code status:  Full  Hx of blood transfusions:   YES   Td up to date:  6/30/11  History of VRE/MRSA:  NO Date:    Preoperative Evaluation: Obstructive Sleep Apnea screening    S: Snore -  Do you snore loudly? (louder than talking or loud enough to be heard through closed doors)NO  T: Tired - Do you often feel tired, fatigued, or sleepy during the daytime?NO  O: Observed - Has anyone ever observed you stop breathing during your sleep?NO  P: Pressure - Do you have or are you being treated for high blood pressure? NO  B: BMI - BMI greater than 35kg/m2? NO  A: Age - Age over 50 years old?YEs  N: Neck - Neck circumference greater than 40 cm? NO  G: Gender - Gender: Male?YEs    Total number of \"YES\" responses:  2    Scoring: Low risk of RANGEL 0-2  At Risk of RANGEL: >3 High Risk of RANGEL: 5-8    Maddy Torres LPN ...... 6/12/2018 11:32 AM        Patient Active Problem List    Diagnosis Date Noted     Paroxysmal atrial fibrillation (H) 02/21/2018     Priority: Medium     Overview:   chronic warfarin anticoagulation and toprol for rate control       S/P CABG x 4 02/21/2018     Priority: Medium     Overview:   status post 4 vessel CABG, [facility]       Unspecified atrial fibrillation 02/11/2018     Priority: Medium     Ascending aorta dilatation (H) 02/08/2018     Priority: Medium     H/O myocardial infarction, greater than 8 weeks 02/08/2018     Priority: Medium     Stented coronary artery 02/08/2018     Priority: Medium     Atrial fibrillation (H) 01/18/2018     Priority: Medium     " Overview:   chronic warfarin anticoagulation and toprol for rate control       Osteoarthrosis 01/18/2018     Priority: Medium     Diabetes mellitus with multiple complications (H) 01/18/2018     Priority: Medium     Essential hypertension 01/18/2018     Priority: Medium     Gout of left foot due to renal impairment 01/18/2018     Priority: Medium     Overview:   tophaceous       Ischemic cardiomyopathy 01/18/2018     Priority: Medium     Lumbago 01/18/2018     Priority: Medium     Enthesopathy of ankle and tarsus 01/18/2018     Priority: Medium     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 01/18/2018     Priority: Medium     Overview:   status post TTE       Mixed hyperlipidemia 01/18/2018     Priority: Medium     S/P CABG (coronary artery bypass graft) 01/18/2018     Priority: Medium     Overview:   status post 4 vessel CABG, [facility]       Spinal stenosis 01/18/2018     Priority: Medium     Squamous cell carcinoma of skin 01/18/2018     Priority: Medium     Chronic systolic heart failure (H) 11/08/2017     Priority: Medium     Basal cell carcinoma of right forehead 10/23/2017     Priority: Medium     Implantable cardioverter-defibrillator, [employer], Single lead 10/20/2017     Priority: Medium     [employer] Incepta [id number]  #[id number]  4/25/13   RV lead CPI [id number]  #[id number]  5/30/07  CHF/Madit II       Hamstring tightness of left lower extremity 02/07/2017     Priority: Medium     Altered level of consciousness 12/04/2016     Priority: Medium     DM II (diabetes mellitus, type II), controlled (H) 12/04/2016     Priority: Medium     Elevated INR 12/04/2016     Priority: Medium     Elevated troponin 12/04/2016     Priority: Medium     Hypoglycemia associated with diabetes (H) 12/04/2016     Priority: Medium     Yeast dermatitis 12/04/2016     Priority: Medium     Anticoagulation monitoring, INR range 2-3 05/13/2015     Priority: Medium     History of TIA (transient ischemic  attack) 11/17/2014     Priority: Medium     CKD (chronic kidney disease) stage 3, GFR 30-59 ml/min 10/17/2014     Priority: Medium     CKD (chronic kidney disease) stage 4, GFR 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       Diabetic neuropathy (H) 11/11/2013     Priority: Medium     Prostate CA (H) 11/11/2013     Priority: Medium     History of urethral stricture 11/07/2013     Priority: Medium     Orthostatic hypotension 11/05/2013     Priority: Medium     Hematoma of right hip 11/21/2012     Priority: Medium     Systolic congestive heart failure (H) 11/14/2012     Priority: Medium     Overview:   9/19/2012 ECHO  Final Impression:   1) Dilated left ventricle to a moderate degree with severely depressed left ventricular systolic function and ejection fraction of 15%. See comments in text regarding question of left ventricular thrombus. Echo density noted there is felt to be artifactual, but the possibility of LV thrombus cannot be fully excluded. Consider repeat study with contrast.   2) Severe left atrial enlargement.   3) Sclerotic aortic valve with mild aortic insufficiency.   4) Sclerotic mitral valve with mild insufficiency.   5) Sclerotic tricuspid valve with mild to moderate regurgitation.   6) Probable moderate elevation right ventricular systolic pressure to 48 mmHg plus right atrial pressure.   7) Study is performed with patient in atrial fibrillation.   8) When compared with prior study of August 7, 2009, there has been further increase in right ventricular systolic pressure  from 29 mmHg plus right atrial to 48 mmHg plus right atrial pressure and the aortic root and ascending aorta appear to have further increased in size from prior 4 cm in the mid ascending aorta to 4.4 cm. Additionally, the issue of possible LV thrombus was not raised in the report of the prior study.   Interpretation: M-Mode, two-dimensional, spectral and color flow Doppler evaluations were performed with standard echocardiographic images with the patient in atrial fibrillation. The study is technically adequate for interpretation.   Aortic root and ascending aorta are mildly to moderately enlarged at 4.5 and 4.4 cm, respectively. There is severe left atrial enlargement. The right atrium is probably within normal limits. The right ventricle is also probably of normal size with normal wall motion. A device lead is noted throughout the right heart.   The left ventricular chamber is moderately enlarged at 6.2 cm. Left ventricular wall thickness appears to be probably mildly increased in the intraventricular septum.   The inferior vena cava is not well visualized.   All cardiac valves appear to be sclerotic. There is mild aortic insufficiency and probable mild mitral regurgitation. There is mild to moderate tricuspid regurgitation with regurgitation jet velocity suggesting right ventricular systolic pressure to be elevated to 48 mmHg plus indeterminate right atrial pressure, suggesting at least probable moderate pulmonary hypertension.   Left ventricular ejection fraction is severely reduced with severe global hypokinesis with estimated ejection fraction in the range of 15%. There is obstruction noted on apical views within the left ventricular apex which is felt to be artifactual; however, the possibility of left ventricular thrombus cannot be excluded. Consider repeat study with contrast for better definition.   No pericardial effusion is noted.   Left ventricular diastolic function is indeterminate in the setting of  the atrial fibrillation.        Chronic systolic congestive heart failure (H) 11/14/2012     Priority: Medium     Overview:   9/19/2012 ECHO  Final Impression:   1) Dilated left ventricle to a moderate degree with severely depressed left ventricular systolic function and ejection fraction of 15%. See comments in text regarding question of left ventricular thrombus. Echo density noted there is felt to be artifactual, but the possibility of LV thrombus cannot be fully excluded. Consider repeat study with contrast.   2) Severe left atrial enlargement.   3) Sclerotic aortic valve with mild aortic insufficiency.   4) Sclerotic mitral valve with mild insufficiency.   5) Sclerotic tricuspid valve with mild to moderate regurgitation.   6) Probable moderate elevation right ventricular systolic pressure to 48 mmHg plus right atrial pressure.   7) Study is performed with patient in atrial fibrillation.   8) When compared with prior study of August 7, 2009, there has been further increase in right ventricular systolic pressure from 29 mmHg plus right atrial to 48 mmHg plus right atrial pressure and the aortic root and ascending aorta appear to have further increased in size from prior 4 cm in the mid ascending aorta to 4.4 cm. Additionally, the issue of possible LV thrombus was not raised in the report of the prior study.   Interpretation: M-Mode, two-dimensional, spectral and color flow Doppler evaluations were performed with standard echocardiographic images with the patient in atrial fibrillation. The study is technically adequate for interpretation.   Aortic root and ascending aorta are mildly to moderately enlarged at 4.5 and 4.4 cm, respectively. There is severe left atrial enlargement. The right atrium is probably within normal limits. The right ventricle is also probably of normal size with normal wall motion. A device lead is noted throughout the right heart.   The left ventricular chamber is moderately enlarged at 6.2  cm. Left ventricular wall thickness appears to be probably mildly increased in the intraventricular septum.   The inferior vena cava is not well visualized.   All cardiac valves appear to be sclerotic. There is mild aortic insufficiency and probable mild mitral regurgitation. There is mild to moderate tricuspid regurgitation with regurgitation jet velocity suggesting right ventricular systolic pressure to be elevated to 48 mmHg plus indeterminate right atrial pressure, suggesting at least probable moderate pulmonary hypertension.   Left ventricular ejection fraction is severely reduced with severe global hypokinesis with estimated ejection fraction in the range of 15%. There is obstruction noted on apical views within the left ventricular apex which is felt to be artifactual; however, the possibility of left ventricular thrombus cannot be excluded. Consider repeat study with contrast for better definition.   No pericardial effusion is noted.   Left ventricular diastolic function is indeterminate in the setting of the atrial fibrillation.        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     Priority: Medium     Onychomycosis 08/05/2010     Priority: Medium     Muscle weakness (generalized) 06/09/2009     Priority: Medium     Overview:   IMO Update 10/11       Myopathy 04/08/2009     Priority: Medium       Past Medical History:   Diagnosis Date     Acute gastritis with hemorrhage 8/10/2009    Overview:  NSAID     Acute on chronic systolic congestive heart failure (H)     11/14/2012,15-20% EF 11/2012     Atherosclerotic heart disease of native coronary artery without angina pectoris      Atrial fibrillation (H)      Cardiomyopathy (H)      Chronic kidney disease     Stage III     Disorder of muscle     2/7/2017     Essential (primary) hypertension     chronic     Gastrointestinal hemorrhage     2009,secondary to NSAID  gastritis     GI bleeding 1/18/2018    Overview:  upper/anemia 8.1     Gout     tophaceous, right foot, right fingers     Hyperlipidemia     No Comments Provided     Low back pain     lumbar spinal stenosis     Malignant neoplasm of prostate (H)     2003     Osteoarthritis      Other nonrheumatic aortic valve disorders     with mild Al     Other retention of urine (CODE)     2010,with stricture     Other specified disorders of arteries and arterioles (CODE) (H)     moderate, with mild increase in diameter of the ascending aorta     Polyneuropathy     via 2008 EMG in [place]     Systolic congestive heart failure (H) 2009    mild to moderate left ventricular enlargement with marked decreased in systolic function.     Transient cerebral ischemic attack 11/17/2014     Type 2 diabetes mellitus without complications (H) 2009       Past Surgical History:   Procedure Laterality Date     ARTHROPLASTY KNEE      2011,left     BYPASS GRAFT ARTERY CORONARY      4 vessel, [facility], 1989     CIRCUMCISION N/A 5/8/2018    Procedure: CIRCUMCISION;  Circumcision ,  Glans Biopsy Cystoscopy;  Surgeon: Edgardo Sanchez MD;  Location:  OR     CYSTOSCOPY      2009,& dilation of urethral stricture, Dr. Wallace     CYSTOSCOPY      2010,& catheter placement for acute obstruction, Dr. Elise     CYSTOSTOMY, INSERT TUBE SUPRAPUBIC, COMBINED N/A 5/22/2018    Procedure: COMBINED CYSTOSTOMY, INSERT TUBE SUPRAPUBIC;  Supra tube Placement    .;  Surgeon: Edgardo Sanchez MD;  Location:  OR     ESOPHAGOSCOPY, GASTROSCOPY, DUODENOSCOPY (EGD), COMBINED      2007,& colonoscopy with polypectomy, [facility] essetnially normal EGD with small polyp removed     HEART CATH, ANGIOPLASTY      approx. 2003,angiogram, 2 stents, [facility]     IMPLANT PACEMAKER      2007,Guidant ICD, CPI Vitality 2 EL, model #T177, serial #[id number], which was implanted on 05/30/2007 at [facility].     LAMINECTOMY LUMBAR ONE LEVEL      2006,decompressive, L3, L4 & L5 with improvement,   [facility]       Current Outpatient Prescriptions   Medication Sig Dispense Refill     acetaminophen (TYLENOL) 325 MG tablet Take 975 mg by mouth every 6 hours if needed. Max acetaminophen dose: 4000mg in 24 hrs.       allopurinol (ZYLOPRIM) 100 MG tablet Take 1 tablet by mouth once daily.       amoxicillin (AMOXIL) 500 MG capsule TAKE 4 CAPSULES BY MOUTH ONE HOUR PRIOR TO APPT       ASPIRIN 81 PO Take one tablet by mouth daily       cholecalciferol (VITAMIN D-1000 MAX ST) 1000 UNITS TABS Take one tablet by mouth daily       DUREZOL 0.05 % ophthalmic emulsion INSTILL ONE DROP IN THE LEFT EYE TWICE DAILY, USE TWICE DAILY FOR 4 WEEKS, BEGIN AFTER SURGERY  1     furosemide (LASIX) 20 MG tablet Take one tablet by mouth every morning and take an additional tab every other morning 137 tablet 0     ILEVRO 0.3 % ophthalmic suspension INSTILL ONE DROP IN THE LEFT EYE DAILY. USE EVERY DAY FOR 3 DAYS BEFORE, THEN FOR 6 WEEKS AFTER.  1     insulin aspart prot & aspart (NOVOLOG MIX 70/30 PEN) injection 35 units every morning and 10 units every evening 45 mL 3     insulin pen needle (EASY TOUCH PEN NEEDLES) 31G X 8 MM FOR ADMINISTERING INSULIN AT HOME TWICE DAILY. Dx: E11.8 200 each 1     lisinopril (PRINIVIL/ZESTRIL) 2.5 MG tablet Take 2.5 mg by mouth daily       metoprolol succinate (TOPROL-XL) 50 MG 24 hr tablet Take 1.5 tablets (75 mg) by mouth daily 135 tablet 0     moxifloxacin (VIGAMOX) 0.5 % ophthalmic solution INSTILL ONE DROP IN THE LEFT EYE FOUR TIMES DAILY. USE FOUR TIMES DAILY 3 DAYS BEFORE SURGERY, THEN FOUR TIMES DAILY FOR 1 WEEK AFTER  0     nystatin-triamcinolone (MYCOLOG II) cream APPLY TOPICALLY TO AFFECTED AREA(S) THREE TIMES DAILY 60 g 11     order for DME Wheelchair with elevating leg rests/foot rest. For home use. Length of need: 99 mo       potassium chloride SA (K-DUR/KLOR-CON M) 20 MEQ CR tablet Take 1 tablet by mouth once every other day with a meal.       simvastatin (ZOCOR) 20 MG tablet Take 20 mg by  "mouth at bedtime.       warfarin (COUMADIN) 2 MG tablet Take 1 mg x four days/week and 2 mg x three days/week (, , and ).  Or as [facility] by [facility]. 90 tablet 1       Recent use of ibuprofen, aspirin or aleve: No     Allergies:  Allergies   Allergen Reactions     Codeine Unknown     Naproxen Other (See Comments)     \"kidneys shut down\"     Sulfa Drugs Unknown     Tramadol Nausea     Vancomycin Unknown       Latex allergy  No    Family History   Problem Relation Age of Onset     HEART DISEASE Father      Heart Disease     Prostate Cancer Brother      Cancer-prostate       Denies family hx of bleeding tendencies, anesthesia complications, or other problems with surgery.      Social History     Social History     Marital status:      Spouse name: N/A     Number of children: N/A     Years of education: N/A     Occupational History     Not on file.     Social History Main Topics     Smoking status: Never Smoker     Smokeless tobacco: Never Used     Alcohol use No     Drug use: No      Comment: Drug use: No     Sexual activity: Yes     Other Topics Concern     Not on file     Social History Narrative    wife    Retired  for [employer].   The patient's wife  at home 2011.    Son is very helpful in the patient's care.  Patient has never smoked. Alcohol Use - no         ROS:    surgical:  patient denies previous complications from prior surgeries including but not limited to prolonged bleeding, anesthesia complications, dysrhythmias, surgical wound infections, or prolonged hospital stay.      REVIEW OF SYSTEMS:  A comprehensive review of systems was negative except foritems noted in HPI/Subjective.    Constitutional: Negative for fever, chills and fatigue .   Eyes: Negative for irritation  and redness .  Ears, nose, mouth, throat, and face: Negative for ear drainage, nasal congestion, sore throat and hoarseness .  Respiratory: " "Negative for cough, sputum production , hemoptysis, dyspnea  and wheezing .  Cardiovascular: Negative for chest discomfort, palpitations and lightheadedness .  Gastrointestinal: Negative for dysphagia, nausea, vomiting, melena, diarrhea, constipation and abdominal pain.  Genitourinary: Negative for frequency, dysuria, urinary incontinence, hematuria.  Integument/breast: Negative for rash.   Hematologic/lymphatic: Negative for easy bruising, bleeding, lymphadenopathy and petechiae.   Musculoskeletal: Negative for myalgias and arthralgias .  Neurological: Negative for headaches, dizziness, vertigo, seizures and weakness.   Psychiatric: Negative for anxiety, depression, panic attacks and suicidal ideations.       -------------------------------------------------------------    PHYSICAL EXAM:  BP 96/56 (BP Location: Right arm, Patient Position: Sitting, Cuff Size: Adult Regular)  Pulse 82  Ht 5' 6\" (1.676 m)  Wt 153 lb (69.4 kg)  SpO2 97%  BMI 24.69 kg/m2    EXAM:  General Appearance: Pleasant, alert, appropriate appearance for age. No acute distress  Head Exam: Normal. Normocephalic, atraumatic.  Eye Exam: Normal external eye, conjunctiva, lids, cornea. FABRICE.  Ear Exam: Normal TM's bilaterally. Normal auditory canals and external ears. Non-tender.  Nose Exam: Normal external nose, mucus membranes, and septum.  OroPharynx Exam: Dental hygiene adequate. Normal buccal mucosa. Normal pharynx.  Neck Exam: Supple, no masses or nodes., no lymphadenopathy  Thyroid Exam: Normal., No nodules or enlargement., normal to palpation  Chest/Respiratory Exam: Normal chest wall and respirations. Clear to auscultation.  Cardiovascular Exam: Regular rate and rhythm. S1, S2, no murmur, click, gallop, or rubs.  Gastrointestinal Exam: Soft, nontender, no abnormal masses or organomegaly. BS x 4.  Lymphatic Exam: Normal.  Musculoskeletal Exam: Back is straight and non-tender, full ROM of upper and lower extremities.  Skin: no rash or " abnormalities  Neurologic Exam: symmetric DTRs, normal gross motor movement, tone, and coordination. No tremor.  Psychiatric Exam: Alert and oriented, appropriate affect.    PHQ Depression Screen  PHQ-9 SCORE 10/26/2016 12/13/2016 1/8/2018   Total Score 0 2 0       Patient can walk up a flight of stairs without becoming short of breath or having chest pain: YES   Patient is able to tolerate greater than 4 METs of activity without any cardiopulmonary symptoms.    LABS:    Results for orders placed or performed in visit on 06/12/18   Hemoglobin   Result Value Ref Range    Hemoglobin 10.5 (L) 13.3 - 17.7 g/dL   Basic metabolic panel   Result Value Ref Range    Sodium 137 134 - 144 mmol/L    Potassium 4.0 3.5 - 5.1 mmol/L    Chloride 106 98 - 107 mmol/L    Carbon Dioxide 20 (L) 21 - 31 mmol/L    Anion Gap 11 3 - 14 mmol/L    Glucose 250 (H) 70 - 105 mg/dL    Urea Nitrogen 56 (H) 7 - 25 mg/dL    Creatinine 1.53 (H) 0.70 - 1.30 mg/dL    GFR Estimate 44 (L) >60 mL/min/1.7m2    GFR Estimate If Black 53 (L) >60 mL/min/1.7m2    Calcium 8.6 8.6 - 10.3 mg/dL          CXR:  Not necessary    EKG: atrial fibrillation, rate 69, unchanged from previous tracings     ---------------------------------------------------------------    No family history of problems with bleeding or anesthetia.   Patient's perioperative risk is minimized and no further cardiopulmonary workup is neccesary.  Please contact the office with any questions or concerns.    1. Pre-op exam    2. Controlled type 2 diabetes mellitus with diabetic polyneuropathy, with long-term current use of insulin (H)    3. Essential hypertension          ASSESSEMNT AND PLAN:  1.  Preoperative history and physical   consults:  None  Patient is approved for the surgery.  No concerns.     For above listed surgery and anesthesia:    - Patient is Medium risk for perioperative complications.    Patient was administered the following vaccines today: none.  Completed labs today:  hemoglobin, BMP, no concerns.    PRE OP RECOMMENDATIONS:  Patient is on chronic pain medications no   Patient is on antiplatlet/anticoagulation YES -patient will hold the warfarin 3 days prior to surgery.  Patient will continue taking aspirin 81 mg up until surgery due to history of coronary artery disease, heart attacks and stents.  Other medications that need adjustment perioperatively no     Patient Instructions   Patient should take the following medications the morning of surgery with a small sip of water: furosemide, lisinopril, metoprolol.  Patient was instructed to hold the following medications the morning of surgery: hold all meds.     Patient was advised to call our office and the surgical services with any change in condition or new symptoms if they were to develop between today and their surgical date.  Especially any cardiopulmonary symptoms or symptoms concerning for an infection.     Hold warfarin 3 days prior to surgery.     Hold insulin the morning of surgery.     Continue taking aspirin up to surgery.     Discontinue aleve, naproxen and ibuprofen 7 days prior to surgery to reduce bleeding risk.  It is fine to take tylenol the week before surgery.  Hold vitamins and herbal remedies for 7 days before surgery.        Other:  Patient was advised to call our office and the surgical services with any change in condition or new symptoms if they were to develop between today and their surgical date.  Especially any cardiopulmonary symptoms or symptoms concerning for an infection.     PRE OP RECOMMENDATIONS:  Discontinue NSAIDS 7 days prior to procedure to reduce bleeding risk    Greater than 25 minutes were spent in counseling and coordination of care.    Velia Corbett PA-C..................6/12/2018 11:49 AM

## 2018-06-18 PROBLEM — R33.9 URINARY RETENTION: Status: ACTIVE | Noted: 2018-01-01

## 2018-06-18 NOTE — NURSING NOTE
Patient is here today for a S/P cath change. Day Yoanna HOLGUIN......................6/18/2018 11:04 AM    Review of Systems:    Weight loss:    No     Recent fever/chills:  No   Night sweats:   No  Current skin rash:  No   Recent hair loss:  No  Heat intolerance:  No   Cold intolerance:  No  Chest pain:   No   Palpitations:   No  Shortness of breath:  No   Wheezing:   No  Constipation:    No   Diarrhea:   No   Nausea:   No   Vomiting:   No   Kidney/side pain:  No   Back pain:   No  Frequent headaches:  No   Dizziness:     No  Leg swelling:   No   Calf pain:    No    Parents, brothers or sisters with history of kidney cancer:   No  Parents, brothers or sisters with history of bladder cancer: No  Father or brother with history of prostate cancer:  No

## 2018-06-18 NOTE — PROGRESS NOTES
Type of Visit  Established    Chief Complaint  Urinary retention    HPI  Mr. 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  He denies fevers or otherwise not feeling well.  His SP tube placement 1 month ago was without complication.  His suprapubic tube has continued to drain urine as expected however he has experienced some difficulty with the tube disconnecting.  He has no other complaints.  He does feel somewhat better now that his bladder is drained with a catheter in place.      Review of Systems  I reviewed the ROS with the patient.    Nursing Notes:   Day Lenz LPN  6/18/2018  1:08 PM  Signed  Patient is here today for a S/P cath change. Day Lenz LPN......................6/18/2018 11:04 AM    Review of Systems:    Weight loss:    No     Recent fever/chills:  No   Night sweats:   No  Current skin rash:  No   Recent hair loss:  No  Heat intolerance:  No   Cold intolerance:  No  Chest pain:   No   Palpitations:   No  Shortness of breath:  No   Wheezing:   No  Constipation:    No   Diarrhea:   No   Nausea:   No   Vomiting:   No   Kidney/side pain:  No   Back pain:   No  Frequent headaches:  No   Dizziness:     No  Leg swelling:   No   Calf pain:    No    Parents, brothers or sisters with history of kidney cancer:   No  Parents, brothers or sisters with history of bladder cancer: No  Father or brother with history of prostate cancer:  No      Family History  Family History   Problem Relation Age of Onset     HEART DISEASE Father      Heart Disease     Prostate Cancer Brother      Cancer-prostate       Physical Exam  Vitals:    06/18/18 1105   BP: (!) 60/50   BP Location: Right arm   Patient Position: Sitting   Cuff Size: Adult Regular   Pulse: 68     Constitutional: NAD, WDWN.  Cardiovascular: Regular rate.  Pulmonary/Chest: Respirations are even and non-labored bilaterally.  Abdominal: Soft. No distension,  tenderness, masses or guarding. No CVA tenderness.  Extremities: TREV x 4, Warm. No clubbing.  No cyanosis.    Skin: Pink, warm and dry.  No rashes noted.  Genitourinary: nonpalpable bladder    Procedure  Previous 18 Serbian catheter was removed over a wire.  A new 18 Serbian [place] tip catheter was passed over the wire with clean technique.  The catheter was hand irrigated with 120 mL of normal saline.  Some small amount of debris was removed and this also assured the catheter was in appropriate position.  The balloon was filled with 10 mils of sterile water    Labs  Results for orders placed or performed in visit on 06/12/18   Hemoglobin   Result Value Ref Range    Hemoglobin 10.5 (L) 13.3 - 17.7 g/dL   Basic metabolic panel   Result Value Ref Range    Sodium 137 134 - 144 mmol/L    Potassium 4.0 3.5 - 5.1 mmol/L    Chloride 106 98 - 107 mmol/L    Carbon Dioxide 20 (L) 21 - 31 mmol/L    Anion Gap 11 3 - 14 mmol/L    Glucose 250 (H) 70 - 105 mg/dL    Urea Nitrogen 56 (H) 7 - 25 mg/dL    Creatinine 1.53 (H) 0.70 - 1.30 mg/dL    GFR Estimate 44 (L) >60 mL/min/1.7m2    GFR Estimate If Black 53 (L) >60 mL/min/1.7m2    Calcium 8.6 8.6 - 10.3 mg/dL   EKG 12-lead complete w/read - Clinics    Narrative    EKG Interpretation:   Rhythm: Irregularly irregular  Rate: 69  Axis: Normal  Conduction: Nonspecific intraventricular conduction delay  QRS: Normal  ST Segments: ST depression in leads I, aVL  T-wave: T-wave inversion in leads I, aVL  Chambers: LVH with repolarization abnormality  PACs Present: No  PVCs Present: No    Impression:   Interpretation limited by noisy baseline.  Atrial fibrillation.  Nonspecific intraventricular conduction delay.  LVH with repolarization abnormality versus lateral ischemia.  Compared to April 27, 2018: Anterior ST segment abnormalities have normalized.    Signed, Elie Rachel MD  Internal Medicine & Pediatrics  6/14/2018  5:38 PM         Assessment  Mr. 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  He presented today with his son and had many questions regarding the suprapubic tube and management of the tube.  We discussed all different treatment options including serial dilation and formal excision and grafting of the urethra.  Following this discussion I recommended against any further procedures given his current state of medical health and the unlikely durable result from any procedure.    Plan  Continue SP tube changes once monthly over a wire in nurse schedule.          I spent 20 minutes on this patient's visit (exclusive of separately billed services/procedures) and over half of this time was spent in face-to-face counseling regarding his diagnosis, treatment options with emphasis on  risks and benefits of each, prognosis and importance of compliance.

## 2018-06-18 NOTE — MR AVS SNAPSHOT
After Visit Summary   6/18/2018    Sukhdeep Gomez    MRN: [id number]           Patient Information     Date Of Birth          8/19/1934        Visit Information        Provider Department      6/18/2018 10:45 AM Edgardo Sanchez MD [facility]        Today's Diagnoses     Urinary retention    -  1    History of urethral stricture        Stricture of male urethra           Follow-ups after your visit        Your next 10 appointments already scheduled     Jun 27, 2018 10:30 AM CDT   Anticoagulation Visit with  ANTI COAG 1   [facility]    [address]  [place] [address]   [phone]            Jul 17, 2018  9:15 AM CDT   Nurse Only with  UROLOGY NURSE   [facility]    [address]  [place] [address]   [phone]            Oct 23, 2018  1:00 PM CDT   Pacemaker Check with  PACEMAKER   [facility]    [address]  [place] [address]   [phone]              Who to contact     If you have questions or need follow up information about today's clinic visit or your schedule please contact [facility] directly at [phone].  Normal or non-critical lab and imaging results will be communicated to you by MyChart, letter or phone within 4 business days after the clinic has received the results. If you do not hear from us within 7 days, please contact the clinic through MyChart or phone. If you have a critical or abnormal lab result, we will notify you by phone as soon as possible.  Submit refill requests through [employer] or call your pharmacy and they will forward the refill request to us. Please allow 3 business days for your refill to be completed.          Additional Information About Your Visit        MyChart Information     [employer] lets you  "send messages to your doctor, view your test results, renew your prescriptions, schedule appointments and more. To sign up, go to [email or web address] . Click on \"Log in\" on the left side of the screen, which will take you to the Welcome page. Then click on \"Sign up Now\" on the right side of the page.     You will be asked to enter the access code listed below, as well as some personal information. Please follow the directions to create your username and password.     Your access code is: [email or web address]  Expires: 2018  3:03 PM     Your access code will  in 90 days. If you need help or a new code, please call your [facility] clinic or [phone].        Care EveryWhere ID     This is your Care EveryWhere ID. This could be used by other organizations to access your [facility] medical records  [id number]        Your Vitals Were     Pulse                   68            Blood Pressure from Last 3 Encounters:   18 (!) 60/50   18 96/56   18 125/69    Weight from Last 3 Encounters:   18 69.4 kg (153 lb)   18 70.3 kg (155 lb)   18 70.3 kg (155 lb)              We Performed the Following     CHANGE CYSTOSTMY TUBE COMPLICATED     IRRIGATION BLADDER SIMPLE LAVAGE/INSTILLATION        Primary Care Provider Office Phone # Fax #    Cesar Sanders [phone] [address] [facility] [address]        Equal Access to Services     [facility]: Hadii aad ku hadasho Soomaali, waaxda luqadaha, qaybta kaalmada adeegyada, griffin pate . So [facility] [phone].    ATENCIÓN: Si nelsonla wai, tiene a cohn disposición servicios gratuitos de asistencia lingüística. Llame al [phone].    We comply with applicable federal civil rights laws and [place] laws. We do not discriminate on the basis of race, color, national origin, age, disability, sex, sexual orientation, or gender identity.            Thank you!     Thank you for " choosing [facility]  for your care. Our goal is always to provide you with excellent care. Hearing back from our patients is one way we can continue to improve our services. Please take a few minutes to complete the written survey that you may receive in the mail after your visit with us. Thank you!             Your Updated Medication List - Protect others around you: Learn how to safely use, store and throw away your medicines at www.disposemymeds.org.          This list is accurate as of 6/18/18  3:42 PM.  Always use your most recent med list.                   Brand Name Dispense Instructions for use Diagnosis    acetaminophen 325 MG tablet    TYLENOL     Take 975 mg by mouth every 6 hours if needed. Max acetaminophen dose: 4000mg in 24 hrs.        allopurinol 100 MG tablet    ZYLOPRIM     Take 1 tablet by mouth once daily.        amoxicillin 500 MG capsule    AMOXIL     TAKE 4 CAPSULES BY MOUTH ONE HOUR PRIOR TO APPT        ASPIRIN 81 PO      Take one tablet by mouth daily        DUREZOL 0.05 % ophthalmic emulsion   Generic drug:  difluprednate      INSTILL ONE DROP IN THE LEFT EYE TWICE DAILY, USE TWICE DAILY FOR 4 WEEKS, BEGIN AFTER SURGERY        furosemide 20 MG tablet    LASIX    137 tablet    Take one tablet by mouth every morning and take an additional tab every other morning    Chronic systolic heart failure (H)       ILEVRO 0.3 % ophthalmic suspension   Generic drug:  nepafenac      INSTILL ONE DROP IN THE LEFT EYE DAILY. USE EVERY DAY FOR 3 DAYS BEFORE, THEN FOR 6 WEEKS AFTER.        insulin aspart prot & aspart injection    NovoLOG MIX 70/30 PEN    45 mL    35 units every morning and 10 units every evening    Diabetes mellitus with multiple complications (H)       insulin pen needle 31G X 8 MM    EASY TOUCH PEN NEEDLES    200 each    FOR ADMINISTERING INSULIN AT HOME TWICE DAILY. Dx: E11.8    Diabetes mellitus with multiple complications (H)       lisinopril 2.5 MG tablet     PRINIVIL/Zestril     Take 2.5 mg by mouth daily        metoprolol succinate 50 MG 24 hr tablet    TOPROL-XL    135 tablet    Take 1.5 tablets (75 mg) by mouth daily    Essential hypertension       moxifloxacin 0.5 % ophthalmic solution    VIGAMOX     INSTILL ONE DROP IN THE LEFT EYE FOUR TIMES DAILY. USE FOUR TIMES DAILY 3 DAYS BEFORE SURGERY, THEN FOUR TIMES DAILY FOR 1 WEEK AFTER        nystatin-triamcinolone cream    MYCOLOG II    60 g    APPLY TOPICALLY TO AFFECTED AREA(S) THREE TIMES DAILY    Yeast dermatitis of penis       order for DME      Wheelchair with elevating leg rests/foot rest. For home use. Length of need: 99 mo        potassium chloride SA 20 MEQ CR tablet    K-DUR/KLOR-CON M     Take 1 tablet by mouth once every other day with a meal.        simvastatin 20 MG tablet    ZOCOR     Take 20 mg by mouth at bedtime.        VITAMIN D-1000 MAX ST 1000 units Tabs   Generic drug:  cholecalciferol      Take one tablet by mouth daily        warfarin 2 MG tablet    COUMADIN    90 tablet    Take 1 mg x four days/week and 2 mg x three days/week (Mondays, Wednesdays, and Fridays).  Or as directed by the protime clinic.    Atrial fibrillation (H)

## 2018-06-26 NOTE — NURSING NOTE
Pt presents to clinic states that he is having pain at superpubic cath site, having urge to urinate and only urinating through catheter  Review of Systems:    Weight loss:    No     Recent fever/chills:  No   Night sweats:   No  Current skin rash:  No   Recent hair loss:  No  Heat intolerance:  No   Cold intolerance:  No  Chest pain:   No   Palpitations:   No  Shortness of breath:  No   Wheezing:   No  Constipation:    No   Diarrhea:   No   Nausea:   No   Vomiting:   No   Kidney/side pain:  No   Back pain:   No  Frequent headaches:  No   Dizziness:     No  Leg swelling:   No   Calf pain:    No

## 2018-06-26 NOTE — MR AVS SNAPSHOT
After Visit Summary   6/26/2018    Sukhdeep Gomez    MRN: [id number]           Patient Information     Date Of Birth          8/19/1934        Visit Information        Provider Department      6/26/2018 2:00 PM Edgardo Sanchez MD [facility]        Today's Diagnoses     Urinary retention    -  1    Prostate CA (H)           Follow-ups after your visit        Your next 10 appointments already scheduled     Jun 27, 2018  3:30 PM CDT   Anticoagulation Visit with  ANTI COAG 1   [facility]    [address]  [place] [address]   [phone]            Jul 17, 2018  9:15 AM CDT   Nurse Only with  UROLOGY NURSE   [facility]    [address]  [place] [address]   [phone]            Oct 23, 2018  1:00 PM CDT   Pacemaker Check with  PACEMAKER   [facility]    [address]  [place] [address]   [phone]              Who to contact     If you have questions or need follow up information about today's clinic visit or your schedule please contact [facility] directly at [phone].  Normal or non-critical lab and imaging results will be communicated to you by Sansanhart, letter or phone within 4 business days after the clinic has received the results. If you do not hear from us within 7 days, please contact the clinic through Sansanhart or phone. If you have a critical or abnormal lab result, we will notify you by phone as soon as possible.  Submit refill requests through [employer] or call your pharmacy and they will forward the refill request to us. Please allow 3 business days for your refill to be completed.          Additional Information About Your Visit        [employer] Information     [employer] lets you send messages to your doctor, view your test  "results, renew your prescriptions, schedule appointments and more. To sign up, go to [email or web address] . Click on \"Log in\" on the left side of the screen, which will take you to the Welcome page. Then click on \"Sign up Now\" on the right side of the page.     You will be asked to enter the access code listed below, as well as some personal information. Please follow the directions to create your username and password.     Your access code is: [email or web address]  Expires: 2018  3:03 PM     Your access code will  in 90 days. If you need help or a new code, please call your [facility] clinic or [phone].        Care EveryWhere ID     This is your Care EveryWhere ID. This could be used by other organizations to access your [facility] medical records  [id number]        Your Vitals Were     Pulse Respirations                68 16           Blood Pressure from Last 3 Encounters:   18 130/78   18 (!) 60/50   18 96/56    Weight from Last 3 Encounters:   18 69.4 kg (153 lb)   18 70.3 kg (155 lb)   18 70.3 kg (155 lb)              Today, you had the following     No orders found for display       Primary Care Provider Office Phone # Fax #    Cesar Sanders [phone]       [address] [facility] [address]        Equal Access to Services     [facility]: Hadii shona segura hadasho Sohema, waaxda luqadaha, qaybta kaalmada alessandro, griffin pate . So [facility] [phone].    ATENCIÓN: Si nelsonla wai, tiene a cohn disposición servicios gratuitos de asistencia lingüística. Llame al [phone].    We comply with applicable federal civil rights laws and [place] laws. We do not discriminate on the basis of race, color, national origin, age, disability, sex, sexual orientation, or gender identity.            Thank you!     Thank you for choosing [facility]  for your care. Our goal is always to provide you " with excellent care. Hearing back from our patients is one way we can continue to improve our services. Please take a few minutes to complete the written survey that you may receive in the mail after your visit with us. Thank you!             Your Updated Medication List - Protect others around you: Learn how to safely use, store and throw away your medicines at www.disposemymeds.org.          This list is accurate as of 6/26/18  3:21 PM.  Always use your most recent med list.                   Brand Name Dispense Instructions for use Diagnosis    acetaminophen 325 MG tablet    TYLENOL     Take 975 mg by mouth every 6 hours if needed. Max acetaminophen dose: 4000mg in 24 hrs.        allopurinol 100 MG tablet    ZYLOPRIM     Take 1 tablet by mouth once daily.        amoxicillin 500 MG capsule    AMOXIL     TAKE 4 CAPSULES BY MOUTH ONE HOUR PRIOR TO APPT        ASPIRIN 81 PO      Take one tablet by mouth daily        DUREZOL 0.05 % ophthalmic emulsion   Generic drug:  difluprednate      INSTILL ONE DROP IN THE LEFT EYE TWICE DAILY, USE TWICE DAILY FOR 4 WEEKS, BEGIN AFTER SURGERY        furosemide 20 MG tablet    LASIX    137 tablet    Take one tablet by mouth every morning and take an additional tab every other morning    Chronic systolic heart failure (H)       ILEVRO 0.3 % ophthalmic suspension   Generic drug:  nepafenac      INSTILL ONE DROP IN THE LEFT EYE DAILY. USE EVERY DAY FOR 3 DAYS BEFORE, THEN FOR 6 WEEKS AFTER.        insulin aspart prot & aspart injection    NovoLOG MIX 70/30 PEN    45 mL    35 units every morning and 10 units every evening    Diabetes mellitus with multiple complications (H)       insulin pen needle 31G X 8 MM    EASY TOUCH PEN NEEDLES    200 each    FOR ADMINISTERING INSULIN AT HOME TWICE DAILY. Dx: E11.8    Diabetes mellitus with multiple complications (H)       lisinopril 2.5 MG tablet    PRINIVIL/Zestril     Take 2.5 mg by mouth daily        metoprolol succinate 50 MG 24 hr tablet     TOPROL-XL    135 tablet    Take 1.5 tablets (75 mg) by mouth daily    Essential hypertension       moxifloxacin 0.5 % ophthalmic solution    VIGAMOX     INSTILL ONE DROP IN THE LEFT EYE FOUR TIMES DAILY. USE FOUR TIMES DAILY 3 DAYS BEFORE SURGERY, THEN FOUR TIMES DAILY FOR 1 WEEK AFTER        nystatin-triamcinolone cream    MYCOLOG II    60 g    APPLY TOPICALLY TO AFFECTED AREA(S) THREE TIMES DAILY    Yeast dermatitis of penis       order for DME      Wheelchair with elevating leg rests/foot rest. For home use. Length of need: 99 mo        potassium chloride SA 20 MEQ CR tablet    K-DUR/KLOR-CON M     Take 1 tablet by mouth once every other day with a meal.        simvastatin 20 MG tablet    ZOCOR     Take 20 mg by mouth at bedtime.        VITAMIN D-1000 MAX ST 1000 units Tabs   Generic drug:  cholecalciferol      Take one tablet by mouth daily        warfarin 2 MG tablet    COUMADIN    90 tablet    Take 1 mg x four days/week and 2 mg x three days/week (Mondays, Wednesdays, and Fridays).  Or as directed by the protime clinic.    Atrial fibrillation (H)

## 2018-06-26 NOTE — PROGRESS NOTES
Type of Visit  Established    Chief Complaint  Urinary retention  Hematuria    HPI  Mr. 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  Presents today with son asking questions about the tube, etc.  He denies fevers or otherwise not feeling well.  His SP tube placement 5 weeks ago was without complication.  Exchange was 1 week ago.  He did have hematuria following exchange for a few days.      Review of Systems  I reviewed the ROS with the patient.    Nursing Notes:   Annie Bond LPN  6/26/2018  2:06 PM  Unsigned  Pt presents to clinic states that he is having pain at superpubic cath site, having urge to urinate and only urinating through catheter  Review of Systems:    Weight loss:    No     Recent fever/chills:  No   Night sweats:   No  Current skin rash:  No   Recent hair loss:  No  Heat intolerance:  No   Cold intolerance:  No  Chest pain:   No   Palpitations:   No  Shortness of breath:  No   Wheezing:   No  Constipation:    No   Diarrhea:   No   Nausea:   No   Vomiting:   No   Kidney/side pain:  No   Back pain:   No  Frequent headaches:  No   Dizziness:     No  Leg swelling:   No   Calf pain:    No          Family History  Family History   Problem Relation Age of Onset     HEART DISEASE Father      Heart Disease     Prostate Cancer Brother      Cancer-prostate     Physical Exam  Vitals:    06/26/18 1407   BP: 130/78   BP Location: Left arm   Patient Position: Sitting   Cuff Size: Adult Regular   Pulse: 68   Resp: 16     Constitutional: NAD, WDWN.  Cardiovascular: Regular rate.  Pulmonary/Chest: Respirations are even and non-labored bilaterally.  Abdominal: Soft. No distension, tenderness, masses or guarding. No CVA tenderness.  Extremities: TREV x 4, Warm. No clubbing.  No cyanosis.    Skin: Pink, warm and dry.  No rashes noted.  Genitourinary: nonpalpable bladder    Assessment  Mr. 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  He again presented today with his son and had many questions regarding the suprapubic tube and management of the tube.  We discussed the gross hematuria, bladder spasms and expectations with SPT.    Plan  Continue SP tube changes once monthly over a wire in nurse schedule.          I spent 15 minutes on this patient's visit (exclusive of separately billed services/procedures) and over half of this time was spent in face-to-face counseling regarding his diagnosis, treatment options with emphasis on  risks and benefits of each, prognosis and importance of compliance.

## 2018-06-27 NOTE — MR AVS SNAPSHOT
Tarango GIA Bradyfidencio   6/27/2018 3:30 PM   Anticoagulation Therapy Visit    Description:  83 year old male   Provider:  [facility]   Department:  [facility]           INR as of 6/27/2018     Today's INR 1.8!      Anticoagulation Summary as of 6/27/2018     INR goal 2.0-3.0   Today's INR 1.8!   Full warfarin instructions 2 mg on Mon, Wed, Fri; 1 mg all other days   Next INR check 7/17/2018    Indications   Long term (current) use of anticoagulants (Resolved) [Z79.01]  Unspecified atrial fibrillation [I48.91]         Description     Continue same dose and recheck in 2-3 weeks. ...............Yolanda Avila RN    6/27/2018    1:54 PM        Your next Anticoagulation Clinic appointment(s)     Jun 27, 2018  3:30 PM CDT   Anticoagulation Visit with [facility]   [facility] ([facility])    [address]  [facility] [address]   [phone]              June 2018 Details    Sun Mon Tue Wed Thu Fri Sat          1               2                 3               4               5               6               7               8               9                 10               11               12               13               14               15               16                 17               18               19               20               21               22               23                 24               25               26               27      2 mg   See details      28      1 mg         29      2 mg         30      1 mg          Date Details   06/27 This INR check               How to take your warfarin dose     To take:  1 mg Take 0.5 of a 2 mg tablet.    To take:  2 mg Take 1 of the 2 mg tablets.           July 2018 Details    Sun Mon Tue Wed Thu Fri Sat     1      1 mg         2      2 mg         3      1 mg         4      2 mg         5      1 mg         6      2 mg         7      1 mg           8      1 mg         9      2 mg         10      1  mg         11      2 mg         12      1 mg         13      2 mg         14      1 mg           15      1 mg         16      2 mg         17            18               19               20               21                 22               23               24               25               26               27               28                 29               30               31                    Date Details   No additional details    Date of next INR:  7/17/2018         How to take your warfarin dose     To take:  1 mg Take 0.5 of a 2 mg tablet.    To take:  2 mg Take 1 of the 2 mg tablets.

## 2018-06-27 NOTE — PROGRESS NOTES
ANTICOAGULATION FOLLOW-UP CLINIC VISIT    Patient Name:  Sukhdeep Gomez  Date:  6/27/2018  Contact Type:  Face to Face    SUBJECTIVE:     Patient Findings     Positives Other complaints (had eye surgery and was directed to hold dose prior to procedure)           OBJECTIVE    INR Protime   Date Value Ref Range Status   06/27/2018 1.8 (A) 0.86 - 1.14 Final       ASSESSMENT / PLAN  INR assessment SUB    Recheck INR In: 3 WEEKS    INR Location Clinic      Anticoagulation Summary as of 6/27/2018     INR goal 2.0-3.0   Today's INR 1.8!   Warfarin maintenance plan 2 mg (2 mg x 1) on Mon, Wed, Fri; 1 mg (2 mg x 0.5) all other days   Full warfarin instructions 2 mg on Mon, Wed, Fri; 1 mg all other days   Weekly warfarin total 10 mg   No change documented Yolanda Avila RN   Plan last modified Shasta Restrepo RN (6/8/2018)   Next INR check 7/17/2018   Priority INR   Target end date Indefinite    Indications   Long term (current) use of anticoagulants (Resolved) [Z79.01]  Unspecified atrial fibrillation [I48.91]         Anticoagulation Episode Summary     INR check location     Preferred lab     Send INR reminders to  INR    Comments       Anticoagulation Care Providers     Provider Role Specialty Phone number    Cesar Sanders MD [facility] [phone]            See the Encounter Report to view Anticoagulation Flowsheet and Dosing Calendar (Go to Encounters tab in chart review, and find the Anticoagulation Therapy Visit)        Yolanda Avila, RN

## 2018-06-29 NOTE — MR AVS SNAPSHOT
After Visit Summary   6/29/2018    Sukhdeep Gomez    MRN: [id number]           Patient Information     Date Of Birth          8/19/1934        Visit Information        Provider Department      6/29/2018 6:00 AM  ICD REMOTE [facility]        Today's Diagnoses     Ischemic cardiomyopathy    -  1       Follow-ups after your visit        Your next 10 appointments already scheduled     Jul 25, 2018 11:45 AM CDT   [facility]    [address]  [facility] [address]   [phone]           Bring a current list of meds and any records pertaining to this visit. For Physicals, please bring immunization records and any forms needing to be filled out. Please arrive 10 minutes early to complete paperwork.            Aug 14, 2018  9:45 AM CDT   Nurse Only with  UROLOGY NURSE   [facility]    [address] [facility] [address]   [phone]            Aug 14, 2018 10:45 AM CDT   Anticoagulation Visit with  [facility]   [facility]    [address] [facility] [address]   [phone]            Oct 23, 2018  1:00 PM CDT   Pacemaker Check with  PACEMAKER   [facility]    [address] [place] [address]   [phone]              Who to contact     If you have questions or need follow up information about today's clinic visit or your schedule please contact [facility] directly at [phone].  Normal or non-critical lab and imaging results will be communicated to you by MyChart, letter or phone within 4 business days after the clinic has received the results. If you do not hear from us within 7 days, please contact the clinic through MyChart or phone. If you have a critical  or abnormal lab result, we will notify you by phone as soon as possible.  Submit refill requests through [employer] or call your pharmacy and they will forward the refill request to us. Please allow 3 business days for your refill to be completed.          Additional Information About Your Visit        Care EveryWhere ID     This is your Care EveryWhere ID. This could be used by other organizations to access your [facility] medical records  [id number]         Blood Pressure from Last 3 Encounters:   06/26/18 130/78   06/18/18 (!) 60/50   06/12/18 96/56    Weight from Last 3 Encounters:   06/12/18 69.4 kg (153 lb)   05/27/18 70.3 kg (155 lb)   05/08/18 70.3 kg (155 lb)              We Performed the Following     (92052)INTERROGATION DEVICE EVAL REMOTE, PACER/ICD        Primary Care Provider Office Phone # Fax #    Cesar Sanders [phone]       [address] [facility] [address]        Equal Access to Services     [facility]: Hadii aad ku hadasho Soomaali, waaxda luqadaha, qaybta kaalmada adeegyada, waxay ashleyin hayalvarez pate . So [facility] [phone].    ATENCIÓN: Si habla español, tiene a cohn disposición servicios gratuitos de asistencia lingüística. Llame al [phone].    We comply with applicable federal civil rights laws and [place] laws. We do not discriminate on the basis of race, color, national origin, age, disability, sex, sexual orientation, or gender identity.            Thank you!     Thank you for choosing [facility]  for your care. Our goal is always to provide you with excellent care. Hearing back from our patients is one way we can continue to improve our services. Please take a few minutes to complete the written survey that you may receive in the mail after your visit with us. Thank you!             Your Updated Medication List - Protect others around you: Learn how to safely use, store and throw away your medicines at www.disposemymeds.org.           This list is accurate as of 6/29/18 11:59 PM.  Always use your most recent med list.                   Brand Name Dispense Instructions for use Diagnosis    acetaminophen 325 MG tablet    TYLENOL     Take 975 mg by mouth every 6 hours if needed. Max acetaminophen dose: 4000mg in 24 hrs.        allopurinol 100 MG tablet    ZYLOPRIM     Take 1 tablet by mouth once daily.        amoxicillin 500 MG capsule    AMOXIL     TAKE 4 CAPSULES BY MOUTH ONE HOUR PRIOR TO APPT        ASPIRIN 81 PO      Take one tablet by mouth daily        DUREZOL 0.05 % ophthalmic emulsion   Generic drug:  difluprednate      INSTILL ONE DROP IN THE LEFT EYE TWICE DAILY, USE TWICE DAILY FOR 4 WEEKS, BEGIN AFTER SURGERY        furosemide 20 MG tablet    LASIX    137 tablet    Take one tablet by mouth every morning and take an additional tab every other morning    Chronic systolic heart failure (H)       ILEVRO 0.3 % ophthalmic suspension   Generic drug:  nepafenac      INSTILL ONE DROP IN THE LEFT EYE DAILY. USE EVERY DAY FOR 3 DAYS BEFORE, THEN FOR 6 WEEKS AFTER.        insulin aspart prot & aspart injection    NovoLOG MIX 70/30 PEN    45 mL    35 units every morning and 10 units every evening    Diabetes mellitus with multiple complications (H)       insulin pen needle 31G X 8 MM    EASY TOUCH PEN NEEDLES    200 each    FOR ADMINISTERING INSULIN AT HOME TWICE DAILY. Dx: E11.8    Diabetes mellitus with multiple complications (H)       lisinopril 2.5 MG tablet    PRINIVIL/Zestril     Take 2.5 mg by mouth daily        moxifloxacin 0.5 % ophthalmic solution    VIGAMOX     INSTILL ONE DROP IN THE LEFT EYE FOUR TIMES DAILY. USE FOUR TIMES DAILY 3 DAYS BEFORE SURGERY, THEN FOUR TIMES DAILY FOR 1 WEEK AFTER        nystatin-triamcinolone cream    MYCOLOG II    60 g    APPLY TOPICALLY TO AFFECTED AREA(S) THREE TIMES DAILY    Yeast dermatitis of penis       order for DME      Wheelchair with elevating leg rests/foot rest. For home use. Length of  need: 99 mo        potassium chloride SA 20 MEQ CR tablet    K-DUR/KLOR-CON M     Take 1 tablet by mouth once every other day with a meal.        VITAMIN D-1000 MAX ST 1000 units Tabs   Generic drug:  cholecalciferol      Take one tablet by mouth daily        warfarin 2 MG tablet    COUMADIN    90 tablet    Take 1 mg x four days/week and 2 mg x three days/week (Mondays, Wednesdays, and Fridays).  Or as directed by the protime clinic.    Atrial fibrillation (H)

## 2018-07-09 NOTE — TELEPHONE ENCOUNTER
LOV was on 6/12/18 with LAWRENCE Carranza. Both Rxs as requested were noted in office visit notes on that date without changes. Writer will refill both Rxs as requested at this time as per RN refill protocol.    Prescription refilled per RN Medication Refill Policy..................Fredy Becerra 7/9/2018 8:46 AM

## 2018-07-17 NOTE — MR AVS SNAPSHOT
Sukhdeep Gomez   7/17/2018 10:30 AM   Anticoagulation Therapy Visit    Description:  83 year old male   Provider:  [facility]   Department:  [facility]           INR as of 7/17/2018     Today's INR 2.5      Anticoagulation Summary as of 7/17/2018     INR goal 2.0-3.0   Today's INR 2.5   Full warfarin instructions 2 mg on Mon, Wed, Fri; 1 mg all other days   Next INR check 8/14/2018    Indications   Long term (current) use of anticoagulants (Resolved) [Z79.01]  Unspecified atrial fibrillation [I48.91]         Description     Continue same Warfarin dose and recheck in 1 month.  Yolanda Avila RN   7/17/2018    10:11 AM      Your next Anticoagulation Clinic appointment(s)     Jul 17, 2018 10:30 AM CDT   Anticoagulation Visit with [facility]   [facility]    [address]  [place] [address]   [phone]              July 2018 Details    Sun Mon Tue Wed Thu Fri Sat     1               2               3               4               5               6               7                 8               9               10               11               12               13               14                 15               16               17      1 mg   See details      18      2 mg         19      1 mg         20      2 mg         21      1 mg           22      1 mg         23      2 mg         24      1 mg         25      2 mg         26      1 mg         27      2 mg         28      1 mg           29      1 mg         30      2 mg         31      1 mg              Date Details   07/17 This INR check               How to take your warfarin dose     To take:  1 mg Take 0.5 of a 2 mg tablet.    To take:  2 mg Take 1 of the 2 mg tablets.           August 2018 Details    Sun Mon Tue Wed Thu Fri Sat        1      2 mg         2      1 mg         3      2 mg         4      1 mg           5      1 mg         6      2 mg         7      1 mg          8      2 mg         9      1 mg         10      2 mg         11      1 mg           12      1 mg         13      2 mg         14            15               16               17               18                 19               20               21               22               23               24               25                 26               27               28               29               30               31                 Date Details   No additional details    Date of next INR:  8/14/2018         How to take your warfarin dose     To take:  1 mg Take 0.5 of a 2 mg tablet.    To take:  2 mg Take 1 of the 2 mg tablets.

## 2018-07-17 NOTE — NURSING NOTE
Suprapubic Tube Change  Patient came to the clinic for monthly suprapubic catheter change. No problems noted. Old catheter removed.  Patient  prepped and draped in sterile manner. New 18 [address] suprapubic almeida inserted with no problems, clear yellow urine return noted.  Catheter flushed with sterile water and return noted with no problems.  Will return to the clinic for monthly catheter change. Edgardo Sanchez MD supervised the procedure.  Prabha Wang RN......July 17, 2018...9:54 AM

## 2018-07-17 NOTE — MR AVS SNAPSHOT
After Visit Summary   7/17/2018    Sukhdeep Gomez    MRN: [id number]           Patient Information     Date Of Birth          8/19/1934        Visit Information        Provider Department      7/17/2018 9:15 AM  UROLOGY NURSE [facility]        Today's Diagnoses     Urinary retention    -  1       Follow-ups after your visit        Your next 10 appointments already scheduled     Jul 20, 2018  9:00 AM CDT   Treatment with Jerson Shannon PT   [facility] ([place] [facility])    [address]  [place] [address]   [phone]            Jul 25, 2018 11:45 AM CDT   [facility] with Cesar Sanders MD   [facility]    [address] [address]   [phone]           Bring a current list of meds and any records pertaining to this visit. For Physicals, please bring immunization records and any forms needing to be filled out. Please arrive 10 minutes early to complete paperwork.            Aug 14, 2018  9:45 AM CDT   Nurse Only with  UROLOGY NURSE   [facility]    [address] [address]   [phone]            Aug 14, 2018 10:45 AM CDT   Anticoagulation Visit with  ANTI COAG 1   [facility]    [address] [facility] [address]   [phone]            Oct 23, 2018  1:00 PM CDT   Pacemaker Check with  PACEMAKER   [facility]    [address] [address]   [phone]              Future tests that were ordered for you today     Open Standing Orders        Priority Remaining Interval Expires Ordered    IRRIGATION BLADDER SIMPLE LAVAGE/INSTILLATION Routine 14/15  6/26/2019 7/17/2018    CHANGE CYSTOSTMY TUBE SIMPLE Routine  14/15  6/26/2019 7/17/2018            Who to contact     If you have questions or need follow up information about today's clinic visit or your schedule please contact [facility] directly at [phone].  Normal or non-critical lab and imaging results will be communicated to you by MyChart, letter or phone within 4 business days after the clinic has received the results. If you do not hear from us within 7 days, please contact the clinic through MyChart or phone. If you have a critical or abnormal lab result, we will notify you by phone as soon as possible.  Submit refill requests through [facility] or call your pharmacy and they will forward the refill request to us. Please allow 3 business days for your refill to be completed.          Additional Information About Your Visit        Care EveryWhere ID     This is your Care EveryWhere ID. This could be used by other organizations to access your [place] medical records  [id number]        Your Vitals Were     Pulse Temperature Respirations             64 96.8  F (36  C) (Temporal) 14          Blood Pressure from Last 3 Encounters:   06/26/18 130/78   06/18/18 (!) 60/50   06/12/18 96/56    Weight from Last 3 Encounters:   06/12/18 69.4 kg (153 lb)   05/27/18 70.3 kg (155 lb)   05/08/18 70.3 kg (155 lb)               Primary Care Provider Office Phone # Fax #    Cesar Sanders [phone]       [address] [facility] [address]        Equal Access to Services     [facility] AH: Hadii aad ku hadasho Soomaali, waaxda luqadaha, qaybta kaalmada adeegyada, griffin pate . So [facility] [phone].    ATENCIÓN: Si habla wai, tiene a cohn disposición servicios gratuitos de asistencia lingüística. Llame al [phone].    We comply with applicable federal civil rights laws and [place] laws. We do not discriminate on the basis of race, color, national origin, age, disability, sex, sexual  orientation, or gender identity.            Thank you!     Thank you for choosing [facility]  for your care. Our goal is always to provide you with excellent care. Hearing back from our patients is one way we can continue to improve our services. Please take a few minutes to complete the written survey that you may receive in the mail after your visit with us. Thank you!             Your Updated Medication List - Protect others around you: Learn how to safely use, store and throw away your medicines at www.disposemymeds.org.          This list is accurate as of 7/17/18  2:28 PM.  Always use your most recent med list.                   Brand Name Dispense Instructions for use Diagnosis    acetaminophen 325 MG tablet    TYLENOL     Take 975 mg by mouth every 6 hours if needed. Max acetaminophen dose: 4000mg in 24 hrs.        allopurinol 100 MG tablet    ZYLOPRIM     Take 1 tablet by mouth once daily.        amoxicillin 500 MG capsule    AMOXIL     TAKE 4 CAPSULES BY MOUTH ONE HOUR PRIOR TO APPT        ASPIRIN 81 PO      Take one tablet by mouth daily        DUREZOL 0.05 % ophthalmic emulsion   Generic drug:  difluprednate      INSTILL ONE DROP IN THE LEFT EYE TWICE DAILY, USE TWICE DAILY FOR 4 WEEKS, BEGIN AFTER SURGERY        furosemide 20 MG tablet    LASIX    137 tablet    Take one tablet by mouth every morning and take an additional tab every other morning    Chronic systolic heart failure (H)       ILEVRO 0.3 % ophthalmic suspension   Generic drug:  nepafenac      INSTILL ONE DROP IN THE LEFT EYE DAILY. USE EVERY DAY FOR 3 DAYS BEFORE, THEN FOR 6 WEEKS AFTER.        insulin aspart prot & aspart injection    NovoLOG MIX 70/30 PEN    45 mL    35 units every morning and 10 units every evening    Diabetes mellitus with multiple complications (H)       insulin pen needle 31G X 8 MM    EASY TOUCH PEN NEEDLES    200 each    FOR ADMINISTERING INSULIN AT HOME TWICE DAILY. Dx: E11.8    Diabetes  mellitus with multiple complications (H)       lisinopril 2.5 MG tablet    PRINIVIL/Zestril     Take 2.5 mg by mouth daily        metoprolol succinate 50 MG 24 hr tablet    TOPROL-XL    135 tablet    TAKE 1 AND 1/2 TABLETS BY MOUTH EVERY DAY    Essential hypertension       moxifloxacin 0.5 % ophthalmic solution    VIGAMOX     INSTILL ONE DROP IN THE LEFT EYE FOUR TIMES DAILY. USE FOUR TIMES DAILY 3 DAYS BEFORE SURGERY, THEN FOUR TIMES DAILY FOR 1 WEEK AFTER        nystatin-triamcinolone cream    MYCOLOG II    60 g    APPLY TOPICALLY TO AFFECTED AREA(S) THREE TIMES DAILY    Yeast dermatitis of penis       order for DME      Wheelchair with elevating leg rests/foot rest. For home use. Length of need: 99 mo        potassium chloride SA 20 MEQ CR tablet    K-DUR/KLOR-CON M     Take 1 tablet by mouth once every other day with a meal.        simvastatin 20 MG tablet    ZOCOR    90 tablet    TAKE ONE TABLET BY MOUTH AT BEDTIME    Mixed hyperlipidemia       VITAMIN D-1000 MAX ST 1000 units Tabs   Generic drug:  cholecalciferol      Take one tablet by mouth daily        warfarin 2 MG tablet    COUMADIN    90 tablet    Take 1 mg x four days/week and 2 mg x three days/week (Mondays, Wednesdays, and Fridays).  Or as directed by the protime clinic.    Atrial fibrillation (H)

## 2018-07-17 NOTE — PROGRESS NOTES
ANTICOAGULATION FOLLOW-UP CLINIC VISIT    Patient Name:  Sukhdeep Gomez  Date:  7/17/2018  Contact Type:  Face to Face    SUBJECTIVE:     Patient Findings     Positives Intentional hold of therapy (held doses for cataract surgery last week)           OBJECTIVE    INR Protime   Date Value Ref Range Status   07/17/2018 2.5 (A) 0.86 - 1.14 Final       ASSESSMENT / PLAN  INR assessment THER    Recheck INR In: 4 WEEKS    INR Location Clinic      Anticoagulation Summary as of 7/17/2018     INR goal 2.0-3.0   Today's INR 2.5   Warfarin maintenance plan 2 mg (2 mg x 1) on Mon, Wed, Fri; 1 mg (2 mg x 0.5) all other days   Full warfarin instructions 2 mg on Mon, Wed, Fri; 1 mg all other days   Weekly warfarin total 10 mg   No change documented Yolanda Avila RN   Plan last modified Shasta Restrepo RN (6/8/2018)   Next INR check 8/14/2018   Priority INR   Target end date Indefinite    Indications   Long term (current) use of anticoagulants (Resolved) [Z79.01]  Unspecified atrial fibrillation [I48.91]         Anticoagulation Episode Summary     INR check location     Preferred lab     Send INR reminders to  INR    Comments       Anticoagulation Care Providers     Provider Role Specialty Phone number    Cesar Sanders MD [facility] [phone]            See the Encounter Report to view Anticoagulation Flowsheet and Dosing Calendar (Go to Encounters tab in chart review, and find the Anticoagulation Therapy Visit)        Yolanda Avila RN

## 2018-07-23 NOTE — PROGRESS NOTES
Patient Information     Patient Name  Sukhdeep Gomez MRN  [id number] Sex  Male   1934      Letter by Cesar Sanders MD at      Author:  Cesar Sanders MD Service:  (none) Author Type:  (none)    Filed:   Encounter Date:  2017 Status:  (Other)           Sukhdeep Gomez  [address] [facility] [address]          2017    Dear Mr. Gomez:    This is to remind you that you are due for your 1 year medication management appointment with Cesar Sanders MD in relation to continued use of simvastatin.  Your last visit was on 2016. Additional refills of your medication require you to complete this visit.    Please call [phone] to schedule your appointment.    Thank you for choosing [facility] for your health care needs.    Sincerely,      Refill RN  [facility]

## 2018-07-23 NOTE — PROGRESS NOTES
Patient Information     Patient Name  Sukhdeep Gomez MRN  [id number] Sex  Male   1934      Letter by Cesar Sanders MD at      Author:  Cesar Sanders MD Service:  (none) Author Type:  (none)    Filed:   Encounter Date:  2017 Status:  (Other)           Sukhdeep Gomez  [address] [facility] [address]          2017    Dear Mr. Gomez:    This is to remind you that you are coming due for your 3 month follow-up appointment with Cesar Sanders MD. Your last visit was on 10/06/2017.     Additional refills of your medication require you to complete this visit.    Please call [phone] to schedule your appointment.    Thank you for choosing [facility] for your health care needs.    Sincerely,      Refill RN  [facility]

## 2018-07-24 NOTE — PROGRESS NOTES
Preliminary Device Interrogation Results.  Final physician signed paceart report to be scanned and attached.    Remote ICD transmission received and reviewed.  Device transmission sent per MD orders.  Patient has a [employer] Incepta single lead ICD.  Normal ICD function.  3 ventricular episodes recorded in which 2 or the 3 episodes are more irregular rhythm and other egm is more abrupt onset with rates 180-190's bpm. Episodes may be AF with RVR lasting 7-16 seconds.  Presenting EGM = Irregular VS @ 58-75 bpm.  = 1%.  Estimated battery longevity to RENETTA = 9 years. RV lead impedance trends appear stable.  A voice mail was left for the patient regarding interrogation results and plan for patient to return to  [facility] in 10/23/18 months as scheduled.    Remote ICD transmission

## 2018-07-24 NOTE — PROGRESS NOTES
Patient Information     Patient Name  Sukhdeep Gomez MRN  [id number] Sex  Male   1934      Letter by Cesar Sanders MD at      Author:  Cesar Sanders MD Service:  (none) Author Type:  (none)    Filed:   Encounter Date:  3/1/2017 Status:  (Other)           Sukhdeep Gomez  [address] [address] [facility] [address]          2017    Dear Mr. Gomez:    Your labs are included below. The creatinine and BUN worsened, so it looks like you are receiving too much furosemide. Reduce to 20 mg twice daily. Reduce the potassium to just once daily. Plan to recheck lab in a month.     Please contact me if you have any questions.    Sincerely,      Cesar Sanders MD  Reviewed and electronically signed by provider.     Results for orders placed or performed in visit on 17      BASIC METABOLIC PANEL      Result  Value Ref Range    SODIUM 135 133 - 143 mmol/L    POTASSIUM 4.6 3.5 - 5.1 mmol/L    CHLORIDE 105 98 - 107 mmol/L    CO2,TOTAL 20 (L) 21 - 31 mmol/L    ANION GAP 10 5 - 18                    GLUCOSE 112 (H) 70 - 105 mg/dL    CALCIUM 9.6 8.6 - 10.3 mg/dL    BUN 80 (H) 7 - 25 mg/dL    CREATININE 2.27 (H) 0.70 - 1.30 mg/dL    BUN/CREAT RATIO           35                    GFR if African American 34 (L) >60 ml/min/1.73m2    GFR if not African American 28 (L) >60 ml/min/1.73m2

## 2018-07-24 NOTE — PROGRESS NOTES
Patient Information     Patient Name  Sukhdeep Gomez MRN  [id number] Sex  Male   1934      Letter by Cesar Sanders MD at      Author:  Cesar Sanders MD Service:  (none) Author Type:  (none)    Filed:   Encounter Date:  2017 Status:  (Other)           Sukhdeep Gomez  [address] [facility] [address]        2017    Dear Mr. Gomez:    Your labs look okay overall. The creatinine test is a kidney test and is elevated, but similar to previous levels. The BUN is a nitrogen measurement and has increased, which shows that you are getting a little dehydrated from the furosemide.     As outlined at the appointment, please reduce the furosemide to 40 mg in the morning and 20 mg in the afternoon. Start on the lisinopril 2.5 mg daily. Keep the same potassium dose.     Follow up in a few weeks to recheck lab.    Please contact me if you have any questions.    Sincerely,      Cesar Sanders MD  Reviewed and electronically signed by provider.     Results for orders placed or performed in visit on 17      BASIC METABOLIC PANEL      Result  Value Ref Range    SODIUM 136 133 - 143 mmol/L    POTASSIUM 4.2 3.5 - 5.1 mmol/L    CHLORIDE 101 98 - 107 mmol/L    CO2,TOTAL 25 21 - 31 mmol/L    ANION GAP 10 5 - 18                    GLUCOSE 192 (H) 70 - 105 mg/dL    CALCIUM 9.6 8.6 - 10.3 mg/dL    BUN 55 (H) 7 - 25 mg/dL    CREATININE 1.77 (H) 0.70 - 1.30 mg/dL    BUN/CREAT RATIO           31                    GFR if African American 45 (L) >60 ml/min/1.73m2    GFR if not African American 37 (L) >60 ml/min/1.73m2

## 2018-07-25 NOTE — NURSING NOTE
Pt presents to clinic today for diabetic check.  Prabha Mayer     Previous A1C is not at goal of <8  No results found for: A1C  Urine microalbumin:creatine: 1.53 on 6/12/18  Foot exam due  Eye exam  1 month ago  Tobacco User NO  Patient is on a daily aspirin  Patient is on a Statin.  Blood pressure today of:     BP Readings from Last 1 Encounters:   06/26/18 130/78      is at the goal of <139/89 for diabetics.    Prabha Mayer LPN on 7/25/2018 at 11:45 AM

## 2018-07-25 NOTE — PATIENT INSTRUCTIONS
Return for lab  Reduce morning insulin to 32 units  Referral to PT  Follow up in 3 months, sooner if problems

## 2018-07-25 NOTE — PROGRESS NOTES
Nursing Notes:   Prabha Mayer LPN  7/25/2018 11:49 AM  Unsigned  Pt presents to clinic today for diabetic check.  Prabha Mayer     Previous A1C is not at goal of <8  No results found for: A1C  Urine microalbumin:creatine: 1.53 on 6/12/18  Foot exam due  Eye exam  1 month ago  Tobacco User NO  Patient is on a daily aspirin  Patient is on a Statin.  Blood pressure today of:     BP Readings from Last 1 Encounters:   06/26/18 130/78      is at the goal of <139/89 for diabetics.    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 Tolerating this.     Also had recent cataract surgery, which went fine.    Notes more weakness after urologic surgery.     Despite surgeries, did not have weight gain or edema in legs. BMP remains stable at preop appts. No increase in SOB.     Blood sugars are reviewed and are variable in June and then more stable in July. He has a few readings in the 70s.   AM sugars are , before dinner 135-302, and before bed , similar to last check in variability, but about 20 points lower.    REVIEW OF SYSTEMS:    Constitutional: negative  Respiratory: negative  Cardiovascular: negative    Current Outpatient Prescriptions   Medication Sig Dispense Refill     acetaminophen (TYLENOL) 325 MG tablet Take 975 mg by mouth every 6 hours if needed. Max acetaminophen dose: 4000mg in 24 hrs.       allopurinol (ZYLOPRIM) 100 MG tablet Take 1 tablet by mouth once daily.       amoxicillin (AMOXIL) 500 MG capsule TAKE 4 CAPSULES BY MOUTH ONE HOUR PRIOR TO APPT       ASPIRIN 81 PO Take one tablet by mouth daily       cholecalciferol (VITAMIN D-1000 MAX ST) 1000 UNITS TABS Take one tablet by mouth daily       DUREZOL 0.05 % ophthalmic emulsion INSTILL ONE DROP  "IN THE LEFT EYE TWICE DAILY, USE TWICE DAILY FOR 4 WEEKS, BEGIN AFTER SURGERY  1     furosemide (LASIX) 20 MG tablet Take one tablet by mouth every morning and take an additional tab every other morning 137 tablet 0     ILEVRO 0.3 % ophthalmic suspension INSTILL ONE DROP IN THE LEFT EYE DAILY. USE EVERY DAY FOR 3 DAYS BEFORE, THEN FOR 6 WEEKS AFTER.  1     insulin aspart prot & aspart (NOVOLOG MIX 70/30 PEN) injection 35 units every morning and 10 units every evening 45 mL 3     insulin pen needle (EASY TOUCH PEN NEEDLES) 31G X 8 MM FOR ADMINISTERING INSULIN AT HOME TWICE DAILY. Dx: E11.8 200 each 1     lisinopril (PRINIVIL/ZESTRIL) 2.5 MG tablet Take 2.5 mg by mouth daily       metoprolol succinate (TOPROL-XL) 50 MG 24 hr tablet TAKE 1 AND 1/2 TABLETS BY MOUTH EVERY  tablet 0     moxifloxacin (VIGAMOX) 0.5 % ophthalmic solution INSTILL ONE DROP IN THE LEFT EYE FOUR TIMES DAILY. USE FOUR TIMES DAILY 3 DAYS BEFORE SURGERY, THEN FOUR TIMES DAILY FOR 1 WEEK AFTER  0     nystatin-triamcinolone (MYCOLOG II) cream APPLY TOPICALLY TO AFFECTED AREA(S) THREE TIMES DAILY 60 g 11     order for DME Wheelchair with elevating leg rests/foot rest. For home use. Length of need: 99 mo       potassium chloride SA (K-DUR/KLOR-CON M) 20 MEQ CR tablet Take 1 tablet by mouth once every other day with a meal.       simvastatin (ZOCOR) 20 MG tablet TAKE ONE TABLET BY MOUTH AT BEDTIME 90 tablet 0     warfarin (COUMADIN) 2 MG tablet Take 1 mg x four days/week and 2 mg x three days/week (Mondays, Wednesdays, and Fridays).  Or as directed by the protime clinic. 90 tablet 1     erythromycin (ROMYCIN) ophthalmic ointment APPLY IN THE RIGHT EYE TWICE DAILY. APPLY 1/4 INCH STRIP.  0     Allergies   Allergen Reactions     Codeine Unknown     Naproxen Other (See Comments)     \"kidneys shut down\"     Sulfa Drugs Unknown     Tramadol Nausea     Vancomycin Unknown       OBJECTIVE:  /60 (BP Location: Right arm, Patient Position: Chair, Cuff " Size: Adult Regular)  Pulse 60  Resp 16    EXAM:  General Appearance: Alert. No acute distress. Seated in wheelchair.  Chest/Respiratory Exam: Clear to auscultation bilaterally  Cardiovascular Exam: Regular rate and rhythm. S1, S2, no murmur, gallop, or rubs.  Neurological: Difficulty standing from wheelchair due to weakness  Extremities: No lower extremity edema.  Psychiatric: Normal affect and mentation        ASSESSMENT/PLAN:    ICD-10-CM    1. 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 Muscle weakness (generalized) M62.81 PHYSICAL THERAPY REFERRAL   3. Chronic systolic congestive heart failure (H) I50.22 PHYSICAL THERAPY REFERRAL   4. CKD (chronic kidney disease) stage 4, GFR 15-29 ml/min (H) N18.4        Due for A1c. He's had recent lab, but no one thought to obtain. Based on some slightly low readings in the 70s, he should take less 70/30 in the morning. Decrease from 35 to 32 units. Wants to return for lab another day. Will also get lipids.    Referral to PT for weakness. This is a reccuring issue given CHF and other chronic medical issues.    CHF appears stable and Cr stable on recent labs. No medication changes.     F/U 3 months.     Cesar Sanders MD    This document was prepared using a combination of typing and voice generated software.  While every attempt was made for accuracy, spelling and grammatical errors may exist.

## 2018-07-25 NOTE — MR AVS SNAPSHOT
After Visit Summary   7/25/2018    Sukhdeep Gomez    MRN: [id number]           Patient Information     Date Of Birth          8/19/1934        Visit Information        Provider Department      7/25/2018 11:45 AM Cesar Sanders MD [facility]        Today's Diagnoses     Diabetes mellitus with multiple complications (H)    -  1    Muscle weakness (generalized)        Chronic systolic congestive heart failure (H)        CKD (chronic kidney disease) stage 4, GFR 15-29 ml/min (H)          Care Instructions    Return for lab  Reduce morning insulin to 32 units  Referral to PT  Follow up in 3 months, sooner if problems          Follow-ups after your visit        Additional Services     PHYSICAL THERAPY REFERRAL                 Your next 10 appointments already scheduled     Aug 14, 2018  9:45 AM CDT   Nurse Only with  UROLOGY NURSE   [facility] ([facility]    [email or web address] [facility] [address]   [phone]            Aug 14, 2018 10:45 AM CDT   Anticoagulation Visit with  ANTI COAG 1   [facility] ([facility])    [email or web address] [facility] [address]   [phone]            Oct 23, 2018  1:00 PM CDT   Pacemaker Check with  PACEMAKER   [facility] ([facility])    [email or web address] [facility] [address]   [phone]              Future tests that were ordered for you today     Open Future Orders        Priority Expected Expires Ordered    Lipid Panel Routine  7/25/2019 7/25/2018            Who to contact     If you have questions or need follow up information about today's clinic visit or your schedule please contact [facility] directly at [phone].  Normal or non-critical lab and imaging results will be communicated to you by MyChart, letter or phone within 4 business days after the  clinic has received the results. If you do not hear from us within 7 days, please contact the clinic through [employer] or phone. If you have a critical or abnormal lab result, we will notify you by phone as soon as possible.  Submit refill requests through [employer] or call your pharmacy and they will forward the refill request to us. Please allow 3 business days for your refill to be completed.          Additional Information About Your Visit        Care EveryWhere ID     This is your Care EveryWhere ID. This could be used by other organizations to access your [place] medical records  [id number]        Your Vitals Were     Pulse Respirations                60 16           Blood Pressure from Last 3 Encounters:   07/25/18 102/60   06/26/18 130/78   06/18/18 (!) 60/50    Weight from Last 3 Encounters:   06/12/18 153 lb (69.4 kg)   05/27/18 155 lb (70.3 kg)   05/08/18 155 lb (70.3 kg)              We Performed the Following     PHYSICAL THERAPY REFERRAL          Today's Medication Changes          These changes are accurate as of 7/25/18 12:14 PM.  If you have any questions, ask your nurse or doctor.               These medicines have changed or have updated prescriptions.        Dose/Directions    insulin aspart prot & aspart injection   Commonly known as:  NovoLOG MIX 70/30 PEN   This may have changed:  additional instructions   Used for:  Diabetes mellitus with multiple complications (H)   Changed by:  Cesar Sanders MD        32 units every morning and 10 units every evening   Quantity:  45 mL   Refills:  3            Where to get your medicines      These medications were sent to [facility] - [place], [place] - [address]  [address] [facility] [address]     Phone:  [phone]     insulin aspart prot & aspart injection                Primary Care Provider Office Phone # Fax #    Cesar Sanders [phone] [address]  [place]  [place] [address]        Equal Access to Services     [facility] : Hadii shona segura adiel Lerma, waaxda luqadaha, qaybta kaalmada alessandro, griffin elghanshyamlucian clifford. So [facility] [phone].    ATENCIÓN: Si habla español, tiene a cohn disposición servicios gratuitos de asistencia lingüística. Ten al [phone].    We comply with applicable federal civil rights laws and [place] laws. We do not discriminate on the basis of race, color, national origin, age, disability, sex, sexual orientation, or gender identity.            Thank you!     Thank you for choosing [facility] AND [facility]  for your care. Our goal is always to provide you with excellent care. Hearing back from our patients is one way we can continue to improve our services. Please take a few minutes to complete the written survey that you may receive in the mail after your visit with us. Thank you!             Your Updated Medication List - Protect others around you: Learn how to safely use, store and throw away your medicines at www.disposemymeds.org.          This list is accurate as of 7/25/18 12:14 PM.  Always use your most recent med list.                   Brand Name Dispense Instructions for use Diagnosis    acetaminophen 325 MG tablet    TYLENOL     Take 975 mg by mouth every 6 hours if needed. Max acetaminophen dose: 4000mg in 24 hrs.        allopurinol 100 MG tablet    ZYLOPRIM     Take 1 tablet by mouth once daily.        amoxicillin 500 MG capsule    AMOXIL     TAKE 4 CAPSULES BY MOUTH ONE HOUR PRIOR TO APPT        ASPIRIN 81 PO      Take one tablet by mouth daily        DUREZOL 0.05 % ophthalmic emulsion   Generic drug:  difluprednate      INSTILL ONE DROP IN THE LEFT EYE TWICE DAILY, USE TWICE DAILY FOR 4 WEEKS, BEGIN AFTER SURGERY        erythromycin ophthalmic ointment    ROMYCIN     APPLY IN THE RIGHT EYE TWICE DAILY. APPLY 1/4 INCH STRIP.        furosemide 20 MG tablet    LASIX    137 tablet    Take one tablet by  mouth every morning and take an additional tab every other morning    Chronic systolic heart failure (H)       ILEVRO 0.3 % ophthalmic suspension   Generic drug:  nepafenac      INSTILL ONE DROP IN THE LEFT EYE DAILY. USE EVERY DAY FOR 3 DAYS BEFORE, THEN FOR 6 WEEKS AFTER.        insulin aspart prot & aspart injection    NovoLOG MIX 70/30 PEN    45 mL    32 units every morning and 10 units every evening    Diabetes mellitus with multiple complications (H)       insulin pen needle 31G X 8 MM    EASY TOUCH PEN NEEDLES    200 each    FOR ADMINISTERING INSULIN AT HOME TWICE DAILY. Dx: E11.8    Diabetes mellitus with multiple complications (H)       lisinopril 2.5 MG tablet    PRINIVIL/Zestril     Take 2.5 mg by mouth daily        metoprolol succinate 50 MG 24 hr tablet    TOPROL-XL    135 tablet    TAKE 1 AND 1/2 TABLETS BY MOUTH EVERY DAY    Essential hypertension       moxifloxacin 0.5 % ophthalmic solution    VIGAMOX     INSTILL ONE DROP IN THE LEFT EYE FOUR TIMES DAILY. USE FOUR TIMES DAILY 3 DAYS BEFORE SURGERY, THEN FOUR TIMES DAILY FOR 1 WEEK AFTER        nystatin-triamcinolone cream    MYCOLOG II    60 g    APPLY TOPICALLY TO AFFECTED AREA(S) THREE TIMES DAILY    Yeast dermatitis of penis       order for DME      Wheelchair with elevating leg rests/foot rest. For home use. Length of need: 99 mo        potassium chloride SA 20 MEQ CR tablet    K-DUR/KLOR-CON M     Take 1 tablet by mouth once every other day with a meal.        simvastatin 20 MG tablet    ZOCOR    90 tablet    TAKE ONE TABLET BY MOUTH AT BEDTIME    Mixed hyperlipidemia       VITAMIN D-1000 MAX ST 1000 units Tabs   Generic drug:  cholecalciferol      Take one tablet by mouth daily        warfarin 2 MG tablet    COUMADIN    90 tablet    Take 1 mg x four days/week and 2 mg x three days/week (Mondays, Wednesdays, and Fridays).  Or as directed by the protime clinic.    Atrial fibrillation (H)

## 2018-08-10 NOTE — INITIAL ASSESSMENTS
07/25/18 0883   General Information   Type of Visit Initial OP Ortho PT Evaluation   Start of Care Date 07/20/18   Referring Physician Marilyn   Patient/Family Goals Statement walk better at home, better mobiity at home, in/out of bed.    Orders Evaluate and Treat   Orders Comment Patient was in PT initially without MD Orders, but MD was contacted to get orders.   Insurance Type Medicare   Medical Diagnosis generalized muscle weakness   Surgical/Medical history reviewed Yes   Precautions/Limitations fall precautions   Presentation and Etiology   Pertinent history of current problem (include personal factors and/or comorbidities that impact the POC) has general decline of strength, endurance, mobility, and walking/standing tolerance.    Impairments A. Pain;F. Decreased strength and endurance;G. Impaired balance;H. Impaired gait   Functional Limitations perform activities of daily living   Chronicity Chronic   Pain quality C. Aching   Frequency of pain/symptoms B. Intermittent   Pain/symptoms exacerbated by B. Walking   Pain/symptoms eased by C. Rest   Prior Level of Function   Prior Level of Function-Mobility Prior to surgical implant of cathetor, he was walking with PT, more than he is able now. Was able to get in/out of bed easier as well.    Functional Level Prior Comment he has decreased mobility and tolerance to avctivities since his new cathetor placement.   Current Level of Function   Current Community Support Family/friend caregiver   Patient role/employment history F. Retired   [facility]   Home/community accessibility lives with son and has good set up at home.    Current equipment-Gait/Locomotion Walker;Gait belt;Wheelchair   Current equipment-ADL Shower/tub chair;Shower/tub grab bar;Commode;Wall grab bar;Reacher;Sock aide   Fall Risk Screen   Fall screen completed by PT   Have you fallen 2 or more times in the past year? No   Have you fallen and had an injury in the past year? No    Timed Up and Go score (seconds) not tested    Is patient a fall risk? Yes   Planned Therapy Interventions   Planned Therapy Interventions balance training;gait training;neuromuscular re-education;strengthening;stretching;transfer training   Planned Modality Interventions   Planned Modality Interventions Hot packs   Clinical Impression   Criteria for Skilled Therapeutic Interventions Met yes, treatment indicated   PT Diagnosis weakness and decreased mobiity, transfers, gait and balance.   Influenced by the following impairments weakness,   Functional limitations due to impairments transfers, walking, bathrrom taks.   Clinical Presentation Stable/Uncomplicated   Clinical Decision Making (Complexity) Low complexity   Predicted Duration of Therapy Intervention (days/wks) 1-2x/wk when able, 8 weeks   Risk & Benefits of therapy have been explained Yes   Patient, Family & other staff in agreement with plan of care Yes   Ortho Goal 1   Goal Identifier walking   Goal Description walk with walker in home to all meals and to bathrrom.    Target Date 09/21/18   Ortho Goal 2   Goal Identifier transfers   Goal Description get in/out of bed on his own for sleep.   Target Date 09/21/18   Ortho Goal 3   Goal Identifier standign   Goal Description patient will tolerate standing greater than 2 minutes, to help with dressing and meal prep   Target Date 09/21/18   Total Evaluation Time   Total Evaluation Time 35   Therapy Certification   Certification date from 07/20/18   Certification date to 09/21/18   Medical Diagnosis generalized muscle weakness

## 2018-08-10 NOTE — PROGRESS NOTES
[facility]          OUTPATIENT PHYSICAL THERAPY ORTHOPEDIC EVALUATION  PLAN OF TREATMENT FOR OUTPATIENT REHABILITATION  (COMPLETE FOR INITIAL CLAIMS ONLY)  Patient's Last Name, First Name, M.I.  YOB: 1934  Sukhdeep Gomez    Provider s Name:  [facility]   Medical Record No.  [id number]   Start of Care Date:  07/20/18   Onset Date:      Type:     _X__PT   ___OT   ___SLP Medical Diagnosis:  generalized muscle weakness     PT Diagnosis:  weakness and decreased mobiity, transfers, gait and balance.   Visits from SOC:  1      _________________________________________________________________________________  Plan of Treatment/Functional Goals:  balance training, gait training, neuromuscular re-education, strengthening, stretching, transfer training     Hot packs     Goals  Goal Identifier: walking  Goal Description: walk with walker in home to all meals and to bathrrom.   Target Date: 09/21/18    Goal Identifier: transfers  Goal Description: get in/out of bed on his own for sleep.  Target Date: 09/21/18    Goal Identifier: standign  Goal Description: patient will tolerate standing greater than 2 minutes, to help with dressing and meal prep  Target Date: 09/21/18                                    Therapy Frequency:     Predicted Duration of Therapy Intervention:  1-2x/wk when able, 8 weeks    Jerson Shannon, PT                 I CERTIFY THE NEED FOR THESE SERVICES FURNISHED UNDER        THIS PLAN OF TREATMENT AND WHILE UNDER MY CARE     (Physician co-signature of this document indicates review and certification of the therapy plan).                       Certification Date From:  07/20/18   Certification Date To:  09/21/18    Referring Provider:  Marilyn    Initial Assessment        See Epic Evaluation Start of Care Date: 07/20/18

## 2018-08-14 NOTE — NURSING NOTE
Suprapubic Tube Change  Patient came to the clinic for monthly suprapubic catheter change. No problems noted. Old catheter removed.  Patient  prepped and draped in sterile manner. New 18 Setswana silicone closed end suprapubic almeida inserted with no problems, clear yellow urine return noted.  Catheter flushed with sterile water and return noted with no problems.  Will return to the clinic for monthly catheter change. Edgardo Sanchez MD   supervised the procedure.

## 2018-08-14 NOTE — MR AVS SNAPSHOT
Sukhdeep Gomez   8/14/2018 10:45 AM   Anticoagulation Therapy Visit    Description:  83 year old male   Provider:  [facility]   Department:  [facility]           INR as of 8/14/2018     Today's INR 3.3!      Anticoagulation Summary as of 8/14/2018     INR goal 2.0-3.0   Today's INR 3.3!   Full warfarin instructions 8/15: 1 mg; Otherwise 2 mg on Mon, Wed, Fri; 1 mg all other days   Next INR check 9/11/2018    Indications   Long term (current) use of anticoagulants (Resolved) [Z79.01]  Unspecified atrial fibrillation [I48.91]         Description     Take 1 mg wed then resume normal and recheck in 4 weeks. Yolanda Avila RN    8/14/2018  10:27 AM        Your next Anticoagulation Clinic appointment(s)     Aug 14, 2018 10:45 AM CDT   Anticoagulation Visit with [facility]   [facility] ([facility])    [address]  [facility] [address]   [phone]              August 2018 Details    Sun Mon Tue Wed Thu Fri Sat        1               2               3               4                 5               6               7               8               9               10               11                 12               13               14      1 mg   See details      15      1 mg         16      1 mg         17      2 mg         18      1 mg           19      1 mg         20      2 mg         21      1 mg         22      2 mg         23      1 mg         24      2 mg         25      1 mg           26      1 mg         27      2 mg         28      1 mg         29      2 mg         30      1 mg         31      2 mg           Date Details   08/14 This INR check               How to take your warfarin dose     To take:  1 mg Take 0.5 of a 2 mg tablet.    To take:  2 mg Take 1 of the 2 mg tablets.           September 2018 Details    Sun Mon Tue Wed Thu Fri Sat           1      1 mg           2      1 mg         3      2 mg         4      1 mg         5       2 mg         6      1 mg         7      2 mg         8      1 mg           9      1 mg         10      2 mg         11            12               13               14               15                 16               17               18               19               20               21               22                 23               24               25               26               27               28               29                 30                      Date Details   No additional details    Date of next INR:  9/11/2018         How to take your warfarin dose     To take:  1 mg Take 0.5 of a 2 mg tablet.    To take:  2 mg Take 1 of the 2 mg tablets.

## 2018-08-14 NOTE — MR AVS SNAPSHOT
After Visit Summary   8/14/2018    Sukhdeep Gomez    MRN: [id number]           Patient Information     Date Of Birth          8/19/1934        Visit Information        Provider Department      8/14/2018 9:45 AM  UROLOGY NURSE [facility]        Today's Diagnoses     Urinary retention           Follow-ups after your visit        Your next 10 appointments already scheduled     Aug 24, 2018 10:30 AM CDT   Treatment with Jerson Shannon, PT   [facility]    [address]  [place] [address]   [phone]            Aug 29, 2018  4:30 PM CDT   Treatment with Jerson Shannon, PT   [facility] ([facility])    [address]  [place] [address]   [phone]            Aug 31, 2018  3:45 PM CDT   Treatment with Jerson Shannon, PT   [facility] ([facility])    [address]  [place] [address]   [phone]            Sep 11, 2018 10:45 AM CDT   Nurse Only with  UROLOGY NURSE   [facility]    [address]  [place] [address]   [phone]            Oct 23, 2018  1:00 PM CDT   Pacemaker Check with  PACEMAKER   [facility]    [address]  [place] [address]   [phone]              Who to contact     If you have questions or need follow up information about today's clinic visit or your schedule please contact [facility] directly at [phone].  Normal or non-critical lab and imaging results will be communicated to you by MyChart, letter or phone within 4 business days after the clinic has received the results. If you do not hear from us within 7 days, please contact the clinic through MyChart or phone. If you have a critical or  abnormal lab result, we will notify you by phone as soon as possible.  Submit refill requests through [employer] or call your pharmacy and they will forward the refill request to us. Please allow 3 business days for your refill to be completed.          Additional Information About Your Visit        Care EveryWhere ID     This is your Care EveryWhere ID. This could be used by other organizations to access your [place] medical records  [id number]        Your Vitals Were     Pulse Temperature Respirations             60 96.7  F (35.9  C) 14          Blood Pressure from Last 3 Encounters:   07/25/18 102/60   06/26/18 130/78   06/18/18 (!) 60/50    Weight from Last 3 Encounters:   06/12/18 69.4 kg (153 lb)   05/27/18 70.3 kg (155 lb)   05/08/18 70.3 kg (155 lb)              We Performed the Following     CHANGE CYSTOSTMY TUBE SIMPLE     IRRIGATION BLADDER SIMPLE LAVAGE/INSTILLATION        Primary Care Provider Office Phone # Fax #    Cesar Sanders [phone]       [address] [facility] [address]        Equal Access to Services     [facility]: Hadii aad ku hadasho Soomaali, waaxda luqadaha, qaybta kaalmada alessandro, griffin pate . So [facility] [phone].    ATENCIÓN: Si habla español, tiene a cohn disposición servicios gratuitos de asistencia lingüística. Ten al [phone].    We comply with applicable federal civil rights laws and [place] laws. We do not discriminate on the basis of race, color, national origin, age, disability, sex, sexual orientation, or gender identity.            Thank you!     Thank you for choosing [facility] [place] [facility] AND [facility]  for your care. Our goal is always to provide you with excellent care. Hearing back from our patients is one way we can continue to improve our services. Please take a few minutes to complete the written survey that you may receive in the mail after your visit with us. Thank you!             Your  Updated Medication List - Protect others around you: Learn how to safely use, store and throw away your medicines at www.disposemymeds.org.          This list is accurate as of 8/14/18 11:09 AM.  Always use your most recent med list.                   Brand Name Dispense Instructions for use Diagnosis    acetaminophen 325 MG tablet    TYLENOL     Take 975 mg by mouth every 6 hours if needed. Max acetaminophen dose: 4000mg in 24 hrs.        allopurinol 100 MG tablet    ZYLOPRIM     Take 1 tablet by mouth once daily.        amoxicillin 500 MG capsule    AMOXIL     TAKE 4 CAPSULES BY MOUTH ONE HOUR PRIOR TO APPT        ASPIRIN 81 PO      Take one tablet by mouth daily        DUREZOL 0.05 % ophthalmic emulsion   Generic drug:  difluprednate      INSTILL ONE DROP IN THE LEFT EYE TWICE DAILY, USE TWICE DAILY FOR 4 WEEKS, BEGIN AFTER SURGERY        erythromycin ophthalmic ointment    ROMYCIN     APPLY IN THE RIGHT EYE TWICE DAILY. APPLY 1/4 INCH STRIP.        furosemide 20 MG tablet    LASIX    137 tablet    Take one tablet by mouth every morning and take an additional tab every other morning    Chronic systolic heart failure (H)       ILEVRO 0.3 % ophthalmic suspension   Generic drug:  nepafenac      INSTILL ONE DROP IN THE LEFT EYE DAILY. USE EVERY DAY FOR 3 DAYS BEFORE, THEN FOR 6 WEEKS AFTER.        insulin aspart prot & aspart injection    NovoLOG MIX 70/30 PEN    45 mL    32 units every morning and 10 units every evening    Diabetes mellitus with multiple complications (H)       insulin pen needle 31G X 8 MM    EASY TOUCH PEN NEEDLES    200 each    FOR ADMINISTERING INSULIN AT HOME TWICE DAILY. Dx: E11.8    Diabetes mellitus with multiple complications (H)       lisinopril 2.5 MG tablet    PRINIVIL/Zestril     Take 2.5 mg by mouth daily        metoprolol succinate 50 MG 24 hr tablet    TOPROL-XL    135 tablet    TAKE 1 AND 1/2 TABLETS BY MOUTH EVERY DAY    Essential hypertension       moxifloxacin 0.5 % ophthalmic  solution    VIGAMOX     INSTILL ONE DROP IN THE LEFT EYE FOUR TIMES DAILY. USE FOUR TIMES DAILY 3 DAYS BEFORE SURGERY, THEN FOUR TIMES DAILY FOR 1 WEEK AFTER        nystatin-triamcinolone cream    MYCOLOG II    60 g    APPLY TOPICALLY TO AFFECTED AREA(S) THREE TIMES DAILY    Yeast dermatitis of penis       order for DME      Wheelchair with elevating leg rests/foot rest. For home use. Length of need: 99 mo        potassium chloride SA 20 MEQ CR tablet    K-DUR/KLOR-CON M     Take 1 tablet by mouth once every other day with a meal.        simvastatin 20 MG tablet    ZOCOR    90 tablet    TAKE ONE TABLET BY MOUTH AT BEDTIME    Mixed hyperlipidemia       VITAMIN D-1000 MAX ST 1000 units Tabs   Generic drug:  cholecalciferol      Take one tablet by mouth daily        warfarin 2 MG tablet    COUMADIN    90 tablet    Take 1 mg x four days/week and 2 mg x three days/week (Mondays, Wednesdays, and Fridays).  Or as directed by the protime clinic.    Atrial fibrillation (H)

## 2018-08-16 NOTE — TELEPHONE ENCOUNTER
"Rx request received from the pharmacy as noted below:    EASY TOUCH PEN NEEDLE 31 GX5/16\" DIS NEEDLE  Will file in chart as: EASY TOUCH PEN NEEDLES 31G X 8 MM  FOR ADMINISTERING INSULIN AT HOME TWICE DAILY. Dx: E11.8  Disp: Not specified (Pharmacy requested 200)   Refills:      Class: E-Prescribe Start: 8/13/2018   For: Diabetes mellitus with multiple complications (H)  Documented:7 months ago  Last refill:8/13/2018  To pharmacy: This prescription was filled on 8/13/2018. Any refills authorized will be placed on file.    Per chart review of current Rx as well as Rx request as noted above, Rx as noted above was filled for a 90 day supply on 8/13/18.    Medication Detail      Disp Refills Start End ALEXI   insulin pen needle (EASY TOUCH PEN NEEDLES) 31G X 8  each 1 3/20/2018  No   Sig: FOR ADMINISTERING INSULIN AT HOME TWICE DAILY. Dx: E11.8   Class: E-Prescribe   Order: [id number]   E-Prescribing Status: Receipt confirmed by pharmacy (3/20/2018  9:00 AM CDT)   Printout Tracking   External Result Report   Medication Administration Instructions   FOR ADMINISTERING INSULIN AT HOME TWICE DAILY. Dx: E11.8   Pharmacy   [facility] - [place], [place] - [address]     Rx request is inappropriate at this time. Refill will be due 11/13/18. Writer will refuse Rx request in this encounter at this time.    Unable to complete prescription refill per RN Medication Refill Policy. Fredy Becerra 8/16/2018 8:58 AM  "

## 2018-08-21 NOTE — NURSING NOTE
Patient presents to the clinic for left eye irritation that started 5-6 days ago. Patient states the eye is painful and watery.  Alexandrea BENNETT CMA.......8/21/2018..3:54 PM

## 2018-08-21 NOTE — PROGRESS NOTES
SUBJECTIVE:   Sukhdeep Gomez is a 84 year old male who presents to clinic today for the following health issues:    Eye(s) Problem    Onset: 5-6 days ago    Description:   Location: Left Eye  Pain: YES  Redness: no    Accompanying Signs & Symptoms:  Discharge/mattering: no  Swelling: YES- around eye  Visual changes: YES- blurred  Fever: no  Nasal Congestion: no  Bothered by bright lights: no    History:   Trauma: no   Foreign body exposure: no    Precipitating factors:   Wearing contacts: no    Alleviating factors:  Improved by: Nothing    Therapies Tried and outcome: Nothing    Cataract surgery to left eye about 2 months ago. At post operative follow-up he did not have blurred vision but has recently developed blurred vision.    Rash around eye on frontal/temporal extending posteriorly developed about 5 days ago but has worsened in the last day or so per patient and son. Burning and tenderness to area.    Problem list and histories reviewed & adjusted, as indicated.  Additional history: as documented    Patient Active Problem List   Diagnosis     ACP (advance care planning)     Altered level of consciousness     Anticoagulation monitoring, INR range 2-3     Atrial fibrillation (H)     Basal cell carcinoma of right forehead     Chronic systolic heart failure (H)     CKD (chronic kidney disease) stage 3, GFR 30-59 ml/min     Osteoarthrosis     Diabetes mellitus with multiple complications (H)     Diabetic neuropathy (H)     DM II (diabetes mellitus, type II), controlled (H)     Elevated INR     Elevated troponin     Essential hypertension     Gout of left foot due to renal impairment     Hamstring tightness of left lower extremity     Hematoma of right hip     History of TIA (transient ischemic attack)     Hypoglycemia associated with diabetes (H)     Implantable cardioverter-defibrillator, [employer], Single lead     Ischemic cardiomyopathy     Lumbago     Macrocytic anemia     Enthesopathy of ankle and  tarsus     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     Mixed hyperlipidemia     Onychomycosis     Orthostatic hypotension     Pes planus     Prostate CA (H)     S/P CABG (coronary artery bypass graft)     Spinal stenosis     Squamous cell carcinoma of skin     Stricture of male urethra     Systolic congestive heart failure (H)     Yeast dermatitis     Unspecified atrial fibrillation     Ascending aorta dilatation (H)     Chronic systolic congestive heart failure (H)     CKD (chronic kidney disease) stage 4, GFR 15-29 ml/min (H)     H/O myocardial infarction, greater than 8 weeks     Paroxysmal atrial fibrillation (H)     S/P CABG x 4     Stented coronary artery     Muscle weakness (generalized)     Myopathy     Urinary retention     Past Surgical History:   Procedure Laterality Date     ARTHROPLASTY KNEE      2011,left     BYPASS GRAFT ARTERY CORONARY      4 vessel, [facility], 1989     CATARACT IOL, RT/LT Bilateral 2018 6/20 and 7/11 Dr Irwin     CIRCUMCISION N/A 5/8/2018    Procedure: CIRCUMCISION;  Circumcision ,  Glans Biopsy Cystoscopy;  Surgeon: Edgardo Sanchez MD;  Location:  OR     CYSTOSCOPY      2009,& dilation of urethral stricture, Dr. Wallace     CYSTOSCOPY      2010,& catheter placement for acute obstruction, Dr. Elise     CYSTOSTOMY, INSERT TUBE SUPRAPUBIC, COMBINED N/A 5/22/2018    Procedure: COMBINED CYSTOSTOMY, INSERT TUBE SUPRAPUBIC;  Supra tube Placement    .;  Surgeon: Edgardo Sanchez MD;  Location:  OR     ESOPHAGOSCOPY, GASTROSCOPY, DUODENOSCOPY (EGD), COMBINED      2007,& colonoscopy with polypectomy, [facility] essetnially normal EGD with small polyp removed     HEART CATH, ANGIOPLASTY      approx. 2003,angiogram, 2 stents, [facility]     IMPLANT PACEMAKER      2007,[employer]ant ICD, CPI Vitality 2 EL, model #T177, serial #[id number], which was implanted on 05/30/2007 at [facility].     LAMINECTOMY LUMBAR ONE LEVEL      2006,decompressive, L3, L4 & L5 with improvement, Dr. Lopes        Social History   Substance Use Topics     Smoking status: Never Smoker     Smokeless tobacco: Never Used     Alcohol use No     Family History   Problem Relation Age of Onset     HEART DISEASE Father      Heart Disease     Prostate Cancer Brother      Cancer-prostate         Current Outpatient Prescriptions   Medication Sig Dispense Refill     acetaminophen (TYLENOL) 325 MG tablet Take 975 mg by mouth every 6 hours if needed. Max acetaminophen dose: 4000mg in 24 hrs.       allopurinol (ZYLOPRIM) 100 MG tablet Take 1 tablet by mouth once daily.       ASPIRIN 81 PO Take one tablet by mouth daily       cholecalciferol (VITAMIN D-1000 MAX ST) 1000 UNITS TABS Take one tablet by mouth daily       DUREZOL 0.05 % ophthalmic emulsion INSTILL ONE DROP IN THE LEFT EYE TWICE DAILY, USE TWICE DAILY FOR 4 WEEKS, BEGIN AFTER SURGERY  1     erythromycin (ROMYCIN) ophthalmic ointment APPLY IN THE RIGHT EYE TWICE DAILY. APPLY 1/4 INCH STRIP.  0     furosemide (LASIX) 20 MG tablet Take one tablet by mouth every morning and take an additional tab every other morning 137 tablet 0     ILEVRO 0.3 % ophthalmic suspension INSTILL ONE DROP IN THE LEFT EYE DAILY. USE EVERY DAY FOR 3 DAYS BEFORE, THEN FOR 6 WEEKS AFTER.  1     insulin aspart prot & aspart (NOVOLOG MIX 70/30 PEN) injection 32 units every morning and 10 units every evening 45 mL 3     insulin pen needle (EASY TOUCH PEN NEEDLES) 31G X 8 MM FOR ADMINISTERING INSULIN AT HOME TWICE DAILY. Dx: E11.8 200 each 1     lisinopril (PRINIVIL/ZESTRIL) 2.5 MG tablet Take 2.5 mg by mouth daily       metoprolol succinate (TOPROL-XL) 50 MG 24 hr tablet TAKE 1 AND 1/2 TABLETS BY MOUTH EVERY  tablet 0     moxifloxacin (VIGAMOX) 0.5 % ophthalmic solution INSTILL ONE DROP IN THE LEFT EYE FOUR TIMES DAILY. USE FOUR TIMES DAILY 3 DAYS BEFORE SURGERY, THEN FOUR TIMES DAILY FOR 1 WEEK AFTER  0     nystatin-triamcinolone (MYCOLOG II) cream APPLY TOPICALLY TO AFFECTED AREA(S) THREE TIMES DAILY 60  "g 11     order for DME Wheelchair with elevating leg rests/foot rest. For home use. Length of need: 99 mo       potassium chloride SA (K-DUR/KLOR-CON M) 20 MEQ CR tablet Take 1 tablet by mouth once every other day with a meal.       simvastatin (ZOCOR) 20 MG tablet TAKE ONE TABLET BY MOUTH AT BEDTIME 90 tablet 0     warfarin (COUMADIN) 2 MG tablet Take 1 mg x four days/week and 2 mg x three days/week (Mondays, Wednesdays, and Fridays).  Or as directed by the [facility] clinic. 90 tablet 1     Allergies   Allergen Reactions     Codeine Unknown     Naproxen Other (See Comments)     \"kidneys shut down\"     Sulfa Drugs Unknown     Tramadol Nausea     Vancomycin Unknown     Recent Labs   Lab Test  07/26/18   1515  06/12/18   1222  05/27/18   0930  04/27/18   0945   10/06/17   1532   10/22/14   1100   A1C  6.8*   --    --    --    --    --    --    --    LDL  30   --    --    --    --   31   --   31   HDL  32   --    --    --    --   36   --   33   TRIG  72   --    --    --    --   132   --   117   ALT   --    --    --   21   --    --    --    --    CR   --   1.53*  1.38*  1.52*   < >  2.29*   < >  1.87*   GFRESTIMATED   --   44*  49*  44*   < >   --    --    --    GFRESTBLACK   --   53*  60*  53*   < >  33*   < >  42*   POTASSIUM   --   4.0  4.0  4.5   < >  4.9   < >  4.9    < > = values in this interval not displayed.      BP Readings from Last 3 Encounters:   08/21/18 104/62   07/25/18 102/60   06/26/18 130/78    Wt Readings from Last 3 Encounters:   06/12/18 153 lb (69.4 kg)   05/27/18 155 lb (70.3 kg)   05/08/18 155 lb (70.3 kg)                    Reviewed and updated as needed this visit by clinical staff  Tobacco  Allergies  Meds  Med Hx  Surg Hx  Fam Hx  Soc Hx      Reviewed and updated as needed this visit by Provider         ROS:    Constitutional, HEENT, cardiovascular, pulmonary, gi and gu systems are negative, except as otherwise noted.    OBJECTIVE:     /62 (BP Location: Left arm, Patient Position: " "Sitting, Cuff Size: Adult Regular)  Pulse 68  Temp 96.3  F (35.7  C) (Tympanic)  Ht 5' 6\" (1.676 m)  There is no height or weight on file to calculate BMI.     GENERAL: frail, elderly, alert and no distress  EYES: swelling under left eye- no erythema, no eyelid swelling, PERRLA and conjunctivae and sclerae normal  HENT: ear canals and TM's normal, nose and mouth without ulcers or lesions  NECK: no adenopathy, no asymmetry, masses, or scars  SKIN: erythematous patch with small vesicles around left eyebrow/left frontal area extending posteriorly to about temporal area.  NEURO: Normal strength and tone, mentation intact and speech normal  PSYCH: mentation appears normal, affect normal/bright    Diagnostic Test Results:  none     ASSESSMENT/PLAN:     1. Herpes zoster with ophthalmic complication, unspecified herpes zoster eye disease  Acute, Symptomatic  - valACYclovir (VALTREX) 1000 mg tablet; Take 1 tablet (1,000 mg) by mouth 2 times daily  Dispense: 21 tablet; Refill: 0- Due to decreased kidney functioning dosed BID versus TID.  - Tylenol for discomfort.    - Warfarin does not show to have interaction with Valtrex but you may want to call coumadin clinic to let them know you have started a new medication as they may want to check INR sooner than usual.    ** CALL EYE DOCTOR AND LET THEM KNOW YOU HAVE SHINGLES AND VISION CHANGES IN LEFT EYE**          FUTURE APPOINTMENTS:       - Follow-up visit: No improvement or worsening symptoms.      Annie Valenzuela, CNP  [facility]    "

## 2018-08-21 NOTE — MR AVS SNAPSHOT
After Visit Summary   8/21/2018    Sukhdeep Gomez    MRN: [id number]           Patient Information     Date Of Birth          8/19/1934        Visit Information        Provider Department      8/21/2018 3:30 PM Annie Valenzuela CNP [facility]        Today's Diagnoses     Herpes zoster with ophthalmic complication, unspecified herpes zoster eye disease    -  1      Care Instructions    ASSESSMENT/PLAN:     1. Herpes zoster with ophthalmic complication, unspecified herpes zoster eye disease  Acute, Symptomatic  - valACYclovir (VALTREX) 1000 mg tablet; Take 1 tablet (1,000 mg) by mouth 2 times daily  Dispense: 21 tablet; Refill: 0  - Tylenol for discomfort.    - Warfarin does not show to have interaction with Valtrex but you may want to call coumadin clinic to let them know you have started a new medication as they may want to check INR sooner than usual.    ** CALL EYE DOCTOR AND LET THEM KNOW YOU HAVE SHINGLES AND VISION CHANGES IN LEFT EYE**          FUTURE APPOINTMENTS:       - Follow-up visit: No improvement or worsening symptoms.      Annie Valenzuela CNP  [facility]          Follow-ups after your visit        Your next 10 appointments already scheduled     Aug 24, 2018 10:30 AM CDT   Treatment with Jerson Shannon, PT   [facility] ([facility]    [address] [place] [address]   [phone]            Aug 29, 2018  4:30 PM CDT   Treatment with Jerson Shannon PT   [facility] ([facility])    [address] [facility] [address]   [phone]            Aug 31, 2018  3:45 PM CDT   Treatment with Jerson Shannon PT   [facility] ([facility])    [address] [place] [address]   [phone]            Sep 11, 2018 10:45 AM CDT   Nurse Only with  UROLOGY NURSE   [facility]    [address]  [place] [address]   [phone]            Oct 23, 2018  1:00 PM CDT   Pacemaker Check with [facility]   [facility]    [address]  [place] [address]   [phone]              Who to contact     If you have questions or need follow up information about today's clinic visit or your schedule please contact [facility] directly at [phone].  Normal or non-critical lab and imaging results will be communicated to you by MyChart, letter or phone within 4 business days after the clinic has received the results. If you do not hear from us within 7 days, please contact the clinic through MyChart or phone. If you have a critical or abnormal lab result, we will notify you by phone as soon as possible.  Submit refill requests through [employer] or call your pharmacy and they will forward the refill request to us. Please allow 3 business days for your refill to be completed.          Additional Information About Your Visit        Care EveryWhere ID     This is your Care EveryWhere ID. This could be used by other organizations to access your [place] medical records  [id number]        Your Vitals Were     Pulse Temperature Height             68 96.3  F (35.7  C) (Tympanic) 5' 6\" (1.676 m)          Blood Pressure from Last 3 Encounters:   08/21/18 104/62   07/25/18 102/60   06/26/18 130/78    Weight from Last 3 Encounters:   06/12/18 153 lb (69.4 kg)   05/27/18 155 lb (70.3 kg)   05/08/18 155 lb (70.3 kg)              Today, you had the following     No orders found for display         Today's Medication Changes          These changes are accurate as of 8/21/18  4:14 PM.  If you have any questions, ask your nurse or doctor.               Start taking these medicines.        Dose/Directions    valACYclovir 1000 mg tablet   Commonly known as:  " VALTREX   Used for:  Herpes zoster with ophthalmic complication, unspecified herpes zoster eye disease   Started by:  Annie Valenzuela CNP        Dose:  1000 mg   Take 1 tablet (1,000 mg) by mouth 2 times daily   Quantity:  21 tablet   Refills:  0            Where to get your medicines      These medications were sent to [id number] ([facility]) - [place], [place] - [address]  [address], [place] [address]     Phone:  [phone]     valACYclovir 1000 mg tablet                Primary Care Provider Office Phone # Fax #    Cesar Sanders [phone] [address]  [place] [address]        Equal Access to Services     FRANCINE ARMANDO : Hadii aad ku hadasho Soomaali, waaxda luqadaha, qaybta kaalmada adeegyada, griffin pate . So [facility] [phone].    ATENCIÓN: Si habla español, tiene a cohn disposición servicios gratuitos de asistencia lingüística. [facility] [phone].    We comply with applicable federal civil rights laws and [place] laws. We do not discriminate on the basis of race, color, national origin, age, disability, sex, sexual orientation, or gender identity.            Thank you!     Thank you for choosing [facility]  for your care. Our goal is always to provide you with excellent care. Hearing back from our patients is one way we can continue to improve our services. Please take a few minutes to complete the written survey that you may receive in the mail after your visit with us. Thank you!             Your Updated Medication List - Protect others around you: Learn how to safely use, store and throw away your medicines at www.disposemymeds.org.          This list is accurate as of 8/21/18  4:14 PM.  Always use your most recent med list.                   Brand Name Dispense Instructions for use Diagnosis    acetaminophen 325 MG tablet    TYLENOL     Take 975 mg by mouth every 6  hours if needed. Max acetaminophen dose: 4000mg in 24 hrs.        allopurinol 100 MG tablet    ZYLOPRIM     Take 1 tablet by mouth once daily.        ASPIRIN 81 PO      Take one tablet by mouth daily        DUREZOL 0.05 % ophthalmic emulsion   Generic drug:  difluprednate      INSTILL ONE DROP IN THE LEFT EYE TWICE DAILY, USE TWICE DAILY FOR 4 WEEKS, BEGIN AFTER SURGERY        erythromycin ophthalmic ointment    ROMYCIN     APPLY IN THE RIGHT EYE TWICE DAILY. APPLY 1/4 INCH STRIP.        furosemide 20 MG tablet    LASIX    137 tablet    Take one tablet by mouth every morning and take an additional tab every other morning    Chronic systolic heart failure (H)       ILEVRO 0.3 % ophthalmic suspension   Generic drug:  nepafenac      INSTILL ONE DROP IN THE LEFT EYE DAILY. USE EVERY DAY FOR 3 DAYS BEFORE, THEN FOR 6 WEEKS AFTER.        insulin aspart prot & aspart injection    NovoLOG MIX 70/30 PEN    45 mL    32 units every morning and 10 units every evening    Diabetes mellitus with multiple complications (H)       insulin pen needle 31G X 8 MM    EASY TOUCH PEN NEEDLES    200 each    FOR ADMINISTERING INSULIN AT HOME TWICE DAILY. Dx: E11.8    Diabetes mellitus with multiple complications (H)       lisinopril 2.5 MG tablet    PRINIVIL/Zestril     Take 2.5 mg by mouth daily        metoprolol succinate 50 MG 24 hr tablet    TOPROL-XL    135 tablet    TAKE 1 AND 1/2 TABLETS BY MOUTH EVERY DAY    Essential hypertension       moxifloxacin 0.5 % ophthalmic solution    VIGAMOX     INSTILL ONE DROP IN THE LEFT EYE FOUR TIMES DAILY. USE FOUR TIMES DAILY 3 DAYS BEFORE SURGERY, THEN FOUR TIMES DAILY FOR 1 WEEK AFTER        nystatin-triamcinolone cream    MYCOLOG II    60 g    APPLY TOPICALLY TO AFFECTED AREA(S) THREE TIMES DAILY    Yeast dermatitis of penis       order for DME      Wheelchair with elevating leg rests/foot rest. For home use. Length of need: 99 mo        potassium chloride SA 20 MEQ CR tablet    K-DUR/KLOR-CON M      Take 1 tablet by mouth once every other day with a meal.        simvastatin 20 MG tablet    ZOCOR    90 tablet    TAKE ONE TABLET BY MOUTH AT BEDTIME    Mixed hyperlipidemia       valACYclovir 1000 mg tablet    VALTREX    21 tablet    Take 1 tablet (1,000 mg) by mouth 2 times daily    Herpes zoster with ophthalmic complication, unspecified herpes zoster eye disease       VITAMIN D-1000 MAX ST 1000 units Tabs   Generic drug:  cholecalciferol      Take one tablet by mouth daily        warfarin 2 MG tablet    COUMADIN    90 tablet    Take 1 mg x four days/week and 2 mg x three days/week (Mondays, Wednesdays, and Fridays).  Or as directed by the [facility] clinic.    Atrial fibrillation (H)

## 2018-08-21 NOTE — PATIENT INSTRUCTIONS
ASSESSMENT/PLAN:     1. Herpes zoster with ophthalmic complication, unspecified herpes zoster eye disease  Acute, Symptomatic  - valACYclovir (VALTREX) 1000 mg tablet; Take 1 tablet (1,000 mg) by mouth 2 times daily  Dispense: 21 tablet; Refill: 0  - Tylenol for discomfort.    - Warfarin does not show to have interaction with Valtrex but you may want to call coumadin clinic to let them know you have started a new medication as they may want to check INR sooner than usual.    ** CALL EYE DOCTOR AND LET THEM KNOW YOU HAVE SHINGLES AND VISION CHANGES IN LEFT EYE**          FUTURE APPOINTMENTS:       - Follow-up visit: No improvement or worsening symptoms.      Annie Valenzuela CNP  [facility]

## 2018-08-24 NOTE — ADDENDUM NOTE
Encounter addended by: Jerson Shannon, PT on: 8/24/2018  7:29 AM<BR>     Actions taken: Charge Capture section accepted

## 2018-08-31 NOTE — ADDENDUM NOTE
Encounter addended by: Jerson Shannon, PT on: 8/31/2018  1:22 PM<BR>     Actions taken: Episode edited, Flowsheet data copied forward, Flowsheet accepted

## 2018-09-07 NOTE — ADDENDUM NOTE
Encounter addended by: Jerson Shannon, PT on: 9/7/2018 11:54 AM<BR>     Actions taken: Episode edited, Flowsheet data copied forward, Flowsheet accepted

## 2018-09-07 NOTE — ADDENDUM NOTE
Encounter addended by: Jerson Shannon, PT on: 9/7/2018 11:53 AM<BR>     Actions taken: Episode edited, Flowsheet data copied forward, Flowsheet accepted

## 2018-09-07 NOTE — ADDENDUM NOTE
Encounter addended by: Jerson Shannon, PT on: 9/7/2018 11:55 AM<BR>     Actions taken: Episode edited, Flowsheet data copied forward, Flowsheet accepted

## 2018-09-11 NOTE — MR AVS SNAPSHOT
After Visit Summary   9/11/2018    Sukhdeep Gomez    MRN: [id number]           Patient Information     Date Of Birth          8/19/1934        Visit Information        Provider Department      9/11/2018 10:45 AM  UROLOGY NURSE [facility]        Today's Diagnoses     Urinary retention           Follow-ups after your visit        Your next 10 appointments already scheduled     Sep 12, 2018 11:45 AM CDT   Treatment with Jerson Shannon, PT   [facility]    [address]  [place] [address]   [phone]            Sep 14, 2018 11:00 AM CDT   Treatment with Jerson Shannon, PT   [facility] ([facility])    [address]  [place] [address]   [phone]            Sep 25, 2018  1:00 PM CDT   Anticoagulation Visit with  ANTI COAG 1   [facility] ([facility])    [address]  [place] [address]   [phone]            Oct 09, 2018 11:15 AM CDT   Nurse Only with  UROLOGY NURSE   [facility] ([facility]    [address]  [place] [address]   [phone]            Oct 23, 2018  1:00 PM CDT   Pacemaker Check with  PACEMAKER   [facility] ([facility])    [address]  [place] [address]   [phone]              Who to contact     If you have questions or need follow up information about today's clinic visit or your schedule please contact [facility] directly at [phone].  Normal or non-critical lab and imaging results will be communicated to you by MyChart, letter or phone within 4 business days after the clinic has received the results. If you do not hear from us within 7 days, please contact the clinic through MyChart or phone. If you have a critical  or abnormal lab result, we will notify you by phone as soon as possible.  Submit refill requests through [employer] or call your pharmacy and they will forward the refill request to us. Please allow 3 business days for your refill to be completed.          Additional Information About Your Visit        Care EveryWhere ID     This is your Care EveryWhere ID. This could be used by other organizations to access your [facility] medical records  [id number]        Your Vitals Were     Pulse Respirations                80 12           Blood Pressure from Last 3 Encounters:   08/21/18 104/62   07/25/18 102/60   06/26/18 130/78    Weight from Last 3 Encounters:   06/12/18 69.4 kg (153 lb)   05/27/18 70.3 kg (155 lb)   05/08/18 70.3 kg (155 lb)              We Performed the Following     CHANGE CYSTOSTMY TUBE SIMPLE     IRRIGATION BLADDER SIMPLE LAVAGE/INSTILLATION        Primary Care Provider Office Phone # Fax #    Cesar Sanders [phone]       [address] [facility] [address]        Equal Access to Services     [facility]: Hadii aad ku hadasho Soomaali, waaxda luqadaha, qaybta kaalmada adeegyamitra, griffin pate . So [facility] [phone].    ATENCIÓN: Si habla español, tiene a cohn disposición servicios gratuitos de asistencia lingüística. Ll[facility] [phone].    We comply with applicable federal civil rights laws and [place] laws. We do not discriminate on the basis of race, color, national origin, age, disability, sex, sexual orientation, or gender identity.            Thank you!     Thank you for choosing [facility] AND [facility]  for your care. Our goal is always to provide you with excellent care. Hearing back from our patients is one way we can continue to improve our services. Please take a few minutes to complete the written survey that you may receive in the mail after your visit with us. Thank you!             Your Updated Medication List -  Protect others around you: Learn how to safely use, store and throw away your medicines at www.disposemymeds.org.          This list is accurate as of 9/11/18 12:15 PM.  Always use your most recent med list.                   Brand Name Dispense Instructions for use Diagnosis    acetaminophen 325 MG tablet    TYLENOL     Take 975 mg by mouth every 6 hours if needed. Max acetaminophen dose: 4000mg in 24 hrs.        allopurinol 100 MG tablet    ZYLOPRIM     Take 1 tablet by mouth once daily.        ASPIRIN 81 PO      Take one tablet by mouth daily        DUREZOL 0.05 % ophthalmic emulsion   Generic drug:  difluprednate      INSTILL ONE DROP IN THE LEFT EYE TWICE DAILY, USE TWICE DAILY FOR 4 WEEKS, BEGIN AFTER SURGERY        erythromycin ophthalmic ointment    ROMYCIN     APPLY IN THE RIGHT EYE TWICE DAILY. APPLY 1/4 INCH STRIP.        furosemide 20 MG tablet    LASIX    137 tablet    Take one tablet by mouth every morning and take an additional tab every other morning    Chronic systolic heart failure (H)       ILEVRO 0.3 % ophthalmic suspension   Generic drug:  nepafenac      INSTILL ONE DROP IN THE LEFT EYE DAILY. USE EVERY DAY FOR 3 DAYS BEFORE, THEN FOR 6 WEEKS AFTER.        insulin aspart prot & aspart injection    NovoLOG MIX 70/30 PEN    45 mL    32 units every morning and 10 units every evening    Diabetes mellitus with multiple complications (H)       insulin pen needle 31G X 8 MM    EASY TOUCH PEN NEEDLES    200 each    FOR ADMINISTERING INSULIN AT HOME TWICE DAILY. Dx: E11.8    Diabetes mellitus with multiple complications (H)       lisinopril 2.5 MG tablet    PRINIVIL/Zestril     Take 2.5 mg by mouth daily        metoprolol succinate 50 MG 24 hr tablet    TOPROL-XL    135 tablet    TAKE 1 AND 1/2 TABLETS BY MOUTH EVERY DAY    Essential hypertension       moxifloxacin 0.5 % ophthalmic solution    VIGAMOX     INSTILL ONE DROP IN THE LEFT EYE FOUR TIMES DAILY. USE FOUR TIMES DAILY 3 DAYS BEFORE SURGERY, THEN  FOUR TIMES DAILY FOR 1 WEEK AFTER        nystatin-triamcinolone cream    MYCOLOG II    60 g    APPLY TOPICALLY TO AFFECTED AREA(S) THREE TIMES DAILY    Yeast dermatitis of penis       order for DME      Wheelchair with elevating leg rests/foot rest. For home use. Length of need: 99 mo        potassium chloride SA 20 MEQ CR tablet    K-DUR/KLOR-CON M     Take 1 tablet by mouth once every other day with a meal.        simvastatin 20 MG tablet    ZOCOR    90 tablet    TAKE ONE TABLET BY MOUTH AT BEDTIME    Mixed hyperlipidemia       valACYclovir 1000 mg tablet    VALTREX    21 tablet    Take 1 tablet (1,000 mg) by mouth 2 times daily    Herpes zoster with ophthalmic complication, unspecified herpes zoster eye disease       VITAMIN D-1000 MAX ST 1000 units Tabs   Generic drug:  cholecalciferol      Take one tablet by mouth daily        warfarin 2 MG tablet    COUMADIN    90 tablet    Take 1 mg x 5 days/week and 2 mg x 2 days/week Or as directed by the protime clinic.    Atrial fibrillation (H)

## 2018-09-11 NOTE — NURSING NOTE
Suprapubic Tube Change  Patient came to the clinic for monthly suprapubic catheter change. No problems noted. Old catheter removed.  Patient  prepped and draped in sterile manner. New 18  Wol[place] silicone suprapubic almeida inserted with no problems, clear yellow urine return noted.  Catheter flushed with sterile water and return noted with no problems.  Will return to the clinic for monthly catheter change. dEgardo Sanchez MD supervised the procedure.  Velia Archibald RN on 9/11/2018 at 12:14 PM

## 2018-09-11 NOTE — PROGRESS NOTES
ANTICOAGULATION FOLLOW-UP CLINIC VISIT    Patient Name:  Sukhdeep Gomez  Date:  9/11/2018  Contact Type:  Face to Face    SUBJECTIVE:     Patient Findings     Positives Change in medications (was taking valtrex x 10 days starting 8/14/18)           OBJECTIVE    INR Protime   Date Value Ref Range Status   09/11/2018 4.5 (A) 0.86 - 1.14 Final       ASSESSMENT / PLAN  INR assessment SUPRA    Recheck INR In: 2 WEEKS    INR Location Clinic      Anticoagulation Summary as of 9/11/2018     INR goal 2.0-3.0   Today's INR 4.5!   Warfarin maintenance plan 2 mg (2 mg x 1) on Mon, Fri; 1 mg (2 mg x 0.5) all other days   Full warfarin instructions 9/11: Hold; Otherwise 2 mg on Mon, Fri; 1 mg all other days   Weekly warfarin total 9 mg   Plan last modified Yolanda Avila, RN (9/11/2018)   Next INR check 9/25/2018   Priority INR   Target end date Indefinite    Indications   Long term (current) use of anticoagulants (Resolved) [Z79.01]  Unspecified atrial fibrillation [I48.91]         Anticoagulation Episode Summary     INR check location     Preferred lab     Send INR reminders to  INR    Comments       Anticoagulation Care Providers     Provider Role Specialty Phone number    Cesar Sanders MD [facility] [phone]            See the Encounter Report to view Anticoagulation Flowsheet and Dosing Calendar (Go to Encounters tab in chart review, and find the Anticoagulation Therapy Visit)        Yolnada Avila, RN

## 2018-09-11 NOTE — MR AVS SNAPSHOT
Sukhdeep Gomez   9/11/2018 8:15 AM   Anticoagulation Therapy Visit    Description:  84 year old male   Provider:  [facility]   Department:  [facility]           INR as of 9/11/2018     Today's INR 4.5!      Anticoagulation Summary as of 9/11/2018     INR goal 2.0-3.0   Today's INR 4.5!   Full warfarin instructions 9/11: Hold; Otherwise 2 mg on Mon, Fri; 1 mg all other days   Next INR check 9/25/2018    Indications   Long term (current) use of anticoagulants (Resolved) [Z79.01]  Unspecified atrial fibrillation [I48.91]         Description     Hold today then decrease dose and recheck in 2 weeks. Yolanda Avila RN    9/11/2018  11:32 AM        September 2018 Details    Sun Mon Tue Wed Thu Fri Sat           1                 2               3               4               5               6               7               8                 9               10               11      Hold   See details      12      1 mg         13      1 mg         14      2 mg         15      1 mg           16      1 mg         17      2 mg         18      1 mg         19      1 mg         20      1 mg         21      2 mg         22      1 mg           23      1 mg         24      2 mg         25            26               27               28               29                 30                      Date Details   09/11 This INR check       Date of next INR:  9/25/2018         How to take your warfarin dose     To take:  1 mg Take 0.5 of a 2 mg tablet.    To take:  2 mg Take 1 of the 2 mg tablets.    Hold Do not take your warfarin dose. See the Details table to the right for additional instructions.

## 2018-09-12 NOTE — ADDENDUM NOTE
Encounter addended by: Jerson Shannon, PT on: 9/12/2018  8:56 AM<BR>     Actions taken: Flowsheet accepted, Charge Capture section accepted

## 2018-09-12 NOTE — ADDENDUM NOTE
Encounter addended by: Jerson Shannon, PT on: 9/12/2018  3:36 PM<BR>     Actions taken: Flowsheet accepted, Charge Capture section accepted

## 2018-09-17 NOTE — ED AVS SNAPSHOT
[facility]    [address] [facility] [address]    [facility] [address]    Phone:  [phone]    Fax:  [phone]                                       Sukhdeep Gomez   MRN: [id number]    Department:  [facility]   Date of Visit:  9/17/2018           Patient Information     Date Of Birth          8/19/1934        Your diagnoses for this visit were:     Forearm laceration, left, initial encounter     Chest wall contusion, left, initial encounter     Supratherapeutic INR        You were seen by Tomas Murray MD.      Follow-up Information     Follow up with Cesar Sanders MD In 1 day.    Specialty:  Family Practice    Why:  Patient advised to follow-up with Coumadin clinic tomorrow.    Contact information:    [address] [place] [address] JOHN  [place] [id number] [phone]          Follow up with [facility].    Specialty:  EMERGENCY MEDICINE    Why:  If symptoms worsen    Contact information:    [address] [place] [address] John  [place] [address] [phone]        Discharge Instructions         First Aid: Bleeding  External bleeding occurs when the body s protective skin is broken. In severe cases, blood loss may place the victim s life in danger. Direct pressure and elevation usually stops bleeding, even the rush of blood from an artery.  Call 911 if you can t stop the bleeding or the victim shows signs of shock.                Step 1. Apply direct pressure    Put on gloves or use other protection to avoid contact with the victim s blood.    Press directly on the wound with a clean gauze patch or cloth.    Keep the pressure constant for at least 5 minutes. This allows the blood to clot (thicken), preventing additional bleeding.    Don't lift the bandage to check on the bleeding until at least 5 minutes have passed.  Step 2. Elevate the injury    Raise the wound above heart level, if possible, to reduce blood flow to the injury.    If a bone is  broken, immobilize the joints above and below the break before elevating the wound.  Pinch off nosebleeds  Reassure the person with the nosebleed and tell him or her to do the following:    Pinch the nostrils below the bone.    Tip the person's head slightly forward and sit quietly, maintaining pressure on the nose for at least 5 minutes.    Don't tilt the person's head backward. This may cause blood to run backward into the person's mouth and throat.    Don't destroy the clot by blowing or rubbing nose after the bleeding stops.  Bleeding: How much is too much?  The victim s age, body size, and overall health all help determine when bleeding becomes serious. Concentrate on the victim s condition and appearance--not on the amount of blood lost. Watch the victim for signs of shock. If any shock symptoms appear, blood loss is a threat to life.  Signs to watch for:    Pale, clammy skin    Racing pulse    Confusion or unconsciousness    Pulsing, spurting bleeding   Date Last Reviewed: 12/1/2016 2000-2017 [employer]. [address] [place] [address]. All rights reserved. This information is not intended as a substitute for professional medical care. Always follow your healthcare professional's instructions.          Chest Wall Contusion (Child)  The chest wall runs from the shoulders to the diaphragm or bottom of the ribs. It includes the front and back of the rib cage. It also includes the breastbone, shoulders, and collarbones. A blunt trauma (such as during a car accident or fall) can injure the chest wall. This injury is called a chest wall contusion.  Injury to the chest wall may result in bruising and swelling. It may also result in broken ribs and injured muscles. These cause pain, often during breathing. If one or more ribs are broken in several areas, the chest wall may become unstable and painful. This may cause serious breathing trouble.  In the emergency room or urgent care center,  any broken bones or other injuries will be assessed. Your child will likely be given medicine for pain. Broken ribs usually heal without further treatment. A broken shoulder or collarbone may be taped or supported with a sling.  Home care    The child s provider may prescribe medicines for pain or swelling. Follow the provider s instructions for giving these medicines to your child. Don't use additional or other pain medicines without first consulting your healthcare provider.    Allow your child to rest as needed. Give pain medicine before an activity or sleeping at night.    Change a sling, tape, or dressings as advised by the healthcare provider.    Position your child so that he or she is as comfortable as possible.    Follow the healthcare provider s instructions for putting ice or heat on the injury.    Have your child hold a pillow against the chest to ease pain when breathing and coughing.  Follow-up care  Follow up with your child s healthcare provider, or as advised.  Special notes to parents    A child s chest wall is very flexible. During an injury, more force may be placed on the internal organs, such as the lungs and heart, than on the chest wall bones. As a result, a child s chest wall may look fine even if there are serious internal injuries. So always get your child medical attention for a serious blow to the chest wall.    After being injured in a car accident or by a fall, your child may have fears and nightmares about the injury. This can last for several months to many years. If the fear affects your child s ability to function, talk to the child s provider. Therapy or other help may be needed.  When to seek medical advice  Call your child's healthcare provider if your child has any of the following:    Continuing or worsening pain not relieved by pain medicine    Trouble breathing, shortness of breath, or fast breathing    Swelling or bruising that doesn t go away or gets worse    Signs of  infection such as increased redness or swelling, worsening pain, or foul-smelling drainage from a wound  Date Last Reviewed: 7/1/2017 2000-2017 [employer] [facility]. [address] [place] [address]. All rights reserved. This information is not intended as a substitute for professional medical care. Always follow your healthcare professional's instructions.          Your next 10 appointments already scheduled     Sep 25, 2018  1:00 PM CDT   Anticoagulation Visit with  ANTI COAG 1   [facility]    [address]  [place] [address]   [phone]            Sep 26, 2018 11:00 AM CDT   [facility]   [facility]    [address] [facility] [address]   [phone]            Sep 26, 2018 11:45 AM CDT   [facility] with Jerson Shannon, PT   [facility]    [address] [place] [address] [place] [address]   [phone]            Sep 28, 2018 11:45 AM CDT   Treatment with Jerson Shannon PT   [facility]    [address] [place] [address] [place] [address]   [phone]            Oct 03, 2018 11:45 AM CDT   Treatment with Jerson Shannon PT   [facility]    [address] [place] [address] [place] [address]   [phone]            Oct 05, 2018 11:15 AM CDT   Treatment with Jerson Shannon PT   [facility]    [address] [place] [address] [place] [address]   [phone]            Oct 09, 2018 11:15 AM CDT   Nurse Only with  UROLOGY NURSE   [facility]    [address]  [place] [address]   [phone]            Oct 23, 2018  1:00 PM CDT   Pacemaker Check with  PACEMAKER   [facility]    [address]  [place] [id number] [phone]              24 Hour Appointment Hotline     To schedule an appointment at [facility], please call [phone]. If you don't have a family doctor or clinic, we will help you find one. [facility] clinics are conveniently located to serve the needs of you and your family.           Review of your medicines      START taking        Dose / Directions Last dose taken    diclofenac 1 % Gel topical gel   Commonly known as:  VOLTAREN   Quantity:  100 g        Apply 4 grams to knees or 2 grams to hands four times daily using enclosed dosing card.   Refills:  1          Our records show that you are taking the medicines listed below. If these are incorrect, please call your family doctor or clinic.        Dose / Directions Last dose taken    acetaminophen 325 MG tablet   Commonly known as:  TYLENOL        Take 975 mg by mouth every 6 hours if needed. Max acetaminophen dose: 4000mg in 24 hrs.   Refills:  0        allopurinol 100 MG tablet   Commonly known as:  ZYLOPRIM        Take 1 tablet by mouth once daily.   Refills:  0        ASPIRIN 81 PO        Take one tablet by mouth daily   Refills:  0        DUREZOL 0.05 % ophthalmic emulsion   Generic drug:  difluprednate        INSTILL ONE DROP IN THE LEFT EYE TWICE DAILY, USE TWICE DAILY FOR 4 WEEKS, BEGIN AFTER SURGERY   Refills:  1        erythromycin ophthalmic ointment   Commonly known as:  ROMYCIN        APPLY IN THE RIGHT EYE TWICE DAILY. APPLY 1/4 INCH STRIP.   Refills:  0        furosemide 20 MG tablet   Commonly known as:  LASIX   Quantity:  137 tablet        Take one tablet by mouth every morning and take an additional tab every other morning   Refills:  0        ILEVRO 0.3 % ophthalmic suspension   Generic drug:  nepafenac        INSTILL ONE DROP IN THE LEFT EYE DAILY. USE EVERY DAY FOR 3 DAYS BEFORE, THEN FOR 6 WEEKS AFTER.   Refills:  1         insulin aspart prot & aspart injection   Commonly known as:  NovoLOG MIX 70/30 PEN   Quantity:  45 mL        32 units every morning and 10 units every evening   Refills:  3        insulin pen needle 31G X 8 MM   Commonly known as:  EASY TOUCH PEN NEEDLES   Quantity:  200 each        FOR ADMINISTERING INSULIN AT HOME TWICE DAILY. Dx: E11.8   Refills:  1        lisinopril 2.5 MG tablet   Commonly known as:  PRINIVIL/Zestril   Dose:  2.5 mg        Take 2.5 mg by mouth daily   Refills:  0        metoprolol succinate 50 MG 24 hr tablet   Commonly known as:  TOPROL-XL   Quantity:  135 tablet        TAKE 1 AND 1/2 TABLETS BY MOUTH EVERY DAY   Refills:  0        moxifloxacin 0.5 % ophthalmic solution   Commonly known as:  VIGAMOX        INSTILL ONE DROP IN THE LEFT EYE FOUR TIMES DAILY. USE FOUR TIMES DAILY 3 DAYS BEFORE SURGERY, THEN FOUR TIMES DAILY FOR 1 WEEK AFTER   Refills:  0        nystatin-triamcinolone cream   Commonly known as:  MYCOLOG II   Quantity:  60 g        APPLY TOPICALLY TO AFFECTED AREA(S) THREE TIMES DAILY   Refills:  11        order for DME        Wheelchair with elevating leg rests/foot rest. For home use. Length of need: 99 mo   Refills:  0        potassium chloride SA 20 MEQ CR tablet   Commonly known as:  K-DUR/KLOR-CON M        Take 1 tablet by mouth once every other day with a meal.   Refills:  0        simvastatin 20 MG tablet   Commonly known as:  ZOCOR   Quantity:  90 tablet        TAKE ONE TABLET BY MOUTH AT BEDTIME   Refills:  0        valACYclovir 1000 mg tablet   Commonly known as:  VALTREX   Dose:  1000 mg   Quantity:  21 tablet        Take 1 tablet (1,000 mg) by mouth 2 times daily   Refills:  0        VITAMIN D-1000 MAX ST 1000 units Tabs   Generic drug:  cholecalciferol        Take one tablet by mouth daily   Refills:  0        warfarin 2 MG tablet   Commonly known as:  COUMADIN   Quantity:  90 tablet        Take 1 mg x 5 days/week and 2 mg x 2 days/week Or as directed by the  [facility] clinic.   Refills:  1                Prescriptions were sent or printed at these locations (1 Prescription)                   [id number] ([facility]) - [place], [place] - [address]   [address] [place], [place] [address]    Telephone:  [phone]   Fax:  [phone]   Hours:                  E-Prescribed (1 of 1)         diclofenac (VOLTAREN) 1 % GEL topical gel                Procedures and tests performed during your visit     CBC with platelets differential    INR    XR Chest 2 Views      Orders Needing Specimen Collection     None      Pending Results     No orders found from 9/15/2018 to 9/18/2018.            Pending Culture Results     No orders found from 9/15/2018 to 9/18/2018.            Pending Results Instructions     If you had any lab results that were not finalized at the time of your Discharge, you can call the ED Lab Result RN at [phone]. You will be contacted by this team for any positive Lab results or changes in treatment. The nurses are available 7 days a week from 10A to 6:30P.  You can leave a message 24 hours per day and they will return your call.        Thank you for choosing [facility]       Thank you for choosing [facility] for your care. Our goal is always to provide you with excellent care. Hearing back from our patients is one way we can continue to improve our services. Please take a few minutes to complete the written survey that you may receive in the mail after you visit with us. Thank you!        Care EveryWhere ID     This is your Care EveryWhere ID. This could be used by other organizations to access your [facility] medical records  [id number]        Equal Access to Services     [facility] AH: Bartolo Lerma, waaxda luqadaha, qaybta kaalmada alessandro, griffin clifford. [facility] [phone].    ATENCIÓN: Si habla español, tiene a cohn disposición servicios gratuitos de asistencia lingüística. Llame  al [phone].    We comply with applicable federal civil rights laws and [place] laws. We do not discriminate on the basis of race, color, national origin, age, disability, sex, sexual orientation, or gender identity.            After Visit Summary       This is your record. Keep this with you and show to your community pharmacist(s) and doctor(s) at your next visit.

## 2018-09-17 NOTE — DISCHARGE INSTRUCTIONS
First Aid: Bleeding  External bleeding occurs when the body s protective skin is broken. In severe cases, blood loss may place the victim s life in danger. Direct pressure and elevation usually stops bleeding, even the rush of blood from an artery.  Call 911 if you can t stop the bleeding or the victim shows signs of shock.                Step 1. Apply direct pressure    Put on gloves or use other protection to avoid contact with the victim s blood.    Press directly on the wound with a clean gauze patch or cloth.    Keep the pressure constant for at least 5 minutes. This allows the blood to clot (thicken), preventing additional bleeding.    Don't lift the bandage to check on the bleeding until at least 5 minutes have passed.  Step 2. Elevate the injury    Raise the wound above heart level, if possible, to reduce blood flow to the injury.    If a bone is broken, immobilize the joints above and below the break before elevating the wound.  Pinch off nosebleeds  Reassure the person with the nosebleed and tell him or her to do the following:    Pinch the nostrils below the bone.    Tip the person's head slightly forward and sit quietly, maintaining pressure on the nose for at least 5 minutes.    Don't tilt the person's head backward. This may cause blood to run backward into the person's mouth and throat.    Don't destroy the clot by blowing or rubbing nose after the bleeding stops.  Bleeding: How much is too much?  The victim s age, body size, and overall health all help determine when bleeding becomes serious. Concentrate on the victim s condition and appearance--not on the amount of blood lost. Watch the victim for signs of shock. If any shock symptoms appear, blood loss is a threat to life.  Signs to watch for:    Pale, clammy skin    Racing pulse    Confusion or unconsciousness    Pulsing, spurting bleeding   Date Last Reviewed: 12/1/2016 2000-2017 [employer]. [address] [facility], [place]  [address]. All rights reserved. This information is not intended as a substitute for professional medical care. Always follow your healthcare professional's instructions.          Chest Wall Contusion (Child)  The chest wall runs from the shoulders to the diaphragm or bottom of the ribs. It includes the front and back of the rib cage. It also includes the breastbone, shoulders, and collarbones. A blunt trauma (such as during a car accident or fall) can injure the chest wall. This injury is called a chest wall contusion.  Injury to the chest wall may result in bruising and swelling. It may also result in broken ribs and injured muscles. These cause pain, often during breathing. If one or more ribs are broken in several areas, the chest wall may become unstable and painful. This may cause serious breathing trouble.  In the emergency room or urgent care center, any broken bones or other injuries will be assessed. Your child will likely be given medicine for pain. Broken ribs usually heal without further treatment. A broken shoulder or collarbone may be taped or supported with a sling.  Home care    The child s provider may prescribe medicines for pain or swelling. Follow the provider s instructions for giving these medicines to your child. Don't use additional or other pain medicines without first consulting your healthcare provider.    Allow your child to rest as needed. Give pain medicine before an activity or sleeping at night.    Change a sling, tape, or dressings as advised by the healthcare provider.    Position your child so that he or she is as comfortable as possible.    Follow the healthcare provider s instructions for putting ice or heat on the injury.    Have your child hold a pillow against the chest to ease pain when breathing and coughing.  Follow-up care  Follow up with your child s healthcare provider, or as advised.  Special notes to parents    A child s chest wall is very flexible. During an injury, more  force may be placed on the internal organs, such as the lungs and heart, than on the chest wall bones. As a result, a child s chest wall may look fine even if there are serious internal injuries. So always get your child medical attention for a serious blow to the chest wall.    After being injured in a car accident or by a fall, your child may have fears and nightmares about the injury. This can last for several months to many years. If the fear affects your child s ability to function, talk to the child s provider. Therapy or other help may be needed.  When to seek medical advice  Call your child's healthcare provider if your child has any of the following:    Continuing or worsening pain not relieved by pain medicine    Trouble breathing, shortness of breath, or fast breathing    Swelling or bruising that doesn t go away or gets worse    Signs of infection such as increased redness or swelling, worsening pain, or foul-smelling drainage from a wound  Date Last Reviewed: 7/1/2017 2000-2017 [employer]. [address] [place] [address]. All rights reserved. This information is not intended as a substitute for professional medical care. Always follow your healthcare professional's instructions.

## 2018-09-17 NOTE — ED TRIAGE NOTES
"ED Nursing Triage Note (General)   ________________________________    Sukhdeep Gomez is a 84 year old Male that presents to triage private car  With history of  Shingles in left eye, did see optometrist last week and was re-started on an eye drop, has 4 drops on med list but unsure which one it is, pt also fell last night and has a skin tear on left forearm that is bleeding, pt states he has pain \"all over.\"  Skin tear wrapped, has numerous bruising over entire body from frail skin, has generalized weakness, lives with his son Mert, reported by patient   Significant symptoms had onset ongoing pain.  /62  Pulse 70  Temp 96.7  F (35.9  C) (Tympanic)  Resp 14  Ht 1.651 m (5' 5\")  Wt 68 kg (150 lb)  SpO2 98%  BMI 24.96 kg/m2t  Patient appears alert , in mild distress., and cooperative and pleasant behavior.    GCS 15  Airway: intact  Breathing noted as Normal.  Circulation Abnormal  Skin fragile  Action taken:  Triage to critical care immediately      PRE HOSPITAL PRIOR LIVING SITUATION Other Relatives    COLUMBIA-SUICIDE SEVERITY RATING SCALE   Screen with Triage Points for Emergency Department      Ask questions that are bolded and underlined.   Past  month   Ask Questions 1 and 2 YES NO   1)  Have you wished you were dead or wished you could go to sleep and not wake up?   x   2)  Have you actually had any thoughts of killing yourself?   x   If YES to 2, ask questions 3, 4, 5, and 6.  If NO to 2, go directly to question 6.   3)  Have you been thinking about how you might do this?   E.g.  I thought about taking an overdose but I never made a specific plan as to when where or how I would actually do it .and I would never go through with it.    x   4)  Have you had these thoughts and had some intention of acting on them?   As opposed to  I have the thoughts but I definitely will not do anything about them.    x   5)  Have you started to work out or worked out the details of how to kill yourself? Do you " intend to carry out this plan?   x   6)  Have you ever done anything, started to do anything, or prepared to do anything to end your life?  Examples: Collected pills, obtained a gun, gave away valuables, wrote a will or suicide note, took out pills but didn t swallow any, held a gun but changed your mind or it was grabbed from your hand, went to the roof but didn t jump; or actually took pills, tried to shoot yourself, cut yourself, tried to hang yourself, etc.    If YES, ask: Was this within the past three months?  Lifetime     x    Past 3 Months        Item 1:  Behavioral Health Referral at Discharge  Item 2:  Behavioral Health Referral at Discharge   Item 3:  Behavioral Health Consult (Psychiatric Nurse/) and consider Patient Safety Precautions  Item 4:  Immediate Notification of Physician and/or Behavioral Health and Patient Safety Precautions   Item 5:  Immediate Notification of Physician and/or Behavioral Health and Patient Safety Precautions  Item 6:  Over 3 months ago: Behavioral Health Consult (Psychiatric Nurse/) and consider Patient Safety Precautions  OR  Item 6:  3 months ago or less: Immediate Notification of Physician and/or Behavioral Health and Patient Safety Precautions

## 2018-09-17 NOTE — ED PROVIDER NOTES
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  He sustained a left forearm laceration with bleeding which has been controlled by pressure dressing.  He reports left-sided rib pain and says it hurts when he breathes however I denies any shortness of breath.  He reports pain scale of 7 out of 10.  He reports a left-sided headache which is worse with lights.  His current currently on try topical NSAIDs, topical steroids, and valacyclovir tablets.    Problem List:    Patient Active Problem List    Diagnosis Date Noted     Urinary retention 06/18/2018     Priority: Medium     Paroxysmal atrial fibrillation (H) 02/21/2018     Priority: Medium     Overview:   chronic warfarin anticoagulation and toprol for rate control       S/P CABG x 4 02/21/2018     Priority: Medium     Overview:   status post 4 vessel CABG, [facility]       Unspecified atrial fibrillation 02/11/2018     Priority: Medium     Ascending aorta dilatation (H) 02/08/2018     Priority: Medium     H/O myocardial infarction, greater than 8 weeks 02/08/2018     Priority: Medium     Stented coronary artery 02/08/2018     Priority: Medium     Atrial fibrillation (H) 01/18/2018     Priority: Medium     Overview:   chronic warfarin anticoagulation and toprol for rate control       Osteoarthrosis 01/18/2018     Priority: Medium     Diabetes mellitus with multiple complications (H) 01/18/2018     Priority: Medium     Essential hypertension  01/18/2018     Priority: Medium     Gout of left foot due to renal impairment 01/18/2018     Priority: Medium     Overview:   tophaceous       Ischemic cardiomyopathy 01/18/2018     Priority: Medium     Lumbago 01/18/2018     Priority: Medium     Enthesopathy of ankle and tarsus 01/18/2018     Priority: Medium     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 01/18/2018     Priority: Medium     Overview:   status post TTE       Mixed hyperlipidemia 01/18/2018     Priority: Medium     S/P CABG (coronary artery bypass graft) 01/18/2018     Priority: Medium     Overview:   status post 4 vessel CABG, [facility]       Spinal stenosis 01/18/2018     Priority: Medium     Squamous cell carcinoma of skin 01/18/2018     Priority: Medium     Chronic systolic heart failure (H) 11/08/2017     Priority: Medium     Basal cell carcinoma of right forehead 10/23/2017     Priority: Medium     Implantable cardioverter-defibrillator, [employer], Single lead 10/20/2017     Priority: Medium     [employer] Incepta E161  SN#[id number]  4/25/13   RV lead CPI [id number]  #[id number]  5/30/07  CHF/Madit II       Hamstring tightness of left lower extremity 02/07/2017     Priority: Medium     Altered level of consciousness 12/04/2016     Priority: Medium     DM II (diabetes mellitus, type II), controlled (H) 12/04/2016     Priority: Medium     Elevated INR 12/04/2016     Priority: Medium     Elevated troponin 12/04/2016     Priority: Medium     Hypoglycemia associated with diabetes (H) 12/04/2016     Priority: Medium     Yeast dermatitis 12/04/2016     Priority: Medium     Anticoagulation monitoring, INR range 2-3 05/13/2015     Priority: Medium     History of TIA (transient ischemic attack) 11/17/2014     Priority: Medium     CKD (chronic kidney disease) stage 3, GFR 30-59 ml/min 10/17/2014     Priority: Medium     CKD (chronic kidney disease) stage 4, GFR 15-29 ml/min (H) 10/17/2014     Priority: Medium     ACP  (advance care planning) 12/10/2013     Priority: Medium     Overview:   Patient has identified Health Care Agent(s): No  Add Health Care Agents: No  Patient has Advance Care Plan Documents (Health Care Directive, POLST): Yes    Advance Care Plan Documents:  Health Care Directive    Patient has identified Specific Treatment Preferences: No   Specific limits to treatment preferences NOT identified: ASSUME FULL TREATMENT.       Diabetic neuropathy (H) 11/11/2013     Priority: Medium     Prostate CA (H) 11/11/2013     Priority: Medium     Orthostatic hypotension 11/05/2013     Priority: Medium     Hematoma of right hip 11/21/2012     Priority: Medium     Systolic congestive heart failure (H) 11/14/2012     Priority: Medium     Overview:   9/19/2012 ECHO  Final Impression:   1) Dilated left ventricle to a moderate degree with severely depressed left ventricular systolic function and ejection fraction of 15%. See comments in text regarding question of left ventricular thrombus. Echo density noted there is felt to be artifactual, but the possibility of LV thrombus cannot be fully excluded. Consider repeat study with contrast.   2) Severe left atrial enlargement.   3) Sclerotic aortic valve with mild aortic insufficiency.   4) Sclerotic mitral valve with mild insufficiency.   5) Sclerotic tricuspid valve with mild to moderate regurgitation.   6) Probable moderate elevation right ventricular systolic pressure to 48 mmHg plus right atrial pressure.   7) Study is performed with patient in atrial fibrillation.   8) When compared with prior study of August 7, 2009, there has been further increase in right ventricular systolic pressure from 29 mmHg plus right atrial to 48 mmHg plus right atrial pressure and the aortic root and ascending aorta appear to have further increased in size from prior 4 cm in the mid ascending aorta to 4.4 cm. Additionally, the issue of possible LV thrombus was not raised in the report of the prior  study.   Interpretation: M-Mode, two-dimensional, spectral and color flow Doppler evaluations were performed with standard echocardiographic images with the patient in atrial fibrillation. The study is technically adequate for interpretation.   Aortic root and ascending aorta are mildly to moderately enlarged at 4.5 and 4.4 cm, respectively. There is severe left atrial enlargement. The right atrium is probably within normal limits. The right ventricle is also probably of normal size with normal wall motion. A device lead is noted throughout the right heart.   The left ventricular chamber is moderately enlarged at 6.2 cm. Left ventricular wall thickness appears to be probably mildly increased in the intraventricular septum.   The inferior vena cava is not well visualized.   All cardiac valves appear to be sclerotic. There is mild aortic insufficiency and probable mild mitral regurgitation. There is mild to moderate tricuspid regurgitation with regurgitation jet velocity suggesting right ventricular systolic pressure to be elevated to 48 mmHg plus indeterminate right atrial pressure, suggesting at least probable moderate pulmonary hypertension.   Left ventricular ejection fraction is severely reduced with severe global hypokinesis with estimated ejection fraction in the range of 15%. There is obstruction noted on apical views within the left ventricular apex which is felt to be artifactual; however, the possibility of left ventricular thrombus cannot be excluded. Consider repeat study with contrast for better definition.   No pericardial effusion is noted.   Left ventricular diastolic function is indeterminate in the setting of the atrial fibrillation.        Chronic systolic congestive heart failure (H) 11/14/2012     Priority: Medium     Overview:   9/19/2012 ECHO  Final Impression:   1) Dilated left ventricle to a moderate degree with severely depressed left ventricular systolic function and ejection fraction of  15%. See comments in text regarding question of left ventricular thrombus. Echo density noted there is felt to be artifactual, but the possibility of LV thrombus cannot be fully excluded. Consider repeat study with contrast.   2) Severe left atrial enlargement.   3) Sclerotic aortic valve with mild aortic insufficiency.   4) Sclerotic mitral valve with mild insufficiency.   5) Sclerotic tricuspid valve with mild to moderate regurgitation.   6) Probable moderate elevation right ventricular systolic pressure to 48 mmHg plus right atrial pressure.   7) Study is performed with patient in atrial fibrillation.   8) When compared with prior study of August 7, 2009, there has been further increase in right ventricular systolic pressure from 29 mmHg plus right atrial to 48 mmHg plus right atrial pressure and the aortic root and ascending aorta appear to have further increased in size from prior 4 cm in the mid ascending aorta to 4.4 cm. Additionally, the issue of possible LV thrombus was not raised in the report of the prior study.   Interpretation: M-Mode, two-dimensional, spectral and color flow Doppler evaluations were performed with standard echocardiographic images with the patient in atrial fibrillation. The study is technically adequate for interpretation.   Aortic root and ascending aorta are mildly to moderately enlarged at 4.5 and 4.4 cm, respectively. There is severe left atrial enlargement. The right atrium is probably within normal limits. The right ventricle is also probably of normal size with normal wall motion. A device lead is noted throughout the right heart.   The left ventricular chamber is moderately enlarged at 6.2 cm. Left ventricular wall thickness appears to be probably mildly increased in the intraventricular septum.   The inferior vena cava is not well visualized.   All cardiac valves appear to be sclerotic. There is mild aortic insufficiency and probable mild mitral regurgitation. There is mild  to moderate tricuspid regurgitation with regurgitation jet velocity suggesting right ventricular systolic pressure to be elevated to 48 mmHg plus indeterminate right atrial pressure, suggesting at least probable moderate pulmonary hypertension.   Left ventricular ejection fraction is severely reduced with severe global hypokinesis with estimated ejection fraction in the range of 15%. There is obstruction noted on apical views within the left ventricular apex which is felt to be artifactual; however, the possibility of left ventricular thrombus cannot be excluded. Consider repeat study with contrast for better definition.   No pericardial effusion is noted.   Left ventricular diastolic function is indeterminate in the setting of the atrial fibrillation.        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     Priority: Medium     Onychomycosis 08/05/2010     Priority: Medium     Muscle weakness (generalized) 06/09/2009     Priority: Medium     Overview:   IMO Update 10/11       Myopathy 04/08/2009     Priority: Medium        Past Medical History:    Past Medical History:   Diagnosis Date     Acute gastritis with hemorrhage 8/10/2009     Acute on chronic systolic congestive heart failure (H)      Atherosclerotic heart disease of native coronary artery without angina pectoris      Atrial fibrillation (H)      Cardiomyopathy (H)      Chronic kidney disease      Disorder of muscle      Essential (primary) hypertension      Gastrointestinal hemorrhage      GI bleeding 1/18/2018     Gout      Hyperlipidemia      Low back pain      Malignant neoplasm of prostate (H)      Osteoarthritis      Other nonrheumatic aortic valve disorders      Other retention of urine (CODE)      Other specified disorders of arteries and arterioles (CODE) (H)      Polyneuropathy      Systolic congestive heart failure (H) 2009     Transient cerebral ischemic  attack 11/17/2014     Type 2 diabetes mellitus without complications (H) 2009       Past Surgical History:    Past Surgical History:   Procedure Laterality Date     ARTHROPLASTY KNEE      2011,left     BYPASS GRAFT ARTERY CORONARY      4 vessel, [facility], 1989     CATARACT IOL, RT/LT Bilateral 2018 6/20 and 7/11 Dr Irwin     CIRCUMCISION N/A 5/8/2018    Procedure: CIRCUMCISION;  Circumcision ,  Glans Biopsy Cystoscopy;  Surgeon: Edgardo Sanchez MD;  Location:  OR     CYSTOSCOPY      2009,& dilation of urethral stricture, Dr. Wallace     CYSTOSCOPY      2010,& catheter placement for acute obstruction, Dr. Elise     CYSTOSTOMY, INSERT TUBE SUPRAPUBIC, COMBINED N/A 5/22/2018    Procedure: COMBINED CYSTOSTOMY, INSERT TUBE SUPRAPUBIC;  Supra tube Placement    .;  Surgeon: Edgardo Sanchez MD;  Location:  OR     ESOPHAGOSCOPY, GASTROSCOPY, DUODENOSCOPY (EGD), COMBINED      2007,& colonoscopy with polypectomy, [facility] essetnially normal EGD with small polyp removed     HEART CATH, ANGIOPLASTY      approx. 2003,angiogram, 2 stents, [facility]     IMPLANT PACEMAKER      2007,Guidant ICD, CPI Vitality 2 EL, model #T177, serial #[id number], which was implanted on 05/30/2007 at [facility].     LAMINECTOMY LUMBAR ONE LEVEL      2006,decompressive, L3, L4 & L5 with improvement, Dr. Lopes       Family History:    Family History   Problem Relation Age of Onset     HEART DISEASE Father      Heart Disease     Prostate Cancer Brother      Cancer-prostate       Social History:  Marital Status:   [2]  Social History   Substance Use Topics     Smoking status: Never Smoker     Smokeless tobacco: Never Used     Alcohol use No        Medications:      acetaminophen (TYLENOL) 325 MG tablet   allopurinol (ZYLOPRIM) 100 MG tablet   ASPIRIN 81 PO   cholecalciferol (VITAMIN D-1000 MAX ST) 1000 UNITS TABS   DUREZOL 0.05 % ophthalmic emulsion   furosemide (LASIX) 20 MG tablet   insulin aspart prot & aspart (NOVOLOG MIX 70/30 PEN) injection  "  insulin pen needle (EASY TOUCH PEN NEEDLES) 31G X 8 MM   lisinopril (PRINIVIL/ZESTRIL) 2.5 MG tablet   metoprolol succinate (TOPROL-XL) 50 MG 24 hr tablet   order for DME   potassium chloride SA (K-DUR/KLOR-CON M) 20 MEQ CR tablet   simvastatin (ZOCOR) 20 MG tablet   valACYclovir (VALTREX) 1000 mg tablet   warfarin (COUMADIN) 2 MG tablet   erythromycin (ROMYCIN) ophthalmic ointment   ILEVRO 0.3 % ophthalmic suspension   moxifloxacin (VIGAMOX) 0.5 % ophthalmic solution   nystatin-triamcinolone (MYCOLOG II) cream         Review of Systems   Constitutional: Negative for chills and fever.   Eyes: Positive for photophobia, pain and visual disturbance.   Respiratory: Positive for shortness of breath. Negative for cough, chest tightness and wheezing.    Cardiovascular: Negative for chest pain and palpitations.   Genitourinary:        Mendosa catheter In-situ   Musculoskeletal: Positive for arthralgias and back pain.   Skin: Positive for wound.   Neurological: Positive for headaches. Negative for weakness.   Psychiatric/Behavioral: Negative for agitation.       Physical Exam   BP: 125/62  Pulse: 70  Temp: 96.7  F (35.9  C)  Resp: 14  Height: 165.1 cm (5' 5\")  Weight: 68 kg (150 lb)  SpO2: 98 %      Physical Exam   Constitutional: He is oriented to person, place, and time. He appears well-developed. No distress.   HENT:   Head: Normocephalic and atraumatic.   Eyes: Pupils are equal, round, and reactive to light.   Left eye redness and sensitivity to light.   Neck: Normal range of motion.   Cardiovascular: Normal rate, regular rhythm and normal heart sounds.    Pulmonary/Chest: Effort normal and breath sounds normal. No respiratory distress. He has no wheezes.   Musculoskeletal: Normal range of motion.   Thoracic spine tenderness without step-off.   Neurological: He is alert and oriented to person, place, and time.   Skin: There is erythema.   Multiple ecchymoses and petechial lesions involving the bilateral upper " extremities.  2 x 2 centimeters laceration on the lateral left forearm.  No active bleeding.  Stage I sacral ulcer.       ED Course     ED Course     Procedures    Results for orders placed or performed during the hospital encounter of 09/17/18 (from the past 24 hour(s))   CBC with platelets differential   Result Value Ref Range    WBC 7.9 4.0 - 11.0 10e9/L    RBC Count 3.30 (L) 4.4 - 5.9 10e12/L    Hemoglobin 10.5 (L) 13.3 - 17.7 g/dL    Hematocrit 33.1 (L) 40.0 - 53.0 %     78 - 100 fl    MCH 31.8 26.5 - 33.0 pg    MCHC 31.7 31.5 - 36.5 g/dL    RDW 18.1 (H) 10.0 - 15.0 %    Platelet Count 168 150 - 450 10e9/L    Diff Method Automated Method     % Neutrophils 78.0 %    % Lymphocytes 12.7 %    % Monocytes 6.9 %    % Eosinophils 1.5 %    % Basophils 0.3 %    % Immature Granulocytes 0.6 %    Absolute Neutrophil 6.2 1.6 - 8.3 10e9/L    Absolute Lymphocytes 1.0 0.8 - 5.3 10e9/L    Absolute Monocytes 0.6 0.0 - 1.3 10e9/L    Absolute Eosinophils 0.1 0.0 - 0.7 10e9/L    Absolute Basophils 0.0 0.0 - 0.2 10e9/L    Abs Immature Granulocytes 0.1 0 - 0.4 10e9/L   INR   Result Value Ref Range    INR 4.19 (H) 0 - 1.3   XR Chest 2 Views    Narrative    PROCEDURE:  XR CHEST 2 VW    HISTORY: Rule out left sided rib fractures.; , .    COMPARISON:  2/1/2018    FINDINGS:  The cardiomediastinal contours are unchanged.  The trachea is midline.   There is calcific aortic atherosclerosis.  No focal consolidation, effusion or pneumothorax.    Assessment of the osseous structures is severely limited by  osteoporosis.      Impression    IMPRESSION:      Osteoporosis. No pneumothorax.      LUANNE CHRISTOPHER MD       Medications   ketorolac (TORADOL) injection 30 mg (not administered)     1:34 PM  She is seen and examined.  He reports improvement of his headache after receiving IM Toradol.  CBC done showed stable hemoglobin of 10.5 unchanged from previously checked about a week ago.  INR improving now 4.15 from 4.5.  I have a patient  reports that he still continues to take his Coumadin as instructed by the Coumadin clinic.  Chest x-ray negative for any rib fracture or pneumothorax.      Assessments & Plan (with Medical Decision Making)   Patient is an 84-year-old male who presented with a fall from a wheelchair sustaining left laceration to his forearm and contusion on office left side report.  He has no bleeding at this time as bleeding was controlled by pressure dressing.  Hemoglobin remains stable at 10.5 however his INR remains supratherapeutic.  Given that patient is at high risk for falls I have advised the patient to hold Coumadin and follows up with his Coumadin clinic tomorrow for dose adjustments.  Patient will be prescribed Voltaren gel to be applied to left side chest wall for pain.    Plan:  -Continue to apply pressure dressing to wound.  No stitching required.  -Continue to hold Coumadin on to follow-up with Coumadin clinic tomorrow.  Patient advised to call Coumadin clinic for directions on how to proceed with continuing taking Coumadin.  -Continue treatment for herpes zoster ophthalmicus as prescribed by ophthalmologist.  -To be discharged home.    I have reviewed the nursing notes.    I have reviewed the findings, diagnosis, plan and need for follow up with the patient.  Tomas Murray MD  9/17/2018  1:41 PM      New Prescriptions    DICLOFENAC (VOLTAREN) 1 % GEL TOPICAL GEL    Apply 4 grams to knees or 2 grams to hands four times daily using enclosed dosing card.       Final diagnoses:   Forearm laceration, left, initial encounter   Chest wall contusion, left, initial encounter   Supratherapeutic INR       9/17/2018   [facility]     Tomas Murray MD  09/17/18 [id number]

## 2018-09-17 NOTE — ED AVS SNAPSHOT
[facility]    [address]    [place] [address]    Phone:  [phone]    Fax:  [phone]                                       Sukhdeep Gomez   MRN: [id number]    Department:  [facility]   Date of Visit:  9/17/2018           After Visit Summary Signature Page     I have received my discharge instructions, and my questions have been answered. I have discussed any challenges I see with this plan with the nurse or doctor.    ..........................................................................................................................................  Patient/Patient Representative Signature      ..........................................................................................................................................  Patient Representative Print Name and Relationship to Patient    ..................................................               ................................................  Date                                   Time    ..........................................................................................................................................  Reviewed by Signature/Title    ...................................................              ..............................................  Date                                               Time          22EPIC Rev 08/18

## 2018-09-20 NOTE — ADDENDUM NOTE
Encounter addended by: Jerson Shannon, PT on: 9/20/2018  9:26 AM<BR>     Actions taken: Flowsheet accepted, Charge Capture section accepted

## 2018-09-21 NOTE — TELEPHONE ENCOUNTER
This is a refill request from [facility]  Name of Medication and Dose  Furosemide 20mg  Quantity requested 90  Last fill date 7/5/18  Last Office Visit  7/25/18    Annie Bond LPN.......9/21/2018 10:49 AM

## 2018-09-25 NOTE — PROGRESS NOTES
ANTICOAGULATION FOLLOW-UP CLINIC VISIT    Patient Name:  Sukhdeep Gomez  Date:  9/25/2018  Contact Type:  Face to Face    SUBJECTIVE:     Patient Findings     Positives No Problem Findings           OBJECTIVE    INR Protime   Date Value Ref Range Status   09/25/2018 3.1 (A) 0.86 - 1.14 Final       ASSESSMENT / PLAN  INR assessment THER    Recheck INR In: 2 WEEKS    INR Location Clinic      Anticoagulation Summary as of 9/25/2018     INR goal 2.0-3.0   Today's INR 3.1!   Warfarin maintenance plan 2 mg (2 mg x 1) on Mon, Fri; 1 mg (2 mg x 0.5) all other days   Full warfarin instructions 2 mg on Mon, Fri; 1 mg all other days   Weekly warfarin total 9 mg   No change documented Yolanda Avila RN   Plan last modified Yolanda Avila RN (9/11/2018)   Next INR check 10/9/2018   Priority INR   Target end date Indefinite    Indications   Long term (current) use of anticoagulants (Resolved) [Z79.01]  Unspecified atrial fibrillation [I48.91]         Anticoagulation Episode Summary     INR check location     Preferred lab     Send INR reminders to  INR    Comments       Anticoagulation Care Providers     Provider Role Specialty Phone number    Cesar Sanders MD [facility] [phone]            See the Encounter Report to view Anticoagulation Flowsheet and Dosing Calendar (Go to Encounters tab in chart review, and find the Anticoagulation Therapy Visit)        Yolanda Avila RN

## 2018-09-25 NOTE — MR AVS SNAPSHOT
Tarango GIA Patricia   9/25/2018 1:00 PM   Anticoagulation Therapy Visit    Description:  84 year old male   Provider:  [facility]   Department:  [facility]           INR as of 9/25/2018     Today's INR 3.1!      Anticoagulation Summary as of 9/25/2018     INR goal 2.0-3.0   Today's INR 3.1!   Full warfarin instructions 2 mg on Mon, Fri; 1 mg all other days   Next INR check 10/9/2018    Indications   Long term (current) use of anticoagulants (Resolved) [Z79.01]  Unspecified atrial fibrillation [I48.91]         Description     Take 2 mg x 2 days and 1 mg x 5 days and recheck in 2 weeks. Yolanda Avila RN    9/25/2018  11:18 AM        Your next Anticoagulation Clinic appointment(s)     Sep 25, 2018  1:00 PM CDT   Anticoagulation Visit with [facility]   [facility] ([facility])    [address]  [place] [facility] [address]   [phone]              September 2018 Details    Sun Mon Tue Wed Thu Fri Sat           1                 2               3               4               5               6               7               8                 9               10               11               12               13               14               15                 16               17               18               19               20               21               22                 23               24               25      1 mg   See details      26      1 mg         27      1 mg         28      2 mg         29      1 mg           30      1 mg                Date Details   09/25 This INR check               How to take your warfarin dose     To take:  1 mg Take 0.5 of a 2 mg tablet.    To take:  2 mg Take 1 of the 2 mg tablets.           October 2018 Details    Sun Mon Tue Wed Thu Fri Sat      1      2 mg         2      1 mg         3      1 mg         4      1 mg         5      2 mg         6      1 mg           7      1 mg         8      2 mg         9             10               11               12               13                 14               15               16               17               18               19               20                 21               22               23               24               25               26               27                 28               29               30               31                   Date Details   No additional details    Date of next INR:  10/9/2018         How to take your warfarin dose     To take:  1 mg Take 0.5 of a 2 mg tablet.    To take:  2 mg Take 1 of the 2 mg tablets.

## 2018-09-26 NOTE — NURSING NOTE
"Chief Complaint   Patient presents with     Diabetes       Initial /70 (BP Location: Left arm, Patient Position: Chair, Cuff Size: Adult Regular)  Pulse 68  Temp 97.1  F (36.2  C) (Oral)  Resp 16 Estimated body mass index is 24.96 kg/(m^2) as calculated from the following:    Height as of 9/17/18: 5' 5\" (1.651 m).    Weight as of 9/17/18: 150 lb (68 kg).  Medication Reconciliation: complete     Prabha Mayer LPN     Previous A1C is at goal of <8  Lab Results   Component Value Date    A1C 6.8 07/26/2018     Urine microalbumin:creatine: 10/10/17  Foot exam 1/8/18  Eye exam 6/1/18; 9/27/18    Tobacco User NO  Patient is on a daily aspirin  Patient is on a Statin.  Blood pressure today of:     BP Readings from Last 1 Encounters:   09/26/18 110/70      is at the goal of <139/89 for diabetics.    Prabha Mayer LPN on 9/26/2018 at 11:06 AM      "

## 2018-09-26 NOTE — MR AVS SNAPSHOT
After Visit Summary   9/26/2018    Sukhdeep Gomez    MRN: [id number]           Patient Information     Date Of Birth          8/19/1934        Visit Information        Provider Department      9/26/2018 11:00 AM Cesar Sanders MD [facility]        Today's Diagnoses     Diabetes mellitus with multiple complications (H)    -  1      Care Instructions    Reduce Novolog to 26 and 10 units  Follow-up in a couple months          Follow-ups after your visit        Your next 10 appointments already scheduled     Sep 26, 2018 11:45 AM CDT   Treatment with Jerson Shannon, PT   [facility]    [address] [address]   [phone]            Sep 28, 2018 11:45 AM CDT   Treatment with Jerson Shannon, PT   [facility]    [address] [address]   [phone]            Oct 03, 2018 11:45 AM CDT   Treatment with Jerson Shannon, PT   [facility]    [address] [address]   [phone]            Oct 05, 2018 11:15 AM CDT   Treatment with Jerson Shannon, PT   [facility]    [address] [address]   [phone]            Oct 09, 2018 11:15 AM CDT   Nurse Only with  UROLOGY NURSE   [facility]    [address]  [place] [address]   [phone]            Oct 09, 2018 12:45 PM CDT   Anticoagulation Visit with  ANTI COAG [facility]   [facility]    [address]  [place] [address]   [phone]            Oct 23, 2018  1:00 PM CDT   Pacemaker Check with  PACEMAKER   [facility]    [address]  [place] [address] [phone]              Who to contact     If you have questions or need follow up information about today's clinic visit or your schedule please contact [facility] directly at [phone].  Normal or non-critical lab and imaging results will be communicated to you by MyChart, letter or phone within 4 business days after the clinic has received the results. If you do not hear from us within 7 days, please contact the clinic through MyChart or phone. If you have a critical or abnormal lab result, we will notify you by phone as soon as possible.  Submit refill requests through [facility] or call your pharmacy and they will forward the refill request to us. Please allow 3 business days for your refill to be completed.          Additional Information About Your Visit        MyChart Information     [facility] lets you send messages to your doctor, view your test results, renew your prescriptions, schedule appointments and more. To sign up, go to [email or web address] . Click on \"Log in\" on the left side of the screen, which will take you to the Welcome page. Then click on \"Sign up Now\" on the right side of the page.     You will be asked to enter the access code listed below, as well as some personal information. Please follow the directions to create your username and password.     Your access code is: [email or web address]  Expires: 2018 11:34 AM     Your access code will  in 90 days. If you need help or a new code, please call your [place] clinic or [phone].        Care EveryWhere ID     This is your Care EveryWhere ID. This could be used by other organizations to access your [place] medical records  [id number]        Your Vitals Were     Pulse Temperature Respirations             68 97.1  F (36.2  C) (Oral) 16          Blood Pressure from Last 3 Encounters:   18 110/70   18 119/64   18 104/62    Weight from Last 3 Encounters: "   09/17/18 150 lb (68 kg)   06/12/18 153 lb (69.4 kg)   05/27/18 155 lb (70.3 kg)              Today, you had the following     No orders found for display         Today's Medication Changes          These changes are accurate as of 9/26/18 11:35 AM.  If you have any questions, ask your nurse or doctor.               These medicines have changed or have updated prescriptions.        Dose/Directions    insulin aspart prot & aspart injection   Commonly known as:  NovoLOG MIX 70/30 PEN   This may have changed:  additional instructions   Used for:  Diabetes mellitus with multiple complications (H)   Changed by:  Cesar Sanders MD        26 units every morning and 10 units every evening   Quantity:  45 mL   Refills:  3         Stop taking these medicines if you haven't already. Please contact your care team if you have questions.     diclofenac 1 % Gel topical gel   Commonly known as:  VOLTAREN   Stopped by:  Cesar Sanders MD           valACYclovir 1000 mg tablet   Commonly known as:  VALTREX   Stopped by:  Cesar Sanders MD                Where to get your medicines      These medications were sent to [id number] ([facility]) - [place], [place] - [address]  [address], [facility] [address]     Phone:  [phone]     insulin aspart prot & aspart injection                Primary Care Provider Office Phone # Fax #    Cesar Sanders [phone]       [address] [facility] [address]        Equal Access to Services     [facility] : Hadii aad ku hadasho Soomaali, waaxda luqadaha, qaybta kaalmada adeegyada, waxay pau pate . So [facility] [phone].    ATENCIÓN: Si habla wai, tiene a cohn disposición servicios gratuitos de asistencia lingüística. Llame al [phone].    We comply with applicable federal civil rights laws and [place] laws. We do not discriminate on the basis of race, color, national origin, age,  disability, sex, sexual orientation, or gender identity.            Thank you!     Thank you for choosing [facility]  for your care. Our goal is always to provide you with excellent care. Hearing back from our patients is one way we can continue to improve our services. Please take a few minutes to complete the written survey that you may receive in the mail after your visit with us. Thank you!             Your Updated Medication List - Protect others around you: Learn how to safely use, store and throw away your medicines at www.disposemymeds.org.          This list is accurate as of 9/26/18 11:35 AM.  Always use your most recent med list.                   Brand Name Dispense Instructions for use Diagnosis    acetaminophen 325 MG tablet    TYLENOL     Take 975 mg by mouth every 6 hours if needed. Max acetaminophen dose: 4000mg in 24 hrs.        allopurinol 100 MG tablet    ZYLOPRIM     Take 1 tablet by mouth once daily.        ASPIRIN 81 PO      Take one tablet by mouth daily        DUREZOL 0.05 % ophthalmic emulsion   Generic drug:  difluprednate      INSTILL ONE DROP IN THE LEFT EYE TWICE DAILY, USE TWICE DAILY FOR 4 WEEKS, BEGIN AFTER SURGERY        erythromycin ophthalmic ointment    ROMYCIN     APPLY IN THE RIGHT EYE TWICE DAILY. APPLY 1/4 INCH STRIP.        furosemide 20 MG tablet    LASIX    137 tablet    Take one tablet by mouth every morning and take an additional tab every other morning    Chronic systolic heart failure (H)       ILEVRO 0.3 % ophthalmic suspension   Generic drug:  nepafenac      INSTILL ONE DROP IN THE LEFT EYE DAILY. USE EVERY DAY FOR 3 DAYS BEFORE, THEN FOR 6 WEEKS AFTER.        insulin aspart prot & aspart injection    NovoLOG MIX 70/30 PEN    45 mL    26 units every morning and 10 units every evening    Diabetes mellitus with multiple complications (H)       insulin pen needle 31G X 8 MM    EASY TOUCH PEN NEEDLES    200 each    FOR ADMINISTERING INSULIN AT HOME TWICE  DAILY. Dx: E11.8    Diabetes mellitus with multiple complications (H)       lisinopril 2.5 MG tablet    PRINIVIL/Zestril     Take 2.5 mg by mouth daily        metoprolol succinate 50 MG 24 hr tablet    TOPROL-XL    135 tablet    TAKE 1 AND 1/2 TABLETS BY MOUTH EVERY DAY    Essential hypertension       moxifloxacin 0.5 % ophthalmic solution    VIGAMOX     INSTILL ONE DROP IN THE LEFT EYE FOUR TIMES DAILY. USE FOUR TIMES DAILY 3 DAYS BEFORE SURGERY, THEN FOUR TIMES DAILY FOR 1 WEEK AFTER        nystatin-triamcinolone cream    MYCOLOG II    60 g    APPLY TOPICALLY TO AFFECTED AREA(S) THREE TIMES DAILY    Yeast dermatitis of penis       order for DME      Wheelchair with elevating leg rests/foot rest. For home use. Length of need: 99 mo        potassium chloride SA 20 MEQ CR tablet    K-DUR/KLOR-CON M     Take 1 tablet by mouth once every other day with a meal.        simvastatin 20 MG tablet    ZOCOR    90 tablet    TAKE ONE TABLET BY MOUTH AT BEDTIME    Mixed hyperlipidemia       VITAMIN D-1000 MAX ST 1000 units Tabs   Generic drug:  cholecalciferol      Take one tablet by mouth daily        warfarin 2 MG tablet    COUMADIN    90 tablet    Take 1 mg x 5 days/week and 2 mg x 2 days/week Or as directed by the [facility] clinic.    Atrial fibrillation (H)

## 2018-09-26 NOTE — ADDENDUM NOTE
Encounter addended by: Jerson Shannon, PT on: 9/26/2018  5:14 PM<BR>     Actions taken: Flowsheet accepted, Charge Capture section accepted

## 2018-09-26 NOTE — PROGRESS NOTES
"Nursing Notes:   Prabha Mayer LPN  9/26/2018 11:14 AM  Signed  Chief Complaint   Patient presents with     Diabetes       Initial /70 (BP Location: Left arm, Patient Position: Chair, Cuff Size: Adult Regular)  Pulse 68  Temp 97.1  F (36.2  C) (Oral)  Resp 16 Estimated body mass index is 24.96 kg/(m^2) as calculated from the following:    Height as of 9/17/18: 5' 5\" (1.651 m).    Weight as of 9/17/18: 150 lb (68 kg).  Medication Reconciliation: complete     Prabha Mayer LPN     Previous A1C is at goal of <8  Lab Results   Component Value Date    A1C 6.8 07/26/2018     Urine microalbumin:creatine: 10/10/17  Foot exam 1/8/18  Eye exam 6/1/18; 9/27/18    Tobacco User NO  Patient is on a daily aspirin  Patient is on a Statin.  Blood pressure today of:     BP Readings from Last 1 Encounters:   09/26/18 110/70      is at the goal of <139/89 for diabetics.    Prabha Mayer LPN on 9/26/2018 at 11:06 AM        SUBJECTIVE:  84 year old male presents to follow up on diabetes and facial shingles. He was diagnosed with shingles and possible eye involvement over a month ago. Appropriately treated with valacyclovir and referred to eye doctor. According to patient there is no zoster conjunctivitis, but he does have inflammation of the lower lid and has been using eye drops. Things are slowly improving. His left vision is affected and cloudy.     Working with PT on strengthening.     Reports weight loss with the zoster. Some low sugars due to not eating as much. Son dropped insulin dose slightly as we discussed last appointment.    REVIEW OF SYSTEMS:    Constitutional: negative  Respiratory: negative  Cardiovascular: negative  Gastrointestinal: some anorexia    Current Outpatient Prescriptions   Medication Sig Dispense Refill     acetaminophen (TYLENOL) 325 MG tablet Take 975 mg by mouth every 6 hours if needed. Max acetaminophen dose: 4000mg in 24 hrs.       allopurinol (ZYLOPRIM) 100 MG tablet Take 1 tablet by " mouth once daily.       ASPIRIN 81 PO Take one tablet by mouth daily       cholecalciferol (VITAMIN D-1000 MAX ST) 1000 UNITS TABS Take one tablet by mouth daily       DUREZOL 0.05 % ophthalmic emulsion INSTILL ONE DROP IN THE LEFT EYE TWICE DAILY, USE TWICE DAILY FOR 4 WEEKS, BEGIN AFTER SURGERY  1     erythromycin (ROMYCIN) ophthalmic ointment APPLY IN THE RIGHT EYE TWICE DAILY. APPLY 1/4 INCH STRIP.  0     furosemide (LASIX) 20 MG tablet Take one tablet by mouth every morning and take an additional tab every other morning 137 tablet 3     ILEVRO 0.3 % ophthalmic suspension INSTILL ONE DROP IN THE LEFT EYE DAILY. USE EVERY DAY FOR 3 DAYS BEFORE, THEN FOR 6 WEEKS AFTER.  1     insulin aspart prot & aspart (NOVOLOG MIX 70/30 PEN) injection 32 units every morning and 10 units every evening 45 mL 3     insulin pen needle (EASY TOUCH PEN NEEDLES) 31G X 8 MM FOR ADMINISTERING INSULIN AT HOME TWICE DAILY. Dx: E11.8 200 each 1     lisinopril (PRINIVIL/ZESTRIL) 2.5 MG tablet Take 2.5 mg by mouth daily       metoprolol succinate (TOPROL-XL) 50 MG 24 hr tablet TAKE 1 AND 1/2 TABLETS BY MOUTH EVERY  tablet 0     moxifloxacin (VIGAMOX) 0.5 % ophthalmic solution INSTILL ONE DROP IN THE LEFT EYE FOUR TIMES DAILY. USE FOUR TIMES DAILY 3 DAYS BEFORE SURGERY, THEN FOUR TIMES DAILY FOR 1 WEEK AFTER  0     nystatin-triamcinolone (MYCOLOG II) cream APPLY TOPICALLY TO AFFECTED AREA(S) THREE TIMES DAILY 60 g 11     order for DME Wheelchair with elevating leg rests/foot rest. For home use. Length of need: 99 mo       potassium chloride SA (K-DUR/KLOR-CON M) 20 MEQ CR tablet Take 1 tablet by mouth once every other day with a meal.       simvastatin (ZOCOR) 20 MG tablet TAKE ONE TABLET BY MOUTH AT BEDTIME 90 tablet 0     warfarin (COUMADIN) 2 MG tablet Take 1 mg x 5 days/week and 2 mg x 2 days/week Or as directed by the [facility] clinic. 90 tablet 1     Allergies   Allergen Reactions     Codeine Unknown     Naproxen Other (See  "Comments)     \"kidneys shut down\"     Sulfa Drugs Unknown     Tramadol Nausea     Vancomycin Unknown       OBJECTIVE:  /70 (BP Location: Left arm, Patient Position: Chair, Cuff Size: Adult Regular)  Pulse 68  Temp 97.1  F (36.2  C) (Oral)  Resp 16    EXAM:  General Appearance: Alert. No acute distress  Eye: No clear problems to conjunctiva or lens. Lower eyelid has slight edema and erythema   Chest/Respiratory Exam: Clear to auscultation bilaterally  Cardiovascular Exam: Regular rate and rhythm. S1, S2, no murmur, gallop, or rubs.  Extremities: No lower extremity edema.  Psychiatric: Normal affect and mentation        ASSESSMENT/PLAN:    ICD-10-CM    1. Diabetes mellitus with multiple complications (H) E11.8 insulin aspart prot & aspart (NOVOLOG MIX 70/30 PEN) injection   2. Herpes zoster ophthalmicus, left eye B02.30        He is seeing an eye doctor and has been appropriately treated for zoster. Too early to check A1c needs a few more weeks. Elected to have him reduce insulin from 32 units in AM to 26 units. Keep 10 units at night.     F/U in a couple months for diabetes visit    Cesar Sanders MD    This document was prepared using a combination of typing and voice generated software.  While every attempt was made for accuracy, spelling and grammatical errors may exist.   "

## 2018-09-29 PROBLEM — B02.30 HERPES ZOSTER OPHTHALMICUS, LEFT EYE: Status: ACTIVE | Noted: 2018-01-01

## 2018-10-05 NOTE — ADDENDUM NOTE
Encounter addended by: Jerson Shannon, PT on: 10/5/2018  1:35 PM<BR>     Actions taken: Pend clinical note, Charge Capture section accepted

## 2018-10-05 NOTE — TELEPHONE ENCOUNTER
LOV with PCP was on 9/26/18 for a diabetic check. Rx as requested is noted in office visit notes on that date with no changes and patient is to follow up in a couple of months. Writer will refill Rx as requested.    Prescription refilled per RN Medication Refill Policy..................Fredy Becerra 10/5/2018 12:22 PM

## 2018-10-08 PROBLEM — E11.649 HYPOGLYCEMIA DUE TO TYPE 2 DIABETES MELLITUS (H): Status: ACTIVE | Noted: 2018-01-01

## 2018-10-08 PROBLEM — T83.511A URINARY TRACT INFECTION ASSOCIATED WITH INDWELLING URETHRAL CATHETER (H): Status: ACTIVE | Noted: 2018-01-01

## 2018-10-08 PROBLEM — N39.0 URINARY TRACT INFECTION ASSOCIATED WITH INDWELLING URETHRAL CATHETER (H): Status: ACTIVE | Noted: 2018-01-01

## 2018-10-08 NOTE — H&P
[facility]    History and Physical  Hospitalist       Date of Admission:  10/8/2018    Assessment & Plan   Sukhdeep Gomez is a 84 year old male with insulin-dependent diabetes who presents with hypoglycemia     Principal Problem:    Hypoglycemia due to type 2 diabetes mellitus (H)    Assessment: unclear etiology for hypoglycemia. No clear overdose on insulin and he is eating. Potentially low from infection    Plan: No daytime insulin given today. Will monitor sugars. No insulin correction. Give 10 units tonight and if okay in AM, then discharge home    Active Problems:    Urinary tract infection associated with indwelling urethral catheter (H)    Assessment: new positive nitrite UA. Potential cause for hypoglycemia    Plan: Treat with Rocephin 1 g IV x 1, then plan oral treatment tomorrow      Elevated troponin    Assessment: likely secondary to strain from hypoglycemia. Previous troponin have been around 0.06. EKG changed, but not from April.    Plan: Recheck in AM. If not declining, then repeat EKG. Would most likely be a medically managed ACS if required.      Atrial fibrillation (H)    Assessment: paroxysmal, stable    Plan: continue home warfarin and metformin      Chronic systolic heart failure (H)    Implantable cardioverter-defibrillator, [employer], Single lead    Assessment: stable    Plan: limit IVF      CKD (chronic kidney disease) stage 3, GFR 30-59 ml/min (H)    Assessment: Cr elevated above baseline    Plan: careful IVF   Recheck in AM      Diabetes mellitus with multiple complications (H)    Assessment: A1c at 6.8%    Plan: need to watch insulin use to avoid hypoglycemia      Stricture of male urethra    Urinary retention    Assessment: chronic Mendosa    Plan: continue catheter      Herpes zoster ophthalmicus, left eye    Assessment: diagnosed over 1 month ago    Plan: no further cares required      DVT Prophylaxis: Warfarin  Code Status: Full Code    Cesar ALEX  [facility]    Primary Care Physician   Cesar Sanders    Chief Complaint   hypoglycemia    History is obtained from the patient and chart review.    History of Present Illness   Sukhdeep Gomez is a 84 year old male with CHF, a-fib, CAD, insulin dependent diabetes who presents with weakness at home. He fell in the night and was confused. He reports yelling for help, but son did not respond. Used medical alert badge. EMS arrived and required  to enter house as son was sleeping in basement. Eventually blood sugar checked and was under 23. Patient was alert with this blood sugar. He had a sugar of 50 last evening and it improved with ice cream. No check done in night. He is not sure about blood sugars yesterday, but believes they were around 50. He has been on 28 units of 70/30 in the AM and 10 at night. I had recommended 26 units last week, but then blood sugars were over 300 so son went to 28 units. He's been on this for a few days without difficulty. Reports eating normally yesterday.     In December 2016 had a similar episode of hypoglycemia and required D5 IV to sustain sugars. After that he used less insulin for a few weeks before increasing dose.    Denies any chest pain, shortness of breath, change in weight, new cough, fever, or other signs of illness. Has a chronic Mendosa and it has some cloudiness for weeks. He has not noticed any urinary changes. Troponin up, but no symptoms as just noted.    Some pain in the left low back. No known injury. He did not really fall, just too weak and dropped to floor. His arms have many skin tears, he believes because of struggling to get up.    Past Medical History    I have reviewed this patient's medical history and updated it with pertinent information if needed.   Past Medical History:   Diagnosis Date     Acute gastritis with hemorrhage 8/10/2009    Overview:  NSAID     Acute on chronic systolic congestive heart failure (H)     11/14/2012,15-20% EF 11/2012      Atherosclerotic heart disease of native coronary artery without angina pectoris      Atrial fibrillation (H)      Cardiomyopathy (H)      Chronic kidney disease     Stage III     Disorder of muscle     2/7/2017     Essential (primary) hypertension     chronic     Gastrointestinal hemorrhage     2009,secondary to NSAID gastritis     GI bleeding 1/18/2018    Overview:  upper/anemia 8.1     Gout     tophaceous, right foot, right fingers     Herpes zoster ophthalmicus, left eye 8/21/2018     Hyperlipidemia     No Comments Provided     Low back pain     lumbar spinal stenosis     Malignant neoplasm of prostate (H)     2003     Osteoarthritis      Other nonrheumatic aortic valve disorders     with mild Al     Other retention of urine (CODE)     2010,with stricture     Other specified disorders of arteries and arterioles (CODE)     moderate, with mild increase in diameter of the ascending aorta     Polyneuropathy     via 2008 EMG in [place]     Systolic congestive heart failure (H) 2009    mild to moderate left ventricular enlargement with marked decreased in systolic function.     Transient cerebral ischemic attack 11/17/2014     Type 2 diabetes mellitus without complications (H) 2009       Past Surgical History   I have reviewed this patient's surgical history and updated it with pertinent information if needed.  Past Surgical History:   Procedure Laterality Date     ARTHROPLASTY KNEE      2011,left     BYPASS GRAFT ARTERY CORONARY      4 vessel, [facility], 1989     CATARACT IOL, RT/LT Bilateral 2018 6/20 and 7/11 Dr Irwin     CIRCUMCISION N/A 5/8/2018    Procedure: CIRCUMCISION;  Circumcision ,  Glans Biopsy Cystoscopy;  Surgeon: Edgardo Sanchez MD;  Location: [facility] OR     CYSTOSCOPY      2009,& dilation of urethral stricture, Dr. Wallace     CYSTOSCOPY      2010,& catheter placement for acute obstruction, Dr. Elise     CYSTOSTOMY, INSERT TUBE SUPRAPUBIC, COMBINED N/A 5/22/2018    Procedure: COMBINED CYSTOSTOMY, INSERT  TUBE SUPRAPUBIC;  Supra tube Placement    .;  Surgeon: Edgardo Sanchez MD;  Location: [facility] OR     ESOPHAGOSCOPY, GASTROSCOPY, DUODENOSCOPY (EGD), COMBINED      ,& colonoscopy with polypectomy, [facility] essetnially normal EGD with small polyp removed     HEART CATH, ANGIOPLASTY      approx. ,angiogram, 2 stents, [facility]     IMPLANT PACEMAKER      ,Guidant ICD, CPI Vitality 2 EL, model #T177, serial #[id number], which was implanted on 2007 at [facility].     LAMINECTOMY LUMBAR ONE LEVEL      ,decompressive, L3, L4 & L5 with improvement, Dr. Lopes       Prior to Admission Medications   Prior to Admission Medications   Prescriptions Last Dose Informant Patient Reported? Taking?   allopurinol (ZYLOPRIM) 100 MG tablet 10/7/2018 at AM  Yes No   Sig: Take 1 tablet by mouth once daily.   aspirin 81 MG EC tablet 10/7/2018 at AM  Yes Yes   Sig: Take 81 mg by mouth daily   cholecalciferol (VITAMIN D-1000 MAX ST) 1000 UNITS TABS 10/7/2018 at AM  Yes Yes   Sig: Take one tablet by mouth daily   furosemide (LASIX) 20 MG tablet 10/7/2018 at AM  Yes Yes   Sig: Take 20 mg by mouth 1 tablet every morning and 1 tablet every other evening   insulin aspart prot & aspart (NOVOLOG MIX 70/30 PEN) injection 10/7/2018 at PM  No Yes   Si units every morning and 10 units every evening   insulin pen needle (EASY TOUCH PEN NEEDLES) 31G X 8 MM Unknown at Unknown time  No No   Sig: FOR ADMINISTERING INSULIN AT HOME TWICE DAILY. Dx: E11.8   lisinopril (PRINIVIL/ZESTRIL) 2.5 MG tablet 10/7/2018 at AM  Yes Yes   Sig: Take 2.5 mg by mouth daily   metoprolol succinate (TOPROL-XL) 50 MG 24 hr tablet 10/7/2018 at AM  No Yes   Sig: TAKE 1 AND 1/2 TABLETS BY MOUTH EVERY DAY   order for DME Unknown at Unknown time  Yes No   Sig: Wheelchair with elevating leg rests/foot rest. For home use. Length of need: 99 mo   potassium chloride SA (K-DUR/KLOR-CON M) 20 MEQ CR tablet 10/7/2018 at AM  Yes Yes   Sig: Take 1 tablet by mouth once every  "other day with a meal.   simvastatin (ZOCOR) 20 MG tablet 10/7/2018 at PM  No Yes   Sig: TAKE ONE TABLET BY MOUTH AT BEDTIME   warfarin (COUMADIN) 2 MG tablet 10/7/2018 at PM  Yes Yes   Sig: Take 1 mg x 5 days/week and 2 mg x 2 days/week (Monday and Friday) Or as directed by the [facility] clinic.      Facility-Administered Medications: None     Allergies   Allergies   Allergen Reactions     Codeine Unknown     Naproxen Other (See Comments)     \"kidneys shut down\"     Sulfa Drugs Unknown     Tramadol Nausea     Vancomycin Unknown       Social History   I have reviewed this patient's social history and updated it with pertinent information if needed. Sukhdeep Gomez  reports that he has never smoked. He has never used smokeless tobacco. He reports that he does not drink alcohol or use illicit drugs.    Family History   I have reviewed this patient's family history and updated it with pertinent information if needed.   Family History   Problem Relation Age of Onset     HEART DISEASE Father      Heart Disease     Prostate Cancer Brother      Cancer-prostate       Review of Systems     REVIEW OF SYSTEMS:    General: Denies general constitutional problems at this time  Eyes: blurriness in left eye  Ears/Nose/Throat: Denies problems  Cardiovascular: Denies problems  Respiratory: Denies problems  Gastrointestinal: poor appetite is chronic  Genitourinary: Denies problems, chronic Mendosa  Musculoskeletal: see above  Skin: many skin tears  Neurologic: Denies problems  Psychiatric: Denies problems      Physical Exam   Temp: 96  F (35.6  C) Temp src: Tympanic BP: (!) 85/56   Heart Rate: 78 Resp: 18 SpO2: 98 % O2 Device: Nasal cannula Oxygen Delivery: 2 LPM  Vital Signs with Ranges  Temp:  [96  F (35.6  C)] 96  F (35.6  C)  Heart Rate:  [78] 78  Resp:  [18] 18  BP: ()/(56-73) 85/56  SpO2:  [98 %] 98 %  150 lbs 0 oz    General Appearance: Pleasant, alert, appropriate appearance for age. No acute distress  Eyes: EOMI, PERRL, " no conjunctival injection  OroPharynx Exam: Normal pharynx.  Neck Exam: Supple, no masses or nodes.  Chest/Respiratory Exam: Normal chest wall and respirations. Clear to auscultation.  Cardiovascular Exam: Irregularly irregular, no MGR  Gastrointestinal Exam: Soft, nontender, no abnormal masses or organomegaly.  Extremities:  No lower extremity edema.  Musculoskeletal: Minimal low lumbar tenderness to palpation  Neuro Exam: Alert, oriented x 3. CN II-XI intact. Strength symmetric upper and lower extremities  Psychiatric: Normal affect and mentation   Skin: Arms wrapped and some bleeding noted through dressings. Legs are excoriated, but stable compared to clinic visits. Numerous areas of ecchymosis on legs. Ecchymosis on right anterior chest. No ecchymosis on abdomen or back.    Data   Data reviewed today:  I personally reviewed the EKG tracing showing a-fib with some slight ST elevation of 1/2 mm in anterior leads. New compared to June 2018, but similar to April 2018.    Recent Labs  Lab 10/08/18  1127 10/08/18  0956   WBC  --  9.4   HGB  --  9.7*   MCV  --  102*   PLT  --  232   INR  --  2.88*   NA  --  140   POTASSIUM  --  4.3   CHLORIDE  --  115*   CO2  --  16*   BUN  --  60*   CR  --  1.91*   ANIONGAP  --  9   KAE  --  8.6   GLC  --  52*   ALBUMIN  --  3.0*   PROTTOTAL  --  5.8*   BILITOTAL  --  0.4   ALKPHOS  --  121*   ALT  --  15   AST  --  23   TROPI 0.129* 0.138*       Recent Results (from the past 24 hour(s))   XR Chest Port 1 View    Narrative    PROCEDURE: XR CHEST PORT 1 VW 10/8/2018 10:27 AM    HISTORY: fell out of bed;     COMPARISONS: 9/17/2018.    TECHNIQUE: Portable AP views.    FINDINGS: There is a pacemaker in place. There has been a median  sternotomy. Heart is enlarged but is stable in size. No acute  infiltrate or effusion is seen.         Impression    IMPRESSION: No acute disease.    ALBERTO DAVIES MD   CT Head w/o Contrast    Narrative    PROCEDURE: CT HEAD W/O CONTRAST     HISTORY: fall  from bed?  on coumadin; .    COMPARISON: 12/4/2016    TECHNIQUE: Helical Images were obtained from the skull base to the  vertex. Axial, coronal and sagittal images were reviewed.     FINDINGS: There is moderate, diffuse atrophy. No acute intracranial  hemorrhage, mass effect,  or midline shift.    The grey-white matter interface is preserved. Scattered  hypoattenuation within the white matter is most compatible with  microvascular ischemic change.     The calvarium is intact. The mastoid air cells are clear.  The  visualized paranasal sinuses are clear.       Impression    IMPRESSION: No acute intracranial findings.      IRA MCELROY MD

## 2018-10-08 NOTE — PHARMACY-ADMISSION MEDICATION HISTORY
Pharmacy -- Admission Medication Reconciliation    Prior to admission (PTA) medications were reviewed and the patient's PTA medication list was updated.    Sources Consulted: patient,son Mert who manages medications, [employer] (recently changed from [place])    The reliability of this Medication Reconciliation is: Reliability: Reliable    The following significant changes were made:  ADDED diclofenac gel, lidocaine 4% OTC patch, amoxicillin pre-dental procedures    In addition, the patient's allergies were reviewed with the patient and updated as follows:   Allergies: Codeine; Naproxen; Sulfa drugs; Tramadol; and Vancomycin    The pharmacist has reviewed with the patient that all personal medications should be removed from the building or locked in the belongings safe.  Patient shall only take medications ordered by the physician and administered by the nursing staff.       Medication barriers identified: patient knows his medications with prompting but is confused on timing of history, son is very knowledgable   Medication adherence concerns: none - pillbox and is monitored by son   Understanding of emergency medications: n/a    Leticia Garzon [facility], 10/8/2018,  3:53 PM

## 2018-10-08 NOTE — IP AVS SNAPSHOT
MRN:[id number]                      After Visit Summary   10/8/2018    Sukhdeep Gomez    MRN: [id number]           Thank you!     Thank you for choosing [facility] for your care. Our goal is always to provide you with excellent care. Hearing back from our patients is one way we can continue to improve our services. Please take a few minutes to complete the written survey that you may receive in the mail after you visit with us. Thank you!        Patient Information     Date Of Birth          8/19/1934        About your hospital stay     You were admitted on:  October 8, 2018 You last received care in the:  [facility]    You were discharged on:  October 9, 2018        Reason for your hospital stay       Low blood sugar possibly from urinary tract infection                  Who to Call     For medical emergencies, please call 911.  For non-urgent questions about your medical care, please call your primary care provider or clinic, [phone]          Attending Provider     Provider Specialty    Boo Shipman MD [facility]    Cesar aguero MD Family Practice       Primary Care Provider Office Phone # Fax #    Cesar Sanders [phone]      After Care Instructions     Activity       Your activity upon discharge: activity as tolerated            Diet       Follow this diet upon discharge:     Moderate Consistent CHO Diet                  Follow-up Appointments     Follow-up and recommended labs and tests        INR appointment on Thursday 10/11/2018 at 12:45 PM    Hospital follow up on Wednesday 10/17/2018 at 11:00 AM with Dr. Sanders at the [facility] clinic                  Your next 10 appointments already scheduled     Oct 11, 2018 12:45 PM CDT   Anticoagulation Visit with  ANTI COAG 1   [facility] ([facility])    [address]  [place] [address]   [phone]            Oct 17, 2018  11:00 AM CDT   Office Visit with Cesar Sanders MD   [facility] ([facility])    [address]  [place] [address]   [phone]           Bring a current list of meds and any records pertaining to this visit. For Physicals, please bring immunization records and any forms needing to be filled out. Please arrive 10 minutes early to complete paperwork.            Oct 17, 2018  1:30 PM CDT   Treatment with Jerson Shannon, PT   [facility]    [address] [facility] [address]   [phone]            Oct 19, 2018 10:30 AM CDT   Treatment with Jerson Shannon, PT   [facility]    [address] [place] [address]   [phone]            Oct 23, 2018  1:00 PM CDT   Pacemaker Check with GH PACEMAKER   [facility] ([facility])    [address]   [place] [address]   [phone]            Oct 24, 2018  2:15 PM CDT   Treatment with Jerson Shannon, PT   [facility]    [address] [facility] [address]   [phone]            Oct 26, 2018  2:00 PM CDT   Treatment with Jerson Shannon, PT   [facility]    [address] [address]   [phone]              Warfarin Instruction     You have started taking a medicine called warfarin. This is a blood-thinning medicine (anticoagulant). It helps prevent and treat blood clots.      Before leaving the hospital, make sure you know how much to take and how long to take it.      You will need regular blood tests to make sure your blood is clotting safely. It is very important to see your doctor for regular blood tests.    Talk to your doctor before taking any new medicine (this includes over-the-counter  "drugs and herbal products). Many medicines can interact with warfarin. This may cause more bleeding or too much clotting.     Eating a lot of vitamin K--found in green, leafy vegetables--can change the way warfarin works in your body. Do NOT avoid these foods. Instead, try to eat the same amount each day.     Bleeding is the most common side-effect of warfarin. You may notice bleeding gums, a bloody nose, bruises and bleeding longer when you cut yourself. See a doctor at once if:   o You cough up blood  o You find blood in your stool (poop)  o You have a deep cut, or a cut that bleeds longer than 10 minutes   o You have a bad cut, hard fall, accident or hit your head (go to urgent care or the emergency room).    For women who can get pregnant: This medicine can harm an unborn baby. Be very careful not to get pregnant while taking this medicine. If you think you might be pregnant, call your doctor right away.    For more information, read \"Guide to Warfarin Therapy,  the booklet you received in the hospital.        Pending Results     Date and Time Order Name Status Description    10/8/2018 1205 EKG 12 lead In process     10/8/2018 0924 EKG 12-lead, tracing only In process             Statement of Approval     Ordered          10/09/18 1004  I have reviewed and agree with all the recommendations and orders detailed in this document.  EFFECTIVE NOW     Approved and electronically signed by:  Cesar Sanders MD             Admission Information     Date & Time Provider Department Dept. Phone    10/8/2018 Cesar Sanders MD [facility] [phone]      Your Vitals Were     Blood Pressure Pulse Temperature Respirations Height Weight    140/59 82 97.2  F (36.2  C) (Tympanic) 16 1.676 m (5' 6\") 71.4 kg (157 lb 6.5 oz)    Pulse Oximetry BMI (Body Mass Index)                99% 25.41 kg/m2          MyChart Information     mention lets you send messages to your doctor, view your test results, renew " "your prescriptions, schedule appointments and more. To sign up, go to [email or web address] . Click on \"Log in\" on the left side of the screen, which will take you to the Welcome page. Then click on \"Sign up Now\" on the right side of the page.     You will be asked to enter the access code listed below, as well as some personal information. Please follow the directions to create your username and password.     Your access code is: [email or web address]  Expires: 2018 11:34 AM     Your access code will  in 90 days. If you need help or a new code, please call your [facility] clinic or [phone].        Care EveryWhere ID     This is your Care EveryWhere ID. This could be used by other organizations to access your [facility] medical records  [id number]        Equal Access to Services     FRANCINE [facility] Hospital[facility] : Bartolo Lerma, francesco nelson, richy fracnois, griffin pate . So [facility] [phone].    ATENCIÓN: Si habla español, tiene a cohn disposición servicios gratuitos de asistencia lingüística. Llame al [phone].    We comply with applicable federal civil rights laws and [place] laws. We do not discriminate on the basis of race, color, national origin, age, disability, sex, sexual orientation, or gender identity.               Review of your medicines      START taking        Dose / Directions    cefdinir 300 MG capsule   Commonly known as:  OMNICEF        Dose:  300 mg   Take 1 capsule (300 mg) by mouth 2 times daily   Quantity:  20 capsule   Refills:  0       GLUCOSE MANAGEMENT Tabs   Used for:  Hypoglycemia        Dose:  15 g   Take 15 g by mouth as needed (blood sugar less than 70)   Quantity:  50 tablet   Refills:  1         CONTINUE these medicines which may have CHANGED, or have new prescriptions. If we are uncertain of the size of tablets/capsules you have at home, strength may be listed as something that might have changed.        Dose / Directions    " warfarin 2 MG tablet   Commonly known as:  COUMADIN   This may have changed:  additional instructions   Used for:  Atrial fibrillation (H)        Hold warfarin on 10/9/18. Take 1 mg by mouth on 10/10/18. Check INR at [facility] clinic on 10/11/18 at 12:45 for further direction.   Quantity:  90 tablet   Refills:  1         CONTINUE these medicines which have NOT CHANGED        Dose / Directions    allopurinol 100 MG tablet   Commonly known as:  ZYLOPRIM        Take 1 tablet by mouth once daily.   Refills:  0       amoxicillin 500 MG capsule   Commonly known as:  AMOXIL        Dose:  2000 mg   Take 2,000 mg by mouth daily as needed (dental appointments)   Refills:  0       aspirin 81 MG EC tablet        Dose:  81 mg   Take 81 mg by mouth daily   Refills:  0       diclofenac 1 % Gel topical gel   Commonly known as:  VOLTAREN        Dose:  4 g   Place 4 g onto the skin 4 times daily as needed for moderate pain   Refills:  0       furosemide 20 MG tablet   Commonly known as:  LASIX        Dose:  20 mg   Take 20 mg by mouth 1 tablet every morning and 1 tablet every other evening   Refills:  0       insulin aspart prot & aspart injection   Commonly known as:  NovoLOG MIX 70/30 PEN   Used for:  Diabetes mellitus with multiple complications (H)        26 units every morning and 10 units every evening   Quantity:  45 mL   Refills:  3       insulin pen needle 31G X 8 MM   Commonly known as:  EASY TOUCH PEN NEEDLES   Used for:  Diabetes mellitus with multiple complications (H)        FOR ADMINISTERING INSULIN AT HOME TWICE DAILY. Dx: E11.8   Quantity:  200 each   Refills:  1       Lidocaine 4 % Patch   Commonly known as:  LIDOCARE        Dose:  1-2 patch   Place 1-2 patches onto the skin daily as needed for moderate pain   Refills:  0       lisinopril 2.5 MG tablet   Commonly known as:  PRINIVIL/Zestril        Dose:  2.5 mg   Take 2.5 mg by mouth daily   Refills:  0       metoprolol succinate 50 MG 24 hr tablet   Commonly known  as:  TOPROL-XL   Used for:  Essential hypertension        TAKE 1 AND 1/2 TABLETS BY MOUTH EVERY DAY   Quantity:  135 tablet   Refills:  0       order for DME        Wheelchair with elevating leg rests/foot rest. For home use. Length of need: 99 mo   Refills:  0       potassium chloride SA 20 MEQ CR tablet   Commonly known as:  K-DUR/KLOR-CON M        Take 1 tablet by mouth once every other day with a meal.   Refills:  0       simvastatin 20 MG tablet   Commonly known as:  ZOCOR   Used for:  Mixed hyperlipidemia        TAKE ONE TABLET BY MOUTH AT BEDTIME   Quantity:  90 tablet   Refills:  0       VITAMIN D-1000 MAX ST 1000 units Tabs   Generic drug:  cholecalciferol        Take one tablet by mouth daily   Refills:  0            Where to get your medicines      These medications were sent to [id number] ([email or web address]) - [place], [place] - [address]  [address], [facility] [address]     Phone:  [phone]     cefdinir 300 MG capsule    GLUCOSE MANAGEMENT Tabs    warfarin 2 MG tablet                Protect others around you: Learn how to safely use, store and throw away your medicines at www.disposemymeds.org.        ANTIBIOTIC INSTRUCTION     You've Been Prescribed an Antibiotic - Now What?  Your healthcare team thinks that you or your loved one might have an infection. Some infections can be treated with antibiotics, which are powerful, life-saving drugs. Like all medications, antibiotics have side effects and should only be used when necessary. There are some important things you should know about your antibiotic treatment.      Your healthcare team may run tests before you start taking an antibiotic.    Your team may take samples (e.g., from your blood, urine or other areas) to run tests to look for bacteria. These test can be important to determine if you need an antibiotic at all and, if you do, which antibiotic will work best.      Within a few days, your healthcare team might  change or even stop your antibiotic.    Your team may start you on an antibiotic while they are working to find out what is making you sick.    Your team might change your antibiotic because test results show that a different antibiotic would be better to treat your infection.    In some cases, once your team has more information, they learn that you do not need an antibiotic at all. They may find out that you don't have an infection, or that the antibiotic you're taking won't work against your infection. For example, an infection caused by a virus can't be treated with antibiotics. Staying on an antibiotic when you don't need it is more likely to be harmful than helpful.      You may experience side effects from your antibiotic.    Like all medications, antibiotics have side effects. Some of these can be serious.    Let you healthcare team know if you have any known allergies when you are admitted to the hospital.    One significant side effect of nearly all antibiotics is the risk of severe and sometimes deadly diarrhea caused by Clostridium difficile (C. Difficile). This occurs when a person takes antibiotics because some good germs are destroyed. Antibiotic use allows C. diificile to take over, putting patients at high risk for this serious infection.    As a patient or caregiver, it is important to understand your or your loved one's antibiotic treatment. It is especially important for caregivers to speak up when patients can't speak for themselves. Here are some important questions to ask your healthcare team.    What infection is this antibiotic treating and how do you know I have that infection?    What side effects might occur from this antibiotic?    How long will I need to take this antibiotic?    Is it safe to take this antibiotic with other medications or supplements (e.g., vitamins) that I am taking?     Are there any special directions I need to know about taking this antibiotic? For example, should I  take it with food?    How will I be monitored to know whether my infection is responding to the antibiotic?    What tests may help to make sure the right antibiotic is prescribed for me?      Information provided by:  www.cdc.gov/getsmart  U.S. Department of Health and Human Services  Centers for disease Control and Prevention  National Center for Emerging and Zoonotic Infectious Diseases  Division of Healthcare Quality Promotion             Medication List: This is a list of all your medications and when to take them. Check marks below indicate your daily home schedule. Keep this list as a reference.      Medications           Morning Afternoon Evening Bedtime As Needed    allopurinol 100 MG tablet   Commonly known as:  ZYLOPRIM   Take 1 tablet by mouth once daily.                                amoxicillin 500 MG capsule   Commonly known as:  AMOXIL   Take 2,000 mg by mouth daily as needed (dental appointments)                                aspirin 81 MG EC tablet   Take 81 mg by mouth daily                                cefdinir 300 MG capsule   Commonly known as:  OMNICEF   Take 1 capsule (300 mg) by mouth 2 times daily                                diclofenac 1 % Gel topical gel   Commonly known as:  VOLTAREN   Place 4 g onto the skin 4 times daily as needed for moderate pain                                furosemide 20 MG tablet   Commonly known as:  LASIX   Take 20 mg by mouth 1 tablet every morning and 1 tablet every other evening                                GLUCOSE MANAGEMENT Tabs   Take 15 g by mouth as needed (blood sugar less than 70)                                insulin aspart prot & aspart injection   Commonly known as:  NovoLOG MIX 70/30 PEN   26 units every morning and 10 units every evening   Last time this was given:  20 Units on 10/9/2018 10:32 AM                                insulin pen needle 31G X 8 MM   Commonly known as:  EASY TOUCH PEN NEEDLES   FOR ADMINISTERING INSULIN AT HOME  TWICE DAILY. Dx: E11.8                                Lidocaine 4 % Patch   Commonly known as:  LIDOCARE   Place 1-2 patches onto the skin daily as needed for moderate pain                                lisinopril 2.5 MG tablet   Commonly known as:  PRINIVIL/Zestril   Take 2.5 mg by mouth daily                                metoprolol succinate 50 MG 24 hr tablet   Commonly known as:  TOPROL-XL   TAKE 1 AND 1/2 TABLETS BY MOUTH EVERY DAY   Last time this was given:  75 mg on 10/8/2018  5:59 PM                                order for DME   Wheelchair with elevating leg rests/foot rest. For home use. Length of need: 99 mo                                potassium chloride SA 20 MEQ CR tablet   Commonly known as:  K-DUR/KLOR-CON M   Take 1 tablet by mouth once every other day with a meal.                                simvastatin 20 MG tablet   Commonly known as:  ZOCOR   TAKE ONE TABLET BY MOUTH AT BEDTIME                                VITAMIN D-1000 MAX ST 1000 units Tabs   Take one tablet by mouth daily   Generic drug:  cholecalciferol                                warfarin 2 MG tablet   Commonly known as:  COUMADIN   Hold warfarin on 10/9/18. Take 1 mg by mouth on 10/10/18. Check INR at [facility] on 10/11/18 at 12:45 for further direction.   Last time this was given:  2 mg on 10/8/2018  5:58 PM

## 2018-10-08 NOTE — IP AVS SNAPSHOT
[facility]    [address]    [place] [address]    Phone:  [phone]    Fax:  [phone]                                       After Visit Summary   10/8/2018    Sukhdeep Gomez    MRN: [id number]           After Visit Summary Signature Page     I have received my discharge instructions, and my questions have been answered. I have discussed any challenges I see with this plan with the nurse or doctor.    ..........................................................................................................................................  Patient/Patient Representative Signature      ..........................................................................................................................................  Patient Representative Print Name and Relationship to Patient    ..................................................               ................................................  Date                                   Time    ..........................................................................................................................................  Reviewed by Signature/Title    ...................................................              ..............................................  Date                                               Time          22EPIC Rev 08/18

## 2018-10-08 NOTE — ED TRIAGE NOTES
Patient brought in by EMS, per EMS patient had acute episode of confusion and fell out of bed when getting up to go to the bathroom.  Patient was yelling for son to help and was unable to get his son's attention and had to use his medical alert.   EMS was not able to get into the home and had to call  department for help.   Patient  has several skin tears on lower forearm bilaterally. No IV established due to skin tears.   Patient stated he is diabetic and was getting up to check his blood sugar, but reported to EMS he was getting up to go to bathroom.

## 2018-10-08 NOTE — PHARMACY-ANTICOAGULATION SERVICE
Pharmacy Consult- Initial Coumadin Assessment    Sukhdeep Gomez is a 84 year old male admitted on 10/8/2018 with <principal problem not specified>    Primary Indication(s) for Anticoagulation: A-fib    *Goal INR:2-3    FYI, patient is followed by the [facility] at: [facility]     Patient Active Problem List   Diagnosis     ACP (advance care planning)     Altered level of consciousness     Anticoagulation monitoring, INR range 2-3     Atrial fibrillation (H)     Basal cell carcinoma of right forehead     Chronic systolic heart failure (H)     CKD (chronic kidney disease) stage 3, GFR 30-59 ml/min (H)     Osteoarthrosis     Diabetes mellitus with multiple complications (H)     Diabetic neuropathy (H)     DM II (diabetes mellitus, type II), controlled (H)     Elevated INR     Elevated troponin     Essential hypertension     Gout of left foot due to renal impairment     Hamstring tightness of left lower extremity     Hematoma of right hip     History of TIA (transient ischemic attack)     Hypoglycemia associated with diabetes (H)     Implantable cardioverter-defibrillator, [employer], Single lead     Ischemic cardiomyopathy     Lumbago     Macrocytic anemia     Enthesopathy of ankle and tarsus     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     Mixed hyperlipidemia     Onychomycosis     Orthostatic hypotension     Pes planus     Prostate CA (H)     S/P CABG (coronary artery bypass graft)     Spinal stenosis     Squamous cell carcinoma of skin     Stricture of male urethra     Systolic congestive heart failure (H)     Yeast dermatitis     Unspecified atrial fibrillation     Ascending aorta dilatation (H)     Chronic systolic congestive heart failure (H)     CKD (chronic kidney disease) stage 4, GFR 15-29 ml/min (H)     H/O myocardial infarction, greater than 8 weeks     Paroxysmal atrial fibrillation (H)     S/P CABG x 4     Stented coronary artery     Muscle weakness (generalized)      Myopathy     Urinary retention     Herpes zoster ophthalmicus, left eye       Patient previously anticoagulated on Coumadin at a dose of 9 mg/week; dosed as: 1 mg daily except 2 mg Monday and Friday    Factors that may increase patient's sensitivity to warfarin (Coumadin) include: advanced age, antibiotics, decreased oral intake per clinic notes    Factors that may decrease patient's sensitivity to warfarin (Coumadin) include: none    Recent Labs   Lab Test  10/08/18   0956 09/25/18 09/17/18   1230 09/11/18 06/12/18   1222   05/27/18   0930   04/27/18   0945   HGB  9.7*   --   10.5*   --    --   10.5*   --   10.2*   --   10.5*   INR  2.88*  3.1*  4.19*  4.5*   < >   --    < >   --    < >   --    PLT  232   --   168   --    --    --    --   231   --   191    < > = values in this interval not displayed.        Assessment/Plan: INR therapeutic today at 2.88. Will resume home dose of 2 mg today. Check INR with AM labs.    Thank You for the Consult. Will continue to follow.    Harish Gipson [facility] ....................  10/8/2018   12:57 PM    *Spoke with Dr. Sanders regarding goal INR range in consult order of 2-2.5. Goal range per [facility] notes= 2-3. Dr. Sanders would prefer to stay on low end of goal range due to risk of falling with hypoglycemia; but does not want to change overall goal of 2-3.  Harish Gipson [facility] on 10/8/2018 at 2:43 PM

## 2018-10-08 NOTE — ED PROVIDER NOTES
History     Chief Complaint   Patient presents with     Fall     Altered Mental Status     HPI  Sukhdeep Gomez is a 84 year old male with a history of paroxysmal A. fib, coronary artery disease status post CABG x4, diabetes mellitus insulin-dependent with diabetic neuropathy and ischemic cardiomyopathy EF of 40% 4/2018 Who presents to the emergency room after an episode of weakness.  He described confusion this morning when he tried to get out of bed to go check a blood sugar.  He fell getting out of the bed and was found on the floor yelling for help.  Stated that he was unable to get his son's attention so he used his medic alert badge.  He thinks he was on the floor for 3-4 hours prior to arrival of EMS.  EMS had difficulty getting into the home called the 's department for help to get into the home to help him. blood Sugar was not checked on arrival to the patient's home.  Patient was transported by EMS but no IV was established due to the number of skin tears on his arms.  Blood sugar here was less than 23.   Problem List:    Patient Active Problem List    Diagnosis Date Noted     Herpes zoster ophthalmicus, left eye 08/21/2018     Priority: Medium     Urinary retention 06/18/2018     Priority: Medium     Paroxysmal atrial fibrillation (H) 02/21/2018     Priority: Medium     Overview:   chronic warfarin anticoagulation and toprol for rate control       S/P CABG x 4 02/21/2018     Priority: Medium     Overview:   status post 4 vessel CABG, [facility]       Unspecified atrial fibrillation 02/11/2018     Priority: Medium     Ascending aorta dilatation (H) 02/08/2018     Priority: Medium     H/O myocardial infarction, greater than 8 weeks 02/08/2018     Priority: Medium     Stented coronary artery 02/08/2018     Priority: Medium     Atrial fibrillation (H) 01/18/2018     Priority: Medium     Overview:   chronic warfarin anticoagulation and toprol for rate control       Osteoarthrosis  01/18/2018     Priority: Medium     Diabetes mellitus with multiple complications (H) 01/18/2018     Priority: Medium     Essential hypertension 01/18/2018     Priority: Medium     Gout of left foot due to renal impairment 01/18/2018     Priority: Medium     Overview:   tophaceous       Ischemic cardiomyopathy 01/18/2018     Priority: Medium     Lumbago 01/18/2018     Priority: Medium     Enthesopathy of ankle and tarsus 01/18/2018     Priority: Medium     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 01/18/2018     Priority: Medium     Overview:   status post TTE       Mixed hyperlipidemia 01/18/2018     Priority: Medium     S/P CABG (coronary artery bypass graft) 01/18/2018     Priority: Medium     Overview:   status post 4 vessel CABG, [facility]       Spinal stenosis 01/18/2018     Priority: Medium     Squamous cell carcinoma of skin 01/18/2018     Priority: Medium     Chronic systolic heart failure (H) 11/08/2017     Priority: Medium     Basal cell carcinoma of right forehead 10/23/2017     Priority: Medium     Implantable cardioverter-defibrillator, [employer], Single lead 10/20/2017     Priority: Medium     [employer] Incepta [id number]  #[id number]  4/25/13   RV lead CPI [id number]  #[id number]  5/30/07  CHF/Madit II       Hamstring tightness of left lower extremity 02/07/2017     Priority: Medium     Altered level of consciousness 12/04/2016     Priority: Medium     DM II (diabetes mellitus, type II), controlled (H) 12/04/2016     Priority: Medium     Elevated INR 12/04/2016     Priority: Medium     Elevated troponin 12/04/2016     Priority: Medium     Hypoglycemia associated with diabetes (H) 12/04/2016     Priority: Medium     Yeast dermatitis 12/04/2016     Priority: Medium     Anticoagulation monitoring, INR range 2-3 05/13/2015     Priority: Medium     History of TIA (transient ischemic attack) 11/17/2014     Priority: Medium     CKD (chronic kidney disease) stage 3, GFR 30-59  ml/min (H) 10/17/2014     Priority: Medium     CKD (chronic kidney disease) stage 4, GFR 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       Diabetic neuropathy (H) 11/11/2013     Priority: Medium     Prostate CA (H) 11/11/2013     Priority: Medium     Orthostatic hypotension 11/05/2013     Priority: Medium     Hematoma of right hip 11/21/2012     Priority: Medium     Systolic congestive heart failure (H) 11/14/2012     Priority: Medium     Overview:   9/19/2012 ECHO  Final Impression:   1) Dilated left ventricle to a moderate degree with severely depressed left ventricular systolic function and ejection fraction of 15%. See comments in text regarding question of left ventricular thrombus. Echo density noted there is felt to be artifactual, but the possibility of LV thrombus cannot be fully excluded. Consider repeat study with contrast.   2) Severe left atrial enlargement.   3) Sclerotic aortic valve with mild aortic insufficiency.   4) Sclerotic mitral valve with mild insufficiency.   5) Sclerotic tricuspid valve with mild to moderate regurgitation.   6) Probable moderate elevation right ventricular systolic pressure to 48 mmHg plus right atrial pressure.   7) Study is performed with patient in atrial fibrillation.   8) When compared with prior study of August 7, 2009, there has been further increase in right ventricular systolic pressure from 29 mmHg plus right atrial to 48 mmHg plus right atrial pressure and the aortic root and ascending aorta appear to have further increased in size  from prior 4 cm in the mid ascending aorta to 4.4 cm. Additionally, the issue of possible LV thrombus was not raised in the report of the prior study.   Interpretation: M-Mode, two-dimensional, spectral and color flow Doppler evaluations were performed with standard echocardiographic images with the patient in atrial fibrillation. The study is technically adequate for interpretation.   Aortic root and ascending aorta are mildly to moderately enlarged at 4.5 and 4.4 cm, respectively. There is severe left atrial enlargement. The right atrium is probably within normal limits. The right ventricle is also probably of normal size with normal wall motion. A device lead is noted throughout the right heart.   The left ventricular chamber is moderately enlarged at 6.2 cm. Left ventricular wall thickness appears to be probably mildly increased in the intraventricular septum.   The inferior vena cava is not well visualized.   All cardiac valves appear to be sclerotic. There is mild aortic insufficiency and probable mild mitral regurgitation. There is mild to moderate tricuspid regurgitation with regurgitation jet velocity suggesting right ventricular systolic pressure to be elevated to 48 mmHg plus indeterminate right atrial pressure, suggesting at least probable moderate pulmonary hypertension.   Left ventricular ejection fraction is severely reduced with severe global hypokinesis with estimated ejection fraction in the range of 15%. There is obstruction noted on apical views within the left ventricular apex which is felt to be artifactual; however, the possibility of left ventricular thrombus cannot be excluded. Consider repeat study with contrast for better definition.   No pericardial effusion is noted.   Left ventricular diastolic function is indeterminate in the setting of the atrial fibrillation.        Chronic systolic congestive heart failure (H) 11/14/2012     Priority: Medium     Overview:   9/19/2012 ECHO  Final  Impression:   1) Dilated left ventricle to a moderate degree with severely depressed left ventricular systolic function and ejection fraction of 15%. See comments in text regarding question of left ventricular thrombus. Echo density noted there is felt to be artifactual, but the possibility of LV thrombus cannot be fully excluded. Consider repeat study with contrast.   2) Severe left atrial enlargement.   3) Sclerotic aortic valve with mild aortic insufficiency.   4) Sclerotic mitral valve with mild insufficiency.   5) Sclerotic tricuspid valve with mild to moderate regurgitation.   6) Probable moderate elevation right ventricular systolic pressure to 48 mmHg plus right atrial pressure.   7) Study is performed with patient in atrial fibrillation.   8) When compared with prior study of August 7, 2009, there has been further increase in right ventricular systolic pressure from 29 mmHg plus right atrial to 48 mmHg plus right atrial pressure and the aortic root and ascending aorta appear to have further increased in size from prior 4 cm in the mid ascending aorta to 4.4 cm. Additionally, the issue of possible LV thrombus was not raised in the report of the prior study.   Interpretation: M-Mode, two-dimensional, spectral and color flow Doppler evaluations were performed with standard echocardiographic images with the patient in atrial fibrillation. The study is technically adequate for interpretation.   Aortic root and ascending aorta are mildly to moderately enlarged at 4.5 and 4.4 cm, respectively. There is severe left atrial enlargement. The right atrium is probably within normal limits. The right ventricle is also probably of normal size with normal wall motion. A device lead is noted throughout the right heart.   The left ventricular chamber is moderately enlarged at 6.2 cm. Left ventricular wall thickness appears to be probably mildly increased in the intraventricular septum.   The inferior vena cava is not well  visualized.   All cardiac valves appear to be sclerotic. There is mild aortic insufficiency and probable mild mitral regurgitation. There is mild to moderate tricuspid regurgitation with regurgitation jet velocity suggesting right ventricular systolic pressure to be elevated to 48 mmHg plus indeterminate right atrial pressure, suggesting at least probable moderate pulmonary hypertension.   Left ventricular ejection fraction is severely reduced with severe global hypokinesis with estimated ejection fraction in the range of 15%. There is obstruction noted on apical views within the left ventricular apex which is felt to be artifactual; however, the possibility of left ventricular thrombus cannot be excluded. Consider repeat study with contrast for better definition.   No pericardial effusion is noted.   Left ventricular diastolic function is indeterminate in the setting of the atrial fibrillation.        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     Priority: Medium     Onychomycosis 08/05/2010     Priority: Medium     Muscle weakness (generalized) 06/09/2009     Priority: Medium     Overview:   IMO Update 10/11       Myopathy 04/08/2009     Priority: Medium        Past Medical History:    Past Medical History:   Diagnosis Date     Acute gastritis with hemorrhage 8/10/2009     Acute on chronic systolic congestive heart failure (H)      Atherosclerotic heart disease of native coronary artery without angina pectoris      Atrial fibrillation (H)      Cardiomyopathy (H)      Chronic kidney disease      Disorder of muscle      Essential (primary) hypertension      Gastrointestinal hemorrhage      GI bleeding 1/18/2018     Gout      Herpes zoster ophthalmicus, left eye 8/21/2018     Hyperlipidemia      Low back pain      Malignant neoplasm of prostate (H)      Osteoarthritis      Other nonrheumatic aortic valve disorders       Other retention of urine (CODE)      Other specified disorders of arteries and arterioles (CODE)      Polyneuropathy      Systolic congestive heart failure (H) 2009     Transient cerebral ischemic attack 11/17/2014     Type 2 diabetes mellitus without complications (H) 2009       Past Surgical History:    Past Surgical History:   Procedure Laterality Date     ARTHROPLASTY KNEE      2011,left     BYPASS GRAFT ARTERY CORONARY      4 vessel, [facility], 1989     CATARACT IOL, RT/LT Bilateral 2018 6/20 and 7/11 Dr Irwin     CIRCUMCISION N/A 5/8/2018    Procedure: CIRCUMCISION;  Circumcision ,  Glans Biopsy Cystoscopy;  Surgeon: Edgardo Sanchez MD;  Location:  OR     CYSTOSCOPY      2009,& dilation of urethral stricture, Dr. Wallace     CYSTOSCOPY      2010,& catheter placement for acute obstruction, Dr. Elise     CYSTOSTOMY, INSERT TUBE SUPRAPUBIC, COMBINED N/A 5/22/2018    Procedure: COMBINED CYSTOSTOMY, INSERT TUBE SUPRAPUBIC;  Supra tube Placement    .;  Surgeon: Edgardo Sanchez MD;  Location:  OR     ESOPHAGOSCOPY, GASTROSCOPY, DUODENOSCOPY (EGD), COMBINED      2007,& colonoscopy with polypectomy, [facility] essetnially normal EGD with small polyp removed     HEART CATH, ANGIOPLASTY      approx. 2003,angiogram, 2 stents, [facility]     IMPLANT PACEMAKER      2007,Guidant ICD, CPI Vitality 2 EL, model #T177, serial #[id number], which was implanted on 05/30/2007 at [facility].     LAMINECTOMY LUMBAR ONE LEVEL      2006,decompressive, L3, L4 & L5 with improvement, Dr. Lopes       Family History:    Family History   Problem Relation Age of Onset     HEART DISEASE Father      Heart Disease     Prostate Cancer Brother      Cancer-prostate       Social History:  Marital Status:   [5]  Social History   Substance Use Topics     Smoking status: Never Smoker     Smokeless tobacco: Never Used     Alcohol use No        Medications:      aspirin 81 MG EC tablet   cholecalciferol (VITAMIN D-1000 MAX ST) 1000 UNITS TABS  "  furosemide (LASIX) 20 MG tablet   insulin aspart prot & aspart (NOVOLOG MIX 70/30 PEN) injection   lisinopril (PRINIVIL/ZESTRIL) 2.5 MG tablet   metoprolol succinate (TOPROL-XL) 50 MG 24 hr tablet   potassium chloride SA (K-DUR/KLOR-CON M) 20 MEQ CR tablet   simvastatin (ZOCOR) 20 MG tablet   warfarin (COUMADIN) 2 MG tablet   allopurinol (ZYLOPRIM) 100 MG tablet   insulin pen needle (EASY TOUCH PEN NEEDLES) 31G X 8 MM   order for DME         Review of Systems    Physical Exam   BP: 104/73  Heart Rate: 78  Temp: 96  F (35.6  C)  Resp: 18  Height: 167.6 cm (5' 6\")  Weight: 68 kg (150 lb)  SpO2: 98 %      Physical Exam   Constitutional: He is oriented to person, place, and time. He appears well-developed. No distress.   Patient has loose skin with multiple skin tears on upper extremities.   HENT:   Head: Normocephalic and atraumatic.   Right Ear: External ear normal.   Left Ear: External ear normal.   Nose: Nose normal.   Mouth/Throat: Oropharynx is clear and moist. No oropharyngeal exudate.   Eyes: EOM are normal. Pupils are equal, round, and reactive to light. Right eye exhibits no discharge. Left eye exhibits no discharge.   Neck: Normal range of motion. Neck supple. No thyromegaly present.   Cardiovascular: Normal rate, regular rhythm and normal heart sounds.    Pulmonary/Chest: Effort normal and breath sounds normal. No respiratory distress. He has no wheezes. He has no rales.   Abdominal: Soft. Bowel sounds are normal. He exhibits no distension. There is no tenderness. There is no rebound.   Urostomy tube in place.    Lymphadenopathy:     He has no cervical adenopathy.   Neurological: He is alert and oriented to person, place, and time.   Skin: Skin is warm and dry. He is not diaphoretic.   Multiple skin tears on both upper extremities.  Scratches on lower legs noted. Back is clear of lesions per nursing.    Nursing note and vitals reviewed.      ED Course   Patient was seen and examined. Initial glucose was 23 " and patient was given glucose gel 30 g given.   IV established and labs drawn. CXR and CT ordered.   Patient has indwelling urostomy- UA obtained after bag changed- did not change urostomy site.   Recheck was 50- patient given D50 IV.   Labs reviewed- Nitrates positive with large leukocyte esterase.   HGB is 9.7. Creatinine is 1.91  Troponin is 0.138- he has ST elevations in V2 V3 that are new since last EKG in June 2018. No other ST Twave changes are noted other than old ST twave changes in ! And AVL. Patient has history of ischemic cardiomyopathy. Will check second troponin.  CRP and Alk Phos elevated- may be secondary to his history of prostate cancer.      Discussed with Dr Sanders for observation admission secondary to  Elevated troponin with hypoglycemia. Concern is present for issues with home per police. Patient has nitrates and will need antibiotics as well. Will give first dose of Rocephin IV.     ED Course     Procedures        EKG:#1  New T wave elevation in V1-V3 compared to June 2018.   #2 - 90 minutes later- no change noted.           Results for orders placed or performed during the hospital encounter of 10/08/18 (from the past 24 hour(s))   CBC with platelets differential   Result Value Ref Range    WBC 9.4 4.0 - 11.0 10e9/L    RBC Count 2.98 (L) 4.4 - 5.9 10e12/L    Hemoglobin 9.7 (L) 13.3 - 17.7 g/dL    Hematocrit 30.5 (L) 40.0 - 53.0 %     (H) 78 - 100 fl    MCH 32.6 26.5 - 33.0 pg    MCHC 31.8 31.5 - 36.5 g/dL    RDW 18.7 (H) 10.0 - 15.0 %    Platelet Count 232 150 - 450 10e9/L    Diff Method Automated Method     % Neutrophils 87.4 %    % Lymphocytes 3.8 %    % Monocytes 7.2 %    % Eosinophils 0.3 %    % Basophils 0.2 %    % Immature Granulocytes 1.1 %    Absolute Neutrophil 8.2 1.6 - 8.3 10e9/L    Absolute Lymphocytes 0.4 (L) 0.8 - 5.3 10e9/L    Absolute Monocytes 0.7 0.0 - 1.3 10e9/L    Absolute Eosinophils 0.0 0.0 - 0.7 10e9/L    Absolute Basophils 0.0 0.0 - 0.2 10e9/L    Abs Immature  Granulocytes 0.1 0 - 0.4 10e9/L   CRP inflammation   Result Value Ref Range    CRP Inflammation 2.6 (H) <0.5 mg/L   INR   Result Value Ref Range    INR 2.88 (H) 0 - 1.3   Comprehensive metabolic panel   Result Value Ref Range    Sodium 140 134 - 144 mmol/L    Potassium 4.3 3.5 - 5.1 mmol/L    Chloride 115 (H) 98 - 107 mmol/L    Carbon Dioxide 16 (L) 21 - 31 mmol/L    Anion Gap 9 3 - 14 mmol/L    Glucose 52 (L) 70 - 105 mg/dL    Urea Nitrogen 60 (H) 7 - 25 mg/dL    Creatinine 1.91 (H) 0.70 - 1.30 mg/dL    GFR Estimate 34 (L) >60 mL/min/1.7m2    GFR Estimate If Black 41 (L) >60 mL/min/1.7m2    Calcium 8.6 8.6 - 10.3 mg/dL    Bilirubin Total 0.4 0.3 - 1.0 mg/dL    Albumin 3.0 (L) 3.5 - 5.7 g/dL    Protein Total 5.8 (L) 6.4 - 8.9 g/dL    Alkaline Phosphatase 121 (H) 34 - 104 U/L    ALT 15 7 - 52 U/L    AST 23 13 - 39 U/L   Lactic acid   Result Value Ref Range    Lactic Acid 1.4 0.5 - 2.2 mmol/L   Troponin I (now)   Result Value Ref Range    Troponin I ES 0.138 (H) 0.000 - 0.034 ug/L   CK total   Result Value Ref Range    CK Total 55 30 - 223 U/L   UA reflex to Microscopic and Culture   Result Value Ref Range    Color Urine Yellow     Appearance Urine Cloudy     Glucose Urine Negative NEG^Negative mg/dL    Bilirubin Urine Negative NEG^Negative    Ketones Urine Negative NEG^Negative mg/dL    Specific Gravity Urine 1.010 1.000 - 1.030    Blood Urine Small (A) NEG^Negative    pH Urine 8.5 5.0 - 9.0 pH    Protein Albumin Urine 100 (A) NEG^Negative mg/dL    Urobilinogen Urine 0.2 0.2 - 1.0 EU/dL    Nitrite Urine Positive (A) NEG^Negative    Leukocyte Esterase Urine Large (A) NEG^Negative    Source Midstream Urine    Urine Microscopic   Result Value Ref Range    WBC Urine 10-25 (A) OTO5^0 - 5 /HPF    RBC Urine O - 2 OTO2^O - 2 /HPF    Squamous Epithelial /LPF Urine Few FEW^Few /LPF    Bacteria Urine Many (A) NEG^Negative /HPF    Amorphous Crystals Moderate (A) NEG^Negative /HPF   XR Chest Port 1 View    Narrative    PROCEDURE:  XR CHEST PORT 1 VW 10/8/2018 10:27 AM    HISTORY: fell out of bed;     COMPARISONS: 9/17/2018.    TECHNIQUE: Portable AP views.    FINDINGS: There is a pacemaker in place. There has been a median  sternotomy. Heart is enlarged but is stable in size. No acute  infiltrate or effusion is seen.         Impression    IMPRESSION: No acute disease.    ALBERTO DAVIES MD   CT Head w/o Contrast    Narrative    PROCEDURE: CT HEAD W/O CONTRAST     HISTORY: fall from bed?  on coumadin; .    COMPARISON: 12/4/2016    TECHNIQUE: Helical Images were obtained from the skull base to the  vertex. Axial, coronal and sagittal images were reviewed.     FINDINGS: There is moderate, diffuse atrophy. No acute intracranial  hemorrhage, mass effect,  or midline shift.    The grey-white matter interface is preserved. Scattered  hypoattenuation within the white matter is most compatible with  microvascular ischemic change.     The calvarium is intact. The mastoid air cells are clear.  The  visualized paranasal sinuses are clear.       Impression    IMPRESSION: No acute intracranial findings.      IRA MCELROY MD   Troponin I (second draw)   Result Value Ref Range    Troponin I ES 0.129 (H) 0.000 - 0.034 ug/L   Urine Culture Aerobic Bacterial   Result Value Ref Range    Specimen Description Midstream Urine     Culture Micro Canceled, Test credited  Duplicate request          Medications   lidocaine 1 % 1 mL (not administered)   lidocaine (LMX4) cream (not administered)   sodium chloride (PF) 0.9% PF flush 3 mL (not administered)   sodium chloride (PF) 0.9% PF flush 3 mL (not administered)   glucose gel 15-30 g ( Oral See Alternative 10/8/18 1014)     Or   dextrose 50 % injection 25-50 mL (50 mLs Intravenous Given 10/8/18 1014)     Or   glucagon injection 1 mg ( Subcutaneous See Alternative 10/8/18 1014)   neomycin-bacitracin-polymyxin (NEOSPORIN) ointment (not administered)   0.9% sodium chloride BOLUS (250 mLs Intravenous New Bag 10/8/18  [id number])       Assessments & Plan (with Medical Decision Making)     I have reviewed the nursing notes.    I have reviewed the findings, diagnosis, plan and need for follow up with the patient.  Patient states dosage of insulin is 26 units of 70/30 in am and 10 units of 70/30 in the pm. Recently changed according to son.    Discussed results with patient- will admit for observation to clear his hypoglycemia, manage his insulin dosage and monitor troponin.  Incidental UTI found without evidence of elevated lactate.Will need oral/IV antibiotics.     New Prescriptions    No medications on file       Final diagnoses:   Hypoglycemia   Urinary tract infection associated with cystostomy catheter, initial encounter (H)   Elevated troponin       10/8/2018   [facility]     Boo Shipman MD  10/08/18 [id number]       Boo Shipman MD  10/08/18 [id number]

## 2018-10-08 NOTE — PROGRESS NOTES
Phone call placed to scheduling for cancel of [facility] Urology appts with patient admitted to hospital.  Jo Harrison RN on 10/8/2018 at 2:50 PM

## 2018-10-08 NOTE — ED NOTES
Pt. BS checked. Was at 23. Intervention: Orange Juice and Peanut butter toast. Reported top CHARY Flannery

## 2018-10-08 NOTE — PLAN OF CARE
Problem: Patient Care Overview  Goal: Plan of Care/Patient Progress Review  Outcome: Improving  VSS. AAOx4. Suprabubic catheter patent, draining clear yellow urine, appointment for tomorrow with Dr. Sanchez cancelled. Blood glucose levels stabilized. Denies pain. Dressing to arms applied by ED staff, clean, dry, intact, left in place. Fall precautions in place.

## 2018-10-08 NOTE — PROGRESS NOTES
[facility] ADMISSION NOTE    Patient admitted to room 301 at approximately 1345 via cart from emergency room. Patient was accompanied by transport tech.     Verbal SBAR report received from Alma Delia prior to patient arrival.     Patient alert and oriented X 3. The patient is not having any pain. Patient was oriented to plan of care, call light, bed controls, tv, telephone, bathroom and visiting hours.     Risk Assessment    The following safety risks were identified during admission: fall. Yellow risk band applied: NO - out of stock, supply notified.     Jameel Perez RN on 10/8/2018 at 6:24 PM

## 2018-10-09 NOTE — DISCHARGE SUMMARY
[facility]    Discharge Summary  Hospitalist    Date of Admission:  10/8/2018  Date of Discharge:  10/9/2018  Discharging Provider: Cesar Sanders  Date of Service (when I saw the patient): 10/09/18    Discharge Diagnoses   Principal Problem:    Hypoglycemia due to type 2 diabetes mellitus (H) (10/8/2018)  Active Problems:    Atrial fibrillation (H) (1/18/2018)    Chronic systolic heart failure (H) (11/8/2017)    CKD (chronic kidney disease) stage 3, GFR 30-59 ml/min (H) (10/17/2014)    Diabetes mellitus with multiple complications (H) (1/18/2018)    Elevated troponin (12/4/2016)    Implantable cardioverter-defibrillator, [place]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 He fell in the night and was confused. He reports yelling for help, but son did not respond. Used medical alert badge. EMS arrived and required  to enter house as son was sleeping in basement. Eventually blood sugar checked and was under 23. Patient was alert with this blood sugar. He had a sugar of 50 last evening and it improved with ice cream. No check done in night. He is not sure about blood sugars yesterday, but believes they were around 50.    Hospital Course   Sukhdeep Gomez was admitted on 10/8/2018.  The following problems were addressed during his hospitalization:    Principal Problem:    Hypoglycemia due to type 2 diabetes mellitus (H)    Assessment: unclear etiology for hypoglycemia. No clear overdose on insulin and he is eating fine. Most likely low from UTI    Plan: No daytime insulin given yesterday on admit. Sugars were 160, 220, and 128.  Given 20 units of Novolog 70/30 this AM. Discharging on usual combo of 26 and 10 units     Active Problems:    Urinary tract infection associated with indwelling urethral catheter (H)    Assessment: new positive nitrite UA. Potential cause for hypoglycemia. Treated with Rocephin 1 g IV x 1. Discharged on cefdinir 300 mg BID x 10 days       Elevated troponin    Assessment: likely secondary to strain from hypoglycemia. Completely asymptomatic. Previous troponin have been around 0.06. EKG changed compared to June 2018, but not from April 2018.    Plan: Troponin up slightly overnight. EKG is improved, with less ST elevated in V1-V3. Overall ST changes have reduced. He has no symptoms. No need for intervention. Suspect further troponin increase due to CKD and inability to clear troponin.       Atrial fibrillation (H)    Assessment: paroxysmal, stable    Plan: continue home warfarin and metoprolol       Chronic systolic heart failure (H)    Implantable cardioverter-defibrillator, [employer], Single lead    Assessment: stable       CKD (chronic kidney disease) stage 3, GFR 30-59 ml/min (H)    Assessment: Cr elevated above baseline, improved with IVF       Diabetes mellitus with multiple complications (H)    Assessment: A1c at 6.8%    Plan: need to monitor sugars and watch insulin dose to avoid hypoglycemia       Stricture of male urethra    Urinary retention    Assessment: chronic [place]       Herpes zoster ophthalmicus, left eye    Assessment: diagnosed over 1 month ago, still has vision problems. Encouraged to wait and see if improves. Needs to discuss with eye doctor.    Cesar Sanders MD    Significant Results and Procedures   See below    Pending Results   These results will be followed up by Cesar Sanders   Unresulted Labs Ordered in the Past 30 Days of this Admission     Date and Time Order Name Status Description    10/8/2018 1027 Urine Culture Aerobic Bacterial In process           Code Status   Full  Code       Primary Care Physician   Cesar Sanders    Physical Exam   Temp: 98.6  F (37  C) Temp src: Tympanic BP: 139/64 Pulse: 82 Heart Rate: 78 Resp: 16 SpO2: 99 % O2 Device: None (Room air) Oxygen Delivery: 2 LPM (turned down to 1lpm)  Vitals:    10/08/18 0918 10/08/18 1408 10/09/18 0500   Weight: 68 kg (150 lb) 67.9 kg (149 lb 11.1 oz) 71.4 kg (157 lb 6.5 oz)     Vital Signs with Ranges  Temp:  [97.5  F (36.4  C)-98.6  F (37  C)] 98.6  F (37  C)  Pulse:  [82-86] 82  Heart Rate:  [78-90] 78  Resp:  [14-18] 16  BP: ()/(38-88) 139/64  SpO2:  [94 %-100 %] 99 %  I/O last 3 completed shifts:  In: 1453 [P.O.:640; I.V.:813]  Out: 450 [Urine:450]    General Appearance: Pleasant, alert, appropriate appearance for age. No acute distress  Chest/Respiratory Exam: Normal chest wall and respirations. Clear to auscultation.  Cardiovascular Exam: Irregularly irregular, no MGR  Gastrointestinal Exam: Soft, nontender, no abnormal masses or organomegaly.  Extremities:  No lower extremity edema.  Psychiatric: Normal affect and mentation   Skin: Arms wrapped and some bleeding noted through dressings.     Discharge Disposition   Discharged to home  Condition at discharge: Stable    Consultations This Hospital Stay   PHARMACY TO DOSE WARFARIN    Time Spent on this Encounter   ICesar, personally saw the patient today and spent greater than 30 minutes discharging this patient.    Discharge Orders     Reason for your hospital stay   Low blood sugar possibly from urinary tract infection     Follow-up and recommended labs and tests    INR appointment on Thursday 10/11/2018 at 12:45 PM    Hospital follow up on Wednesday 10/17/2018 at 11:00 AM with Dr. Sanders at the [facility]     Activity   Your activity upon discharge: activity as tolerated     Diet   Follow this diet upon discharge:     Moderate Consistent CHO Diet       Discharge Medications   Current Discharge Medication List      START taking these medications     Details   cefdinir (OMNICEF) 300 MG capsule Take 1 capsule (300 mg) by mouth 2 times daily  Qty: 20 capsule, Refills: 0    Associated Diagnoses: Urinary tract infection associated with indwelling urethral catheter, initial encounter (H)      Nutritional Supplements (GLUCOSE MANAGEMENT) TABS Take 15 g by mouth as needed (blood sugar less than 70)  Qty: 50 tablet, Refills: 1    Associated Diagnoses: Hypoglycemia         CONTINUE these medications which have NOT CHANGED    Details   aspirin 81 MG EC tablet Take 81 mg by mouth daily      cholecalciferol (VITAMIN D-1000 MAX ST) 1000 UNITS TABS Take one tablet by mouth daily      diclofenac (VOLTAREN) 1 % GEL topical gel Place 4 g onto the skin 4 times daily as needed for moderate pain      furosemide (LASIX) 20 MG tablet Take 20 mg by mouth 1 tablet every morning and 1 tablet every other evening      insulin aspart prot & aspart (NOVOLOG MIX 70/30 PEN) injection 26 units every morning and 10 units every evening  Qty: 45 mL, Refills: 3    Associated Diagnoses: Diabetes mellitus with multiple complications (H)      Lidocaine (LIDOCARE) 4 % Patch Place 1-2 patches onto the skin daily as needed for moderate pain      lisinopril (PRINIVIL/ZESTRIL) 2.5 MG tablet Take 2.5 mg by mouth daily      metoprolol succinate (TOPROL-XL) 50 MG 24 hr tablet TAKE 1 AND 1/2 TABLETS BY MOUTH EVERY DAY  Qty: 135 tablet, Refills: 0    Associated Diagnoses: Essential hypertension      potassium chloride SA (K-DUR/KLOR-CON M) 20 MEQ CR tablet Take 1 tablet by mouth once every other day with a meal.      simvastatin (ZOCOR) 20 MG tablet TAKE ONE TABLET BY MOUTH AT BEDTIME  Qty: 90 tablet, Refills: 0    Associated Diagnoses: Mixed hyperlipidemia      warfarin (COUMADIN) 2 MG tablet Take 1 mg x 5 days/week and 2 mg x 2 days/week (Monday and Friday) Or as directed by the [facility] clinic.  Qty: 90 tablet, Refills: 1    Associated Diagnoses: Atrial fibrillation (H)      allopurinol (ZYLOPRIM) 100 MG  "tablet Take 1 tablet by mouth once daily.      amoxicillin (AMOXIL) 500 MG capsule Take 2,000 mg by mouth daily as needed (dental appointments)      insulin pen needle (EASY TOUCH PEN NEEDLES) 31G X 8 MM FOR ADMINISTERING INSULIN AT HOME TWICE DAILY. Dx: E11.8  Qty: 200 each, Refills: 1    Associated Diagnoses: Diabetes mellitus with multiple complications (H)      order for DME Wheelchair with elevating leg rests/foot rest. For home use. Length of need: 99 mo           Allergies   Allergies   Allergen Reactions     Codeine Unknown     Naproxen Other (See Comments)     \"kidneys shut down\"     Sulfa Drugs Unknown     Tramadol Nausea     Vancomycin Unknown     Data   Most Recent 3 CBC's:  Recent Labs   Lab Test  10/08/18   0956  09/17/18   1230  06/12/18   1222  05/27/18   0930   WBC  9.4  7.9   --   10.7   HGB  9.7*  10.5*  10.5*  10.2*   MCV  102*  100   --   99   PLT  232  168   --   231      Most Recent 3 BMP's:  Recent Labs   Lab Test  10/09/18   0424  10/08/18   0956  06/12/18   1222   NA  136  140  137   POTASSIUM  4.9  4.3  4.0   CHLORIDE  113*  115*  106   CO2  16*  16*  20*   BUN  51*  60*  56*   CR  1.66*  1.91*  1.53*   ANIONGAP  7  9  11   KAE  8.2*  8.6  8.6   GLC  129*  52*  250*     Most Recent 2 LFT's:  Recent Labs   Lab Test  10/08/18   0956  04/27/18   0945   AST  23  24   ALT  15  21   ALKPHOS  121*  97   BILITOTAL  0.4  0.4     Most Recent INR's and Anticoagulation Dosing History:  Anticoagulation Dose History     Recent Dosing and Labs Latest Ref Rng & Units 7/17/2018 8/14/2018 9/11/2018 9/17/2018 9/25/2018 10/8/2018 10/9/2018    Warfarin 2 mg - - - - - - 2 mg -    INR 0 - 1.3 - - - 4.19(H) - 2.88(H) 3.50(H)    INR 0.86 - 1.14 2.5(A) 3.3(A) 4.5(A) - 3.1(A) - -        Most Recent 3 Troponin's:  Recent Labs   Lab Test  10/09/18   0424  10/08/18   1127  10/08/18   0956   TROPI  0.174*  0.129*  0.138*     Most Recent Cholesterol Panel:  Recent Labs   Lab Test  07/26/18   1515   CHOL  76   LDL  30 "   HDL  32   TRIG  72     Most Recent 6 Bacteria Isolates From Any Culture (See EPIC Reports for Culture Details):  Recent Labs   Lab Test  10/08/18   1149   CULT  Canceled, Test credited  Duplicate request       Most Recent TSH, T4 and A1c Labs:  Recent Labs   Lab Test  07/26/18   1515   A1C  6.8*     Results for orders placed or performed during the hospital encounter of 10/08/18   XR Chest Port 1 View    Narrative    PROCEDURE: XR CHEST PORT 1 VW 10/8/2018 10:27 AM    HISTORY: fell out of bed;     COMPARISONS: 9/17/2018.    TECHNIQUE: Portable AP views.    FINDINGS: There is a pacemaker in place. There has been a median  sternotomy. Heart is enlarged but is stable in size. No acute  infiltrate or effusion is seen.         Impression    IMPRESSION: No acute disease.    ALBERTO DAVIES MD   CT Head w/o Contrast    Narrative    PROCEDURE: CT HEAD W/O CONTRAST     HISTORY: fall from bed?  on coumadin; .    COMPARISON: 12/4/2016    TECHNIQUE: Helical Images were obtained from the skull base to the  vertex. Axial, coronal and sagittal images were reviewed.     FINDINGS: There is moderate, diffuse atrophy. No acute intracranial  hemorrhage, mass effect,  or midline shift.    The grey-white matter interface is preserved. Scattered  hypoattenuation within the white matter is most compatible with  microvascular ischemic change.     The calvarium is intact. The mastoid air cells are clear.  The  visualized paranasal sinuses are clear.       Impression    IMPRESSION: No acute intracranial findings.      IRA MCELROY MD

## 2018-10-09 NOTE — PLAN OF CARE
Problem: Patient Care Overview  Goal: Plan of Care/Patient Progress Review  -diagnostic tests and consults completed and resulted   -vital signs normal or at patient baseline   -tolerating oral intake to maintain hydration   -returns to baseline functional status    Vital signs stable. Tolerating oral intake. Stand by assist with ambulation to restroom. Glucose stable this am. Troponin results available.

## 2018-10-09 NOTE — PLAN OF CARE
Problem: Patient Care Overview  Goal: Plan of Care/Patient Progress Review  -diagnostic tests and consults completed and resulted   -vital signs normal or at patient baseline   -tolerating oral intake to maintain hydration   -returns to baseline functional status    Vital signs stable. Tolerating oral intake. Stand by assist with ambulation to restroom. Pain to left rib area. Refusing offer of acetaminophen at this time. Labs in am.

## 2018-10-09 NOTE — PROGRESS NOTES
[facility] DISCHARGE NOTE    Patient discharged to home at 12:45 PM via wheel chair. Accompanied by son and staff. Discharge instructions reviewed with patient and son, opportunity offered to ask questions. Prescriptions sent to patients preferred pharmacy. All belongings sent with patient.    Deidre Good

## 2018-10-09 NOTE — PHARMACY - DISCHARGE MEDICATION RECONCILIATION AND EDUCATION
Pharmacy:  Discharge Counseling and Medication Reconciliation    Sukhdeep Gomez  [address]  [place] [address]  [phone] (home)   84 year old male  PCP: Cesar Sanders    Allergies: Codeine; Naproxen; Sulfa drugs; Tramadol; and Vancomycin    Discharge Counseling:    Pharmacist met with patient (and/or family) today to review the medication portion of the After Visit Summary (with an emphasis on NEW medications) and to address patient's questions/concerns.    Summary of Education: Provided education on cefdinir with patient and son. Also discussed warfarin discharge instructions: Hold warfarin on 10/9/18. Take 1 mg by mouth on 10/10/18. Check INR at [facility] on 10/11/18 at 12:45 for further direction. Also discussed keeping glucose tablets on hand at home    Materials Provided:  MedCounselor sheets printed from Clinical Pharmacology on: Cefdinir    Discharge Medication Reconciliation:    Harish Gipson RP has reviewed the patient's discharge medication orders and has compared them to the inpatient medication administration record and to what the patient was taking prior to admission - any discrepancies have been resolved.    It has been determined that the patient has an adequate supply of medications available or which can be obtained from the patient's preferred pharmacy, which HE/SHE has confirmed as: [facility]  Thank you for the consult.    Harish Gipson RPH........October 9, 2018 12:45 PM

## 2018-10-09 NOTE — PLAN OF CARE
Problem: Patient Care Overview  Goal: Plan of Care/Patient Progress Review  -diagnostic tests and consults completed and resulted   -vital signs normal or at patient baseline   -tolerating oral intake to maintain hydration   -returns to baseline functional status    Outcome: No Change  Pt up in chair, ambulating in room with assist of 1 and walker. Insulin given per order, see eMAR. Dressings changed. VSS, IV Abx infusing. Will continue to monitor.   Deidre Good RN on 10/9/2018 at 10:58 AM

## 2018-10-09 NOTE — PLAN OF CARE
Problem: Patient Care Overview  Goal: Plan of Care/Patient Progress Review  -diagnostic tests and consults completed and resulted   -vital signs normal or at patient baseline   -tolerating oral intake to maintain hydration   -returns to baseline functional status    Vital signs stable. Tolerating oral intake. Stand by assist with ambulation to restroom. Pain to left rib area. Reports decrease in pain from tylenol.  Labs in am.

## 2018-10-09 NOTE — PLAN OF CARE
Problem: Patient Care Overview  Goal: Plan of Care/Patient Progress Review  -diagnostic tests and consults completed and resulted   -vital signs normal or at patient baseline   -tolerating oral intake to maintain hydration   -returns to baseline functional status    Pt A&O, denies pain, VSS. Extremities remain cool to the touch, pulses moderately strong. Skin bruised, dressing on both arms and left calf remain C/D/I. Suprapubic cath patent and putting out clear light yellow output. Bed alarm on, call light within reach, IV infusing.     Problem: Diabetes Comorbidity  Goal: Diabetes  Patient comorbidity will be monitored for signs and symptoms of hyperglycemia or hypoglycemia. Problems will be absent, minimized or managed by discharge/transition of care.   AM chem strip 128, no AM dose of insulin ordered. Will continue to monitor.   Deidre Good RN on 10/9/2018 at 8:01 AM

## 2018-10-10 NOTE — TELEPHONE ENCOUNTER
LOV with PCP was on 9/26/18. Rx as requested was noted in office visit notes on that date with no changes and patient is to follow up in a couple of months. Writer will refill Rx as requested.    Prescription refilled per RN Medication Refill Policy..................Fredy Becerra 10/10/2018 9:36 AM

## 2018-10-11 NOTE — MR AVS SNAPSHOT
Sukhdeep Gomez   10/11/2018 12:45 PM   Anticoagulation Therapy Visit    Description:  84 year old male   Provider:  [facility]   Department:  [facility]           INR as of 10/11/2018     Today's INR 1.6!      Anticoagulation Summary as of 10/11/2018     INR goal 2.0-3.0   Today's INR 1.6!   Full warfarin instructions 2 mg on Mon, Thu, Sat; 1 mg all other days   Next INR check 10/17/2018    Indications   Long term (current) use of anticoagulants (Resolved) [Z79.01]  Unspecified atrial fibrillation [I48.91]         Description     Increase Warfarin/Coumadin and recheck INR in 1 week.  Shasta Restrepo ....................  10/11/2018   12:54 PM          October 2018 Details    Sun Mon Tue Wed Thu Fri Sat      1               2               3               4               5               6                 7               8               9               10               11      2 mg   See details      12      1 mg         13      2 mg           14      1 mg         15      2 mg         16      1 mg         17            18               19               20                 21               22               23               24               25               26               27                 28               29               30               31                   Date Details   10/11 This INR check       Date of next INR:  10/17/2018         How to take your warfarin dose     To take:  1 mg Take 0.5 of a 2 mg tablet.    To take:  2 mg Take 1 of the 2 mg tablets.

## 2018-10-11 NOTE — TELEPHONE ENCOUNTER
Writer received a faxed Rx request from [id number] with relation to patient's diabetic pen needles. Per faxed Rx request, they had not received a response to request as sent on 10/4/18 for patient's pen needles. Chart review shows that request as entered on 10/2/18 was from [facility] [employer], and that Rx as requested was filled as noted below on 10/5/18:    Medication Detail      Disp Refills Start End ALEXI   insulin pen needle (EASY TOUCH PEN NEEDLES) 31G X 8  each 1 10/5/2018  No   Sig: FOR ADMINISTERING INSULIN AT HOME TWICE DAILY. Dx: E11.8   Class: E-Prescribe   Order: [id number]   E-Prescribing Status: Receipt confirmed by pharmacy (10/5/2018 12:23 PM CDT)   Printout Tracking   External Result Report   Medication Administration Instructions   FOR ADMINISTERING INSULIN AT HOME TWICE DAILY. Dx: E11.8   Pharmacy   [id number] - [place], [place] - [address]     Call placed to [id number]. Spoke to Aliza pharmacy tech. Advised her of Rx as noted above and requested that she obtain a transfer. Pharmacy tech states understanding and will do so. Nothing further needed per this writer at this time. Writer will close this encounter.    Fredy Becerra RN on 10/11/2018 at 3:15 PM

## 2018-10-11 NOTE — PROGRESS NOTES
ANTICOAGULATION FOLLOW-UP CLINIC VISIT    Patient Name:  Sukhdeep Gomez  Date:  10/11/2018  Contact Type:  Face to Face    SUBJECTIVE:     Patient Findings     Positives Hospital admission, Bruising (bruising on hands (from IVs?)), Antibiotic use or infection (Started Omnicef 10/09/18 for 10 days.)           OBJECTIVE    INR [facility]   Date Value Ref Range Status   10/11/2018 1.6 (A) 2.0 - 3.0 Final       ASSESSMENT / PLAN  INR assessment SUB    Recheck INR In: 6 DAYS    INR Location Clinic      Anticoagulation Summary as of 10/11/2018     INR goal 2.0-3.0   Today's INR 1.6!   Warfarin maintenance plan 2 mg (2 mg x 1) on Mon, Thu, Sat; 1 mg (2 mg x 0.5) all other days   Full warfarin instructions 2 mg on Mon, Thu, Sat; 1 mg all other days   Weekly warfarin total 10 mg   Plan last modified Shasta Restrepo RN (10/11/2018)   Next INR check 10/17/2018   Priority INR   Target end date Indefinite    Indications   Long term (current) use of anticoagulants (Resolved) [Z79.01]  Unspecified atrial fibrillation [I48.91]         Anticoagulation Episode Summary     INR check location     Preferred lab     Send INR reminders to  INR    Comments       Anticoagulation Care Providers     Provider Role Specialty Phone number    Cesar Sanders MD [facility] [phone]            See the Encounter Report to view Anticoagulation Flowsheet and Dosing Calendar (Go to Encounters tab in chart review, and find the Anticoagulation Therapy Visit)        Shasta Restrepo RN

## 2018-10-12 NOTE — ED AVS SNAPSHOT
[facility]    [address]    [place] [address]    Phone:  [phone]    Fax:  [phone]                                       Sukhdeep Gomez   MRN: [id number]    Department:  [facility]   Date of Visit:  10/12/2018           Patient Information     Date Of Birth          8/19/1934        Your diagnoses for this visit were:     Insulin dependent type 2 diabetes mellitus (H)     Hyperglycemia     Elevated troponin        You were seen by Tomas Murray MD.      Follow-up Information     Follow up with Cesar Sanders MD On 10/17/2018.    Specialty:  Family Practice    Contact information:    [address]  [facility] [address] [phone]          Follow up with [facility].    Specialty:  EMERGENCY MEDICINE    Why:  If symptoms worsen    Contact information:    [address]  [place] [address] [phone]        Discharge Instructions         High Blood Sugar (Hyperglycemia)     When you have hyperglycemia, drink plenty of water or other sugar-free, caffeine-free liquids.   Too much glucose (sugar) in your blood is called hyperglycemia or high blood sugar. High blood sugar can lead to a dangerous condition called ketoacidosis. In severe cases, it can lead to dehydration and coma.  Possible causes of hyperglycemia    Inadequate treatment plan for diabetes     Being sick    Being under stress    Taking certain medicines, such as steroids    Eating too much food, especially carbohydrates    Being less active than usual    Not taking enough diabetes medicine  Symptoms of hyperglycemia  Hyperglycemia may not cause symptoms. If you do have symptoms, they may include:    Thirst    Frequent need to urinate    Feeling tired    Nausea and vomiting    Itchy, dry skin    Blurry vision    Fast breathing and breath that smells fruity     Weakness    Dizziness    Wounds or skin infections that don t  heal    Unexplained weight loss if hyperglycemia lasts for more than a few days   What you should do  Make sure you do the following:    Check your blood sugar.    Drink plenty of sugar-free, caffeine-free liquids such as water. Don t drink fruit juice.    Check your blood sugar again every 4 hours. If you take insulin or diabetes medicines, follow your sick-day plan for taking medicine. Call your healthcare provider if you are not able to eat.    Check your blood or urine for ketones as directed.    Call your healthcare provider if your blood sugar and ketones do not return to your target range.  Preventing high blood sugar  To help keep your blood sugar from getting too high:    Control stress.    When you're ill, follow your sick-day plan.     Follow your meal plan. Eat only the amount of food on your meal plan    Follow your exercise plan.    Take your insulin or diabetes medicines as directed by your healthcare team. Also test your blood sugar as directed. If the plan is not working for you, discuss it with your healthcare provider.  Other things to do    Carry a medical ID card, a compact USB drive, or wear a medical alert bracelet or necklace. It should say that you have diabetes. It should also say what to do in case you pass out or go into a coma.    Make sure family, friends, and coworkers know the signs of high blood sugar. Tell them what to do if your blood sugar gets very high and you can t help yourself.    Talk to your healthcare team about other things you can do to prevent high blood sugar.   Special note: Drink plenty of sugar-free and caffeine-free liquids when you feel symptoms of hyperglycemia. Call your healthcare provider if you keep having episodes of hyperglycemia.   Date Last Reviewed: 5/1/2016 2000-2017 [employer]. [address] [place] [address], [place] [address]. All rights reserved. This information is not intended as a substitute for professional medical care. Always follow  "your healthcare professional's instructions.      About Insulin Pens and Vials  Insulin vials  You need syringes to inject the insulin. Use a new syringe for each injection. Ask your doctor about the right size syringe for your dose.  Storing vials    Once you start using a vial of insulin, store it at room temperature away from direct light.    After opening, you can only use the vial for a certain amount of time. Refer to the handout for your type of insulin for storage guidelines. Throw the vial away when it passes the storage date, even if there is insulin left in the vial.    Keep unopened vials in the refrigerator. You may use them until the expiration date on the box (also known as the \"use by\" or \"discard\" date).  Your _________________________ insulin is good for _____________ days.  Your _________________________ insulin is good for _____________ days.  Your _________________________ insulin is good for _____________ days.   Insulin pens  Some pens come pre-filled with insulin. You may throw these away when the insulin expires or when you run out. Other pens use an insulin cartridge. You may throw away the cartridge, but not the pen.  Pens are made to dial an exact dose of insulin.  You need pen needles to inject the insulin. Use a new needle for each injection.  Storing pens    Once you start using an insulin pen, you may store it at room temperature.    Do not store an insulin pen with a pen needle attached.    Keep unopened pens in the refrigerator. You may use them until the expiration date on the box (also known as the \"use by\" or \"discard\" date).  Disposal  You may throw vials, cartridges and disposable pens in the trash. Throw pen needles and syringes in your sharps container. Your sharps container should:    Seal tightly and stand up straight without tipping.    Hold objects without leaking, breaking, cracking or letting the sharps push through.    Be clearly labeled and easy to dispose. (Call your " garbage company for information on disposal.)  For informational purposes only. Not to replace the advice of your health care provider.  Copyright   2007 [facility]. All rights reserved. AOTMP 090393 - REV 03/17.          Your next 10 appointments already scheduled     Oct 17, 2018 11:00 AM CDT   Office Visit with Cesar Sanders MD   [facility] ([facility])    [address] [facility] [place] [address]   [phone]           Bring a current list of meds and any records pertaining to this visit. For Physicals, please bring immunization records and any forms needing to be filled out. Please arrive 10 minutes early to complete paperwork.            Oct 17, 2018 12:45 PM CDT   Anticoagulation Visit with  ANTI COAG 1   [facility] ([facility]    [address]  [place] [address]   [phone]            Oct 17, 2018  1:30 PM CDT   Treatment with Jerson Shannon PT   [facility]    [address]  [place] [address]   [phone]            Oct 19, 2018 10:30 AM CDT   Treatment with Jerson Shannon PT   [facility]    [address] [place] [address] [place] [address]   [phone]            Oct 23, 2018  1:00 PM CDT   Pacemaker Check with  PACEMAKER   [facility] ([facility])    [address]  [place] [address]   [phone]            Oct 24, 2018  2:15 PM CDT   Treatment with Jerson Shannon PT   [facility])    [address]  [place] [address]   [phone]            Oct 26, 2018  2:00 PM CDT   Treatment with Jerson Shannon PT   [facility] ([facility])    [address]  [place] [address]    [phone]              24 Hour Appointment Hotline     To schedule an appointment at [facility], please call [phone]. If you don't have a family doctor or clinic, we will help you find one. [facility] are conveniently located to serve the needs of you and your family.        ED Discharge Orders     CARDIOLOGY EVAL ADULT REFERRAL       Preferred location:  [place]: [facility]  [phone] [email or web address]    Please be aware that coverage of these services is subject to the terms and limitations of your health insurance plan.  Call member services at your health plan with any benefit or coverage questions.      Please bring the following to your appointment:  Any x-rays, CTs or MRIs which have been performed. Contact the facility where they were done to arrange for  prior to your scheduled appointment.    List of current medications  This referral request   Any documents/labs given to you for this referral                     Review of your medicines      Our records show that you are taking the medicines listed below. If these are incorrect, please call your family doctor or clinic.        Dose / Directions Last dose taken    allopurinol 100 MG tablet   Commonly known as:  ZYLOPRIM        Take 1 tablet by mouth once daily.   Refills:  0        amoxicillin 500 MG capsule   Commonly known as:  AMOXIL   Dose:  2000 mg        Take 2,000 mg by mouth daily as needed (dental appointments)   Refills:  0        aspirin 81 MG EC tablet   Dose:  81 mg        Take 81 mg by mouth daily   Refills:  0        cefdinir 300 MG capsule   Commonly known as:  OMNICEF   Dose:  300 mg   Quantity:  20 capsule        Take 1 capsule (300 mg) by mouth 2 times daily   Refills:  0        diclofenac 1 % Gel topical gel   Commonly known as:  VOLTAREN   Dose:  4 g        Place 4 g onto the skin 4 times daily as needed for moderate pain   Refills:  0        furosemide 20 MG  tablet   Commonly known as:  LASIX   Dose:  20 mg        Take 20 mg by mouth 1 tablet every morning and 1 tablet every other evening   Refills:  0        GLUCOSE MANAGEMENT Tabs   Dose:  15 g   Quantity:  50 tablet        Take 15 g by mouth as needed (blood sugar less than 70)   Refills:  1        insulin aspart prot & aspart injection   Commonly known as:  NovoLOG MIX 70/30 PEN   Quantity:  45 mL        26 units every morning and 10 units every evening   Refills:  3        insulin pen needle 31G X 8 MM   Commonly known as:  EASY TOUCH PEN NEEDLES   Quantity:  200 each        FOR ADMINISTERING INSULIN AT HOME TWICE DAILY. Dx: E11.8   Refills:  1        Lidocaine 4 % Patch   Commonly known as:  LIDOCARE   Dose:  1-2 patch        Place 1-2 patches onto the skin daily as needed for moderate pain   Refills:  0        lisinopril 2.5 MG tablet   Commonly known as:  PRINIVIL/Zestril   Dose:  2.5 mg        Take 2.5 mg by mouth daily   Refills:  0        metoprolol succinate 50 MG 24 hr tablet   Commonly known as:  TOPROL-XL   Quantity:  135 tablet        TAKE 1 AND 1/2 TABLETS BY MOUTH EVERY DAY   Refills:  1        order for DME        Wheelchair with elevating leg rests/foot rest. For home use. Length of need: 99 mo   Refills:  0        potassium chloride SA 20 MEQ CR tablet   Commonly known as:  K-DUR/KLOR-CON M        Take 1 tablet by mouth once every other day with a meal.   Refills:  0        simvastatin 20 MG tablet   Commonly known as:  ZOCOR   Quantity:  90 tablet        TAKE ONE TABLET BY MOUTH AT BEDTIME   Refills:  0        VITAMIN D-1000 MAX ST 1000 units Tabs   Generic drug:  cholecalciferol        Take one tablet by mouth daily   Refills:  0        warfarin 2 MG tablet   Commonly known as:  COUMADIN   Quantity:  90 tablet        Take 2 mg x three days/week and 1 mg x four days/week. Or as directed by the [facility].   Refills:  1                Procedures and tests performed during your visit     Basic  "metabolic panel    CBC with platelets differential    EKG 12-lead, tracing only    INR    Partial thromboplastin time    Troponin I (now)      Orders Needing Specimen Collection     None      Pending Results     No orders found from 10/10/2018 to 10/13/2018.            Pending Culture Results     No orders found from 10/10/2018 to 10/13/2018.            Pending Results Instructions     If you had any lab results that were not finalized at the time of your Discharge, you can call the ED Lab Result RN at [phone]. You will be contacted by this team for any positive Lab results or changes in treatment. The nurses are available 7 days a week from 10A to 6:30P.  You can leave a message 24 hours per day and they will return your call.        Thank you for choosing [facility]       Thank you for choosing [facility] for your care. Our goal is always to provide you with excellent care. Hearing back from our patients is one way we can continue to improve our services. Please take a few minutes to complete the written survey that you may receive in the mail after you visit with us. Thank you!        [employer] Information     [employer] lets you send messages to your doctor, view your test results, renew your prescriptions, schedule appointments and more. To sign up, go to [email or web address] . Click on \"Log in\" on the left side of the screen, which will take you to the Welcome page. Then click on \"Sign up Now\" on the right side of the page.     You will be asked to enter the access code listed below, as well as some personal information. Please follow the directions to create your username and password.     Your access code is: [email or web address]  Expires: 2018 11:34 AM     Your access code will  in 90 days. If you need help or a new code, please call your [facility] clinic or [phone].        Care EveryWhere ID     This is your Care EveryWhere ID. This could be used by other organizations to access your [facility] " medical records  [id number]        Equal Access to Services     BIANCA ARMANDO : Bartolo Lerma, francesco nelson, griffin phelan. So [facility] [phone].    ATENCIÓN: Si habla español, tiene a cohn disposición servicios gratuitos de asistencia lingüística. Llame al [phone].    We comply with applicable federal civil rights laws and [place] laws. We do not discriminate on the basis of race, color, national origin, age, disability, sex, sexual orientation, or gender identity.            After Visit Summary       This is your record. Keep this with you and show to your community pharmacist(s) and doctor(s) at your next visit.

## 2018-10-12 NOTE — ED PROVIDER NOTES
History     Chief Complaint   Patient presents with     Syncope     HPI  Sukhdeep Gomez is a 84 year old male who has a history of type 2 insulin-dependent diabetes, coronary artery disease status post CABG, A. fib on chronic anticoagulation as well as gout presenting to the emergency room via EMS on account of syncope.  Family reports that patient was found unresponsive this morning and they believed that he had fallen as he had stents of blood from his nose.  Prior to the arrival of EMS patient regained consciousness and has since been alert and oriented.  Patient denies that he fell and states that he just woke up this morning and he does not know why he is here today.  He reports left-sided headache which he says is chronic and does not report any weakness of any extremities.  He denies chest pain or shortness of breath at this time.  Of note patient fell 5 days ago and sustained bruising of his bilateral forearm currently has a bandage over them.  He was evaluated in the emergency room for that injury.     2:19 PM  Patient's son at the bedside.  Mr. Painting's who lives with patient reports that patient did not fall.  He was trying to sit on his wheelchair when he acted woozy, slumped to his wheelchair and became unresponsive.  He had some bright red blood draining from his nostrils as well as drooling of saliva.  Mr. Painting who is patient's son says that at that time he gave patient 26 units of NovoLog and called EMS.  By the time EMS arrived patient was more responsive and has remained alert and oriented since he has been in the emergency room.  Patient's son reports that patient frequently goes into hypoglycemia and he believes that the patient's went into hypoglycemia today prior to the onset of these symptoms.  Patient takes a total of 36 units of NovoLog at home, 26 units a.m. and 10 units p.m.  Patient states that he keeps his blood pressure log by himself I usually checks his blood pressure 2-3 times a  day his blood pressure usually runs in the high 190s-200s.  He does have an appointment to follow-up with his primary care doctor on Wednesday.    Problem List:    Patient Active Problem List    Diagnosis Date Noted     Hypoglycemia due to type 2 diabetes mellitus (H) 10/08/2018     Priority: Medium     Urinary tract infection associated with indwelling urethral catheter (H) 10/08/2018     Priority: Medium     Herpes zoster ophthalmicus, left eye 08/21/2018     Priority: Medium     Urinary retention 06/18/2018     Priority: Medium     Paroxysmal atrial fibrillation (H) 02/21/2018     Priority: Medium     Overview:   chronic warfarin anticoagulation and toprol for rate control       S/P CABG x 4 02/21/2018     Priority: Medium     Overview:   status post 4 vessel CABG, [facility]       Unspecified atrial fibrillation 02/11/2018     Priority: Medium     Ascending aorta dilatation (H) 02/08/2018     Priority: Medium     H/O myocardial infarction, greater than 8 weeks 02/08/2018     Priority: Medium     Stented coronary artery 02/08/2018     Priority: Medium     Atrial fibrillation (H) 01/18/2018     Priority: Medium     Overview:   chronic warfarin anticoagulation and toprol for rate control       Osteoarthrosis 01/18/2018     Priority: Medium     Diabetes mellitus with multiple complications (H) 01/18/2018     Priority: Medium     Essential hypertension 01/18/2018     Priority: Medium     Gout of left foot due to renal impairment 01/18/2018     Priority: Medium     Overview:   tophaceous       Ischemic cardiomyopathy 01/18/2018     Priority: Medium     Lumbago 01/18/2018     Priority: Medium     Enthesopathy of ankle and tarsus 01/18/2018     Priority: Medium     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 01/18/2018     Priority: Medium     Overview:   status post TTE       Mixed hyperlipidemia 01/18/2018     Priority: Medium     S/P CABG (coronary artery bypass graft) 01/18/2018      Priority: Medium     Overview:   status post 4 vessel CABG, [facility]       Spinal stenosis 01/18/2018     Priority: Medium     Squamous cell carcinoma of skin 01/18/2018     Priority: Medium     Chronic systolic heart failure (H) 11/08/2017     Priority: Medium     Basal cell carcinoma of right forehead 10/23/2017     Priority: Medium     Implantable cardioverter-defibrillator, [employer], Single lead 10/20/2017     Priority: Medium     [employer] Incepta [id number]  SN#[id number]  4/25/13   RV lead CPI [id number]  SN#[id number]  5/30/07  CHF/Madit II       Hamstring tightness of left lower extremity 02/07/2017     Priority: Medium     Altered level of consciousness 12/04/2016     Priority: Medium     Elevated INR 12/04/2016     Priority: Medium     Elevated troponin 12/04/2016     Priority: Medium     Yeast dermatitis 12/04/2016     Priority: Medium     Anticoagulation monitoring, INR range 2-3 05/13/2015     Priority: Medium     History of TIA (transient ischemic attack) 11/17/2014     Priority: Medium     CKD (chronic kidney disease) stage 3, GFR 30-59 ml/min (H) 10/17/2014     Priority: Medium     ACP (advance care planning) 12/10/2013     Priority: Medium     Overview:   Patient has identified Health Care Agent(s): No  Add Health Care Agents: No  Patient has Advance Care Plan Documents (Health Care Directive, POLST): Yes    Advance Care Plan Documents:  Health Care Directive    Patient has identified Specific Treatment Preferences: No   Specific limits to treatment preferences NOT identified: ASSUME FULL TREATMENT.       Diabetic neuropathy (H) 11/11/2013     Priority: Medium     Prostate CA (H) 11/11/2013     Priority: Medium     Orthostatic hypotension 11/05/2013     Priority: Medium     Hematoma of right hip 11/21/2012     Priority: Medium     Systolic congestive heart failure (H) 11/14/2012     Priority: Medium     Overview:   9/19/2012 ECHO  Final Impression:   1) Dilated left ventricle to a  moderate degree with severely depressed left ventricular systolic function and ejection fraction of 15%. See comments in text regarding question of left ventricular thrombus. Echo density noted there is felt to be artifactual, but the possibility of LV thrombus cannot be fully excluded. Consider repeat study with contrast.   2) Severe left atrial enlargement.   3) Sclerotic aortic valve with mild aortic insufficiency.   4) Sclerotic mitral valve with mild insufficiency.   5) Sclerotic tricuspid valve with mild to moderate regurgitation.   6) Probable moderate elevation right ventricular systolic pressure to 48 mmHg plus right atrial pressure.   7) Study is performed with patient in atrial fibrillation.   8) When compared with prior study of August 7, 2009, there has been further increase in right ventricular systolic pressure from 29 mmHg plus right atrial to 48 mmHg plus right atrial pressure and the aortic root and ascending aorta appear to have further increased in size from prior 4 cm in the mid ascending aorta to 4.4 cm. Additionally, the issue of possible LV thrombus was not raised in the report of the prior study.   Interpretation: M-Mode, two-dimensional, spectral and color flow Doppler evaluations were performed with standard echocardiographic images with the patient in atrial fibrillation. The study is technically adequate for interpretation.   Aortic root and ascending aorta are mildly to moderately enlarged at 4.5 and 4.4 cm, respectively. There is severe left atrial enlargement. The right atrium is probably within normal limits. The right ventricle is also probably of normal size with normal wall motion. A device lead is noted throughout the right heart.   The left ventricular chamber is moderately enlarged at 6.2 cm. Left ventricular wall thickness appears to be probably mildly increased in the intraventricular septum.   The inferior vena cava is not well visualized.   All cardiac valves appear to be  sclerotic. There is mild aortic insufficiency and probable mild mitral regurgitation. There is mild to moderate tricuspid regurgitation with regurgitation jet velocity suggesting right ventricular systolic pressure to be elevated to 48 mmHg plus indeterminate right atrial pressure, suggesting at least probable moderate pulmonary hypertension.   Left ventricular ejection fraction is severely reduced with severe global hypokinesis with estimated ejection fraction in the range of 15%. There is obstruction noted on apical views within the left ventricular apex which is felt to be artifactual; however, the possibility of left ventricular thrombus cannot be excluded. Consider repeat study with contrast for better definition.   No pericardial effusion is noted.   Left ventricular diastolic function is indeterminate in the setting of the atrial fibrillation.        Chronic systolic congestive heart failure (H) 11/14/2012     Priority: Medium     Overview:   9/19/2012 ECHO  Final Impression:   1) Dilated left ventricle to a moderate degree with severely depressed left ventricular systolic function and ejection fraction of 15%. See comments in text regarding question of left ventricular thrombus. Echo density noted there is felt to be artifactual, but the possibility of LV thrombus cannot be fully excluded. Consider repeat study with contrast.   2) Severe left atrial enlargement.   3) Sclerotic aortic valve with mild aortic insufficiency.   4) Sclerotic mitral valve with mild insufficiency.   5) Sclerotic tricuspid valve with mild to moderate regurgitation.   6) Probable moderate elevation right ventricular systolic pressure to 48 mmHg plus right atrial pressure.   7) Study is performed with patient in atrial fibrillation.   8) When compared with prior study of August 7, 2009, there has been further increase in right ventricular systolic pressure from 29 mmHg plus right atrial to 48 mmHg plus right atrial pressure and the  aortic root and ascending aorta appear to have further increased in size from prior 4 cm in the mid ascending aorta to 4.4 cm. Additionally, the issue of possible LV thrombus was not raised in the report of the prior study.   Interpretation: M-Mode, two-dimensional, spectral and color flow Doppler evaluations were performed with standard echocardiographic images with the patient in atrial fibrillation. The study is technically adequate for interpretation.   Aortic root and ascending aorta are mildly to moderately enlarged at 4.5 and 4.4 cm, respectively. There is severe left atrial enlargement. The right atrium is probably within normal limits. The right ventricle is also probably of normal size with normal wall motion. A device lead is noted throughout the right heart.   The left ventricular chamber is moderately enlarged at 6.2 cm. Left ventricular wall thickness appears to be probably mildly increased in the intraventricular septum.   The inferior vena cava is not well visualized.   All cardiac valves appear to be sclerotic. There is mild aortic insufficiency and probable mild mitral regurgitation. There is mild to moderate tricuspid regurgitation with regurgitation jet velocity suggesting right ventricular systolic pressure to be elevated to 48 mmHg plus indeterminate right atrial pressure, suggesting at least probable moderate pulmonary hypertension.   Left ventricular ejection fraction is severely reduced with severe global hypokinesis with estimated ejection fraction in the range of 15%. There is obstruction noted on apical views within the left ventricular apex which is felt to be artifactual; however, the possibility of left ventricular thrombus cannot be excluded. Consider repeat study with contrast for better definition.   No pericardial effusion is noted.   Left ventricular diastolic function is indeterminate in the setting of the atrial fibrillation.        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     Priority: Medium     Onychomycosis 08/05/2010     Priority: Medium     Muscle weakness (generalized) 06/09/2009     Priority: Medium     Overview:   IMO Update 10/11       Myopathy 04/08/2009     Priority: Medium        Past Medical History:    Past Medical History:   Diagnosis Date     Acute gastritis with hemorrhage 8/10/2009     Acute on chronic systolic congestive heart failure (H)      Atherosclerotic heart disease of native coronary artery without angina pectoris      Atrial fibrillation (H)      Cardiomyopathy (H)      Chronic kidney disease      Disorder of muscle      Essential (primary) hypertension      Gastrointestinal hemorrhage      GI bleeding 1/18/2018     Gout      Herpes zoster ophthalmicus, left eye 8/21/2018     Hyperlipidemia      Low back pain      Malignant neoplasm of prostate (H)      Osteoarthritis      Other nonrheumatic aortic valve disorders      Other retention of urine (CODE)      Other specified disorders of arteries and arterioles (CODE)      Polyneuropathy      Systolic congestive heart failure (H) 2009     Transient cerebral ischemic attack 11/17/2014     Type 2 diabetes mellitus without complications (H) 2009       Past Surgical History:    Past Surgical History:   Procedure Laterality Date     ARTHROPLASTY KNEE      2011,left     BYPASS GRAFT ARTERY CORONARY      4 vessel, [facility], 1989     CATARACT IOL, RT/LT Bilateral 2018 6/20 and 7/11 Dr Irwin     CIRCUMCISION N/A 5/8/2018    Procedure: CIRCUMCISION;  Circumcision ,  Glans Biopsy Cystoscopy;  Surgeon: Edgardo Sanchez MD;  Location:  OR     CYSTOSCOPY      2009,& dilation of urethral stricture, Dr. Wallace     CYSTOSCOPY      2010,& catheter placement for acute obstruction, Dr. Elise     CYSTOSTOMY, INSERT TUBE SUPRAPUBIC, COMBINED N/A 5/22/2018    Procedure: COMBINED CYSTOSTOMY, INSERT TUBE SUPRAPUBIC;  Supra tube  Placement    .;  Surgeon: Edgardo Sanchez MD;  Location: [facility] OR     ESOPHAGOSCOPY, GASTROSCOPY, DUODENOSCOPY (EGD), COMBINED      2007,& colonoscopy with polypectomy, [facility] essetnially normal EGD with small polyp removed     HEART CATH, ANGIOPLASTY      approx. 2003,angiogram, 2 stents, [facility]     IMPLANT PACEMAKER      2007,[employer] ICD, CPI Vitality 2 EL, model #T177, serial #[id number], which was implanted on 05/30/2007 at [facility].     LAMINECTOMY LUMBAR ONE LEVEL      2006,decompressive, L3, L4 & L5 with improvement, Dr. Lopes       Family History:    Family History   Problem Relation Age of Onset     HEART DISEASE Father      Heart Disease     Prostate Cancer Brother      Cancer-prostate       Social History:  Marital Status:   [5]  Social History   Substance Use Topics     Smoking status: Never Smoker     Smokeless tobacco: Never Used     Alcohol use No        Medications:      allopurinol (ZYLOPRIM) 100 MG tablet   amoxicillin (AMOXIL) 500 MG capsule   aspirin 81 MG EC tablet   cefdinir (OMNICEF) 300 MG capsule   cholecalciferol (VITAMIN D-1000 MAX ST) 1000 UNITS TABS   diclofenac (VOLTAREN) 1 % GEL topical gel   furosemide (LASIX) 20 MG tablet   insulin aspart prot & aspart (NOVOLOG MIX 70/30 PEN) injection   insulin pen needle (EASY TOUCH PEN NEEDLES) 31G X 8 MM   Lidocaine (LIDOCARE) 4 % Patch   lisinopril (PRINIVIL/ZESTRIL) 2.5 MG tablet   metoprolol succinate (TOPROL-XL) 50 MG 24 hr tablet   Nutritional Supplements (GLUCOSE MANAGEMENT) TABS   order for DME   potassium chloride SA (K-DUR/KLOR-CON M) 20 MEQ CR tablet   simvastatin (ZOCOR) 20 MG tablet   warfarin (COUMADIN) 2 MG tablet         Review of Systems   Constitutional: Negative for chills and fever.   HENT: Positive for nosebleeds. Negative for congestion.    Respiratory: Negative for cough and shortness of breath.    Cardiovascular: Negative for chest pain and palpitations.   Gastrointestinal: Negative for abdominal pain.   Skin: Positive  for rash and wound.       Physical Exam   BP: 103/72  Heart Rate: 76  SpO2: 98 %      Physical Exam   Constitutional: He is oriented to person, place, and time. He appears well-developed. No distress.   HENT:   Head: Normocephalic and atraumatic.   Mouth/Throat: Oropharynx is clear and moist.   Ecchymotic skin lesions on the nasal bridge.  Blood stained from his left nostril.   Eyes: Conjunctivae and EOM are normal.   The left pupil appears more dilated compared to the right however both pupils reactive to light.   Neck: Normal range of motion.   Cardiovascular: Normal rate, regular rhythm and normal heart sounds.    Pulmonary/Chest: Effort normal and breath sounds normal.   Musculoskeletal: Normal range of motion. He exhibits no tenderness.   Neurological: He is alert and oriented to person, place, and time. No cranial nerve deficit.   Skin: He is not diaphoretic. There is erythema.   Multiple parenchymal takes skin bruises involving the bilateral upper extremities as well as the chest and trunk.       ED Course     ED Course     Procedures  Based on the collateral information obtained from patient's son I have discontinued CT head because patient did not have a fall.  I do suspect that patient went into hypoglycemia which made him become unresponsive.his mental status improved that he received NovoLog.  I also suspects that both patient and his son has a poor understanding for the symptoms of hypoglycemia as well as hyperglycemia as patient son thinks that patient had a low blood sugar which improved after he gave insulin.  Of note patient's troponin drawn 4 days ago while he was in the emergency room here was elevated at 0.17 but today it has declined to 0.115.  Remains chest pain-free and EKG with no acute ST segment changes.  EKG unchanged from EKG done previously.  Patient's current blood sugar is 166 and he has not received any fluid containing dextrose here in the emergency today night that he did receive  any dextrose by EMS.  This is suggestive that patient's blood sugar was much higher prior to receiving insulin.    Critical Care time:  none      Results for orders placed or performed during the hospital encounter of 10/12/18 (from the past 24 hour(s))   CBC with platelets differential   Result Value Ref Range    WBC 8.0 4.0 - 11.0 10e9/L    RBC Count 2.60 (L) 4.4 - 5.9 10e12/L    Hemoglobin 8.4 (L) 13.3 - 17.7 g/dL    Hematocrit 26.6 (L) 40.0 - 53.0 %     (H) 78 - 100 fl    MCH 32.3 26.5 - 33.0 pg    MCHC 31.6 31.5 - 36.5 g/dL    RDW 18.2 (H) 10.0 - 15.0 %    Platelet Count 200 150 - 450 10e9/L    Diff Method Automated Method     % Neutrophils 83.6 %    % Lymphocytes 5.7 %    % Monocytes 7.8 %    % Eosinophils 1.1 %    % Basophils 0.2 %    % Immature Granulocytes 1.6 %    Absolute Neutrophil 6.7 1.6 - 8.3 10e9/L    Absolute Lymphocytes 0.5 (L) 0.8 - 5.3 10e9/L    Absolute Monocytes 0.6 0.0 - 1.3 10e9/L    Absolute Eosinophils 0.1 0.0 - 0.7 10e9/L    Absolute Basophils 0.0 0.0 - 0.2 10e9/L    Abs Immature Granulocytes 0.1 0 - 0.4 10e9/L   INR   Result Value Ref Range    INR 1.48 (H) 0 - 1.3   Partial thromboplastin time   Result Value Ref Range    PTT 38 29 - 50 sec   Basic metabolic panel   Result Value Ref Range    Sodium 137 134 - 144 mmol/L    Potassium 4.0 3.5 - 5.1 mmol/L    Chloride 113 (H) 98 - 107 mmol/L    Carbon Dioxide 16 (L) 21 - 31 mmol/L    Anion Gap 8 3 - 14 mmol/L    Glucose 166 (H) 70 - 105 mg/dL    Urea Nitrogen 48 (H) 7 - 25 mg/dL    Creatinine 1.59 (H) 0.70 - 1.30 mg/dL    GFR Estimate 42 (L) >60 mL/min/1.7m2    GFR Estimate If Black 50 (L) >60 mL/min/1.7m2    Calcium 8.4 (L) 8.6 - 10.3 mg/dL   Troponin I (now)   Result Value Ref Range    Troponin I ES 0.115 (H) 0.000 - 0.034 ug/L       Medications - No data to display    Assessments & Plan (with Medical Decision Making)   Patient is an 84-year-old male with a history of coronary artery disease status post CABG, atrial fibrillation with  chronic anticoagulation as well as insulin-dependent type 2 diabetes who is seen today at the emergency room on account of brief unresponsiveness in the setting of possible hypoglycemia.  His blood sugar currently is 166 and anion gap is closed as well as a normal bicarbonate.  He does have elevated troponin which is improving from a higher level 4 days ago.  EKG without any ST segment changes.  Patient affirms that he does have a cardiology and will call to make an appointment to follow-up.  I have a sense that patient has poor glycemic control and needs more education on insulin administration.  I have advised patient to increase his insulin by 3 units.  He will continue to take NovoLog 26 units a.m. and NovoLog 13 units p.m.  He is also advised to keep blood sugar log 3 times daily and to follow-up with his primary care doctor as scheduled on Wednesday with the readings of his blood sugar for proper insulin adjustment.  Patient and his son Mr. Painting affirms understanding of this instruction and will do as instructed.  CT scan of the head was canceled as patient did not have a fall.    I have reviewed the nursing notes.    I have reviewed the findings, diagnosis, plan and need for follow up with the patient.    Discharge Medication List as of 10/12/2018  2:43 PM          Final diagnoses:   Insulin dependent type 2 diabetes mellitus (H)   Hyperglycemia   Elevated troponin     Tomas Murray MD  Emergency Medicine Physician  10/12/2018  2:34 PM        10/12/2018   [facility]     Tomas Murray MD  10/12/18 1922

## 2018-10-12 NOTE — DISCHARGE INSTRUCTIONS
High Blood Sugar (Hyperglycemia)     When you have hyperglycemia, drink plenty of water or other sugar-free, caffeine-free liquids.   Too much glucose (sugar) in your blood is called hyperglycemia or high blood sugar. High blood sugar can lead to a dangerous condition called ketoacidosis. In severe cases, it can lead to dehydration and coma.  Possible causes of hyperglycemia    Inadequate treatment plan for diabetes     Being sick    Being under stress    Taking certain medicines, such as steroids    Eating too much food, especially carbohydrates    Being less active than usual    Not taking enough diabetes medicine  Symptoms of hyperglycemia  Hyperglycemia may not cause symptoms. If you do have symptoms, they may include:    Thirst    Frequent need to urinate    Feeling tired    Nausea and vomiting    Itchy, dry skin    Blurry vision    Fast breathing and breath that smells fruity     Weakness    Dizziness    Wounds or skin infections that don t heal    Unexplained weight loss if hyperglycemia lasts for more than a few days   What you should do  Make sure you do the following:    Check your blood sugar.    Drink plenty of sugar-free, caffeine-free liquids such as water. Don t drink fruit juice.    Check your blood sugar again every 4 hours. If you take insulin or diabetes medicines, follow your sick-day plan for taking medicine. Call your healthcare provider if you are not able to eat.    Check your blood or urine for ketones as directed.    Call your healthcare provider if your blood sugar and ketones do not return to your target range.  Preventing high blood sugar  To help keep your blood sugar from getting too high:    Control stress.    When you're ill, follow your sick-day plan.     Follow your meal plan. Eat only the amount of food on your meal plan    Follow your exercise plan.    Take your insulin or diabetes medicines as directed by your healthcare team. Also test your blood sugar as directed. If the  "plan is not working for you, discuss it with your healthcare provider.  Other things to do    Carry a medical ID card, a compact USB drive, or wear a medical alert bracelet or necklace. It should say that you have diabetes. It should also say what to do in case you pass out or go into a coma.    Make sure family, friends, and coworkers know the signs of high blood sugar. Tell them what to do if your blood sugar gets very high and you can t help yourself.    Talk to your healthcare team about other things you can do to prevent high blood sugar.   Special note: Drink plenty of sugar-free and caffeine-free liquids when you feel symptoms of hyperglycemia. Call your healthcare provider if you keep having episodes of hyperglycemia.   Date Last Reviewed: 5/1/2016 2000-2017 [employer]. [address] [place] [address]. All rights reserved. This information is not intended as a substitute for professional medical care. Always follow your healthcare professional's instructions.      About Insulin Pens and Vials  Insulin vials  You need syringes to inject the insulin. Use a new syringe for each injection. Ask your doctor about the right size syringe for your dose.  Storing vials    Once you start using a vial of insulin, store it at room temperature away from direct light.    After opening, you can only use the vial for a certain amount of time. Refer to the handout for your type of insulin for storage guidelines. Throw the vial away when it passes the storage date, even if there is insulin left in the vial.    Keep unopened vials in the refrigerator. You may use them until the expiration date on the box (also known as the \"use by\" or \"discard\" date).  Your _________________________ insulin is good for _____________ days.  Your _________________________ insulin is good for _____________ days.  Your _________________________ insulin is good for _____________ days.   Insulin pens  Some pens come " "pre-filled with insulin. You may throw these away when the insulin expires or when you run out. Other pens use an insulin cartridge. You may throw away the cartridge, but not the pen.  Pens are made to dial an exact dose of insulin.  You need pen needles to inject the insulin. Use a new needle for each injection.  Storing pens    Once you start using an insulin pen, you may store it at room temperature.    Do not store an insulin pen with a pen needle attached.    Keep unopened pens in the refrigerator. You may use them until the expiration date on the box (also known as the \"use by\" or \"discard\" date).  Disposal  You may throw vials, cartridges and disposable pens in the trash. Throw pen needles and syringes in your sharps container. Your sharps container should:    Seal tightly and stand up straight without tipping.    Hold objects without leaking, breaking, cracking or letting the sharps push through.    Be clearly labeled and easy to dispose. (Call your garbage company for information on disposal.)  For informational purposes only. Not to replace the advice of your health care provider.  Copyright   2007 [employer]. All rights reserved. [employer] [id number] - REV 03/17.        "

## 2018-10-12 NOTE — ED TRIAGE NOTES
"Pt arrives to ED via [facility] for a syncopal episode.  Pt was trying to go from his wheel chair to the toilet when this happened.  Pt states that he had a fall on Sunday and has some left rib rib pain and abrasions to his arms.  Pt states that he doesn't remember \"passing out\" he states he was just sleeping.  Pt denies any pain.  Pt does have a hx A-fib.   "

## 2018-10-12 NOTE — ED AVS SNAPSHOT
[facility]    [address]    [place] [address]    Phone:  [phone]    Fax:  [phone]                                       Sukhdeep Gomez   MRN: [id number]    Department:  [facility]   Date of Visit:  10/12/2018           After Visit Summary Signature Page     I have received my discharge instructions, and my questions have been answered. I have discussed any challenges I see with this plan with the nurse or doctor.    ..........................................................................................................................................  Patient/Patient Representative Signature      ..........................................................................................................................................  Patient Representative Print Name and Relationship to Patient    ..................................................               ................................................  Date                                   Time    ..........................................................................................................................................  Reviewed by Signature/Title    ...................................................              ..............................................  Date                                               Time          22EPIC Rev 08/18

## 2018-10-17 NOTE — NURSING NOTE
"Chief Complaint   Patient presents with     Hospital F/U       Initial /68 (BP Location: Right arm, Patient Position: Chair, Cuff Size: Adult Regular)  Pulse 80  Resp 16  Wt 150 lb (68 kg)  BMI 24.21 kg/m2 Estimated body mass index is 24.21 kg/(m^2) as calculated from the following:    Height as of an earlier encounter on 10/17/18: 5' 6\" (1.676 m).    Weight as of this encounter: 150 lb (68 kg).  Medication Reconciliation: complete    Prabha Mayer LPN  "

## 2018-10-17 NOTE — NURSING NOTE
"Pt presents today for ED f/u. Denies chest pain/pressure, SOB, fatigue. C/o weakness. States will be starting back with physical therapy. Unable to obtain BP reading via left arm, right arm, or right leg after multiple attempts. Kristie Nicole RN on 10/17/2018 at 9:20 AM     Chief Complaint   Patient presents with     Follow Up For     ED f/u        Initial Resp 16  Ht 1.676 m (5' 6\")  Wt 68 kg (150 lb)  BMI 24.21 kg/m2 Estimated body mass index is 24.21 kg/(m^2) as calculated from the following:    Height as of this encounter: 1.676 m (5' 6\").    Weight as of this encounter: 68 kg (150 lb).  Meds Reconciled: complete  Pt is on Aspirin  Pt is on a Statin  PHQ and/or WALESKA reviewed. Pt referred to PCP/MH Provider as appropriate.    Kristie Nicole RN      "

## 2018-10-17 NOTE — MR AVS SNAPSHOT
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  Labs today     2. Echocardiogram, they will call you to schedule this. We will call you with results.     You will follow up with  LEXUS Chamberlain CNP  in 3 months.       Please call the cardiology office with problems, questions, or concerns at [phone].    If you experience chest pain, chest pressure, chest tightness, shortness of breath, fainting, lightheadedness, nausea, vomiting, or other concerning symptoms, please report to the Emergency Department or call 911. These symptoms may be emergent, and best treated in the Emergency Department.     LB Angeles, RN  [facility]  [phone]             Follow-ups after your visit        Your next 10 appointments already scheduled     Oct 17, 2018 11:00 AM CDT   Office Visit with Cesar Sanders MD   [facility] ([facility])    [address] [place] [address] [phone]           Bring a current list of meds and any records pertaining to this visit. For Physicals, please bring immunization records and any forms needing to be filled out. Please arrive 10 minutes early to complete paperwork.            Oct 17, 2018 12:45 PM CDT   Anticoagulation Visit with GH ANTI COAG 1   [facility] ([facility])    [address]  [place] [address]   [phone]            Oct 17, 2018  1:30 PM CDT   Treatment with Jersonclaribel Shannon, PT   [facility]    [address]   [place] [address]   [phone]            Oct 19, 2018 10:30 AM CDT   Treatment with Jerson Shannon, PT   [facility]    [address]   [place] [address]   [phone]            Oct 23, 2018  1:00 PM CDT   Pacemaker Check with GH PACEMAKER   [facility]    [address]  [place] [address]   [phone]            Oct 24, 2018  2:15 PM CDT   Treatment with Jerson Shannon, PT   [facility]    [address]   [place] [address]   [phone]            Oct 26, 2018  2:00 PM CDT   Treatment with Jerson Shannon, PT   [facility]    [address]   [place] [address]   [phone]              Future tests that were ordered for you today     Open Future Orders        Priority Expected Expires Ordered    Hemoglobin A1c Routine  10/17/2019 10/17/2018    Troponin I Routine  10/17/2019 10/17/2018    Comprehensive metabolic panel Routine  10/17/2019 10/17/2018    CBC with platelets Routine  10/17/2019 10/17/2018            Who to contact     If you have questions or need follow up information about today's clinic visit or your schedule please contact [facility] directly at [phone].  Normal or non-critical lab and imaging results will be communicated to you by MyChart, letter or phone within 4 business days after the clinic has received the results. If you do not hear from us within 7 days, please contact the clinic through MyChart or phone. If you have a  "critical or abnormal lab result, we will notify you by phone as soon as possible.  Submit refill requests through [employer] or call your pharmacy and they will forward the refill request to us. Please allow 3 business days for your refill to be completed.          Additional Information About Your Visit        [employer] Information     [employer] lets you send messages to your doctor, view your test results, renew your prescriptions, schedule appointments and more. To sign up, go to [email or web address] . Click on \"Log in\" on the left side of the screen, which will take you to the Welcome page. Then click on \"Sign up Now\" on the right side of the page.     You will be asked to enter the access code listed below, as well as some personal information. Please follow the directions to create your username and password.     Your access code is: [email or web address]  Expires: 2018 11:34 AM     Your access code will  in 90 days. If you need help or a new code, please call your [place] clinic or [phone].        Care EveryWhere ID     This is your Care EveryWhere ID. This could be used by other organizations to access your [place] medical records  [id number]        Your Vitals Were     Respirations Height BMI (Body Mass Index)             16 1.676 m (5' 6\") 24.21 kg/m2          Blood Pressure from Last 3 Encounters:   10/12/18 111/66   10/09/18 140/59   18 110/70    Weight from Last 3 Encounters:   10/17/18 68 kg (150 lb)   10/12/18 67.6 kg (149 lb)   10/09/18 71.4 kg (157 lb 6.5 oz)               Primary Care Provider Office Phone # Fax #    Cesar Sanders [phone] [address] [facility] [address]        Equal Access to Services     FRANCINE ARMANDO : Bartolo Lerma, walanny nelson, qadorian kaalgriffin gautam. [facility] [phone].    ATENCIÓN: Si habla español, tiene a cohn disposición servicios gratuitos de " asistencia lingüística. Ten al [phone].    We comply with applicable federal civil rights laws and [place] laws. We do not discriminate on the basis of race, color, national origin, age, disability, sex, sexual orientation, or gender identity.            Thank you!     Thank you for choosing [facility] [place]  for your care. Our goal is always to provide you with excellent care. Hearing back from our patients is one way we can continue to improve our services. Please take a few minutes to complete the written survey that you may receive in the mail after your visit with us. Thank you!             Your Updated Medication List - Protect others around you: Learn how to safely use, store and throw away your medicines at www.disposemymeds.org.          This list is accurate as of 10/17/18 10:12 AM.  Always use your most recent med list.                   Brand Name Dispense Instructions for use Diagnosis    allopurinol 100 MG tablet    ZYLOPRIM     Take 1 tablet by mouth once daily.        amoxicillin 500 MG capsule    AMOXIL     Take 2,000 mg by mouth daily as needed (dental appointments)        aspirin 81 MG EC tablet      Take 81 mg by mouth daily        cefdinir 300 MG capsule    OMNICEF    20 capsule    Take 1 capsule (300 mg) by mouth 2 times daily    Urinary tract infection associated with indwelling urethral catheter, initial encounter (H)       diclofenac 1 % Gel topical gel    VOLTAREN     Place 4 g onto the skin 4 times daily as needed for moderate pain        furosemide 20 MG tablet    LASIX     Take 20 mg by mouth 1 tablet every morning and 1 tablet every other evening        GLUCOSE MANAGEMENT Tabs     50 tablet    Take 15 g by mouth as needed (blood sugar less than 70)    Hypoglycemia       insulin aspart prot & aspart injection    NovoLOG MIX 70/30 PEN    45 mL    26 units every morning and 10 units every evening    Diabetes mellitus with multiple complications (H)       insulin  pen needle 31G X 8 MM    EASY TOUCH PEN NEEDLES    200 each    FOR ADMINISTERING INSULIN AT HOME TWICE DAILY. Dx: E11.8    Diabetes mellitus with multiple complications (H)       Lidocaine 4 % Patch    LIDOCARE     Place 1-2 patches onto the skin daily as needed for moderate pain        lisinopril 2.5 MG tablet    PRINIVIL/Zestril     Take 2.5 mg by mouth daily        metoprolol succinate 50 MG 24 hr tablet    TOPROL-XL    135 tablet    TAKE 1 AND 1/2 TABLETS BY MOUTH EVERY DAY    Essential hypertension       order for DME      Wheelchair with elevating leg rests/foot rest. For home use. Length of need: 99 mo        potassium chloride SA 20 MEQ CR tablet    K-DUR/KLOR-CON M     Take 1 tablet by mouth once every other day with a meal.        simvastatin 20 MG tablet    ZOCOR    90 tablet    TAKE ONE TABLET BY MOUTH AT BEDTIME    Mixed hyperlipidemia       VITAMIN D-1000 MAX ST 1000 units Tabs   Generic drug:  cholecalciferol      Take one tablet by mouth daily        warfarin 2 MG tablet    COUMADIN    90 tablet    Take 2 mg x three days/week and 1 mg x four days/week. Or as directed by the [facility].    Atrial fibrillation (H)

## 2018-10-17 NOTE — PATIENT INSTRUCTIONS
Reduce insulin to 22 units in the morning and 10 units at night.   If any further low sugars below 70, then reduce insulin further to 20 units in the morning and 8 units at night  Follow-up in about a week

## 2018-10-17 NOTE — PATIENT INSTRUCTIONS
You were seen by  LEXUS Chamberlain CNP      1.  Labs today     2. Echocardiogram, they will call you to schedule this. We will call you with results.     You will follow up with  LEXUS Chamberlain CNP  in 3 months.       Please call the cardiology office with problems, questions, or concerns at [phone].    If you experience chest pain, chest pressure, chest tightness, shortness of breath, fainting, lightheadedness, nausea, vomiting, or other concerning symptoms, please report to the Emergency Department or call 911. These symptoms may be emergent, and best treated in the Emergency Department.     ELIZABETH AngelesN, RN  [facility]  [phone]

## 2018-10-17 NOTE — PROGRESS NOTES
[facility] HEART CARE   CARDIOLOGY PROGRESS NOTE    Sukhdeep Gomez   [address] [facility] [id number]      Cesar Sanders     Chief Complaint   Patient presents with     Follow Up For     ED f/u         HPI:   Mr. Gomez is an 84 year old male who presents for cardiology follow-up with recent ER visit on 10/12/2018 and hospitalization on 10/8/2018.  Patient was last seen by cardiology on 10/8/2018.  It was noted that he was hypotensive and weak at that time, felt secondary to dehydration and diuretics.     He has a history of chronic systolic heart failure with an EF of 40%, ischemic cardiomyopathy with a history of myocardial infarction resulting in inferior and posterior wall akinesis, stenting x1 in 2010 of unknown vessel and four-vessel CABG in 1987.  Additionally, he has a history of a sending aortic aneurysm measuring up to 4.8 cm on recent imaging, paroxysmal atrial fibrillation, CKD stage IV, type II DM with peripheral neuropathy for which she is insulin dependent and has had issues with hypoglycemia, hyperlipidemia, hypertension, history of TIA and pacemaker placement 2007 with an upgrade to ICD in April 2013.    On 10/8/2018 patient was hospitalized due to episode of hypoglycemia.  He presented with generalized weakness and report that he fell during the night with confusion.  It was noticed that EMS arrived to the house and required  to enter the house as patient's son who cares for him was sleeping in the basement.  His blood sugar was checked and was noted to be under 23.  It was felt unclear etiology for hypoglycemia with no clear evidence of insulin overdose, no changes in his diet or appetite, suspected likely from UTI.  Sugars remained stable during the hospitalization.  He was found to have a urinary tract infection and was discharged on Cefdinir.  He was found to have an elevated troponin felt likely secondary to strain from hypoglycemia.  Patient had no  anginal symptoms.  " He was noted that his troponin did uptrend slightly overnight, EKGs were noted to improved with less ST elevation in V1 to V3.  He remained asymptomatic during his hospitalization and eventually troponins trending down.  Patient was discharged on 10/9/2018.    On 10/12/2018 patient returned to the ED with a second suspected hypoglycemia event.  His son who takes care of him reports that patient was confused and slumped over in his chair with slight leakage of blood from his nose and pink tinged drool at the corner of his mouth.  He reports that he gave his father insulin as response to this.  Denies checking his dad's blood sugar with this episode.  Troponin was rechecked in the ER and continues to trend down from 0.17-0.115.  Asymptomatic.  His blood sugar in the ED was 166 and he was discharged this same day.     Since his last visit to the ED, patients son reports that his father was again confused yesterday. He was alert and not responsive, when he did become more reponsive he was confused. Again patients son denies checking is dad blood glucose with this episode. He does admit to giving his dad \"candy\" and at that point he checked his sugar which was 86.    Today patient admits that he has been feeling good today with no episodes. BG today 101 this morning.  He denies any chest pain or pressure.  No shortness of breath or increased CACERES.  No edema.  No lightheadedness.  No palpitations or rapid heart rate.  No abdominal distention or changes in appetite.  No orthopnea or PND reported.    As reported above patient has a history of coronary artery disease, ischemic cardiomyopathy and myocardial infarction x1.  He had a CABG x4 through the [facility].  He had a stent placed to an unknown artery in 2010.  He is denied any anginal symptoms.  He has had elevation in his troponin which is slowly trending down.  No concerning ischemic EKG changes.  He is remained stable on aspirin, metoprolol and Zocor " with previously noted good control of his cholesterol.    He has a history of atrial fibrillation for which he describes being largely asymptomatic.  He is remained on metoprolol with good rate control and Coumadin oral anticoagulation.  There is recently been concern for continuing the Coumadin with his falls and episode of unresponsiveness and confusion which has been felt secondary to hypoglycemia.    From a CHF standpoint patient has been doing well with no increased edema, shortness of breath, orthopnea or PND.  Appetite has remained normal.  He is remained on Lasix 20 mg every morning.  He is on metoprolol and lisinopril.  He has had no reported weight gain.  He watches his salt intake and overall fluid intake.  He has had no recent CHF exacerbations.    He has a history of ascending aortic aneurysm which has been stable, of moderate size measuring up to 4.8 cm by TTE.  He is currently on metoprolol   XL.  He had a repeat echo in 2018 which remain unchanged.    He has echocardiogram evidence of his taken basal  septum with no AUNDREA or LVOT gradient.  Moderately reduced LV systolic function with an EF of 35-40% with moderate diffuse hypokinesis most prominent in the basal to mid inferior and inferior lateral, apical inferior and lateral wall secondary to previous MI.     IMAGING RESULTS:   [facility]  [address] [place] [address]     Name: JOSE JOY  MRN: [id number]  : 1934  Study Date: 2018 10:40 AM  Age: 83 yrs  Gender: Male  Patient Location: [facility]  Reason For Study: , Chronic systolic heart failure (H), Ascending aorta  dilatation  Ordering Physician: DENISE CRAWFORD  Referring Physician: DENISE CRAWFORD  Performed By: Jennifer Call     BSA: 1.8 m2  Height: 66 in  Weight: 157 lb  BP: 96/78 mmHg  _____________________________________________________________________________  __        Procedure  Echocardiogram with two-dimensional, color and  spectral Doppler performed.  _____________________________________________________________________________  __        Interpretation Summary  Moderate LV dilatation with LV ESV 105ml.  Biplane LV EF 40%.  Basal septum 16mm thick with no AUNDREA or LVOT gradient.  Moderate diffuse hypokinesis, most prominent in the basal to mid inferior and  inferolateral, apical inferior and lateral walls.  The right ventricle is normal size. Global right ventricular function is  mildly reduced.  The aortic root (sinuses of valsalva) are moderately dilated at 4.4 cm (2.4  cm/m2). There is effacement of the sinotubular junction. The ascending aorta  is moderately dilated at 4.8 cm (2.7 cm/m2). The aortic arch is dilated at 3.8  cm (2.1 cm/m2).  IVC is normal size with normal respiratory variation. Estimated mean RA  pressure is 3 mmHg.  No pericardial effusion is present.        There has been no change.  _____________________________________________________________________________  __        Left Ventricle  Thickening of the anterobasal septum is present. Moderate LV dilatation with  LV ESV 105ml.  Biplane LV EF 40%.  Basal septum 16mm thick with no AUNDREA or LVOT gradient. Moderately (EF 35-40%)  reduced left ventricular function is present. LVEF 40% (traced). Left  ventricular diastolic function is indeterminate. Moderate diffuse hypokinesis,  most prominent in the basal to mid inferior and inferolateral, apical inferior  and lateral walls.     Right Ventricle  The right ventricle is normal size. Global right ventricular function is  mildly reduced. A pacemaker lead is noted in the right ventricle.     Atria  The right atria appears normal. Severe left atrial enlargement is present. The  atrial septum is intact as assessed by color Doppler . A pacemaker lead is  noted in the right atrium.     Mitral Valve  Mitral leaflet thickness is increased . Mild mitral annular calcification is  present. Mild mitral insufficiency is  present.     Aortic Valve  The aortic valve is tricuspid. Mild to moderate aortic valve sclerosis is  present. Mild aortic insufficiency is present. The mean AoV pressure gradient  is 7.0 mmHg. The calculated aortic valve are is 1.6 cm^2.        Tricuspid Valve  The tricuspid valve is normal. Mild tricuspid insufficiency is present. Right  ventricular systolic pressure is 20mmHg above the right atrial pressure.     Pulmonic Valve  Trace pulmonic insufficiency is present.     Vessels  The aortic root (sinuses of valsalva) are moderate dilated at 4.4 cm (2.4  cm/m2). There is effacement of the sinotubular junction. The ascending aorta  is moderately dilated at 4.8 cm (2.7 cm/m2). The aortic arch is dilated at 3.8  cm (2.1 cm/m2). The pulmonary artery is normal. IVC is normal size with normal  respiratory variation. Estimated mean RA pressure is 3 mmHg.     Pericardium  No pericardial effusion is present.     Compared to Previous Study  There has been no change.        Attestation  I have personally viewed the imaging and agree with the interpretation and  report as documented by the fellow, Savannah Jenkins, and/or edited by me.  _____________________________________________________________________________  __  MMode/2D Measurements & Calculations     IVSd: 2.0 cm  LVIDd: 3.1 cm  LVIDs: 2.8 cm  LVPWd: 1.3 cm  FS: 9.4 %  LV mass(C)d: 190.3 grams  LV mass(C)dI: 105.5 grams/m2  Ao root diam: 4.6 cm  LA dimension: 4.4 cm  asc Aorta Diam: 4.8 cm  LA/Ao: 0.96  LVOT diam: 2.5 cm  LVOT area: 4.9 cm2  LA Volume (BP): 83.7 ml  LA Volume Index (BP): 46.5 ml/m2  RWT: 0.85           Doppler Measurements & Calculations  MV E max carlos: 88.8 cm/sec  MV A max carlos: 28.6 cm/sec  MV E/A: 3.1  MV dec time: 0.19 sec  Ao V2 max: 175.5 cm/sec  Ao max P.0 mmHg  Ao V2 mean: 123.0 cm/sec  Ao mean P.0 mmHg  Ao V2 VTI: 34.3 cm  NIESHA(I,D): 1.6 cm2  NIESHA(V,D): 1.6 cm2  LV V1 max P.4 mmHg  LV V1 max: 58.4 cm/sec  LV V1 VTI: 11.1 cm  SV(LVOT):  54.5 ml  SI(LVOT): 30.2 ml/m2  TR max edwin: 226.0 cm/sec  TR max P.5 mmHg  AV Edwin Ratio (DI): 0.33  NIESHA Index (cm2/m2): 0.88              _____________________________________________________________________________  __        Report approved by: Fauzia Mcdonald 2018 12:25 PM      PAST MEDICAL HISTORY:   Past Medical History:   Diagnosis Date     Acute gastritis with hemorrhage 8/10/2009    Overview:  NSAID     Acute on chronic systolic congestive heart failure (H)     2012,15-20% EF 2012     Atherosclerotic heart disease of native coronary artery without angina pectoris      Atrial fibrillation (H)      Cardiomyopathy (H)      Chronic kidney disease     Stage III     Disorder of muscle     2017     Essential (primary) hypertension     chronic     Gastrointestinal hemorrhage     ,secondary to NSAID gastritis     GI bleeding 2018    Overview:  upper/anemia 8.1     Gout     tophaceous, right foot, right fingers     Herpes zoster ophthalmicus, left eye 2018     Hyperlipidemia     No Comments Provided     Low back pain     lumbar spinal stenosis     Malignant neoplasm of prostate (H)          Osteoarthritis      Other nonrheumatic aortic valve disorders     with mild Al     Other retention of urine (CODE)     ,with stricture     Other specified disorders of arteries and arterioles (CODE)     moderate, with mild increase in diameter of the ascending aorta     Polyneuropathy     via  EMG in [place]     Systolic congestive heart failure (H)     mild to moderate left ventricular enlargement with marked decreased in systolic function.     Transient cerebral ischemic attack 2014     Type 2 diabetes mellitus without complications (H)           FAMILY HISTORY:   Family History   Problem Relation Age of Onset     HEART DISEASE Father      Heart Disease     Prostate Cancer Brother      Cancer-prostate          PAST SURGICAL HISTORY:   Past Surgical History:    Procedure Laterality Date     ARTHROPLASTY KNEE      ,left     BYPASS GRAFT ARTERY CORONARY      4 vessel, [facility],      CATARACT IOL, RT/LT Bilateral  and  Dr Irwin     CIRCUMCISION N/A 2018    Procedure: CIRCUMCISION;  Circumcision ,  Glans Biopsy Cystoscopy;  Surgeon: Edgardo Sanchez MD;  Location:  OR     CYSTOSCOPY      ,& dilation of urethral stricture, Dr. Wallace     CYSTOSCOPY      ,& catheter placement for acute obstruction, Dr. Elise     CYSTOSTOMY, INSERT TUBE SUPRAPUBIC, COMBINED N/A 2018    Procedure: COMBINED CYSTOSTOMY, INSERT TUBE SUPRAPUBIC;  Supra tube Placement    .;  Surgeon: Edgardo Sanchez MD;  Location:  OR     ESOPHAGOSCOPY, GASTROSCOPY, DUODENOSCOPY (EGD), COMBINED      ,& colonoscopy with polypectomy, [facility] essetnially normal EGD with small polyp removed     HEART CATH, ANGIOPLASTY      approx. ,angiogram, 2 stents, [facility]     IMPLANT PACEMAKER      ,Guidant ICD, CPI Vitality 2 EL, model #T177, serial #[id number], which was implanted on 2007 at [facility].     LAMINECTOMY LUMBAR ONE LEVEL      ,decompressive, L3, L4 & L5 with improvement, Dr. Lopes          SOCIAL HISTORY:   Social History     Social History     Marital status:      Spouse name: N/A     Number of children: N/A     Years of education: N/A     Social History Main Topics     Smoking status: Never Smoker     Smokeless tobacco: Never Used     Alcohol use No     Drug use: No      Comment: Drug use: No     Sexual activity: Yes     Other Topics Concern     None     Social History Narrative    wife    Retired  for [employer].   The patient's wife  at home 2011.    Son is very helpful in the patient's care.  Patient has never smoked. Alcohol Use - no          CURRENT MEDICATIONS:   Prior to Admission medications    Medication Sig Start Date End Date Taking? Authorizing Provider   allopurinol (ZYLOPRIM) 100  MG tablet Take 1 tablet by mouth once daily. 1/8/18  Yes Reported, Patient   amoxicillin (AMOXIL) 500 MG capsule Take 2,000 mg by mouth daily as needed (dental appointments)   Yes Unknown, Entered By History   aspirin 81 MG EC tablet Take 81 mg by mouth daily   Yes Unknown, Entered By History   cefdinir (OMNICEF) 300 MG capsule Take 1 capsule (300 mg) by mouth 2 times daily 10/9/18  Yes Ceasr Sanders MD   cholecalciferol (VITAMIN D-1000 MAX ST) 1000 UNITS TABS Take one tablet by mouth daily 8/23/12  Yes Reported, Patient   diclofenac (VOLTAREN) 1 % GEL topical gel Place 4 g onto the skin 4 times daily as needed for moderate pain   Yes Unknown, Entered By History   furosemide (LASIX) 20 MG tablet Take 20 mg by mouth 1 tablet every morning and 1 tablet every other evening   Yes Unknown, Entered By History   insulin aspart prot & aspart (NOVOLOG MIX 70/30 PEN) injection 26 units every morning and 10 units every evening 9/26/18  Yes Cesar Sanders MD   insulin pen needle (EASY TOUCH PEN NEEDLES) 31G X 8 MM FOR ADMINISTERING INSULIN AT HOME TWICE DAILY. Dx: E11.8 10/5/18  Yes Cesar Sanders MD   Lidocaine (LIDOCARE) 4 % Patch Place 1-2 patches onto the skin daily as needed for moderate pain   Yes Unknown, Entered By History   lisinopril (PRINIVIL/ZESTRIL) 2.5 MG tablet Take 2.5 mg by mouth daily 2/8/18  Yes Reported, Patient   metoprolol succinate (TOPROL-XL) 50 MG 24 hr tablet TAKE 1 AND 1/2 TABLETS BY MOUTH EVERY DAY 10/10/18  Yes Cesar Sanders MD   Nutritional Supplements (GLUCOSE MANAGEMENT) TABS Take 15 g by mouth as needed (blood sugar less than 70) 10/9/18  Yes Cesar Sanders MD   order for DME Wheelchair with elevating leg rests/foot rest. For home use. Length of need: 99 mo 2/17/16  Yes Reported, Patient   potassium chloride SA (K-DUR/KLOR-CON M) 20 MEQ CR tablet Take 1 tablet by mouth once every other day with a meal. 1/8/18  Yes Reported, Patient   simvastatin (ZOCOR) 20 MG tablet TAKE  "ONE TABLET BY MOUTH AT BEDTIME 7/9/18  Yes Cesar Sanders MD   warfarin (COUMADIN) 2 MG tablet Take 2 mg x three days/week and 1 mg x four days/week. Or as directed by the protReplaced by [facility]. 10/11/18  Yes Cesar Sanders MD          ALLERGIES:   Allergies   Allergen Reactions     Codeine Unknown     Naproxen Other (See Comments)     \"kidneys shut down\"     Sulfa Drugs Unknown     Tramadol Nausea     Vancomycin Unknown          ROS:   CONSTITUTIONAL: No reported fever or chills. No weight gain.  ENT: No visual disturbance, ear ache, epistaxis or sore throat.   CARDIOVASCULAR: No chest pain, chest pressure or chest discomfort. No palpitations or lower extremity edema.   RESPIRATORY: No shortness of breath, dyspnea upon exertion, cough, wheezing or hemoptysis.  No orthopnea or PND reported.  GI: No reported abdominal pain. No nausea or vomiting. No melena or hematochezia.  : No reported hematuria.He has an indwelling urinary catheter.  He is currently being treated for catheter associated UTI.  NEUROLOGICAL: Today he denies any lightheadedness, dizziness or syncope.  Positive for generalized weakness.  HEMATOLOGIC: Positive for excessive bruising.    MUSCULOSKELETAL: No reported joint pain or swelling, no muscle pain.  ENDOCRINOLOGIC: No temperature intolerance. No hair or skin changes.  SKIN: Positive for excessive bruising and multiple skin tears.      PHYSICAL EXAM:   Resp 16  Ht 1.676 m (5' 6\")  Wt 68 kg (150 lb)  BMI 24.21 kg/m2  GENERAL: The patient is a well-developed, well-nourished, in no apparent distress.  HEENT: Head is normocephalic and atraumatic. Eyes are symmetrical with normal visual tracking. No icterus, no xanthelasmas. Nares appeared normal without nasal drainage. Mucous membranes are moist, no cyanosis.  NECK: Supple, JVP not visible.   CHEST/ LUNGS: Lungs clear to auscultation, no rales, rhonchi or wheezes, no use of accessory muscles, no retractions, respirations unlabored and normal " respiratory rate.   CARDIO: Regular rate and rhythm normal with S1 and S2 and no murmur, click or rub.   ABD: Abdomen is soft and nontender, nondistended.   EXTREMITIES: No edema present.  MUSCULOSKELETAL: No visible joint swelling.   NEUROLOGIC: Alert and oriented X3. Normal speech, gait and affect. No focal neurologic deficits.   SKIN: Positive for multiple bruises and skin tears present on both upper extremities.      LAB RESULTS:   Anticoagulation Therapy Visit on 10/11/2018   Component Date Value Ref Range Status     INR Protime 10/11/2018 1.6* 2.0 - 3.0 Final   Anticoagulation Therapy Visit on 09/25/2018   Component Date Value Ref Range Status     INR Protime 09/25/2018 3.1* 0.86 - 1.14 Final   Anticoagulation Therapy Visit on 09/11/2018   Component Date Value Ref Range Status     INR Protime 09/11/2018 4.5* 0.86 - 1.14 Final   Anticoagulation Therapy Visit on 08/14/2018   Component Date Value Ref Range Status     INR Protime 08/14/2018 3.3* 0.86 - 1.14 Final   Orders Only on 07/26/2018   Component Date Value Ref Range Status     Hemoglobin A1C 07/26/2018 6.8* 4.0 - 6.0 % Final     Cholesterol 07/26/2018 76  <200 mg/dL Final     Triglycerides 07/26/2018 72  <150 mg/dL Final     HDL Cholesterol 07/26/2018 32  23 - 92 mg/dL Final     LDL Cholesterol Calculated 07/26/2018 30  <100 mg/dL Final     Non HDL Cholesterol 07/26/2018 44  <130 mg/dL Final          ASSESSMENT:   Sukhdeep Gomez presents for cardiology follow-up with recent ER visit on 10/12/2018 and hospitalization on 10/8/2018.    He has a history of chronic systolic heart failure with an EF of 40%, ischemic cardiomyopathy with a history of myocardial infarction resulting in inferior and posterior wall akinesis, stenting x1 in 2010 of unknown vessel and four-vessel CABG in 1987.  Additionally, he has a history of a sending aortic aneurysm measuring up to 4.8 cm on recent imaging, paroxysmal atrial fibrillation, CKD stage IV, type II DM with peripheral  neuropathy for which she is insulin dependent and has had issues with hypoglycemia, hyperlipidemia, hypertension, history of TIA and pacemaker placement 2007 with an upgrade to ICD in April 2013.    1. Elevated troponin  2. Insulin dependent type 2 diabetes mellitus (H)  3. Coronary artery disease involving native heart without angina pectoris, unspecified vessel or lesion type  4. Chronic systolic congestive heart failure (H)  5. Decreased responsiveness  6. Hypoglycemia  7. Chronic anticoagulation  8. Essential hypertension  9. Chronic atrial fibrillation (H)    PLAN:  1. ER follow-up with recurrent episodes of hypoglycemia and confusion with treating hypoglycemia and monitoring sugars. I have reviewed with patient and his son that it is very important for him to check his blood glucose when he has these episodes of unresponsiveness and confusion.  He has had very critical blood glucose readings in the past.  Consideration should be made for continuous glucose monitoring.  2.  It was also reviewed the understanding of treating hypoglycemia with glucagon and avoid additional use of insulin if unsure what they are treating as this may lead to an insulin overdose.  3.  Consideration for home health care, additional nursing cares.  They can further discuss this with patient's PCP.  4.  There is also concern for hypotension, unable to maintain much for manual or automatic pressure readings today.  He has been noted to have pressures in the 60s systolic in the past with concerns for hypovolemia and overdiuresis.  5.  It has been recommended that he stop taking Lasix 20 mg on a daily basis, he has been well compensated from a heart failure standpoint.  We are recommending today that he take Lasix 20 mg only as needed for increased symptoms of decompensation such as edema or increased shortness of breath.  He will continue to monitor his weight, sodium intake and overall fluid intake.  6.  For treating hypotension it has  also been recommended that he reduce his metoprolol succinate dose down to 50 mg daily.  He has been very well rate controlled in the 50s-60s with his atrial fibrillation.  With this adjustment we will need to continue to monitor his overall heart rate.  If concern for elevations with this adjustment, we may review his rate histograms from his device.  7.  He has been found to have a chronically elevated troponin which has slowly been trending down.  This was rechecked today and again shows continued downward trend.  He is denied any anginal symptoms.  Possible etiologies include renally related complication causing elevated troponin with hypoperfusion of the kidneys related to very low pressures and possible strain from recurrent episodes of severe hypoglycemia.  We should continue to monitor his troponin levels in the future.  Again he has had no symptoms or evidence of ischemia or angina.  8.  If he continues to have episodes of falls and unresponsiveness we may need to consider continued use of Coumadin oral anticoagulation for his atrial fibrillation.  He may be at greater risk for hemorrhagic stroke than a thromboembolic event if he continues to have these events.  9. With his recent episodes of unresponsiveness, repeat echo has been ordered given thickening of the basal septum without previous evidence of AUNDREA or LVOT gradient seen on previous TTE.  10. Labs today.    Thank you for allowing me to participate in the care of your patient. Please do not hesitate to contact me if you have any questions.     Nguyen Azevedo

## 2018-10-17 NOTE — PROGRESS NOTES
ANTICOAGULATION FOLLOW-UP CLINIC VISIT    Patient Name:  Sukhdeep Gomez  Date:  10/17/2018  Contact Type:  Face to Face    SUBJECTIVE:     Patient Findings     Positives No Problem Findings           OBJECTIVE    INR   Date Value Ref Range Status   10/17/2018 1.68 (H) 0 - 1.3 Final       ASSESSMENT / PLAN  INR assessment SUB    Recheck INR In: 3 WEEKS    INR Location Clinic      Anticoagulation Summary as of 10/17/2018     INR goal 2.0-3.0   Today's INR 1.7!   Warfarin maintenance plan 2 mg (2 mg x 1) on Mon, Wed, Fri; 1 mg (2 mg x 0.5) all other days   Full warfarin instructions 2 mg on Mon, Wed, Fri; 1 mg all other days   Weekly warfarin total 10 mg   Plan last modified Yolanda Avila, RN (10/17/2018)   Next INR check 11/7/2018   Priority INR   Target end date Indefinite    Indications   Long term (current) use of anticoagulants (Resolved) [Z79.01]  Unspecified atrial fibrillation [I48.91]         Anticoagulation Episode Summary     INR check location     Preferred lab     Send INR reminders to  INR    Comments       Anticoagulation Care Providers     Provider Role Specialty Phone number    Cesar Sanders MD [facility] [phone]            See the Encounter Report to view Anticoagulation Flowsheet and Dosing Calendar (Go to Encounters tab in chart review, and find the Anticoagulation Therapy Visit)        Yolanda Avila, RN

## 2018-10-17 NOTE — PROGRESS NOTES
"Nursing Notes:   Prabha Mayer, LPN  10/17/2018 11:07 AM  Unsigned  Chief Complaint   Patient presents with     Hospital F/U       Initial /68 (BP Location: Right arm, Patient Position: Chair, Cuff Size: Adult Regular)  Pulse 80  Resp 16  Wt 150 lb (68 kg)  BMI 24.21 kg/m2 Estimated body mass index is 24.21 kg/(m^2) as calculated from the following:    Height as of an earlier encounter on 10/17/18: 5' 6\" (1.676 m).    Weight as of this encounter: 150 lb (68 kg).  Medication Reconciliation: complete    Prabha Mayer LPN    SUBJECTIVE:  84 year old male presents with son to follow up on repetitive hypoglycemic events.  He had a low sugar 1 evening about a week and a half ago.  On October 8 he was in the ER for hypoglycemia and was hospitalized overnight on observation to ensure no further hypoglycemia.  There is no clear reason for the hypoglycemia but his blood sugars were running low that whole day.  The UTI was suspected, however the culture grew out mixed yobany.  Has completed antibiotic treatment.  Then on October 12 he was again in the ER for an unconscious episode.  His son found him unresponsive and yet still dosed 26 units of insulin.  He did not check a blood sugar.  Surprisingly after that the patient became more alert.  ED provider recommended increasing nighttime insulin due to that event.  They have been using now 26 units in the morning and 13 units at night.  The event on the 12th occurred about 10 AM.  Yesterday evening around 4 PM he again felt low, but no sugars were checked.  Eating improved his symptoms.  He feels fine today.  Reports no adverse effects of all of these events.  When we last reviewed blood sugars there were multiple times where he would be slightly low.  I recommended a reduction in insulin.  He does have a reading of about 50 and 76 on his records.  An A1c was obtained earlier today and is only 5.9%.      REVIEW OF SYSTEMS:    Pertinent items are noted in " HPI.    Current Outpatient Prescriptions   Medication Sig Dispense Refill     allopurinol (ZYLOPRIM) 100 MG tablet Take 1 tablet by mouth once daily.       amoxicillin (AMOXIL) 500 MG capsule Take 2,000 mg by mouth daily as needed (dental appointments)       aspirin 81 MG EC tablet Take 81 mg by mouth daily       cefdinir (OMNICEF) 300 MG capsule Take 1 capsule (300 mg) by mouth 2 times daily 20 capsule 0     cholecalciferol (VITAMIN D-1000 MAX ST) 1000 UNITS TABS Take one tablet by mouth daily       diclofenac (VOLTAREN) 1 % GEL topical gel Place 4 g onto the skin 4 times daily as needed for moderate pain       furosemide (LASIX) 20 MG tablet Take 20 mg by mouth daily as needed One tab daily as needed.        insulin aspart prot & aspart (NOVOLOG MIX 70/30 PEN) injection 26 units every morning and 10 units every evening 45 mL 3     insulin pen needle (EASY TOUCH PEN NEEDLES) 31G X 8 MM FOR ADMINISTERING INSULIN AT HOME TWICE DAILY. Dx: E11.8 200 each 1     Lidocaine (LIDOCARE) 4 % Patch Place 1-2 patches onto the skin daily as needed for moderate pain       lisinopril (PRINIVIL/ZESTRIL) 2.5 MG tablet Take 2.5 mg by mouth daily       metoprolol succinate (TOPROL-XL) 25 MG 24 hr tablet TAKE 1 AND 1/2 TABLETS BY MOUTH EVERY DAY 60 tablet 1     Nutritional Supplements (GLUCOSE MANAGEMENT) TABS Take 15 g by mouth as needed (blood sugar less than 70) 50 tablet 1     order for DME Wheelchair with elevating leg rests/foot rest. For home use. Length of need: 99 mo       potassium chloride SA (K-DUR/KLOR-CON M) 20 MEQ CR tablet Take 1 tablet by mouth once every other day with a meal.       simvastatin (ZOCOR) 20 MG tablet TAKE ONE TABLET BY MOUTH AT BEDTIME 90 tablet 0     warfarin (COUMADIN) 2 MG tablet Take 2 mg x three days/week and 1 mg x four days/week. Or as directed by the protime clinic. 90 tablet 1     [DISCONTINUED] metoprolol succinate (TOPROL-XL) 50 MG 24 hr tablet TAKE 1 AND 1/2 TABLETS BY MOUTH EVERY   "tablet 1     Allergies   Allergen Reactions     Codeine Unknown     Naproxen Other (See Comments)     \"kidneys shut down\"     Sulfa Drugs Unknown     Tramadol Nausea     Vancomycin Unknown       OBJECTIVE:  /68 (BP Location: Right arm, Patient Position: Chair, Cuff Size: Adult Regular)  Pulse 80  Resp 16  Wt 150 lb (68 kg)  BMI 24.21 kg/m2    EXAM:  General Appearance: Alert. No acute distress  Psychiatric: Normal affect and mentation        ASSESSMENT/PLAN:    ICD-10-CM    1. 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  He has many readings above 300 with no adverse effect.  Discussed with son need to avoid excessive insulin dosing.  I am surprised he is even still alive based on some of the recent events including dosing of insulin when he was confused and nearly unconscious.    Given glucagon to use for hypoglycemia or loss of consciousness.  Avoid further insulin if the glucagon is used that day.    Adjusted insulin downward by 5 total units in the day.  Reduce morning insulin from 26 down to 22 units and evening insulin from 13 down to 10 units. If further lows, then will refer to diabetes education for continuos glucose monitoring.    Offered and declines home care at this time.    Discussed events with cardiology.  He was seen earlier today with hypotension and had medication adjustments by Nguyen Eubanks NP.    Follow-up in about a week    Greater than 50% of this 25 minute appointment spent on counseling   Cesar Sanders MD    This document was prepared using a combination of typing and " voice generated software.  While every attempt was made for accuracy, spelling and grammatical errors may exist.

## 2018-10-17 NOTE — MR AVS SNAPSHOT
Sukhdeep Bradyfidencio   10/17/2018 12:45 PM   Anticoagulation Therapy Visit    Description:  84 year old male   Provider:  [facility]   Department:  [facility]           INR as of 10/17/2018     Today's INR 1.7!      Anticoagulation Summary as of 10/17/2018     INR goal 2.0-3.0   Today's INR 1.7!   Full warfarin instructions 2 mg on Mon, Wed, Fri; 1 mg all other days   Next INR check 11/7/2018    Indications   Long term (current) use of anticoagulants (Resolved) [Z79.01]  Unspecified atrial fibrillation [I48.91]         Description     Continue same dose and recheck in 2-3 weeks. ...............Yolanda Avila RN    10/17/2018    10:39 AM      Your next Anticoagulation Clinic appointment(s)     Oct 17, 2018 12:45 PM CDT   Anticoagulation Visit with [facility]   [facility] ([facility])    [address]  [facility] [address]   [phone]              October 2018 Details    Sun Mon Tue Wed Thu Fri Sat      1               2               3               4               5               6                 7               8               9               10               11               12               13                 14               15               16               17      2 mg   See details      18      1 mg         19      2 mg         20      1 mg           21      1 mg         22      2 mg         23      1 mg         24      2 mg         25      1 mg         26      2 mg         27      1 mg           28      1 mg         29      2 mg         30      1 mg         31      2 mg             Date Details   10/17 This INR check               How to take your warfarin dose     To take:  1 mg Take 0.5 of a 2 mg tablet.    To take:  2 mg Take 1 of the 2 mg tablets.           November 2018 Details    Sun Mon Tue Wed Thu Fri Sat         1      1 mg         2      2 mg         3      1 mg           4      1 mg         5      2 mg         6      1 mg          7            8               9               10                 11               12               13               14               15               16               17                 18               19               20               21               22               23               24                 25               26               27               28               29               30                 Date Details   No additional details    Date of next INR:  11/7/2018         How to take your warfarin dose     To take:  1 mg Take 0.5 of a 2 mg tablet.    To take:  2 mg Take 1 of the 2 mg tablets.

## 2018-10-17 NOTE — MR AVS SNAPSHOT
After Visit Summary   10/17/2018    Sukhdeep Gomez    MRN: [id number]           Patient Information     Date Of Birth          8/19/1934        Visit Information        Provider Department      10/17/2018 11:00 AM Cesar Sanders MD [facility]        Today's Diagnoses     Hypoglycemia associated with diabetes (H)    -  1    Diabetes mellitus with multiple complications (H)          Care Instructions    Reduce insulin to 22 units in the morning and 10 units at night.   If any further low sugars below 70, then reduce insulin further to 20 units in the morning and 8 units at night  Follow-up in about a week          Follow-ups after your visit        Your next 10 appointments already scheduled     Oct 17, 2018 12:45 PM CDT   Anticoagulation Visit with  ANTI COAG 1   [facility]    [address]  [place] [address]   [phone]            Oct 17, 2018  1:30 PM CDT   Treatment with Jerson Shannon, PT   [facility]    [address]  [place] [address]   [phone]            Oct 19, 2018 10:30 AM CDT   Treatment with Jerson Shannon PT   [facility]    [address]  [place] [address]   [phone]            Oct 23, 2018  1:00 PM CDT   Pacemaker Check with  PACEMAKER   [facility]    [address]  [place] [address]   [phone]            Oct 24, 2018  2:15 PM CDT   Treatment with Jerson Shannon PT   [facility]    [address]  [place] [address]   [phone]            Oct 26, 2018  2:00 PM CDT   Treatment with Jerson Shannon PT   [facility])    [address]  "  [place] [address]   [phone]            2018 10:45 AM CST   Anticoagulation Visit with GH ANTI COAG 2   [facility] ([facility])    [address]  [place] [address]   [phone]              Who to contact     If you have questions or need follow up information about today's clinic visit or your schedule please contact [facility] directly at [phone].  Normal or non-critical lab and imaging results will be communicated to you by Eathart, letter or phone within 4 business days after the clinic has received the results. If you do not hear from us within 7 days, please contact the clinic through [employer] or phone. If you have a critical or abnormal lab result, we will notify you by phone as soon as possible.  Submit refill requests through [employer] or call your pharmacy and they will forward the refill request to us. Please allow 3 business days for your refill to be completed.          Additional Information About Your Visit        Eathart Information     [employer] lets you send messages to your doctor, view your test results, renew your prescriptions, schedule appointments and more. To sign up, go to [email or web address] . Click on \"Log in\" on the left side of the screen, which will take you to the Welcome page. Then click on \"Sign up Now\" on the right side of the page.     You will be asked to enter the access code listed below, as well as some personal information. Please follow the directions to create your username and password.     Your access code is: [email or web address]  Expires: 2018 11:34 AM     Your access code will  in 90 days. If you need help or a new code, please call your [place] [facility] clinic or [phone].        Care EveryWhere ID     This is your Care EveryWhere ID. This could be used by other organizations to access your [place] medical records  [id number]        Your Vitals Were     Pulse " Respirations BMI (Body Mass Index)             80 16 24.21 kg/m2          Blood Pressure from Last 3 Encounters:   10/17/18 108/68   10/12/18 111/66   10/09/18 140/59    Weight from Last 3 Encounters:   10/17/18 150 lb (68 kg)   10/17/18 150 lb (68 kg)   10/12/18 149 lb (67.6 kg)              Today, you had the following     No orders found for display         Today's Medication Changes          These changes are accurate as of 10/17/18 11:30 AM.  If you have any questions, ask your nurse or doctor.               Start taking these medicines.        Dose/Directions    blood glucose lancets standard   Commonly known as:  no brand specified   Used for:  Diabetes mellitus with multiple complications (H)   Started by:  Cesar Sanders MD        Use to test blood sugar 3 times daily or as directed.   Quantity:  300 each   Refills:  4       blood glucose monitoring meter device kit   Commonly known as:  no brand specified   Used for:  Diabetes mellitus with multiple complications (H)   Started by:  Cesar Sanders MD        Use to test blood sugar 3 times daily or as directed.   Quantity:  1 kit   Refills:  0       blood glucose monitoring test strip   Commonly known as:  no brand specified   Used for:  Diabetes mellitus with multiple complications (H)   Started by:  Cesar Sanders MD        Use to test blood sugars 3 times daily or as directed   Quantity:  100 strip   Refills:  4       glucagon 1 MG kit   Commonly known as:  GLUCAGON EMERGENCY   Used for:  Hypoglycemia associated with diabetes (H)   Started by:  Cesar Sanders MD        Dose:  1 mg   Inject 1 mg into the muscle once as needed for low blood sugar   Quantity:  2 mg   Refills:  11         These medicines have changed or have updated prescriptions.        Dose/Directions    insulin aspart prot & aspart injection   Commonly known as:  NovoLOG MIX 70/30 PEN   This may have changed:  additional instructions   Used for:  Diabetes mellitus with  multiple complications (H)   Changed by:  Cesar Sanders MD        22 units every morning and 10 units every evening   Quantity:  45 mL   Refills:  3       metoprolol succinate 25 MG 24 hr tablet   Commonly known as:  TOPROL-XL   This may have changed:  medication strength   Used for:  Essential hypertension, Chronic atrial fibrillation (H)   Changed by:  Nguyen Azevedo APRN CNP        TAKE 1 AND 1/2 TABLETS BY MOUTH EVERY DAY   Quantity:  60 tablet   Refills:  1            Where to get your medicines      These medications were sent to [id number] ([facility]) - [place], [place] - [address]  [address], [facility] [address]     Phone:  [phone]     blood glucose lancets standard    blood glucose monitoring meter device kit    blood glucose monitoring test strip    glucagon 1 MG kit    insulin aspart prot & aspart injection    metoprolol succinate 25 MG 24 hr tablet                Primary Care Provider Office Phone # Fax #    Cesar Sanders [phone]       [address]  [facility] [address]        Equal Access to Services     [facility]: Hadii aad ku hadasho Soomaali, waaxda luqadaha, qaybta kaalmada adeegyada, waxpaulo pate . So [facility] [phone].    ATENCIÓN: Si habla español, tiene a cohn disposición servicios gratuitos de asistencia lingüística. [facility] [phone].    We comply with applicable federal civil rights laws and [place] laws. We do not discriminate on the basis of race, color, national origin, age, disability, sex, sexual orientation, or gender identity.            Thank you!     Thank you for choosing [facility]  for your care. Our goal is always to provide you with excellent care. Hearing back from our patients is one way we can continue to improve our services. Please take a few minutes to complete the written survey that you may receive in the mail after your visit with  us. Thank you!             Your Updated Medication List - Protect others around you: Learn how to safely use, store and throw away your medicines at www.disposemymeds.org.          This list is accurate as of 10/17/18 11:30 AM.  Always use your most recent med list.                   Brand Name Dispense Instructions for use Diagnosis    allopurinol 100 MG tablet    ZYLOPRIM     Take 1 tablet by mouth once daily.        amoxicillin 500 MG capsule    AMOXIL     Take 2,000 mg by mouth daily as needed (dental appointments)        aspirin 81 MG EC tablet      Take 81 mg by mouth daily        blood glucose lancets standard    no brand specified    300 each    Use to test blood sugar 3 times daily or as directed.    Diabetes mellitus with multiple complications (H)       blood glucose monitoring meter device kit    no brand specified    1 kit    Use to test blood sugar 3 times daily or as directed.    Diabetes mellitus with multiple complications (H)       blood glucose monitoring test strip    no brand specified    100 strip    Use to test blood sugars 3 times daily or as directed    Diabetes mellitus with multiple complications (H)       cefdinir 300 MG capsule    OMNICEF    20 capsule    Take 1 capsule (300 mg) by mouth 2 times daily    Urinary tract infection associated with indwelling urethral catheter, initial encounter (H)       diclofenac 1 % Gel topical gel    VOLTAREN     Place 4 g onto the skin 4 times daily as needed for moderate pain        furosemide 20 MG tablet    LASIX     Take 20 mg by mouth daily as needed One tab daily as needed.        glucagon 1 MG kit    GLUCAGON EMERGENCY    2 mg    Inject 1 mg into the muscle once as needed for low blood sugar    Hypoglycemia associated with diabetes (H)       GLUCOSE MANAGEMENT Tabs     50 tablet    Take 15 g by mouth as needed (blood sugar less than 70)    Hypoglycemia       insulin aspart prot & aspart injection    NovoLOG MIX 70/30 PEN    45 mL    22 units  every morning and 10 units every evening    Diabetes mellitus with multiple complications (H)       insulin pen needle 31G X 8 MM    EASY TOUCH PEN NEEDLES    200 each    FOR ADMINISTERING INSULIN AT HOME TWICE DAILY. Dx: E11.8    Diabetes mellitus with multiple complications (H)       Lidocaine 4 % Patch    LIDOCARE     Place 1-2 patches onto the skin daily as needed for moderate pain        lisinopril 2.5 MG tablet    PRINIVIL/Zestril     Take 2.5 mg by mouth daily        metoprolol succinate 25 MG 24 hr tablet    TOPROL-XL    60 tablet    TAKE 1 AND 1/2 TABLETS BY MOUTH EVERY DAY    Essential hypertension, Chronic atrial fibrillation (H)       order for DME      Wheelchair with elevating leg rests/foot rest. For home use. Length of need: 99 mo        potassium chloride SA 20 MEQ CR tablet    K-DUR/KLOR-CON M     Take 1 tablet by mouth once every other day with a meal.        simvastatin 20 MG tablet    ZOCOR    90 tablet    TAKE ONE TABLET BY MOUTH AT BEDTIME    Mixed hyperlipidemia       VITAMIN D-1000 MAX ST 1000 units Tabs   Generic drug:  cholecalciferol      Take one tablet by mouth daily        warfarin 2 MG tablet    COUMADIN    90 tablet    Take 2 mg x three days/week and 1 mg x four days/week. Or as directed by the protime clinic.    Atrial fibrillation (H)

## 2018-10-22 NOTE — TELEPHONE ENCOUNTER
Redundant Refill Request for insulin pen needle (EASY TOUCH PEN NEEDLES) 31G X 8 MM refused;    Medication Detail      Disp Refills Start End ALEXI   insulin pen needle (EASY TOUCH PEN NEEDLES) 31G X 8  each 1 10/5/2018  No   Sig: FOR ADMINISTERING INSULIN AT HOME TWICE DAILY. Dx: E11.8   Class: E-Prescribe   Order: [id number]   E-Prescribing Status: Receipt confirmed by pharmacy (10/5/2018 12:23 PM CDT)   Printout Tracking   External Result Report   Medication Administration Instructions   FOR ADMINISTERING INSULIN AT HOME TWICE DAILY. Dx: E11.8   Pharmacy   [id number] ([facility]) - [place], [place] - [address]     Unable to complete prescription refill per RN Medication Refill Policy. Fredy Becerra 10/22/2018 11:10 AM

## 2018-10-23 NOTE — PATIENT INSTRUCTIONS
It was a pleasure to see you in clinic today.  Please do not hesitate to call with any questions or concerns.  You are scheduled for a remote transmission on 1/23/18.  We look forward to seeing you in clinic at your next device check in 6 months.    Terri Reynolds, RN, MS, CCRN  Electrophysiology Nurse Clinician  [facility]    During Business Hours Please Call:  [phone]  After Hours Please Call:  [phone] - select option #4 and ask for job code [phone]

## 2018-10-23 NOTE — TELEPHONE ENCOUNTER
LOV with PCP was on 10/17/18 for a hospital follow up. Rx as requested was noted in office visit notes on that date with no changes. Patient was to follow up in one weeks time and will be due tomorrow for follow up with no appointment scheduled. Call placed to patient to discuss. Patient was unavailable at this time. Writer will refill Rx as requested. Will not send reminder letter since patient was just seen a week ago.    Prescription refilled per RN Medication Refill Policy..................Fredy Becerra 10/23/2018 2:19 PM

## 2018-10-23 NOTE — PROGRESS NOTES
Preliminary Device Interrogation Results.  Final physician signed paceart report to be scanned and attached.    Patient seen in clinic for evaluation and iterative programming of his [employer] single lead ICD per MD orders.  Normal ICD function.  3 episodes recorded as NSVT  - 157-185 bpm, 7 beats - 11 seconds in duration.  Intrinsic rhythm = irregular ventricular rhythm @ ~ 70-80 bpm.  Patient is taking Coumadin.   = < 1%.  Estimated battery longevity to RENETTA = 8.5 years.  Patient reports that he is feeling okay today.  Patient's son reports that his dad fell on 10/8 and was also in the ER on 10/12.  No arrhythmias recorded at time of the fall on 10/8/18.  1 episode recorded on 10/12/18 @ 0949 which correlates with date of emergency room visit.  Plan for patient to send a remote transmission in 3 months and return to clinic in 6 months.  Nguyen Azevedo NP notified of interrogation results.     Single lead ICD

## 2018-10-23 NOTE — MR AVS SNAPSHOT
After Visit Summary   10/23/2018    Sukhdeep Gomez    MRN: [id number]           Patient Information     Date Of Birth          8/19/1934        Visit Information        Provider Department      10/23/2018 1:00 PM  PACEMAKER [facility]        Today's Diagnoses     Ischemic cardiomyopathy    -  1      Care Instructions    It was a pleasure to see you in clinic today.  Please do not hesitate to call with any questions or concerns.  You are scheduled for a remote transmission on 1/23/18.  We look forward to seeing you in clinic at your next device check in 6 months.    Terri Reynolds, RN, MS, CCRN  Electrophysiology Nurse Clinician  [facility]    During Business Hours Please Call:  [phone]  After Hours Please Call:  [phone] - select option #4 and ask for job code [phone]                        Follow-ups after your visit        Your next 10 appointments already scheduled     Oct 24, 2018  2:15 PM CDT   Treatment with Jerson Shannon, PT   [facility] ([facility])    [address]  [place] [id number] [phone]            Nov 07, 2018 10:45 AM CST   Anticoagulation Visit with  ANTI COAG 2   [facility] ([facility])    [address]  [facility] [id number] [phone]              Who to contact     If you have questions or need follow up information about today's clinic visit or your schedule please contact [facility] directly at [phone].  Normal or non-critical lab and imaging results will be communicated to you by MyChart, letter or phone within 4 business days after the clinic has received the results. If you do not hear from us within 7 days, please contact the clinic through MyChart or phone. If you have a critical or abnormal lab result, we will notify you by phone as soon as possible.  Submit refill  "requests through [employer] or call your pharmacy and they will forward the refill request to us. Please allow 3 business days for your refill to be completed.          Additional Information About Your Visit        [employer] Information     [employer] lets you send messages to your doctor, view your test results, renew your prescriptions, schedule appointments and more. To sign up, go to [email or web address] . Click on \"Log in\" on the left side of the screen, which will take you to the Welcome page. Then click on \"Sign up Now\" on the right side of the page.     You will be asked to enter the access code listed below, as well as some personal information. Please follow the directions to create your username and password.     Your access code is: [email or web address]  Expires: 2018 11:34 AM     Your access code will  in 90 days. If you need help or a new code, please call your [facility] clinic or [phone].        Care EveryWhere ID     This is your Care EveryWhere ID. This could be used by other organizations to access your [facility] medical records  [id number]         Blood Pressure from Last 3 Encounters:   10/17/18 108/68   10/12/18 111/66   10/09/18 140/59    Weight from Last 3 Encounters:   10/17/18 68 kg (150 lb)   10/17/18 68 kg (150 lb)   10/12/18 67.6 kg (149 lb)              We Performed the Following     ICD DEVICE PROGRAMMING EVAL, SINGLE LEAD ICD        Primary Care Provider Office Phone # Fax #    Cesarflorentino Sanders [phone] [address] [facility] [address]        Equal Access to Services     [facility] : Hadabiel Lerma, francesco nelson, qaybgriffin perez. So [facility] [phone].    ATENCIÓN: Si habla español, tiene a cohn disposición servicios gratuitos de asistencia lingüística. Ten al [phone].    We comply with applicable federal civil rights laws and [place] laws. We do not discriminate " on the basis of race, color, national origin, age, disability, sex, sexual orientation, or gender identity.            Thank you!     Thank you for choosing [facility] AND [facility]  for your care. Our goal is always to provide you with excellent care. Hearing back from our patients is one way we can continue to improve our services. Please take a few minutes to complete the written survey that you may receive in the mail after your visit with us. Thank you!             Your Updated Medication List - Protect others around you: Learn how to safely use, store and throw away your medicines at www.disposemymeds.org.          This list is accurate as of 10/23/18  1:40 PM.  Always use your most recent med list.                   Brand Name Dispense Instructions for use Diagnosis    allopurinol 100 MG tablet    ZYLOPRIM     Take 1 tablet by mouth once daily.        amoxicillin 500 MG capsule    AMOXIL     Take 2,000 mg by mouth daily as needed (dental appointments)        aspirin 81 MG EC tablet      Take 81 mg by mouth daily        blood glucose lancets standard    no brand specified    300 each    Use to test blood sugar 3 times daily or as directed.    Diabetes mellitus with multiple complications (H)       blood glucose monitoring lancets      USE TO TEST BLOOD SUGAR 3 TIMES DAILY OR AS DIRECTED.        blood glucose monitoring meter device kit    no brand specified    1 kit    Use to test blood sugar 3 times daily or as directed.    Diabetes mellitus with multiple complications (H)       * blood glucose monitoring test strip    no brand specified    100 strip    Use to test blood sugars 3 times daily or as directed    Diabetes mellitus with multiple complications (H)       * blood glucose monitoring test strip    no brand specified     USE TO TEST BLOOD SUGARS 3 TIMES DAILY OR AS DIRECTED        cefdinir 300 MG capsule    OMNICEF    20 capsule    Take 1 capsule (300 mg) by mouth 2 times daily    Urinary tract  infection associated with indwelling urethral catheter, initial encounter (H)       diclofenac 1 % Gel topical gel    VOLTAREN     Place 4 g onto the skin 4 times daily as needed for moderate pain        furosemide 20 MG tablet    LASIX     Take 20 mg by mouth daily as needed One tab daily as needed.        glucagon 1 MG kit    GLUCAGON EMERGENCY    2 mg    Inject 1 mg into the muscle once as needed for low blood sugar    Hypoglycemia associated with diabetes (H)       GLUCOSE MANAGEMENT Tabs     50 tablet    Take 15 g by mouth as needed (blood sugar less than 70)    Hypoglycemia       insulin aspart prot & aspart injection    NovoLOG MIX 70/30 PEN    45 mL    22 units every morning and 10 units every evening    Diabetes mellitus with multiple complications (H)       insulin pen needle 31G X 8 MM    EASY TOUCH PEN NEEDLES    200 each    FOR ADMINISTERING INSULIN AT HOME TWICE DAILY. Dx: E11.8    Diabetes mellitus with multiple complications (H)       Lidocaine 4 % Patch    LIDOCARE     Place 1-2 patches onto the skin daily as needed for moderate pain        lisinopril 2.5 MG tablet    PRINIVIL/Zestril     Take 2.5 mg by mouth daily        metoprolol succinate 25 MG 24 hr tablet    TOPROL-XL    60 tablet    TAKE 1 AND 1/2 TABLETS BY MOUTH EVERY DAY    Essential hypertension, Chronic atrial fibrillation (H)       order for DME      Wheelchair with elevating leg rests/foot rest. For home use. Length of need: 99 mo        potassium chloride SA 20 MEQ CR tablet    K-DUR/KLOR-CON M     Take 1 tablet by mouth once every other day with a meal.        simvastatin 20 MG tablet    ZOCOR    90 tablet    TAKE ONE TABLET BY MOUTH AT BEDTIME    Mixed hyperlipidemia       VITAMIN D-1000 MAX ST 1000 units Tabs   Generic drug:  cholecalciferol      Take one tablet by mouth daily        warfarin 2 MG tablet    COUMADIN    90 tablet    Take 2 mg x three days/week and 1 mg x four days/week. Or as directed by the protime clinic.     Atrial fibrillation (H)       * Notice:  This list has 2 medication(s) that are the same as other medications prescribed for you. Read the directions carefully, and ask your doctor or other care provider to review them with you.

## 2018-10-24 NOTE — ADDENDUM NOTE
Encounter addended by: Jerson Shannon, PT on: 10/24/2018 12:30 PM<BR>     Actions taken: Episode edited, Flowsheet data copied forward, Flowsheet accepted, Charge Capture section accepted

## 2018-10-24 NOTE — ADDENDUM NOTE
Encounter addended by: Jerson Shannon, PT on: 10/24/2018  1:26 PM<BR>     Actions taken: Flowsheet accepted, Charge Capture section accepted

## 2018-10-25 NOTE — ADDENDUM NOTE
Encounter addended by: Jerson Shannon, PT on: 10/25/2018  9:49 AM<BR>     Actions taken: Flowsheet accepted, Charge Capture section accepted

## 2018-10-30 NOTE — TELEPHONE ENCOUNTER
Chart review shows that Rx as requested was last filled as noted below, to pharmacys [facility] in [facility] on 10/5/18:    Medication Detail      Disp Refills Start End ALEXI   insulin pen needle (EASY TOUCH PEN NEEDLES) 31G X 8  each 1 10/5/2018  No   Sig: FOR ADMINISTERING INSULIN AT HOME TWICE DAILY. Dx: E11.8   Class: E-Prescribe   Order: [id number]   E-Prescribing Status: Receipt confirmed by pharmacy (10/5/2018 12:23 PM CDT)   Printout Tracking   External Result Report   Medication Administration Instructions   FOR ADMINISTERING INSULIN AT HOME TWICE DAILY. Dx: E11.8   Pharmacy   [id number] ([facility]) - [place], [place] - [address]     Call placed to [id number]. Spoke to darnell Giles. Advised her of Rx as noted above and requested that she obtain a transfer. darnell Giles states understanding and will do so. Nothing needed per this writer at this time. Writer will refuse Rx request in this encounter and make note of above in Rx refusal to pharmacy.    Unable to complete prescription refill per RN Medication Refill Policy. Fredy Becerra 10/30/2018 3:13 PM

## 2018-10-30 NOTE — NURSING NOTE
Suprapubic Tube Change  Patient came to the clinic for monthly suprapubic catheter change. No problems noted. Old catheter removed.  Patient  prepped and draped in sterile manner. New 18 Kazakh suprapubic almeida inserted with no problems, clear yellow urine return noted.  Catheter flushed with sterile water and return noted with no problems.  Will return to the clinic for monthly catheter change. Edgardo Sanchez MD supervised the procedure.  Ethel Robbins RN on 10/30/2018 at 12:15 PM

## 2018-10-30 NOTE — MR AVS SNAPSHOT
After Visit Summary   10/30/2018    Sukhdeep Gomez    MRN: [id number]           Patient Information     Date Of Birth          8/19/1934        Visit Information        Provider Department      10/30/2018 12:00 PM  UROLOGY NURSE [facility]        Today's Diagnoses     Urinary retention           Follow-ups after your visit        Your next 10 appointments already scheduled     Oct 30, 2018  1:00 PM CDT   LAB with  LAB   [facility] ([facility])    [address] [facility] [id number] [phone]           Please do not eat 10-12 hours before your appointment if you are coming in fasting for labs on lipids, cholesterol, or glucose (sugar). This does not apply to pregnant women. Water, hot tea and black coffee (with nothing added) are okay. Do not drink other fluids, diet soda or chew gum.            Oct 31, 2018 11:15 AM CDT   Treatment with Jerson Shannon PT   [facility] ([facility])    [address] [facility] [address]   [phone]            Nov 02, 2018 11:15 AM CDT   Treatment with Jerson Shannon PT   [facility] ([facility])    [address] [facility] [address]   [phone]            Nov 07, 2018 10:45 AM CST   Anticoagulation Visit with  ANTI COAG 2   [facility] ([facility])    [address] [facility] [address]   [phone]            Nov 26, 2018 10:30 AM CST   Cystoscopy with Edgardo Sanchez MD   [facility] ([facility])    [address] [facility] [address]   [phone]            May 07, 2019 11:30 AM CDT   Pacemaker Check with  PACEMAKER   [facility] ([facility])    [address] [facility] [address]  "  [phone]              Who to contact     If you have questions or need follow up information about today's clinic visit or your schedule please contact [facility] directly at [phone].  Normal or non-critical lab and imaging results will be communicated to you by MyChart, letter or phone within 4 business days after the clinic has received the results. If you do not hear from us within 7 days, please contact the clinic through MyChart or phone. If you have a critical or abnormal lab result, we will notify you by phone as soon as possible.  Submit refill requests through [employer] or call your pharmacy and they will forward the refill request to us. Please allow 3 business days for your refill to be completed.          Additional Information About Your Visit        Care EveryWhere ID     This is your Care EveryWhere ID. This could be used by other organizations to access your [facility] medical records  [id number]        Your Vitals Were     Pulse Respirations Height BMI (Body Mass Index)          76 16 1.676 m (5' 6\") 24.21 kg/m2         Blood Pressure from Last 3 Encounters:   10/17/18 108/68   10/12/18 111/66   10/09/18 140/59    Weight from Last 3 Encounters:   10/30/18 68 kg (150 lb)   10/17/18 68 kg (150 lb)   10/17/18 68 kg (150 lb)              We Performed the Following     CHANGE CYSTOSTMY TUBE SIMPLE     IRRIGATION BLADDER SIMPLE LAVAGE/INSTILLATION        Primary Care Provider Office Phone # Fax #    Cesarflorentino Sanders [phone] [address] [facility] [address]        Equal Access to Services     [facility] PRABHA : Hadii shona chun Sohema, waaxda luqadaha, qaybta kaalmada griffin francois. So [facility] [phone].    ATENCIÓN: Si habla español, tiene a cohn disposición servicios gratuitos de asistencia lingüística. Llame al [phone].    We comply with applicable federal civil rights laws and [place] " laws. We do not discriminate on the basis of race, color, national origin, age, disability, sex, sexual orientation, or gender identity.            Thank you!     Thank you for choosing [facility] AND [facility]  for your care. Our goal is always to provide you with excellent care. Hearing back from our patients is one way we can continue to improve our services. Please take a few minutes to complete the written survey that you may receive in the mail after your visit with us. Thank you!             Your Updated Medication List - Protect others around you: Learn how to safely use, store and throw away your medicines at www.disposemymeds.org.          This list is accurate as of 10/30/18 12:45 PM.  Always use your most recent med list.                   Brand Name Dispense Instructions for use Diagnosis    allopurinol 100 MG tablet    ZYLOPRIM     Take 1 tablet by mouth once daily.        amoxicillin 500 MG capsule    AMOXIL     Take 2,000 mg by mouth daily as needed (dental appointments)        aspirin 81 MG EC tablet      Take 81 mg by mouth daily        blood glucose lancets standard    no brand specified    300 each    Use to test blood sugar 3 times daily or as directed.    Diabetes mellitus with multiple complications (H)       blood glucose monitoring lancets      USE TO TEST BLOOD SUGAR 3 TIMES DAILY OR AS DIRECTED.        blood glucose monitoring meter device kit    no brand specified    1 kit    Use to test blood sugar 3 times daily or as directed.    Diabetes mellitus with multiple complications (H)       * blood glucose monitoring test strip    no brand specified    100 strip    Use to test blood sugars 3 times daily or as directed    Diabetes mellitus with multiple complications (H)       * blood glucose monitoring test strip    no brand specified     USE TO TEST BLOOD SUGARS 3 TIMES DAILY OR AS DIRECTED        cefdinir 300 MG capsule    OMNICEF    20 capsule    Take 1 capsule (300 mg) by mouth 2  times daily    Urinary tract infection associated with indwelling urethral catheter, initial encounter (H)       diclofenac 1 % Gel topical gel    VOLTAREN     Place 4 g onto the skin 4 times daily as needed for moderate pain        furosemide 20 MG tablet    LASIX     Take 20 mg by mouth daily as needed One tab daily as needed.        glucagon 1 MG kit    GLUCAGON EMERGENCY    2 mg    Inject 1 mg into the muscle once as needed for low blood sugar    Hypoglycemia associated with diabetes (H)       GLUCOSE MANAGEMENT Tabs     50 tablet    Take 15 g by mouth as needed (blood sugar less than 70)    Hypoglycemia       insulin aspart prot & aspart injection    NovoLOG MIX 70/30 PEN    45 mL    22 units every morning and 10 units every evening    Diabetes mellitus with multiple complications (H)       insulin pen needle 31G X 8 MM    EASY TOUCH PEN NEEDLES    200 each    FOR ADMINISTERING INSULIN AT HOME TWICE DAILY. Dx: E11.8    Diabetes mellitus with multiple complications (H)       Lidocaine 4 % Patch    LIDOCARE     Place 1-2 patches onto the skin daily as needed for moderate pain        lisinopril 2.5 MG tablet    PRINIVIL/Zestril     Take 2.5 mg by mouth daily        metoprolol succinate 25 MG 24 hr tablet    TOPROL-XL    60 tablet    TAKE 1 AND 1/2 TABLETS BY MOUTH EVERY DAY    Essential hypertension, Chronic atrial fibrillation (H)       order for DME      Wheelchair with elevating leg rests/foot rest. For home use. Length of need: 99 mo        potassium chloride SA 20 MEQ CR tablet    K-DUR/KLOR-CON M     Take 1 tablet by mouth once every other day with a meal.        simvastatin 20 MG tablet    ZOCOR    90 tablet    TAKE ONE TABLET BY MOUTH AT BEDTIME    Mixed hyperlipidemia       VITAMIN D-1000 MAX ST 1000 units Tabs   Generic drug:  cholecalciferol      Take one tablet by mouth daily        warfarin 2 MG tablet    COUMADIN    90 tablet    Take 2 mg x three days/week and 1 mg x four days/week. Or as directed by  the [facility] clinic.    Atrial fibrillation (H)       * Notice:  This list has 2 medication(s) that are the same as other medications prescribed for you. Read the directions carefully, and ask your doctor or other care provider to review them with you.

## 2018-11-01 NOTE — ADDENDUM NOTE
Encounter addended by: Jerson Shannon, PT on: 11/1/2018  9:02 AM<BR>     Actions taken: Episode edited, Flowsheet data copied forward, Flowsheet accepted, Charge Capture section accepted

## 2018-11-07 NOTE — PROGRESS NOTES
ANTICOAGULATION FOLLOW-UP CLINIC VISIT    Patient Name:  Sukhdeep Gomez  Date:  11/7/2018  Contact Type:  Face to Face    SUBJECTIVE:     Patient Findings     Positives Other complaints (bleeding behind sclera, no pain or vision problems, eye doctor aware), No Problem Findings           OBJECTIVE    INR Protime   Date Value Ref Range Status   11/07/2018 2.2 2.0 - 3.0 Final       ASSESSMENT / PLAN  INR assessment THER    Recheck INR In: 3 WEEKS    INR Location Clinic      Anticoagulation Summary as of 11/7/2018     INR goal 2.0-3.0   Today's INR 2.2   Warfarin maintenance plan 2 mg (2 mg x 1) on Mon, Wed, Fri; 1 mg (2 mg x 0.5) all other days   Full warfarin instructions 2 mg on Mon, Wed, Fri; 1 mg all other days   Weekly warfarin total 10 mg   No change documented Shasta Restrepo RN   Plan last modified Yolanda Avila RN (10/17/2018)   Next INR check 11/28/2018   Priority INR   Target end date Indefinite    Indications   Long term (current) use of anticoagulants (Resolved) [Z79.01]  Unspecified atrial fibrillation [I48.91]         Anticoagulation Episode Summary     INR check location     Preferred lab     Send INR reminders to  INR    Comments       Anticoagulation Care Providers     Provider Role Specialty Phone number    Cesar Sanders MD [facility] [phone]            See the Encounter Report to view Anticoagulation Flowsheet and Dosing Calendar (Go to Encounters tab in chart review, and find the Anticoagulation Therapy Visit)        Shasta Restrepo RN

## 2018-11-07 NOTE — MR AVS SNAPSHOT
Sukhdeep Bradyfidencio   11/7/2018 10:45 AM   Anticoagulation Therapy Visit    Description:  84 year old male   Provider:  [facility]   Department:  [facility]           INR as of 11/7/2018     Today's INR 2.2      Anticoagulation Summary as of 11/7/2018     INR goal 2.0-3.0   Today's INR 2.2   Full warfarin instructions 2 mg on Mon, Wed, Fri; 1 mg all other days   Next INR check 11/28/2018    Indications   Long term (current) use of anticoagulants (Resolved) [Z79.01]  Unspecified atrial fibrillation [I48.91]         Description     Continue same Coumadin/Warfarin dose and recheck INR in 3 weeks.  Shasta Restrepo ....................  11/7/2018   11:04 AM          November 2018 Details    Sun Mon Tue Wed Thu Fri Sat         1               2               3                 4               5               6               7      2 mg   See details      8      1 mg         9      2 mg         10      1 mg           11      1 mg         12      2 mg         13      1 mg         14      2 mg         15      1 mg         16      2 mg         17      1 mg           18      1 mg         19      2 mg         20      1 mg         21      2 mg         22      1 mg         23      2 mg         24      1 mg           25      1 mg         26      2 mg         27      1 mg         28            29               30                 Date Details   11/07 This INR check       Date of next INR:  11/28/2018         How to take your warfarin dose     To take:  1 mg Take 0.5 of a 2 mg tablet.    To take:  2 mg Take 1 of the 2 mg tablets.

## 2018-11-21 NOTE — PROGRESS NOTES
Nursing Notes:   Moreno Prabharandlal ROSS, LPN  11/21/2018  1:39 PM  Signed  Pt presents to clinic today for wound on left lower leg.  Prabha Mayer     SUBJECTIVE:  84 year old male with CHF and diabetes presents for a wound on his left posterior leg. It was present last week and occurred due to leg back for [place] catheter. Worse today in PT and called to get an appointment. He has no fever. It has some erythema and soreness. No previous MRSA and no recent skin infections.      REVIEW OF SYSTEMS:    Constitutional: negative    Current Outpatient Prescriptions   Medication Sig Dispense Refill     allopurinol (ZYLOPRIM) 100 MG tablet Take 1 tablet by mouth once daily.       amoxicillin (AMOXIL) 500 MG capsule Take 2,000 mg by mouth daily as needed (dental appointments)       aspirin 81 MG EC tablet Take 81 mg by mouth daily       blood glucose (NO BRAND SPECIFIED) lancets standard Use to test blood sugar 3 times daily or as directed. 300 each 4     blood glucose monitoring (CRISTIAN MICROLET) lancets USE TO TEST BLOOD SUGAR 3 TIMES DAILY OR AS DIRECTED.  4     blood glucose monitoring (NO BRAND SPECIFIED) meter device kit Use to test blood sugar 3 times daily or as directed. 1 kit 0     blood glucose monitoring (NO BRAND SPECIFIED) test strip USE TO TEST BLOOD SUGARS 3 TIMES DAILY OR AS DIRECTED  4     blood glucose monitoring (NO BRAND SPECIFIED) test strip Use to test blood sugars 3 times daily or as directed 100 strip 4     cefdinir (OMNICEF) 300 MG capsule Take 1 capsule (300 mg) by mouth 2 times daily 20 capsule 0     cholecalciferol (VITAMIN D-1000 MAX ST) 1000 UNITS TABS Take one tablet by mouth daily       diclofenac (VOLTAREN) 1 % GEL topical gel Place 4 g onto the skin 4 times daily as needed for moderate pain       furosemide (LASIX) 20 MG tablet Take 20 mg by mouth daily as needed One tab daily as needed.        glucagon (GLUCAGON EMERGENCY) 1 MG kit Inject 1 mg into the muscle once as needed for low blood sugar 2  "mg 11     insulin aspart prot & aspart (NOVOLOG MIX 70/30 PEN) injection 22 units every morning and 10 units every evening 45 mL 3     insulin pen needle (EASY TOUCH PEN NEEDLES) 31G X 8 MM FOR ADMINISTERING INSULIN AT HOME TWICE DAILY. Dx: E11.8 200 each 1     Lidocaine (LIDOCARE) 4 % Patch Place 1-2 patches onto the skin daily as needed for moderate pain       lisinopril (PRINIVIL/ZESTRIL) 2.5 MG tablet Take 2.5 mg by mouth daily       metoprolol succinate (TOPROL-XL) 25 MG 24 hr tablet TAKE 1 AND 1/2 TABLETS BY MOUTH EVERY DAY 60 tablet 1     Nutritional Supplements (GLUCOSE MANAGEMENT) TABS Take 15 g by mouth as needed (blood sugar less than 70) 50 tablet 1     order for DME Wheelchair with elevating leg rests/foot rest. For home use. Length of need: 99 mo       potassium chloride SA (K-DUR/KLOR-CON M) 20 MEQ CR tablet Take 1 tablet by mouth once every other day with a meal.       simvastatin (ZOCOR) 20 MG tablet TAKE ONE TABLET BY MOUTH AT BEDTIME 90 tablet 2     warfarin (COUMADIN) 2 MG tablet Take 2 mg x three days/week and 1 mg x four days/week. Or as directed by the protime clinic. 90 tablet 1     Allergies   Allergen Reactions     Codeine Unknown     Naproxen Other (See Comments)     \"kidneys shut down\"     Sulfa Drugs Unknown     Tramadol Nausea     Vancomycin Unknown       OBJECTIVE:  /70 (BP Location: Left arm, Patient Position: Chair, Cuff Size: Adult Regular)  Pulse 82  Resp 16    EXAM:  Skin: Posterior L lower leg with 1 cm area of ulceration and eschar with grey green drainage. Surrounding area of mild erythema.    ASSESSMENT/PLAN:    ICD-10-CM    1. Cellulitis of leg, left L03.116 Wound Culture Aerobic Bacterial     cephALEXin (KEFLEX) 500 MG capsule     mupirocin (BACTROBAN) 2 % ointment       Recommend antibiotics. Treat with mupirocin ointment and Keflex. Culture obtained. If resistant, will change oral antibiotics.   F/U if worse    Cesar Sanders MD    This document was prepared using " a combination of typing and voice generated software.  While every attempt was made for accuracy, spelling and grammatical errors may exist.

## 2018-11-21 NOTE — PATIENT INSTRUCTIONS
Apply mupirocin to the leg two to three times daily  Take Keflex twice daily   Both for 10 days  Follow-up if worse. We will call with culture results to make sure the antibiotics work

## 2018-11-21 NOTE — MR AVS SNAPSHOT
After Visit Summary   11/21/2018    Sukhdeep Gomez    MRN: [id number]           Patient Information     Date Of Birth          8/19/1934        Visit Information        Provider Department      11/21/2018 1:15 PM Cesar Sanders MD [facility]        Today's Diagnoses     Cellulitis of leg, left    -  1      Care Instructions    Apply mupirocin to the leg two to three times daily  Take Keflex twice daily   Both for 10 days  Follow-up if worse. We will call with culture results to make sure the antibiotics work          Follow-ups after your visit        Your next 10 appointments already scheduled     Nov 26, 2018 10:30 AM CST   Cyst[facility] with Edgardo Sanchez MD   [facility]    [address]  [place] [address]   [phone]            Nov 28, 2018 11:00 AM CST   Anticoagulation Visit with  ANTI COAG 1   [facility]    [address] [facility] [address]   [phone]            Dec 05, 2018 11:45 AM CST   Treatment with Jerson Shannon PT   [facility]    [address] [facility] [address]   [phone]            Dec 07, 2018 11:45 AM CST   Treatment with Jerson Shannon PT   [facility] ([facility]    [address] [facility] [address]   [phone]            May 07, 2019 11:30 AM CDT   Pacemaker Check with  PACEMAKER   [facility]    [address] [facility] [address]   [phone]              Who to contact     If you have questions or need follow up information about today's clinic visit or your schedule please contact [facility] directly at [phone].  Normal or non-critical lab and imaging results will be  communicated to you by MyChart, letter or phone within 4 business days after the clinic has received the results. If you do not hear from us within 7 days, please contact the clinic through MyChart or phone. If you have a critical or abnormal lab result, we will notify you by phone as soon as possible.  Submit refill requests through [employer] or call your pharmacy and they will forward the refill request to us. Please allow 3 business days for your refill to be completed.          Additional Information About Your Visit        Care EveryWhere ID     This is your Care EveryWhere ID. This could be used by other organizations to access your [place] medical records  [id number]        Your Vitals Were     Pulse Respirations                82 16           Blood Pressure from Last 3 Encounters:   11/21/18 110/70   10/17/18 108/68   10/12/18 111/66    Weight from Last 3 Encounters:   10/30/18 150 lb (68 kg)   10/17/18 150 lb (68 kg)   10/17/18 150 lb (68 kg)              We Performed the Following     Wound Culture Aerobic Bacterial          Today's Medication Changes          These changes are accurate as of 11/21/18  1:44 PM.  If you have any questions, ask your nurse or doctor.               Start taking these medicines.        Dose/Directions    cephALEXin 500 MG capsule   Commonly known as:  KEFLEX   Used for:  Cellulitis of leg, left   Started by:  Cesar Sanders MD        Dose:  500 mg   Take 1 capsule (500 mg) by mouth 2 times daily for 10 days   Quantity:  20 capsule   Refills:  0       mupirocin 2 % ointment   Commonly known as:  BACTROBAN   Used for:  Cellulitis of leg, left   Started by:  Cesar Sandres MD        Apply topically 3 times daily for 10 days   Quantity:  15 g   Refills:  0            Where to get your medicines      These medications were sent to [id number] ([facility]) - [place] - [address]  [address] [place] [address]     Phone:   [phone]     cephALEXin 500 MG capsule    mupirocin 2 % ointment                Primary Care Provider Office Phone # Fax #    Cesar Sanders [phone] [address]  [place] [address]        Equal Access to Services     [facility]: Hadii aad ku hadasho Soomaali, waaxda luqadaha, qaybta kaalmada adeegyada, waxay ashleyin haydanetten jose yordanlucian pate . So [facility] [phone].    ATENCIÓN: Si habla español, tiene a cohn disposición servicios gratuitos de asistencia lingüística. Llame al [phone].    We comply with applicable federal civil rights laws and [place] laws. We do not discriminate on the basis of race, color, national origin, age, disability, sex, sexual orientation, or gender identity.            Thank you!     Thank you for choosing [facility] AND [facility]  for your care. Our goal is always to provide you with excellent care. Hearing back from our patients is one way we can continue to improve our services. Please take a few minutes to complete the written survey that you may receive in the mail after your visit with us. Thank you!             Your Updated Medication List - Protect others around you: Learn how to safely use, store and throw away your medicines at www.disposemymeds.org.          This list is accurate as of 11/21/18  1:44 PM.  Always use your most recent med list.                   Brand Name Dispense Instructions for use Diagnosis    allopurinol 100 MG tablet    ZYLOPRIM     Take 1 tablet by mouth once daily.        amoxicillin 500 MG capsule    AMOXIL     Take 2,000 mg by mouth daily as needed (dental appointments)        aspirin 81 MG EC tablet      Take 81 mg by mouth daily        blood glucose lancets standard    no brand specified    300 each    Use to test blood sugar 3 times daily or as directed.    Diabetes mellitus with multiple complications (H)       blood glucose monitoring lancets      USE TO TEST BLOOD SUGAR 3 TIMES DAILY  OR AS DIRECTED.        blood glucose monitoring meter device kit    no brand specified    1 kit    Use to test blood sugar 3 times daily or as directed.    Diabetes mellitus with multiple complications (H)       * blood glucose monitoring test strip    no brand specified    100 strip    Use to test blood sugars 3 times daily or as directed    Diabetes mellitus with multiple complications (H)       * blood glucose monitoring test strip    no brand specified     USE TO TEST BLOOD SUGARS 3 TIMES DAILY OR AS DIRECTED        cefdinir 300 MG capsule    OMNICEF    20 capsule    Take 1 capsule (300 mg) by mouth 2 times daily    Urinary tract infection associated with indwelling urethral catheter, initial encounter (H)       cephALEXin 500 MG capsule    KEFLEX    20 capsule    Take 1 capsule (500 mg) by mouth 2 times daily for 10 days    Cellulitis of leg, left       diclofenac 1 % topical gel    VOLTAREN     Place 4 g onto the skin 4 times daily as needed for moderate pain        furosemide 20 MG tablet    LASIX     Take 20 mg by mouth daily as needed One tab daily as needed.        glucagon 1 MG kit    GLUCAGON EMERGENCY    2 mg    Inject 1 mg into the muscle once as needed for low blood sugar    Hypoglycemia associated with diabetes (H)       GLUCOSE MANAGEMENT Tabs     50 tablet    Take 15 g by mouth as needed (blood sugar less than 70)    Hypoglycemia       insulin aspart prot & aspart injection    NovoLOG MIX 70/30 PEN    45 mL    22 units every morning and 10 units every evening    Diabetes mellitus with multiple complications (H)       insulin pen needle 31G X 8 MM miscellaneous    EASY TOUCH PEN NEEDLES    200 each    FOR ADMINISTERING INSULIN AT HOME TWICE DAILY. Dx: E11.8    Diabetes mellitus with multiple complications (H)       Lidocaine 4 % Patch    LIDOCARE     Place 1-2 patches onto the skin daily as needed for moderate pain        lisinopril 2.5 MG tablet    PRINIVIL/Zestril     Take 2.5 mg by mouth daily         metoprolol succinate 25 MG 24 hr tablet    TOPROL-XL    60 tablet    TAKE 1 AND 1/2 TABLETS BY MOUTH EVERY DAY    Essential hypertension, Chronic atrial fibrillation (H)       mupirocin 2 % ointment    BACTROBAN    15 g    Apply topically 3 times daily for 10 days    Cellulitis of leg, left       order for DME      Wheelchair with elevating leg rests/foot rest. For home use. Length of need: 99 mo        potassium chloride SA 20 MEQ CR tablet    K-DUR/KLOR-CON M     Take 1 tablet by mouth once every other day with a meal.        simvastatin 20 MG tablet    ZOCOR    90 tablet    TAKE ONE TABLET BY MOUTH AT BEDTIME    Mixed hyperlipidemia       VITAMIN D-1000 MAX ST 1000 units Tabs   Generic drug:  cholecalciferol      Take one tablet by mouth daily        warfarin 2 MG tablet    COUMADIN    90 tablet    Take 2 mg x three days/week and 1 mg x four days/week. Or as directed by the protime clinic.    Atrial fibrillation (H)       * Notice:  This list has 2 medication(s) that are the same as other medications prescribed for you. Read the directions carefully, and ask your doctor or other care provider to review them with you.

## 2018-11-21 NOTE — ADDENDUM NOTE
Encounter addended by: Jerson Shannon, PT on: 11/21/2018  1:36 PM<BR>     Actions taken: Flowsheet data copied forward, Flowsheet accepted

## 2018-11-26 NOTE — NURSING NOTE
Suprapubic Tube Change  Patient came to the clinic for monthly suprapubic catheter change. No problems noted. Old catheter removed.  Patient  prepped and draped in sterile manner. New 18 Vietnamese silicone closed end suprapubic almeida inserted with no problems, clear yellow urine return noted.  Catheter flushed with sterile water and return noted with no problems.  Will return to the clinic for monthly catheter change. Edgardo Sanchez MD supervised the procedure.  Ethel Robbins RN on 11/26/2018 at 11:15 AM

## 2018-11-26 NOTE — MR AVS SNAPSHOT
After Visit Summary   11/26/2018    Sukhdeep Gomez    MRN: [id number]           Patient Information     Date Of Birth          8/19/1934        Visit Information        Provider Department      11/26/2018 10:30 AM  UROLOGY NURSE [facility]        Today's Diagnoses     Urinary retention           Follow-ups after your visit        Your next 10 appointments already scheduled     Dec 05, 2018 11:15 AM CST   Anticoagulation Visit with  ANTI COAG 1   [facility]    [address]  [place] [address]   [phone]            Dec 05, 2018 11:45 AM CST   Treatment with Jerson Shannon, PT   [facility]    [address] [facility] [address]   [phone]            Dec 07, 2018 11:45 AM CST   Treatment with Jerson Shannon, PT   [facility] ([facility])    [address] [facility] [address]   [phone]            Dec 21, 2018 11:30 AM CST   Nurse Only with  UROLOGY NURSE   [facility]    [facility] [address]   [phone]            May 07, 2019 11:30 AM CDT   Pacemaker Check with  PACEMAKER   [facility]    [address] [facility] [address]   [phone]              Who to contact     If you have questions or need follow up information about today's clinic visit or your schedule please contact [facility] directly at [phone].  Normal or non-critical lab and imaging results will be communicated to you by MyChart, letter or phone within 4 business days after the clinic has received the results. If you do not hear from us within 7 days, please contact the clinic through MyChart or phone. If you have a  critical or abnormal lab result, we will notify you by phone as soon as possible.  Submit refill requests through [employer] or call your pharmacy and they will forward the refill request to us. Please allow 3 business days for your refill to be completed.          Additional Information About Your Visit        Care EveryWhere ID     This is your Care EveryWhere ID. This could be used by other organizations to access your [place] medical records  [id number]        Your Vitals Were     Pulse Respirations                80 16           Blood Pressure from Last 3 Encounters:   11/21/18 110/70   10/17/18 108/68   10/12/18 111/66    Weight from Last 3 Encounters:   10/30/18 68 kg (150 lb)   10/17/18 68 kg (150 lb)   10/17/18 68 kg (150 lb)              We Performed the Following     CHANGE CYSTOSTMY TUBE SIMPLE     IRRIGATION BLADDER SIMPLE LAVAGE/INSTILLATION        Primary Care Provider Office Phone # Fax #    Cesar Sanders [phone]       [address] [facility] [address]        Equal Access to Services     [facility]: Hadii aad ku hadasho Soomaali, waaxda luqadaha, qaybta kaalmada adeegyada, waxay idiin haydanetten jose pate . So [facility] [phone].    ATENCIÓN: Si habla español, tiene a cohn disposición servicios gratuitos de asistencia lingüística. [facility] [phone].    We comply with applicable federal civil rights laws and [place] laws. We do not discriminate on the basis of race, color, national origin, age, disability, sex, sexual orientation, or gender identity.            Thank you!     Thank you for choosing [facility] AND [facility]  for your care. Our goal is always to provide you with excellent care. Hearing back from our patients is one way we can continue to improve our services. Please take a few minutes to complete the written survey that you may receive in the mail after your visit with us. Thank you!             Your Updated Medication List -  Protect others around you: Learn how to safely use, store and throw away your medicines at www.disposemymeds.org.          This list is accurate as of 11/26/18 11:43 AM.  Always use your most recent med list.                   Brand Name Dispense Instructions for use Diagnosis    allopurinol 100 MG tablet    ZYLOPRIM     Take 1 tablet by mouth once daily.        amoxicillin 500 MG capsule    AMOXIL     Take 2,000 mg by mouth daily as needed (dental appointments)        aspirin 81 MG EC tablet      Take 81 mg by mouth daily        blood glucose lancets standard    no brand specified    300 each    Use to test blood sugar 3 times daily or as directed.    Diabetes mellitus with multiple complications (H)       blood glucose monitoring lancets      USE TO TEST BLOOD SUGAR 3 TIMES DAILY OR AS DIRECTED.        blood glucose monitoring meter device kit    no brand specified    1 kit    Use to test blood sugar 3 times daily or as directed.    Diabetes mellitus with multiple complications (H)       * blood glucose monitoring test strip    no brand specified    100 strip    Use to test blood sugars 3 times daily or as directed    Diabetes mellitus with multiple complications (H)       * blood glucose monitoring test strip    no brand specified     USE TO TEST BLOOD SUGARS 3 TIMES DAILY OR AS DIRECTED        cefdinir 300 MG capsule    OMNICEF    20 capsule    Take 1 capsule (300 mg) by mouth 2 times daily    Urinary tract infection associated with indwelling urethral catheter, initial encounter (H)       cephALEXin 500 MG capsule    KEFLEX    20 capsule    Take 1 capsule (500 mg) by mouth 2 times daily for 10 days    Cellulitis of leg, left       diclofenac 1 % topical gel    VOLTAREN     Place 4 g onto the skin 4 times daily as needed for moderate pain        furosemide 20 MG tablet    LASIX     Take 20 mg by mouth daily as needed One tab daily as needed.        glucagon 1 MG kit    GLUCAGON EMERGENCY    2 mg    Inject 1 mg  into the muscle once as needed for low blood sugar    Hypoglycemia associated with diabetes (H)       GLUCOSE MANAGEMENT Tabs     50 tablet    Take 15 g by mouth as needed (blood sugar less than 70)    Hypoglycemia       insulin aspart prot & aspart (70-30) 100 UNIT/ML pen    NovoLOG MIX 70/30 PEN    45 mL    22 units every morning and 10 units every evening    Diabetes mellitus with multiple complications (H)       insulin pen needle 31G X 8 MM miscellaneous    EASY TOUCH PEN NEEDLES    200 each    FOR ADMINISTERING INSULIN AT HOME TWICE DAILY. Dx: E11.8    Diabetes mellitus with multiple complications (H)       Lidocaine 4 % Patch    LIDOCARE     Place 1-2 patches onto the skin daily as needed for moderate pain        lisinopril 2.5 MG tablet    PRINIVIL/Zestril     Take 2.5 mg by mouth daily        metoprolol succinate 25 MG 24 hr tablet    TOPROL-XL    60 tablet    TAKE 1 AND 1/2 TABLETS BY MOUTH EVERY DAY    Essential hypertension, Chronic atrial fibrillation (H)       mupirocin 2 % ointment    BACTROBAN    15 g    Apply topically 3 times daily for 10 days    Cellulitis of leg, left       order for DME      Wheelchair with elevating leg rests/foot rest. For home use. Length of need: 99 mo        potassium chloride SA 20 MEQ CR tablet    K-DUR/KLOR-CON M     Take 1 tablet by mouth once every other day with a meal.        simvastatin 20 MG tablet    ZOCOR    90 tablet    TAKE ONE TABLET BY MOUTH AT BEDTIME    Mixed hyperlipidemia       VITAMIN D-1000 MAX ST 1000 units Tabs   Generic drug:  cholecalciferol      Take one tablet by mouth daily        warfarin 2 MG tablet    COUMADIN    90 tablet    Take 2 mg x three days/week and 1 mg x four days/week. Or as directed by the protime clinic.    Atrial fibrillation (H)       * Notice:  This list has 2 medication(s) that are the same as other medications prescribed for you. Read the directions carefully, and ask your doctor or other care provider to review them with  you.

## 2018-12-05 NOTE — PROGRESS NOTES
ANTICOAGULATION FOLLOW-UP CLINIC VISIT    Patient Name:  Sukhdeep Gomez  Date:  12/5/2018  Contact Type:  Face to Face    SUBJECTIVE:     Patient Findings     Positives Nose bleeds (yesterday, stopped easily), Antibiotic use or infection (Cephalexin x 4-5 days, didn't complete dose, last dose about 1 week ago.)           OBJECTIVE    INR   Date Value Ref Range Status   12/05/2018 4.91 (HH) 0 - 1.3 Final       ASSESSMENT / PLAN  INR assessment SUPRA    Recheck INR In: 2 DAYS    INR Location Clinic      Anticoagulation Summary as of 12/5/2018     INR goal 2.0-3.0   Today's INR 4.91!   Warfarin maintenance plan 2 mg (2 mg x 1) on Mon, Wed, Fri; 1 mg (2 mg x 0.5) all other days   Full warfarin instructions 12/5: Hold; 12/6: Hold; Otherwise 2 mg on Mon, Wed, Fri; 1 mg all other days   Weekly warfarin total 10 mg   Plan last modified Yolanda vAila RN (10/17/2018)   Next INR check 12/7/2018   Priority INR   Target end date Indefinite    Indications   Long term (current) use of anticoagulants (Resolved) [Z79.01]  Unspecified atrial fibrillation [I48.91]         Anticoagulation Episode Summary     INR check location     Preferred lab     Send INR reminders to  INR    Comments       Anticoagulation Care Providers     Provider Role Specialty Phone number    Cesar Sanders MD [facility] [phone]            See the Encounter Report to view Anticoagulation Flowsheet and Dosing Calendar (Go to Encounters tab in chart review, and find the Anticoagulation Therapy Visit)        Shasta Restrepo RN

## 2018-12-05 NOTE — MR AVS SNAPSHOT
Sukhdeep Gomez   12/5/2018 11:15 AM   Anticoagulation Therapy Visit    Description:  84 year old male   Provider:  [facility]   Department:  [facility]           INR as of 12/5/2018     Today's INR >8.0!      Anticoagulation Summary as of 12/5/2018     INR goal 2.0-3.0   Today's INR >8.0!   Full warfarin instructions 12/5: Hold; 12/6: Hold; Otherwise 2 mg on Mon, Wed, Fri; 1 mg all other days   Next INR check 12/7/2018    Indications   Long term (current) use of anticoagulants (Resolved) [Z79.01]  Unspecified atrial fibrillation [I48.91]         Description     INR POCT >8.0.  Lab recheck INR 4.9.  HOLD Warfarin x two days, recheck INR 12/07/18.  Discussed signs of bleeding, and instructed patient to report symptoms of bleeding.  Go to ER for head injury or severe bleeding.        Your next Anticoagulation Clinic appointment(s)     Dec 07, 2018  2:00 PM CST   Anticoagulation Visit with [facility]   [facility] ([facility])    [address]  [place] [address]   [phone]              December 2018 Details    Sun Mon Tue Wed Thu Fri Sat           1                 2               3               4               5      Hold   See details      6      Hold         7            8                 9               10               11               12               13               14               15                 16               17               18               19               20               21               22                 23               24               25               26               27               28               29                 30               31                     Date Details   12/05 This INR check       Date of next INR:  12/7/2018         How to take your warfarin dose     To take:  2 mg Take 1 of the 2 mg tablets.    Hold Do not take your warfarin dose. See the Details table to the right for additional instructions.

## 2018-12-07 PROBLEM — D63.8 ANEMIA IN OTHER CHRONIC DISEASES CLASSIFIED ELSEWHERE: Status: ACTIVE | Noted: 2018-01-01

## 2018-12-07 PROBLEM — I50.20 SYSTOLIC HEART FAILURE (H): Status: ACTIVE | Noted: 2018-01-01

## 2018-12-07 NOTE — NURSING NOTE
Pt presents to clinic today for hospital follow up.  Medication Reconciliation: complete    Prabha Mayer LPN

## 2018-12-07 NOTE — ED TRIAGE NOTES
Patient clothing is visibly soiled and malodorous.  Wheelchair has visible stool on the seat. He has many bruises and has fallen at home on multiple occasions.

## 2018-12-07 NOTE — ED TRIAGE NOTES
Patient presents with shortness of breath and fluid accumulating in the lower legs.  O2 75% on room air.  O2 applied at 2L, ana O2 up to 82%.  Increased to 4L.  Unable to increase O2 with NC, switched to non-rebreather at 10L.  Poor perfusion in hands.  RR 28.  Hands cold.  Cardiac monitor shows afib, rates in the 90's.  He feels congested in his chest.  He normally sleeps in the recliner at home, but has been unable to sleep lately.  Denies CP.  Uses a diuretic, son has been titrating at home due to the edema.  Last 3-4 days son has been giving 80 mg lasix.  Has a chronic indwelling urinary catheter which has been having less output despite the increase in diuretics.  MD in to see patient.  Jaime Mert at bedside.

## 2018-12-07 NOTE — ED NOTES
RT contacted regarding patient O2 status.  Ear sat probe placed on patient, better readings here.  O2 decreased to 4L via NC per RT.

## 2018-12-07 NOTE — ED PROVIDER NOTES
History     Chief Complaint   Patient presents with     Shortness of Breath     HPI  Sukhdeep Gomez is a 84 year old male who presents to the ED today with a chief complaint of shortness of breath.  Patient was seen at the clinic prior to arrival where they suspected a CHF exacerbation.  Over the last week and a half patient reports progressive dyspnea as well as fluid retention.  Patient's son is with him and reports that they have increased his dose of Lasix to 80 mg/day but the patient is still having increased amount of fluid retention.  Patient denies chest pain or abdominal pain.  Patient denies recent fevers, coughs, or sicknesses.  Patient denies any recent change in diet.    Problem List:    Patient Active Problem List    Diagnosis Date Noted     Hypoglycemia due to type 2 diabetes mellitus (H) 10/08/2018     Priority: Medium     Urinary tract infection associated with indwelling urethral catheter (H) 10/08/2018     Priority: Medium     Herpes zoster ophthalmicus, left eye 08/21/2018     Priority: Medium     Urinary retention 06/18/2018     Priority: Medium     Paroxysmal atrial fibrillation (H) 02/21/2018     Priority: Medium     Overview:   chronic warfarin anticoagulation and toprol for rate control       S/P CABG x 4 02/21/2018     Priority: Medium     Overview:   status post 4 vessel CABG, [facility]       Unspecified atrial fibrillation 02/11/2018     Priority: Medium     Ascending aorta dilatation (H) 02/08/2018     Priority: Medium     H/O myocardial infarction, greater than 8 weeks 02/08/2018     Priority: Medium     Stented coronary artery 02/08/2018     Priority: Medium     Atrial fibrillation (H) 01/18/2018     Priority: Medium     Overview:   chronic warfarin anticoagulation and toprol for rate control       Osteoarthrosis 01/18/2018     Priority: Medium     Diabetes mellitus with multiple complications (H) 01/18/2018     Priority: Medium     Essential hypertension 01/18/2018      Priority: Medium     Gout of left foot due to renal impairment 01/18/2018     Priority: Medium     Overview:   tophaceous       Ischemic cardiomyopathy 01/18/2018     Priority: Medium     Lumbago 01/18/2018     Priority: Medium     Enthesopathy of ankle and tarsus 01/18/2018     Priority: Medium     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 01/18/2018     Priority: Medium     Overview:   status post TTE       Mixed hyperlipidemia 01/18/2018     Priority: Medium     S/P CABG (coronary artery bypass graft) 01/18/2018     Priority: Medium     Overview:   status post 4 vessel CABG, [facility]       Spinal stenosis 01/18/2018     Priority: Medium     Squamous cell carcinoma of skin 01/18/2018     Priority: Medium     Chronic systolic heart failure (H) 11/08/2017     Priority: Medium     Basal cell carcinoma of right forehead 10/23/2017     Priority: Medium     Implantable cardioverter-defibrillator, [employer], Single lead - Not Dependent 10/20/2017     Priority: Medium     [employer] Incepta [id number]  SN#[id number]  4/25/13   RV lead CPI [id number]  #[id number]  5/30/07  CHF/Madit II       Hamstring tightness of left lower extremity 02/07/2017     Priority: Medium     Altered level of consciousness 12/04/2016     Priority: Medium     Elevated INR 12/04/2016     Priority: Medium     Elevated troponin 12/04/2016     Priority: Medium     Yeast dermatitis 12/04/2016     Priority: Medium     Anticoagulation monitoring, INR range 2-3 05/13/2015     Priority: Medium     History of TIA (transient ischemic attack) 11/17/2014     Priority: Medium     CKD (chronic kidney disease) stage 3, GFR 30-59 ml/min (H) 10/17/2014     Priority: Medium     ACP (advance care planning) 12/10/2013     Priority: Medium     Overview:   Patient has identified Health Care Agent(s): No  Add Health Care Agents: No  Patient has Advance Care Plan Documents (Health Care Directive, POLST): Yes    Advance Care Plan  Documents:  Health Care Directive    Patient has identified Specific Treatment Preferences: No   Specific limits to treatment preferences NOT identified: ASSUME FULL TREATMENT.       Diabetic neuropathy (H) 11/11/2013     Priority: Medium     Prostate CA (H) 11/11/2013     Priority: Medium     Orthostatic hypotension 11/05/2013     Priority: Medium     Hematoma of right hip 11/21/2012     Priority: Medium     Acute on chronic systolic congestive heart failure (H) 11/14/2012     Priority: Medium     Overview:   9/19/2012 ECHO  Final Impression:   1) Dilated left ventricle to a moderate degree with severely depressed left ventricular systolic function and ejection fraction of 15%. See comments in text regarding question of left ventricular thrombus. Echo density noted there is felt to be artifactual, but the possibility of LV thrombus cannot be fully excluded. Consider repeat study with contrast.   2) Severe left atrial enlargement.   3) Sclerotic aortic valve with mild aortic insufficiency.   4) Sclerotic mitral valve with mild insufficiency.   5) Sclerotic tricuspid valve with mild to moderate regurgitation.   6) Probable moderate elevation right ventricular systolic pressure to 48 mmHg plus right atrial pressure.   7) Study is performed with patient in atrial fibrillation.   8) When compared with prior study of August 7, 2009, there has been further increase in right ventricular systolic pressure from 29 mmHg plus right atrial to 48 mmHg plus right atrial pressure and the aortic root and ascending aorta appear to have further increased in size from prior 4 cm in the mid ascending aorta to 4.4 cm. Additionally, the issue of possible LV thrombus was not raised in the report of the prior study.   Interpretation: M-Mode, two-dimensional, spectral and color flow Doppler evaluations were performed with standard echocardiographic images with the patient in atrial fibrillation. The study is technically adequate for  interpretation.   Aortic root and ascending aorta are mildly to moderately enlarged at 4.5 and 4.4 cm, respectively. There is severe left atrial enlargement. The right atrium is probably within normal limits. The right ventricle is also probably of normal size with normal wall motion. A device lead is noted throughout the right heart.   The left ventricular chamber is moderately enlarged at 6.2 cm. Left ventricular wall thickness appears to be probably mildly increased in the intraventricular septum.   The inferior vena cava is not well visualized.   All cardiac valves appear to be sclerotic. There is mild aortic insufficiency and probable mild mitral regurgitation. There is mild to moderate tricuspid regurgitation with regurgitation jet velocity suggesting right ventricular systolic pressure to be elevated to 48 mmHg plus indeterminate right atrial pressure, suggesting at least probable moderate pulmonary hypertension.   Left ventricular ejection fraction is severely reduced with severe global hypokinesis with estimated ejection fraction in the range of 15%. There is obstruction noted on apical views within the left ventricular apex which is felt to be artifactual; however, the possibility of left ventricular thrombus cannot be excluded. Consider repeat study with contrast for better definition.   No pericardial effusion is noted.   Left ventricular diastolic function is indeterminate in the setting of the atrial fibrillation.        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     Priority: Medium     Onychomycosis 08/05/2010     Priority: Medium     Muscle weakness (generalized) 06/09/2009     Priority: Medium     Overview:   IMO Update 10/11       Myopathy 04/08/2009     Priority: Medium        Past Medical History:    Past Medical History:   Diagnosis Date     Acute gastritis with hemorrhage 8/10/2009     Acute on  chronic systolic congestive heart failure (H)      Atherosclerotic heart disease of native coronary artery without angina pectoris      Atrial fibrillation (H)      Cardiomyopathy (H)      Chronic kidney disease      Disorder of muscle      Essential (primary) hypertension      Gastrointestinal hemorrhage      GI bleeding 1/18/2018     Gout      Herpes zoster ophthalmicus, left eye 8/21/2018     Hyperlipidemia      Low back pain      Malignant neoplasm of prostate (H)      Osteoarthritis      Other nonrheumatic aortic valve disorders      Other retention of urine (CODE)      Other specified disorders of arteries and arterioles (CODE)      Polyneuropathy      Systolic congestive heart failure (H) 2009     Transient cerebral ischemic attack 11/17/2014     Type 2 diabetes mellitus without complications (H) 2009       Past Surgical History:    Past Surgical History:   Procedure Laterality Date     ARTHROPLASTY KNEE      2011,left     BYPASS GRAFT ARTERY CORONARY      4 vessel, [facility], 1989     CATARACT IOL, RT/LT Bilateral 2018 6/20 and 7/11 Dr Irwin     CIRCUMCISION N/A 5/8/2018    Procedure: CIRCUMCISION;  Circumcision ,  Glans Biopsy Cystoscopy;  Surgeon: Edgardo Sanchez MD;  Location:  OR     CYSTOSCOPY      2009,& dilation of urethral stricture, Dr. Wallace     CYSTOSCOPY      2010,& catheter placement for acute obstruction, Dr. Elise     CYSTOSTOMY, INSERT TUBE SUPRAPUBIC, COMBINED N/A 5/22/2018    Procedure: COMBINED CYSTOSTOMY, INSERT TUBE SUPRAPUBIC;  Supra tube Placement    .;  Surgeon: Edgardo Sanchez MD;  Location:  OR     ESOPHAGOSCOPY, GASTROSCOPY, DUODENOSCOPY (EGD), COMBINED      2007,& colonoscopy with polypectomy, [facility] essetnially normal EGD with small polyp removed     HEART CATH, ANGIOPLASTY      approx. 2003,angiogram, 2 stents, [facility]     IMPLANT PACEMAKER      2007,Guidant ICD, CPI Vitality 2 EL, model #T177, serial #[id number], which was implanted on 05/30/2007 at [facility].      LAMINECTOMY LUMBAR ONE LEVEL      2006,decompressive, L3, L4 & L5 with improvement, Dr. Lopes       Family History:    Family History   Problem Relation Age of Onset     Heart Disease Father      Heart Disease     Prostate Cancer Brother      Cancer-prostate       Social History:  Marital Status:   [5]  Social History   Substance Use Topics     Smoking status: Never Smoker     Smokeless tobacco: Never Used     Alcohol use No        Medications:      allopurinol (ZYLOPRIM) 100 MG tablet   amoxicillin (AMOXIL) 500 MG capsule   aspirin 81 MG EC tablet   blood glucose (NO BRAND SPECIFIED) lancets standard   blood glucose monitoring (NO BRAND SPECIFIED) meter device kit   blood glucose monitoring (NO BRAND SPECIFIED) test strip   cefdinir (OMNICEF) 300 MG capsule   cholecalciferol (VITAMIN D-1000 MAX ST) 1000 UNITS TABS   diclofenac (VOLTAREN) 1 % GEL topical gel   furosemide (LASIX) 20 MG tablet   glucagon (GLUCAGON EMERGENCY) 1 MG kit   insulin aspart prot & aspart (NOVOLOG MIX 70/30 PEN) injection   insulin pen needle (EASY TOUCH PEN NEEDLES) 31G X 8 MM   Lidocaine (LIDOCARE) 4 % Patch   lisinopril (PRINIVIL/ZESTRIL) 2.5 MG tablet   metoprolol succinate (TOPROL-XL) 25 MG 24 hr tablet   Nutritional Supplements (GLUCOSE MANAGEMENT) TABS   order for DME   potassium chloride SA (K-DUR/KLOR-CON M) 20 MEQ CR tablet   simvastatin (ZOCOR) 20 MG tablet   warfarin (COUMADIN) 2 MG tablet         Review of Systems   Respiratory: Positive for shortness of breath.    Cardiovascular: Positive for leg swelling. Negative for chest pain.   All other systems reviewed and are negative.      Physical Exam   BP: 121/60  Pulse: 96  Heart Rate: 98  Temp: 96.8  F (36  C)  Resp: 28  SpO2: (!) 75 %      Physical Exam   Constitutional: He is oriented to person, place, and time. No distress.   HENT:   Head: Normocephalic and atraumatic.   Eyes: EOM are normal. Pupils are equal, round, and reactive to light.   Neck: Normal range of motion.    Cardiovascular: Normal rate.    No murmur heard.  Diminished heart sounds   Pulmonary/Chest: Breath sounds normal. He is in respiratory distress. He has no wheezes. He exhibits no tenderness.   Abdominal: Soft. Bowel sounds are normal. He exhibits no distension and no mass. There is no tenderness.   Musculoskeletal: Normal range of motion. He exhibits edema.   Neurological: He is alert and oriented to person, place, and time.   Skin: Skin is warm. He is not diaphoretic.   Psychiatric: He has a normal mood and affect. His behavior is normal. Judgment and thought content normal.       ED Course     ED Course     Procedures          EKG read at 1457, heart rate 103, patient has lateral ischemia consistent with past EKGs, left bundle branch block does not appear to meet sgarbossa criteria    Critical Care time:  none               Results for orders placed or performed during the hospital encounter of 12/07/18 (from the past 24 hour(s))   CBC with platelets differential   Result Value Ref Range    WBC 9.5 4.0 - 11.0 10e9/L    RBC Count 2.97 (L) 4.4 - 5.9 10e12/L    Hemoglobin 9.0 (L) 13.3 - 17.7 g/dL    Hematocrit 29.6 (L) 40.0 - 53.0 %     78 - 100 fl    MCH 30.3 26.5 - 33.0 pg    MCHC 30.4 (L) 31.5 - 36.5 g/dL    RDW 16.8 (H) 10.0 - 15.0 %    Platelet Count 206 150 - 450 10e9/L    Diff Method Automated Method     % Neutrophils 83.9 %    % Lymphocytes 6.7 %    % Monocytes 7.7 %    % Eosinophils 1.0 %    % Basophils 0.2 %    % Immature Granulocytes 0.5 %    Absolute Neutrophil 7.9 1.6 - 8.3 10e9/L    Absolute Lymphocytes 0.6 (L) 0.8 - 5.3 10e9/L    Absolute Monocytes 0.7 0.0 - 1.3 10e9/L    Absolute Eosinophils 0.1 0.0 - 0.7 10e9/L    Absolute Basophils 0.0 0.0 - 0.2 10e9/L    Abs Immature Granulocytes 0.1 0 - 0.4 10e9/L   Basic metabolic panel   Result Value Ref Range    Sodium 140 134 - 144 mmol/L    Potassium 3.7 3.5 - 5.1 mmol/L    Chloride 109 (H) 98 - 107 mmol/L    Carbon Dioxide 21 21 - 31 mmol/L     Anion Gap 10 3 - 14 mmol/L    Glucose 120 (H) 70 - 105 mg/dL    Urea Nitrogen 56 (H) 7 - 25 mg/dL    Creatinine 2.21 (H) 0.70 - 1.30 mg/dL    GFR Estimate 29 (L) >60 mL/min/1.7m2    GFR Estimate If Black 35 (L) >60 mL/min/1.7m2    Calcium 8.4 (L) 8.6 - 10.3 mg/dL   Troponin I   Result Value Ref Range    Troponin I ES 0.126 (H) 0.000 - 0.034 ug/L   Nt probnp inpatient   Result Value Ref Range    N-Terminal Pro BNP Inpatient 642 (H) 0 - 100 pg/mL   INR   Result Value Ref Range    INR 2.37 (H) 0 - 1.3   Blood gas arterial and oxyhgb   Result Value Ref Range    pH Arterial 7.37 7.35 - 7.45 pH    pCO2 Arterial 32 (L) 35 - 45 mm Hg    pO2 Arterial 124 (H) 83 - 108 mm Hg    Bicarbonate Arterial 18 (L) 22 - 28 mmol/L    FIO2 0     Oxyhemoglobin Arterial 97 >95 %   D-Dimer (HI,[facility])   Result Value Ref Range    D-Dimer ng/mL 292 (H) 0 - 230 ng/ml D-DU   XR Chest Port 1 View    Narrative    PROCEDURE:  XR CHEST PORT 1 VW    HISTORY: CHF exacerbation? heart size?; . .    COMPARISON:  10/18/2018    FINDINGS:  A left chest automatic implantable cardiac defibrillator/pacemaker is  present.  The cardiomediastinal contours are enlarged, grossly unchanged. There  is calcific aortic atherosclerosis.   There is some opacification of the left lung base, likely a new  effusion. No pneumothorax is seen.      Impression    IMPRESSION:  Cardiomegaly. Probable left pleural effusion. Recommend  follow-up.      LUANNE CHRISTOPHER MD       Medications   furosemide (LASIX) injection 40 mg (40 mg Intravenous Given 12/7/18 1550)       Assessments & Plan (with Medical Decision Making)   Patient seen and examined.  Patient is nontoxic-appearing in no acute distress.  Heart sounds slightly diminished as well as lung sounds.  Abdomen slightly enlarged according to baseline, nontender.  Patient has lower extremity pitting edema.  Focused a limited bedside ultrasound revealed a hypokinetic heart with low mitral slab, no signs of pericardial effusion,  increased heart size.  Patient found to have a normal white count, hemoglobin 9 which is consistent with past results, slight decrease in kidney function, troponin of 0.126 which again is consistent with past results, .  X-ray showed cardiomegaly, probable left pleural effusion.  Patient given 40 mg Lasix and was placed on a nonrebreather.  Upon reassessment patient was transitioned to a nasal cannula and was satting at 96% on 2-4 L of O2.  Due to patient's multiple comorbidities as well as his increased fluid retention and extremely low O2 sats upon arrival patient will be admitted for further observation management and treatment.  Patient graciously accepted by Dr. Sanchez.    Pedro Lee PA-C     I have reviewed the nursing notes.    I have reviewed the findings, diagnosis, plan and need for follow up with the patient.       New Prescriptions    No medications on file       Final diagnoses:   Acute on chronic congestive heart failure, unspecified heart failure type (H)       12/7/2018   [facility]     Pedro Lee PA  12/07/18 [id number]

## 2018-12-07 NOTE — PROGRESS NOTES
Nursing Notes:   Prabha Mayer LPN  12/7/2018  1:31 PM  Unsigned  Pt presents to clinic today for hospital follow up.  Medication Reconciliation: complete    Prabha Mayer LPN      SUBJECTIVE:  84 year old male with heart failure presents for weight gain and malaise. Reports weight gain of over 20 lbs. He does not feel well. Is weak and having trouble moving around home. Son helping, but having a harder time.  He was backed down on furosemide from 20 mg daily to as needed 1.5 months ago. Son was still providing 20 mg every other day. Taking 40 mg daily for a week and then 80 mg daily for 3 days.   Now congested. Not able to sleep. Believes that he needs to be hospitalized.     REVIEW OF SYSTEMS:    Pertinent items are noted in HPI.    Current Outpatient Prescriptions   Medication Sig Dispense Refill     allopurinol (ZYLOPRIM) 100 MG tablet Take 1 tablet by mouth once daily.       amoxicillin (AMOXIL) 500 MG capsule Take 2,000 mg by mouth daily as needed (dental appointments)       aspirin 81 MG EC tablet Take 81 mg by mouth daily       blood glucose (NO BRAND SPECIFIED) lancets standard Use to test blood sugar 3 times daily or as directed. 300 each 4     blood glucose monitoring (NO BRAND SPECIFIED) meter device kit Use to test blood sugar 3 times daily or as directed. 1 kit 0     blood glucose monitoring (NO BRAND SPECIFIED) test strip Use to test blood sugars 3 times daily or as directed 100 strip 4     cefdinir (OMNICEF) 300 MG capsule Take 1 capsule (300 mg) by mouth 2 times daily 20 capsule 0     cholecalciferol (VITAMIN D-1000 MAX ST) 1000 UNITS TABS Take one tablet by mouth daily       diclofenac (VOLTAREN) 1 % GEL topical gel Place 4 g onto the skin 4 times daily as needed for moderate pain       furosemide (LASIX) 20 MG tablet Take 20 mg by mouth daily as needed One tab daily as needed.        glucagon (GLUCAGON EMERGENCY) 1 MG kit Inject 1 mg into the muscle once as needed for low blood sugar 2 mg 11  "    insulin aspart prot & aspart (NOVOLOG MIX 70/30 PEN) injection 22 units every morning and 10 units every evening 45 mL 3     insulin pen needle (EASY TOUCH PEN NEEDLES) 31G X 8 MM FOR ADMINISTERING INSULIN AT HOME TWICE DAILY. Dx: E11.8 200 each 1     Lidocaine (LIDOCARE) 4 % Patch Place 1-2 patches onto the skin daily as needed for moderate pain       lisinopril (PRINIVIL/ZESTRIL) 2.5 MG tablet Take 2.5 mg by mouth daily       metoprolol succinate (TOPROL-XL) 25 MG 24 hr tablet TAKE 1 AND 1/2 TABLETS BY MOUTH EVERY DAY 60 tablet 1     Nutritional Supplements (GLUCOSE MANAGEMENT) TABS Take 15 g by mouth as needed (blood sugar less than 70) 50 tablet 1     order for DME Wheelchair with elevating leg rests/foot rest. For home use. Length of need: 99 mo       potassium chloride SA (K-DUR/KLOR-CON M) 20 MEQ CR tablet Take 1 tablet by mouth once every other day with a meal.       simvastatin (ZOCOR) 20 MG tablet TAKE ONE TABLET BY MOUTH AT BEDTIME 90 tablet 2     warfarin (COUMADIN) 2 MG tablet HOLD x two days, recheck INR on 12/07/18.  Or as directed by the protime clinic. 90 tablet 1     Allergies   Allergen Reactions     Codeine Unknown     Naproxen Other (See Comments)     \"kidneys shut down\"     Sulfa Drugs Unknown     Tramadol Nausea     Vancomycin Unknown       OBJECTIVE:  Pulse 76  Resp 18  Wt 173 lb (78.5 kg)  BMI 27.92 kg/m2    EXAM:  General Appearance: Alert. No acute distress  Chest/Respiratory Exam: bilateral base rales  Cardiovascular Exam: Regular rate and rhythm. S1, S2, no murmur, gallop, or rubs.  Extremities: 2+ pitting bilateral lower extremity edema.  Psychiatric: Normal affect and mentation      ASSESSMENT/PLAN:    ICD-10-CM    1. Chronic systolic heart failure (H) I50.22    2. 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 However, he may certainly be hypoxic. Given weight gain and weakness, " appears to need diuresis in the hospital as he is not safe at home. Discussed with Dr Sanchez. Given patient is at [facility] and we have no lab values, recommends ED first, then decision to admit after x-ray and labs. Patient agreeable to ED. Son with transport. Called and discussed case with Dr Kelsey in ED.    F/U after likely hospitalization    Greater than 50% of this 25 minute appointment spent on counseling and coordination of care    Cesar Sanders MD    This document was prepared using a combination of typing and voice generated software.  While every attempt was made for accuracy, spelling and grammatical errors may exist.

## 2018-12-07 NOTE — H&P
[facility]    History and Physical  Hospitalist       Date of Admission:  12/7/2018    Assessment & Plan   Sukhdeep Gomez is a 84 year old male who presents with acute on chronic systolic congestive heart failure.  The patient has had a 23 pound weight gain since October, and has a known ejection fraction on a previous echocardiogram of 40%.  His son has noted significant edema and significant decreased urine output.  He has a chronic Mendosa catheter related to prostate cancer and urinary retention.    Active Problems:    Atrial fibrillation (H)    Assessment: This appears to be stable. ,    Plan: Continue current medication, telemetry, and pharmacy consult to dose warfarin.    Diabetes mellitus with multiple complications (H)    Assessment: Stable on current meds-last A1c was 5.9%    Plan: Monitor blood sugars, continue insulin.    Elevated troponin    Assessment: Likely related to heart failure    Plan: Repeat this evening    Essential hypertension    Assessment: Patient is mildly hypotensive now    Plan: We will continue current meds for the time being  Anemia-likely related to chronic kidney disease.    Implantable cardioverter-defibrillator, [employer], Single lead - Not Dependent    Assessment: Interrogation has been done within the last month and was showing that the implantable device is functioning normally    Plan: Telemetry    Ischemic cardiomyopathy    Assessment: Last echocardiogram in April of this year revealed an EF of 40% with diffuse hypokinesis of the left ventricle consistent with cardiomyopathy    Plan: Lasix IV    Prostate CA (H)    Assessment: Chronic    Plan: Continue current Mendosa catheter.  His last PSA was in the normal range    Acute on chronic systolic congestive heart failure (H)    Assessment: Decompensated-23 pound weight gain    Plan: Telemetry, IV Lasix, monitor I&O and weight.  Chronic kidney disease- creatinine is increased from 1.5-day to 2.2 in the  last 2 months.      DVT Prophylaxis: Patient is anticoagulated on warfarin  Code Status: Prior full CODE STATUS    PETER HOWELL    Primary Care Physician   Cesar Sanders    Chief Complaint   Shortness of breath    History is obtained from the patient, his son, his clinic physician, the ED PA, and chart review.    History of Present Illness   Sukhdeep Gomez is a 84 year old male who presents with shortness of breath.  The patient has a history of ischemic cardiomyopathy and chronic systolic heart failure.  His son is noted over the last couple of weeks that he has had significant fluid retention and is gained weight.  He tried increasing his Lasix dose at home to double what he had previously been taking but there was no improvement.  He feels he is gained about 20 pounds.  He has had no recent fever, chills, or other illnesses.  No chest pain.  He has noted increased swelling in the legs and his son is noted decreased urine output.  He has a chronic Mendosa catheter because of urinary retention related to prostate cancer.    The patient is also diabetic.  His most recent A1c was 5.9%.  He takes insulin twice daily, 22 units in the morning and 10 units in the evening.    He also has a history of chronic kidney disease.  His creatinine has been increasing over the last couple of months.    Past Medical History    I have reviewed this patient's medical history and updated it with pertinent information if needed.   Past Medical History:   Diagnosis Date     Acute gastritis with hemorrhage 8/10/2009    Overview:  NSAID     Acute on chronic systolic congestive heart failure (H)     11/14/2012,15-20% EF 11/2012     Atherosclerotic heart disease of native coronary artery without angina pectoris      Atrial fibrillation (H)      Cardiomyopathy (H)      Chronic kidney disease     Stage III     Disorder of muscle     2/7/2017     Essential (primary) hypertension     chronic     Gastrointestinal hemorrhage      2009,secondary to NSAID gastritis     GI bleeding 1/18/2018    Overview:  upper/anemia 8.1     Gout     tophaceous, right foot, right fingers     Herpes zoster ophthalmicus, left eye 8/21/2018     Hyperlipidemia     No Comments Provided     Low back pain     lumbar spinal stenosis     Malignant neoplasm of prostate (H)     2003     Osteoarthritis      Other nonrheumatic aortic valve disorders     with mild Al     Other retention of urine (CODE)     2010,with stricture     Other specified disorders of arteries and arterioles (CODE)     moderate, with mild increase in diameter of the ascending aorta     Polyneuropathy     via 2008 EMG in [place]     Systolic congestive heart failure (H) 2009    mild to moderate left ventricular enlargement with marked decreased in systolic function.     Transient cerebral ischemic attack 11/17/2014     Type 2 diabetes mellitus without complications (H) 2009       Past Surgical History   I have reviewed this patient's surgical history and updated it with pertinent information if needed.  Past Surgical History:   Procedure Laterality Date     ARTHROPLASTY KNEE      2011,left     BYPASS GRAFT ARTERY CORONARY      4 vessel, [facility], 1989     CATARACT IOL, RT/LT Bilateral 2018 6/20 and 7/11 Dr Irwin     CIRCUMCISION N/A 5/8/2018    Procedure: CIRCUMCISION;  Circumcision ,  Glans Biopsy Cystoscopy;  Surgeon: Edgardo Sanchez MD;  Location:  OR     CYSTOSCOPY      2009,& dilation of urethral stricture, Dr. Wallace     CYSTOSCOPY      2010,& catheter placement for acute obstruction, Dr. Elise     CYSTOSTOMY, INSERT TUBE SUPRAPUBIC, COMBINED N/A 5/22/2018    Procedure: COMBINED CYSTOSTOMY, INSERT TUBE SUPRAPUBIC;  Supra tube Placement    .;  Surgeon: Edgardo Sanchez MD;  Location:  OR     ESOPHAGOSCOPY, GASTROSCOPY, DUODENOSCOPY (EGD), COMBINED      2007,& colonoscopy with polypectomy, [facility] essetnially normal EGD with small polyp removed     HEART CATH, ANGIOPLASTY       approx. ,angiogram, 2 stents, [facility]     IMPLANT PACEMAKER      ,Guidant ICD, CPI Vitality 2 EL, model #T177, serial #[id number], which was implanted on 2007 at [facility].     LAMINECTOMY LUMBAR ONE LEVEL      ,decompressive, L3, L4 & L5 with improvement, Dr. Lopes       Prior to Admission Medications   Prior to Admission Medications   Prescriptions Last Dose Informant Patient Reported? Taking?   Lidocaine (LIDOCARE) 4 % Patch   Yes No   Sig: Place 1-2 patches onto the skin daily as needed for moderate pain   Nutritional Supplements (GLUCOSE MANAGEMENT) TABS   No No   Sig: Take 15 g by mouth as needed (blood sugar less than 70)   allopurinol (ZYLOPRIM) 100 MG tablet   Yes No   Sig: Take 1 tablet by mouth once daily.   amoxicillin (AMOXIL) 500 MG capsule   Yes No   Sig: Take 2,000 mg by mouth daily as needed (dental appointments)   aspirin 81 MG EC tablet   Yes No   Sig: Take 81 mg by mouth daily   blood glucose (NO BRAND SPECIFIED) lancets standard   No No   Sig: Use to test blood sugar 3 times daily or as directed.   blood glucose monitoring (NO BRAND SPECIFIED) meter device kit   No No   Sig: Use to test blood sugar 3 times daily or as directed.   blood glucose monitoring (NO BRAND SPECIFIED) test strip   No No   Sig: Use to test blood sugars 3 times daily or as directed   cefdinir (OMNICEF) 300 MG capsule   No No   Sig: Take 1 capsule (300 mg) by mouth 2 times daily   cholecalciferol (VITAMIN D-1000 MAX ST) 1000 UNITS TABS   Yes No   Sig: Take one tablet by mouth daily   diclofenac (VOLTAREN) 1 % GEL topical gel   Yes No   Sig: Place 4 g onto the skin 4 times daily as needed for moderate pain   furosemide (LASIX) 20 MG tablet   Yes No   Sig: Take 20 mg by mouth daily as needed One tab daily as needed.    glucagon (GLUCAGON EMERGENCY) 1 MG kit   No No   Sig: Inject 1 mg into the muscle once as needed for low blood sugar   insulin aspart prot & aspart (NOVOLOG MIX 70/30 PEN) injection   No No   Si  "units every morning and 10 units every evening   insulin pen needle (EASY TOUCH PEN NEEDLES) 31G X 8 MM   No No   Sig: FOR ADMINISTERING INSULIN AT HOME TWICE DAILY. Dx: E11.8   lisinopril (PRINIVIL/ZESTRIL) 2.5 MG tablet   Yes No   Sig: Take 2.5 mg by mouth daily   metoprolol succinate (TOPROL-XL) 25 MG 24 hr tablet   No No   Sig: TAKE 1 AND 1/2 TABLETS BY MOUTH EVERY DAY   order for DME   Yes No   Sig: Wheelchair with elevating leg rests/foot rest. For home use. Length of need: 99 mo   potassium chloride SA (K-DUR/KLOR-CON M) 20 MEQ CR tablet   Yes No   Sig: Take 1 tablet by mouth once every other day with a meal.   simvastatin (ZOCOR) 20 MG tablet   No No   Sig: TAKE ONE TABLET BY MOUTH AT BEDTIME   warfarin (COUMADIN) 2 MG tablet   Yes No   Sig: HOLD x two days, recheck INR on 12/07/18.  Or as directed by the protime clinic.      Facility-Administered Medications: None     Allergies   Allergies   Allergen Reactions     Codeine Unknown     Naproxen Other (See Comments)     \"kidneys shut down\"     Sulfa Drugs Unknown     Tramadol Nausea     Vancomycin Unknown       Social History   I have reviewed this patient's social history and updated it with pertinent information if needed. Sukhdeep Gomez  reports that he has never smoked. He has never used smokeless tobacco. He reports that he does not drink alcohol or use illicit drugs.    Family History   I have reviewed this patient's family history and updated it with pertinent information if needed.   Family History   Problem Relation Age of Onset     Heart Disease Father      Heart Disease     Prostate Cancer Brother      Cancer-prostate       Review of Systems     REVIEW OF SYSTEMS:    Constitutional: normal energy and appetite, no recent sick contacts  Other than what is noted in the HPI, a comprehensive review of systems is negative.  Patient states he just does not feel well.  He has had no GI or  symptoms other than noting the decreased urine output.  He has " become increasingly weak.  No specific neurologic, psychiatric or endocrine symptoms        Physical Exam   Temp: 96.8  F (36  C) Temp src: Tympanic BP: 128/48 Pulse: 96 Heart Rate: 91 Resp: 23 SpO2: 97 % O2 Device: Nasal cannula Oxygen Delivery: 4 LPM  Vital Signs with Ranges  Temp:  [96.8  F (36  C)] 96.8  F (36  C)  Pulse:  [76-96] 96  Heart Rate:  [88-98] 91  Resp:  [16-28] 23  BP: (113-128)/(48-77) 128/48  SpO2:  [61 %-100 %] 97 %  0 lbs 0 oz    Constitutional: He is awake and alert, lying on the ED cart in no apparent distress.  He has oxygen in place.  Oxygen saturations were 75% on admission but increased to over 90% with oxygen.  His weight is up 23 pounds in the last 2 months  Eyes: No scleral icterus.  Pupils are equal round and reactive and extraocular movements are intact.  HEENT: ENT is unremarkable except for poor dentition.  Neck is supple with good carotid pulses, no bruits are heard.  There is positive JVD.  Respiratory: There are decreased breath sounds bilaterally with diminished breath sounds at the bases.  Scattered rales are heard at the left base.  No rhonchorous breath sounds and no wheezes.  Cardiovascular: Distant tones, irregularly irregular rhythm, very soft apical murmur.  No gallop heard.  GI: Obese, soft and nontender with no organomegaly or masses, bowel sounds are normal  Lymph/Hematologic: No bruising, no adenopathy noted  Genitourinary: Mendosa catheter in place  Skin: No specific rashes or lesions noted.  Skin is warm and dry, not diaphoretic  Musculoskeletal: Full range of motion, 2+ pitting edema to the knees  Neurologic: No focal findings  Psychiatric: Alert and oriented with normal mood and affect    Data   Data reviewed today:  I personally reviewed the EKG tracing showing Inverted T waves in leads I and aVL, nonspecific ST-T wave changes, irregularly irregular rhythm, unchanged from his previous EKG and the chest x-ray image(s) showing Cardiomegaly, pleural effusion on the  left, increased cephalization..  Laboratory studies were reviewed.  Hemoglobin is low at 9.0.  This appears to be chronic.  White count is normal.  Troponin is elevated at 0.126, likely related to his heart failure.  BNP is elevated, d-dimer minimally elevated.  The remainder of his labs are unremarkable except for a creatinine of 2.21.  Electrolytes are normal.  Glucose is 120.    Recent Labs  Lab 12/07/18  1447 12/05/18  1141 12/05/18   WBC 9.5  --   --    HGB 9.0*  --   --      --   --      --   --    INR 2.37* 4.91* >8.0     --   --    POTASSIUM 3.7  --   --    CHLORIDE 109*  --   --    CO2 21  --   --    BUN 56*  --   --    CR 2.21*  --   --    ANIONGAP 10  --   --    KAE 8.4*  --   --    *  --   --    TROPI 0.126*  --   --        Recent Results (from the past 24 hour(s))   XR Chest Port 1 View    Narrative    PROCEDURE:  XR CHEST PORT 1 VW    HISTORY: CHF exacerbation? heart size?; . .    COMPARISON:  10/18/2018    FINDINGS:  A left chest automatic implantable cardiac defibrillator/pacemaker is  present.  The cardiomediastinal contours are enlarged, grossly unchanged. There  is calcific aortic atherosclerosis.   There is some opacification of the left lung base, likely a new  effusion. No pneumothorax is seen.      Impression    IMPRESSION:  Cardiomegaly. Probable left pleural effusion. Recommend  follow-up.      LUANNE CHRISTOPHER MD     Visited with patient.  Says he is feeling better, less short of breath.  Urine output is picking up somewhat.  Vital signs remained stable.  Troponin is 0.124, trending downward.    Plan: Continue current treatments, diuresis, and recheck labs in the morning.  PETER HOWELL MD on 12/7/2018 at 8:37 PM

## 2018-12-07 NOTE — PROGRESS NOTES
[facility] ADMISSION NOTE    Patient admitted to room 331 at approximately 1718 via cart from emergency room. Patient was accompanied by other: CNA.     Verbal SBAR report received from CHARY Gregorio prior to patient arrival.     Patient trasferred to bed via slip sheet. Patient alert and oriented X 3. The patient is not having any pain. 0-10 Pain Scale: 0. Admission vital signs: Blood pressure 130/65, pulse 96, temperature 96.6  F (35.9  C), temperature source Tympanic, resp. rate 18, weight 78.6 kg (173 lb 4.5 oz), SpO2 98 %. Patient was oriented to plan of care, call light, bed controls, tv, telephone, bathroom and visiting hours.     Risk Assessment    The following safety risks were identified during admission: fall. Yellow risk band applied: YES.     Alysha Livingston

## 2018-12-08 NOTE — PROVIDER NOTIFICATION
12/08/18 1036 12/08/18 1101 12/08/18 1248   Point of Care Testing   Puncture Site fingertip fingertip fingertip   Bedside Glucose (mg/dl )  56 mg/dl 103 mg/dl 164 mg/dl     Patient has had increase in blood glucose with apple juice and last  after eating lunch.  Patient has denied any symptoms.  MD has been notified.  Patient prefers to be sitting up in chair throughout the morning.  Patient has been repositioned frequently in chair. Patient remains alert, oriented and uses call light appropriately, also asked to be assisted with repositioning when needed.  No new skin breakdown this shift.  Sp02 has >92% on 3L via NC, no shortness of breath reported while patient up in chair.

## 2018-12-08 NOTE — PROGRESS NOTES
12/08/18 1100   Cognitive Status Examination   Orientation orientation to person, place and time   Level of Consciousness alert   Able to Follow Commands success, 2-step commands   Mobility   Bed Mobility Bed mobility skill: Supine to sit   Bed Mobility Skill: Supine to Sit   Level of [place]: Supine/Sit minimum assist (75% patients effort)   Physical Assist/Nonphysical Assist: Supine/Sit 1 person assist   Assistive Device: Supine/Sit bedrail   Transfer Skills   Transfer Transfer Skill: Stand to Sit   Transfer Skill: Bed to Chair/Chair to Bed   Level of [place]: Bed to Chair minimum assist (75% patients effort)   Physical Assist/Nonphysical Assist: Bed to Chair 2 persons   Transfer Skill: Sit to Stand   Level of [place]: Sit/Stand minimum assist (75% patients effort)   Bathing   Physical Assist/Nonphysical Assist 2 person assist   General Therapy Interventions   Planned Therapy Interventions ADL retraining;progressive activity/exercise;bed mobility training;transfer training   Intervention Comments pt completed bed mobility and transfer from bed to chair with min assist x 2 overall due to weakness    Clinical Impression   Criteria for Skilled Therapeutic Interventions Met yes, treatment indicated   OT Diagnosis weakness   Assessment of Occupational Performance 1-3 Performance Deficits   Identified Performance Deficits dressing, bathing and grooming   Clinical Decision Making (Complexity) Low complexity   Predicted Duration of Therapy Intervention (days/wks) 3-4 days   Anticipated Discharge Disposition Home with Home Therapy   Risks and Benefits of Treatment have been explained. Yes   Patient, Family & other staff in agreement with plan of care Yes   Total Evaluation Time   Total Evaluation Time (Minutes) 15

## 2018-12-08 NOTE — PROVIDER NOTIFICATION
12/08/18 1012   Point of Care Testing   Puncture Site fingertip   Bedside Glucose (mg/dl )  49 mg/dl     Unable to take blood glucose earlier d/t all monitors requiring docking from lab.  Patient had eaten one bowl of oatmeal, tested about 15 minutes after meal, BG 49.  Patient given some apple juice and will retest in 15 minutes.  Patient denies any symptoms and states normally he feels some numbness in lips when BG is low although denies this symptom with last two low blood glucose checks.  Patient denies any pain or discomfort, VSS.  Patient up to chair with PT, requires assist x 2.

## 2018-12-08 NOTE — PLAN OF CARE
Problem: Cardiac: Heart Failure (Adult)  Goal: Signs and Symptoms of Listed Potential Problems Will be Absent, Minimized or Managed (Cardiac: Heart Failure)  Signs and symptoms of listed potential problems will be absent, minimized or managed by discharge/transition of care (reference Cardiac: Heart Failure (Adult) CPG).  Patient had a 7 beat run of V tac, VSS, Patient was on 4 lpm O2 but was titrated to 3 LPM o2 sats at 98%. Lung sounds clear bilaterally. Both lower legs have sore which were dressed with Telfa and Kerlix. Sore on left foot which patient says was from a blister is wrapped with Telfa and  Kerlix also. Drainage minimal. Dressings had to be loosened due to discomfort. Legs elevated on a pillow. Kathia Rios RN 12/8/2018 5:18 AM         Problem: Diabetes Comorbidity  Goal: Diabetes  Patient comorbidity will be monitored for signs and symptoms of hyperglycemia or hypoglycemia. Problems will be absent, minimized or managed by discharge/transition of care.  Patient had BG of 46, was given 2 OJ BG went up to 65. Patient then ate pudding. Patient denies feeling symptoms of low blood sugar. He says he usually knows when he has low BG but did not notice it this time. Kathia Rios RN 12/8/2018 5:12 AM

## 2018-12-08 NOTE — PROGRESS NOTES
12/08/18 0800   Quick Adds   Type of Visit Initial PT Evaluation       Present no   Living Environment   Lives With child(jason), adult   Living Arrangements house   Home Accessibility ramps present at home;bed and bath on same level;grab bars present (bathtub);grab bars present (toilet)   Number of Stairs to Enter Home 0   Number of Stairs Within Home 10  (Does not use them. Son lives in basement)   Transportation Available family or friend will provide   Living Environment Comment Has Walker and W/c at home. Denies issues with mobility at home. Does not use stairs. Has railing outside of home   Self-Care   Activity/Exercise/Self-Care Comment Is getting Outpatient physical therapy to work on his strength and endurance   Functional Level Prior   Ambulation 3-->assistive equipment and person   Toileting 3-->assistive equipment and person   Bathing 1-->assistive equipment   Dressing 2-->assistive person   Eating 0-->independent   Communication 0-->understands/communicates without difficulty   Swallowing 0-->swallows foods/liquids without difficulty   Cognition 0 - no cognition issues reported   Fall history within last six months yes   Which of the above functional risks had a recent onset or change? ambulation;transferring;toileting;dressing   General Information   Onset of Illness/Injury or Date of Surgery - Date 12/08/18   Referring Physician Dr. Sanchez   Patient/Family Goals Statement To improve his strength and stability   Precautions/Limitations fall precautions   General Info Comments Patient states that he feels much better today when compared to the past few days. Reports more stability on his feet and less fatigue   Cognitive Status Examination   Orientation orientation to person, place and time   Level of Consciousness alert   Follows Commands and Answers Questions 100% of the time   Personal Safety and Judgment intact   Memory intact   Integumentary/Edema   Integumentary/Edema  other (describe)   Integumentary/Edema Comments Patient has LE's wrapped and bandaged due to drainage   Posture    Posture Forward head position;Protracted shoulders   Bed Mobility   Bed Mobility Bed mobility skill: Supine to sit   Bed Mobility Skill: Supine to Sit   Level of [place]: Supine/Sit minimum assist (75% patients effort)   Physical Assist/Nonphysical Assist: Supine/Sit 1 person + 1 person to manage equipment   Assistive Device: Supine/Sit bed rails   Transfer Skills   Transfer Transfer Skill: Sit to Stand   Transfer Skill:  Sit to Stand   Level of [place]: Sit/Stand minimum assist (75% patients effort)   Physical Assist/Nonphysical Assist: Sit/Stand 1 person + 1 person to manage equipment   Weightbearing Restrictions: Sit/Stand full weight-bearing   Assistive Device for Transfer: Sit/Stand rolling walker   General Therapy Interventions   Planned Therapy Interventions bed mobility training;neuromuscular re-education;ROM;strengthening;stretching, gait training   Clinical Impression   Criteria for Skilled Therapeutic Intervention yes, treatment indicated   PT Diagnosis Impaired mobility, decreased strength and activity tolerance, impaired balance   Influenced by the following impairments weakness, fatigue   Functional limitations due to impairments ambulation, transfers   Clinical Presentation Stable/Uncomplicated   Clinical Decision Making (Complexity) Low complexity   Therapy Frequency` daily   Predicted Duration of Therapy Intervention (days/wks) 2-3 days   Anticipated Discharge Disposition Home with Outpatient Therapy   Risk & Benefits of therapy have been explained Yes   Patient, Family & other staff in agreement with plan of care Yes   Total Evaluation Time   Total Evaluation Time (Minutes) 15

## 2018-12-08 NOTE — PHARMACY-ANTICOAGULATION SERVICE
Pharmacy Consult- Initial Coumadin Assessment    Sukhdeep Gomez is a 84 year old male admitted on 12/7/2018 with Acute on chronic systolic congestive heart failure (H)    Primary Indication(s) for Anticoagulation: atrial fibrillation    Goal INR: 2- 3    FYI, patient is followed by the Anticoagulation/Protime Clinic at: [facility]    Patient Active Problem List   Diagnosis     ACP (advance care planning)     Altered level of consciousness     Anticoagulation monitoring, INR range 2-3     Atrial fibrillation (H)     Basal cell carcinoma of right forehead     Chronic systolic heart failure (H)     CKD (chronic kidney disease) stage 3, GFR 30-59 ml/min (H)     Osteoarthrosis     Diabetes mellitus with multiple complications (H)     Diabetic neuropathy (H)     Elevated INR     Elevated troponin     Essential hypertension     Gout of left foot due to renal impairment     Hamstring tightness of left lower extremity     Hematoma of right hip     History of TIA (transient ischemic attack)     Implantable cardioverter-defibrillator, PlanGrid, Single lead - Not Dependent     Ischemic cardiomyopathy     Lumbago     Macrocytic anemia     Enthesopathy of ankle and tarsus     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     Mixed hyperlipidemia     Onychomycosis     Orthostatic hypotension     Pes planus     Prostate CA (H)     S/P CABG (coronary artery bypass graft)     Spinal stenosis     Squamous cell carcinoma of skin     Stricture of male urethra     Yeast dermatitis     Unspecified atrial fibrillation     Ascending aorta dilatation (H)     Acute on chronic systolic congestive heart failure (H)     H/O myocardial infarction, greater than 8 weeks     Paroxysmal atrial fibrillation (H)     S/P CABG x 4     Stented coronary artery     Muscle weakness (generalized)     Myopathy     Urinary retention     Herpes zoster ophthalmicus, left eye     Hypoglycemia due to type 2 diabetes mellitus (H)     Urinary tract  infection associated with indwelling urethral catheter (H)     Anemia in other chronic diseases classified elsewhere     Systolic heart failure (H)       Patient previously anticoagulated on Coumadin at a dose of 10mg/week; dosed as: 2 mg Monday, Wednesday, and Friday, 1 mg ROW    Factors that may increase patient's sensitivity to warfarin (Coumadin) include: aggressive diuresis, acute illness    Factors that may decrease patient's sensitivity to warfarin (Coumadin) include: none noted.     Recent Labs   Lab Test  12/07/18   1447  12/05/18   1141 12/05/18 11/07/18   10/17/18   1012   10/12/18   1240   10/08/18   0956   HGB  9.0*   --    --    --    --   9.1*   --   8.4*   --   9.7*   INR  2.37*  4.91*  >8.0  2.2   < >   --    < >  1.48*   < >  2.88*   PLT  206   --    --    --    --   203   --   200   --   232    < > = values in this interval not displayed.        Plan: Patient has held two doses as directed by Protime clinic on 12/05/18 and 12/06/18. His INR has returned to a normal range. Will give patient 2 mg today and continue to monitor closely. Will check INR on 12/08/18.     Thank You for the Consult. Will continue to follow.    Alexandrea Plascencia [facility] ....................  12/7/2018   6:42 PM

## 2018-12-08 NOTE — PLAN OF CARE
Problem: Patient Care Overview  Goal: Plan of Care/Patient Progress Review  Discharge Planner PT   Patient plan for discharge: To return home to live with his son  Current status: Required min assist for bed mobility and transfer from bed to recliner. Reports fatigue with activity.   Barriers to return to prior living situation: strength, fatigue, balance  Recommendations for discharge: To return home to live with son and continue with outpatient physical therapy.   Rationale for recommendations: See above       Entered by: Laurent Otero 12/08/2018 11:19 AM

## 2018-12-08 NOTE — PHARMACY-ADMISSION MEDICATION HISTORY
Pharmacy -- Admission Medication Reconciliation    Prior to admission (PTA) medications were reviewed and the patient's PTA medication list was updated.    Sources Consulted: Patient, Patient's son Mert Reg Lawson  ** Note completed prior to [employer] [facility] [employer] records received.     The reliability of this Medication Reconciliation is: Reliability: Reliable    The following significant changes were made:  1. Removed cefdinir - per son and patient  2. Removed diclofenac - per son and patient  3. Removed lidocaine patch  4. Adjusted metoprolol to 25 mg daily per patient's son  5. Adjusted furosemide per patient and patient's son    Per patients son the patient has not missed any doses of warfarin within the last week with the exception of those he was directed to HOLD on 12/05/18 and 12/06/18.     In addition, the patient's allergies were reviewed with the patient and updated as follows:   Allergies: Naproxen; Sulfa drugs; and Vancomycin    The pharmacist has reviewed with the patient that all personal medications should be removed from the building or locked in the belongings safe.  Patient shall only take medications ordered by the physician and administered by the nursing staff.     Medication barriers identified: Patient has all medications set up for him by his son and self administers.    Medication adherence concerns: none noted.    Understanding of emergency medications: Patient has had hypoglycemia in the past. He verbalizes appropriate self recognition and treatment.     Alexandrea Plascencia [facility], 12/7/2018,  6:30 PM

## 2018-12-09 NOTE — PLAN OF CARE
Problem: Patient Care Overview  Goal: Plan of Care/Patient Progress Review  Outcome: Therapy, progress toward functional goals as expected  Patient has maintained Sp02 >92% and weaned to 2L via NC.  Denies any shortness of breath.  Wanted to be up in recliner throughout most of the day, refused to take breaks in bed.  Patient was frequently repositioned in chair and had pillow support.  No new skin breakdown this shift.  Dressing on wounds changed, no new open areas.  Patient has denied any pain or discomfort.  Visited with family.  Continues to have moderate generalized edema.

## 2018-12-09 NOTE — PROGRESS NOTES
[facility]    Hospitalist Progress Note      Assessment & Plan   Sukhdeep Gomez is a 84 year old male who was admitted on 12/7/2018.     Principal Problem:    Acute on chronic systolic congestive heart failure (H)    Assessment: Clinically markedly improved.  A different scale was used for weight today, up 4 pounds however he has put out over 1500 mL of urine over 2 shifts, just under 100 mL/h.  Edema is markedly improved.    Plan: We will continue diuresis but cut Lasix back to every 8 hours and echocardiogram will be scheduled tomorrow  Active Problems:    Atrial fibrillation (H)    Assessment: Remain stable    Plan: No change in meds    Diabetes mellitus with multiple complications (H)    Assessment: Blood glucose has been relatively well controlled with current regimen    Plan: Continue same    Essential hypertension    Assessment: Adequate control    Plan: Continue current meds    Anemia in other chronic diseases classified elsewhere    Assessment: Hemoglobin is trending downward    Plan: Continue to follow    Systolic heart failure (H)    Assessment: Stable-improved    Plan: Continue diuresis    DVT Prophylaxis: Warfarin  Code Status: Full Code    PETER HOWELL    Interval History   Feeling better today.  No chest pain or dyspnea.    -Data reviewed today: I reviewed all new labs and imaging results over the last 24 hours. I personally reviewed no images or EKG's today.  Lab work was reviewed.  Hemoglobin is trending downward slightly at 8.3.  Creatinine is markedly improved now at just under 2.  Electrolytes are normal.  Blood glucose monitoring shows adequate blood sugar control.    Physical Exam   Temp: 97  F (36.1  C) Temp src: Tympanic BP: (!) 120/96   Heart Rate: 77 Resp: 20 SpO2: 97 % O2 Device: None (Room air) Oxygen Delivery: 2 LPM(wean from oxygen at this time )  Vitals:    12/07/18 1742 12/09/18 0700   Weight: 78.6 kg (173 lb 4.5 oz) 80.5 kg (177 lb 6.4 oz)     Vital  Signs with Ranges  Temp:  [96.7  F (35.9  C)-97.8  F (36.6  C)] 97  F (36.1  C)  Heart Rate:  [77-94] 77  Resp:  [18-24] 20  BP: ()/(35-96) 120/96  SpO2:  [94 %-99 %] 97 %  I/O last 3 completed shifts:  In: 680 [P.O.:680]  Out: 900 [Urine:900]    Constitutional: He is awake and alert, sitting up in bed, in no apparent distress  Respiratory: Lungs are relatively clear  Cardiovascular: Appears to be in a paced rhythm at this time, no gallop  GI: Abdomen is soft and nontender  Skin/Integumen: Edema is markedly decreased  Other: According to the scale weight is up 4 pounds, however a different scale was used.  In the last 16 hours he has put out roughly 95 mL/h.    Medications     Continuing ACE inhibitor/ARB/ARNI from home medication list OR ACE inhibitor/ARB order already placed during this visit       - MEDICATION INSTRUCTIONS -       - MEDICATION INSTRUCTIONS -       Warfarin Therapy Reminder         aspirin  81 mg Oral Daily     atorvastatin  10 mg Oral QPM     furosemide  40 mg Intravenous Q6H     insulin aspart prot & aspart  10 Units Subcutaneous Daily with supper     insulin aspart prot & aspart  22 Units Subcutaneous QAM AC     lisinopril  2.5 mg Oral Daily     metoprolol succinate ER  25 mg Oral Daily     warfarin  0.5 mg Oral ONCE at 18:00       Data   Recent Labs   Lab 12/09/18  0441 12/08/18  0400 12/07/18  1900 12/07/18  1447   WBC 9.0 8.8  --  9.5   HGB 8.3* 8.9*  --  9.0*   MCV 97 99  --  100    188  --  206   INR 2.99* 2.53*  --  2.37*    142  --  140   POTASSIUM 4.4 4.0  --  3.7   CHLORIDE 110* 110*  --  109*   CO2 22 23  --  21   BUN 56* 55*  --  56*   CR 1.98* 2.11*  --  2.21*   ANIONGAP 7 9  --  10   KAE 8.0* 8.3*  --  8.4*   * 146*  --  120*   TROPI  --   --  0.124* 0.126*       No results found for this or any previous visit (from the past 24 hour(s)).

## 2018-12-09 NOTE — PHARMACY-ANTICOAGULATION SERVICE
Pharmacy Consult- Coumadin Follow-Up    Sukhdeep Gomez is a 84 year old male admitted on 12/7/2018 with Acute on chronic systolic congestive heart failure (H)    Primary Indication(s) for Anticoagulation: atrial fibrillation    Goal INR: 2 - 3    FYI, patient is followed by the Anticoagulation/Protime Clinic at: [facility]    Patient Active Problem List   Diagnosis     ACP (advance care planning)     Altered level of consciousness     Anticoagulation monitoring, INR range 2-3     Atrial fibrillation (H)     Basal cell carcinoma of right forehead     Chronic systolic heart failure (H)     CKD (chronic kidney disease) stage 3, GFR 30-59 ml/min (H)     Osteoarthrosis     Diabetes mellitus with multiple complications (H)     Diabetic neuropathy (H)     Elevated INR     Elevated troponin     Essential hypertension     Gout of left foot due to renal impairment     Hamstring tightness of left lower extremity     Hematoma of right hip     History of TIA (transient ischemic attack)     Implantable cardioverter-defibrillator, [id number], Single lead - Not Dependent     Ischemic cardiomyopathy     Lumbago     Macrocytic anemia     Enthesopathy of ankle and tarsus     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     Mixed hyperlipidemia     Onychomycosis     Orthostatic hypotension     Pes planus     Prostate CA (H)     S/P CABG (coronary artery bypass graft)     Spinal stenosis     Squamous cell carcinoma of skin     Stricture of male urethra     Yeast dermatitis     Unspecified atrial fibrillation     Ascending aorta dilatation (H)     Acute on chronic systolic congestive heart failure (H)     H/O myocardial infarction, greater than 8 weeks     Paroxysmal atrial fibrillation (H)     S/P CABG x 4     Stented coronary artery     Muscle weakness (generalized)     Myopathy     Urinary retention     Herpes zoster ophthalmicus, left eye     Hypoglycemia due to type 2 diabetes mellitus (H)     Urinary tract  infection associated with indwelling urethral catheter (H)     Anemia in other chronic diseases classified elsewhere     Systolic heart failure (H)       New factors that may increase patient's sensitivity to warfarin (Coumadin) include: aggressive diuresis, acute illness    New factors that may decrease patient's sensitivity to warfarin (Coumadin) include: none noted      Recent Labs   Lab Test 12/09/18  0441 12/08/18  0400 12/07/18  1447 12/05/18  1141  10/17/18  1012   HGB 8.3* 8.9* 9.0*  --   --  9.1*   INR 2.99* 2.53* 2.37* 4.91*   < >  --     188 206  --   --  203    < > = values in this interval not displayed.        Recent Dosing:  Anticoagulation Dose History     Recent Dosing and Labs Latest Ref Rng & Units 10/12/2018 10/17/2018 11/7/2018 12/5/2018 12/7/2018 12/8/2018 12/9/2018    Warfarin 0.5 mg - - - - - - 0.5 mg -    Warfarin 2 mg - - - - - 2 mg - -    INR 0 - 1.3 1.48(H) 1.68(H) - 4.91(HH) 2.37(H) 2.53(H) 2.99(H)    INR 2.0 - 3.0 - 1.7(A) 2.2 >8.0 - - -        Plan: Patient's INR continues to rise, although currently therapeutic. Will plan to give warfarin 0.5 mg by mouth one time for one dose and continue to monitor INR closely. Recheck INR on 12/10/18.    Thank You for the Consult. Will continue to follow.    Alexandrea Plascencia [facility] ....................  12/9/2018   8:09 AM

## 2018-12-09 NOTE — PLAN OF CARE
Discharge Planner OT   Patient plan for discharge: anticipates discharge home  Current status: weakness, impaired mobility, min assist overall for self cares  Barriers to return to prior living situation: none  Recommendations for discharge: home with outpatient therapy  Rationale for recommendations: therapist observation       Entered by: Linda Millard 12/09/2018 9:30 AM

## 2018-12-09 NOTE — PLAN OF CARE
Patients VSS, afebrile, was awake most of the night, denied pain. Patient able to ween off O2, sats at 94%RA. No SOB when ambulating to chair from bed. Weight has increased, see flow sheet. Lasix held at 0400 for B/P 92/40. Kathia Rios RN 12/9/2018 7:48 AM

## 2018-12-09 NOTE — PHARMACY-ANTICOAGULATION SERVICE
Pharmacy Consult- Coumadin Follow-Up    Sukhdeep Gomez is a 84 year old male admitted on 12/7/2018 with Acute on chronic systolic congestive heart failure (H)    Primary Indication(s) for Anticoagulation: atrial fibrillation    Goal INR: 2-3    FYI, patient is followed by the Anticoagulation/Protime Clinic at: [facility]    Patient Active Problem List   Diagnosis     ACP (advance care planning)     Altered level of consciousness     Anticoagulation monitoring, INR range 2-3     Atrial fibrillation (H)     Basal cell carcinoma of right forehead     Chronic systolic heart failure (H)     CKD (chronic kidney disease) stage 3, GFR 30-59 ml/min (H)     Osteoarthrosis     Diabetes mellitus with multiple complications (H)     Diabetic neuropathy (H)     Elevated INR     Elevated troponin     Essential hypertension     Gout of left foot due to renal impairment     Hamstring tightness of left lower extremity     Hematoma of right hip     History of TIA (transient ischemic attack)     Implantable cardioverter-defibrillator, Bionanoplus, Single lead - Not Dependent     Ischemic cardiomyopathy     Lumbago     Macrocytic anemia     Enthesopathy of ankle and tarsus     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     Mixed hyperlipidemia     Onychomycosis     Orthostatic hypotension     Pes planus     Prostate CA (H)     S/P CABG (coronary artery bypass graft)     Spinal stenosis     Squamous cell carcinoma of skin     Stricture of male urethra     Yeast dermatitis     Unspecified atrial fibrillation     Ascending aorta dilatation (H)     Acute on chronic systolic congestive heart failure (H)     H/O myocardial infarction, greater than 8 weeks     Paroxysmal atrial fibrillation (H)     S/P CABG x 4     Stented coronary artery     Muscle weakness (generalized)     Myopathy     Urinary retention     Herpes zoster ophthalmicus, left eye     Hypoglycemia due to type 2 diabetes mellitus (H)     Urinary tract infection  associated with indwelling urethral catheter (H)     Anemia in other chronic diseases classified elsewhere     Systolic heart failure (H)       New factors that may increase patient's sensitivity to warfarin (Coumadin) include: aggressive diuresis, acute illness    New factors that may decrease patient's sensitivity to warfarin (Coumadin) include: none noted    Recent Labs   Lab Test  12/08/18   0400  12/07/18   1447  12/05/18   1141 12/05/18   10/17/18   1012   10/12/18   1240   HGB  8.9*  9.0*   --    --    --   9.1*   --   8.4*   INR  2.53*  2.37*  4.91*  >8.0   < >   --    < >  1.48*   PLT  188  206   --    --    --   203   --   200    < > = values in this interval not displayed.        Recent Dosing:  Anticoagulation Dose History     Recent Dosing and Labs Latest Ref Rng & Units 10/11/2018 10/12/2018 10/17/2018 11/7/2018 12/5/2018 12/7/2018 12/8/2018    Warfarin 2 mg - - - - - - 2 mg -    INR 0 - 1.3 - 1.48(H) 1.68(H) - 4.91(HH) 2.37(H) 2.53(H)    INR 2.0 - 3.0 1.6(A) - 1.7(A) 2.2 >8.0 - -            Plan: Patient's INR is now therapeutic. Will cautiously give patient warfarin 0.5 mg by mouth one time for dose. Will recheck INR on 12/09/18.     Thank You for the Consult. Will continue to follow.    Alexandrea Plascencia [facility] ....................  12/8/2018   6:06 PM

## 2018-12-09 NOTE — PLAN OF CARE
Discharge Planner PT   Patient plan for discharge: To return home with son  Current status: Patient required min assist for bed mobility and CGA for transfer from bed to chair  Barriers to return to prior living situation: weakness, balance, fatigue  Recommendations for discharge: To continue with outpatient PT after discharge  Rationale for recommendations: See above       Entered by: Laurent Otero 12/09/2018 9:28 AM

## 2018-12-09 NOTE — PLAN OF CARE
Patient Care Overview  Individualization & Mutuality  Notified Dr. Sanchez of  of 84, pt asymptomatic.  Also notified of patient having urine output on the lower side <500 out via almeida in last 10 hours.  VORB to administer novolog per MAR, patient's home dose, patient has good appetite and has ordered supper.  Will continue to monitor urine output, VSS.  Patient has had hydrocerin placed on all wounds per MAR.  Patient denies any pain or discomfort.

## 2018-12-10 PROBLEM — N17.9 AKI (ACUTE KIDNEY INJURY) (H): Status: ACTIVE | Noted: 2018-01-01

## 2018-12-10 NOTE — PHARMACY-ANTICOAGULATION SERVICE
Pharmacy Consult- Coumadin Follow-Up    Sukhdeep Gomez is a 84 year old male admitted on 12/7/2018 with Acute on chronic systolic congestive heart failure (H)    Primary Indication(s) for Anticoagulation: atrial fibrillation    Goal INR: 2.5 (2-3)    FYI, patient is followed by the Anticoagulation/Protime Clinic at: [facility] - will need appointment made at discharge    Patient Active Problem List   Diagnosis     ACP (advance care planning)     Altered level of consciousness     Anticoagulation monitoring, INR range 2-3     Atrial fibrillation (H)     Basal cell carcinoma of right forehead     Chronic systolic heart failure (H)     CKD (chronic kidney disease) stage 3, GFR 30-59 ml/min (H)     Osteoarthrosis     Diabetes mellitus with multiple complications (H)     Diabetic neuropathy (H)     Elevated INR     Elevated troponin     Essential hypertension     Gout of left foot due to renal impairment     Hamstring tightness of left lower extremity     Hematoma of right hip     History of TIA (transient ischemic attack)     Implantable cardioverter-defibrillator, [employer], Single lead - Not Dependent     Ischemic cardiomyopathy     Lumbago     Macrocytic anemia     Enthesopathy of ankle and tarsus     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     Mixed hyperlipidemia     Onychomycosis     Orthostatic hypotension     Pes planus     Prostate CA (H)     S/P CABG (coronary artery bypass graft)     Spinal stenosis     Squamous cell carcinoma of skin     Stricture of male urethra     Yeast dermatitis     Unspecified atrial fibrillation     Ascending aorta dilatation (H)     Acute on chronic systolic congestive heart failure (H)     H/O myocardial infarction, greater than 8 weeks     Paroxysmal atrial fibrillation (H)     S/P CABG x 4     Stented coronary artery     Muscle weakness (generalized)     Myopathy     Urinary retention     Herpes zoster ophthalmicus, left eye     Hypoglycemia due to type 2  diabetes mellitus (H)     Urinary tract infection associated with indwelling urethral catheter (H)     Anemia in other chronic diseases classified elsewhere     Systolic heart failure (H)     HENRY (acute kidney injury) (H)     New factors that may increase patient's sensitivity to warfarin (Coumadin) include: continued anemia    New factors that may decrease patient's sensitivity to warfarin (Coumadin) include: none noted      Recent Labs   Lab Test 12/10/18  0526 12/09/18  0441 12/08/18  0400 12/07/18  1447   HGB 8.3* 8.3* 8.9* 9.0*   INR 2.15* 2.99* 2.53* 2.37*    177 188 206      Recent Dosing:    Date INR Dose Given   12/7 2.37 2 mg   12/8 2.53 0.5 mg   12/9 2.99 0.5 mg   12/10 2.15 1 mg     Plan: Warfarin 1 mg today.  Check INR in am.    Thank You for the Consult. Will continue to follow.    Damari Munoz [facility] ....................  12/10/2018   10:15 AM

## 2018-12-10 NOTE — PLAN OF CARE
Patient am blood sugar was 98, rechecked with 101. Spoke to MD and received VORB to hold the 22 units scheduled and give a one time dose of 11 units.

## 2018-12-10 NOTE — PLAN OF CARE
Patient has been unaware of irregular heart rhythm of ectopy that was reported to this writer by telly tech. VSS, afebrile, lotion applied to back, arms, and legs. Patient refuses to have elbow dressing changed and does not want dressings on legs. Patient continues to be on room air sats at 96%. Kathia Rios RN 12/10/2018 4:24 AM

## 2018-12-10 NOTE — PROGRESS NOTES
[facility]    Hospitalist Progress Note      Assessment & Plan   Sukhdeep Gomez is a 84 year old male who was admitted on 12/7/2018.     Principal Problem:    Acute on chronic systolic congestive heart failure (H)    Assessment: present on admission. Breathing better. Less edema.     Plan: Reduced lasix to BID from QID.Echo today. Continue lisinopril and metoprolol XL.    Active Problems:    Atrial fibrillation (H)    Assessment: chronic    Plan: continue warfarin and metoprolol      Diabetes mellitus with multiple complications (H)    Assessment: BG well controlled here.    Plan: monitor      Elevated troponin    Assessment: presume strain related, CKD and heart failure.      Plan: follow clinically. Echo today      Essential hypertension    Assessment: chronic        Implantable cardioverter-defibrillator, [employer], Single lead - Not Dependent    Assessment: chronic      Ischemic cardiomyopathy      HENRY (acute kidney injury) (H)    Assessment: creatinine bump with IV furosemide    Plan: reduce lasix, monitor.     Bilateral foot pain    Assessment:  presume neuropathy    Plan: start gabapentin daily, ramp up tomorrow to BID       Anemia    Assessment: unclear cause    Plan: follow hemoglobin    DVT Prophylaxis: Warfarin  Code Status: Full Code    Gideon Fernandes    Interval History   Feels much better. His biggest complaint is foot pain.    -Data reviewed today: I reviewed all new labs and imaging results over the last 24 hours. I personally reviewed no images or EKG's today.    Physical Exam   Temp: 97.6  F (36.4  C) Temp src: Tympanic BP: 103/56   Heart Rate: 103 Resp: 18 SpO2: 95 % O2 Device: None (Room air)    Vitals:    12/07/18 1742 12/09/18 0700 12/10/18 0500   Weight: 78.6 kg (173 lb 4.5 oz) 80.5 kg (177 lb 6.4 oz) 79.6 kg (175 lb 6.4 oz)     Vital Signs with Ranges  Temp:  [97.2  F (36.2  C)-97.6  F (36.4  C)] 97.6  F (36.4  C)  Heart Rate:  [] 103  Resp:  [18-20]  18  BP: (103-120)/(42-96) 103/56  SpO2:  [95 %-98 %] 95 %  I/O last 3 completed shifts:  In: 680 [P.O.:680]  Out: 700 [Urine:700]    GENERAL: Comfortable, no apparent distress.  CARDIOVASCULAR: [id number] rate and rhythm, no murmur. No lower extremity edema   RESPIRATORY: Clear to auscultation bilaterally, no wheezes or crackles.  GI: non-tender, non-distended, normal bowel sounds.   SKIN: warm periphery, no rashes    Medications     Continuing ACE inhibitor/ARB/ARNI from home medication list OR ACE inhibitor/ARB order already placed during this visit       - MEDICATION INSTRUCTIONS -       - MEDICATION INSTRUCTIONS -       Warfarin Therapy Reminder         aspirin  81 mg Oral Daily     atorvastatin  10 mg Oral QPM     furosemide  40 mg Intravenous Q12H     gabapentin  300 mg Oral Daily     insulin aspart prot & aspart  10 Units Subcutaneous Daily with supper     insulin aspart prot & aspart  22 Units Subcutaneous QAM AC     lisinopril  2.5 mg Oral Daily     metoprolol succinate ER  25 mg Oral Daily     warfarin  1 mg Oral ONCE at 18:00       Data   Recent Labs   Lab 12/10/18  0526 12/09/18  0441 12/08/18  0400 12/07/18  1900 12/07/18  1447   WBC 10.1 9.0 8.8  --  9.5   HGB 8.3* 8.3* 8.9*  --  9.0*   MCV 96 97 99  --  100    177 188  --  206   INR 2.15* 2.99* 2.53*  --  2.37*    139 142  --  140   POTASSIUM 4.2 4.4 4.0  --  3.7   CHLORIDE 108* 110* 110*  --  109*   CO2 22 22 23  --  21   BUN 62* 56* 55*  --  56*   CR 2.45* 1.98* 2.11*  --  2.21*   ANIONGAP 8 7 9  --  10   KAE 8.0* 8.0* 8.3*  --  8.4*   GLC 88 132* 146*  --  120*   TROPI  --   --   --  0.124* 0.126*

## 2018-12-10 NOTE — PROGRESS NOTES
Diet instruction:   Attempted x 2 to talk with pt today, was sleeping both times and did not wake with knock and name call. Left written information with pt on table. Will talk with if still here on Wednesday.     Alma Delia Hernandez RD on 12/10/2018 at 3:58 PM

## 2018-12-11 NOTE — PLAN OF CARE
1600 lasix held due to a BP of 84/53, MD aware. Uneventful day. Patient denies pain and states that he was happy to rest during the afternoon.

## 2018-12-11 NOTE — PLAN OF CARE
Patient A & O x 3, afebrile b/p 90's systolic, MD aware. Jonathon sounds are diminished throughout, no shortness of breath reported, [id number] 96% on RA. Apical pulse irregular, nails beds purple, cap refill greater than 3 seconds on hands bilaterally, used ear probe for saturation readings. All extremities are cool, +3 generalized edema, feet elevated on pillows. Bowel sounds active denies any nausea.Patient refused any dressing applied to wounds, denies any pain, will continue to monitor. Osiris Fowler RN on 12/11/2018 at 4:26 PM

## 2018-12-11 NOTE — PROGRESS NOTES
[facility]    Hospitalist Progress Note      Assessment & Plan   Sukhdeep Gomez is a 84 year old male who was admitted on 12/7/2018.       Acute on chronic systolic congestive heart failure (H)    Assessment: present on admission. Echo shows marked decrease in EF and contractility in past year.  Ejection fraction has been 40%, now 20-30%.  Poor diuresis yesterday.    Plan: Lasix 40 mg IV every 8 hours.  Hold lisinopril due to renal function and low blood pressure. Continue metoprolol XL.     Active Problems:    Atrial fibrillation (H)    Assessment: chronic, rate controlled    Plan: continue warfarin and metoprolol       Diabetes mellitus with multiple complications (H)    Assessment: BG well controlled here.    Plan: monitor       Elevated troponin    Assessment: presume strain related, CKD and heart failure.      Plan: follow clinically.        Essential hypertension    Assessment: chronic        Implantable cardioverter-defibrillator, [employer], Single lead - Not Dependent    Assessment: chronic       Ischemic cardiomyopathy       HENRY (acute kidney injury) (H)    Assessment: creatinine bump with IV furosemide.  When decreased frequency, poor diuresis.    Plan: Lasix 40 mg IV every 8 hours.  Hold lisinopril.      Bilateral foot pain    Assessment:  presume neuropathy    Plan: start gabapentin yesterday, BID today.       Anemia    Assessment: unclear cause    Plan: follow hemoglobin, check iron and B12, folate        DVT Prophylaxis: Warfarin  Code Status: Full Code    Gideon Fernandes    Interval History   Feels better. No chest pain. Still dyspnea. No fevers, chills.     -Data reviewed today: I reviewed all new labs and imaging results over the last 24 hours. I personally reviewed the Echo image(s) showing reduced EF and WMA's.    Physical Exam   Temp: 96.3  F (35.7  C) Temp src: Tympanic BP: 111/56   Heart Rate: 84 Resp: 16 SpO2: 98 % O2 Device: None (Room air)    Vitals:     12/09/18 0700 12/10/18 0500 12/11/18 0514   Weight: 80.5 kg (177 lb 6.4 oz) 79.6 kg (175 lb 6.4 oz) 83.3 kg (183 lb 10.3 oz)     Vital Signs with Ranges  Temp:  [96.3  F (35.7  C)-96.9  F (36.1  C)] 96.3  F (35.7  C)  Heart Rate:  [72-91] 84  Resp:  [16] 16  BP: ()/(35-57) 111/56  SpO2:  [82 %-98 %] 98 %  I/O last 3 completed shifts:  In: 660 [P.O.:660]  Out: 450 [Urine:450]    GENERAL: Comfortable, no apparent distress.  CARDIOVASCULAR: irregular rhythm, no murmur. 1+ lower extremity edema   RESPIRATORY: Bilateral crackles.  GI: non-tender, non-distended, normal bowel sounds.   SKIN: warm periphery, no rashes      Medications     Continuing ACE inhibitor/ARB/ARNI from home medication list OR ACE inhibitor/ARB order already placed during this visit       - MEDICATION INSTRUCTIONS -       - MEDICATION INSTRUCTIONS -       Warfarin Therapy Reminder         aspirin  81 mg Oral Daily     atorvastatin  10 mg Oral QPM     furosemide  40 mg Intravenous Q8H     gabapentin  300 mg Oral Daily     insulin aspart prot & aspart  10 Units Subcutaneous Daily with supper     insulin aspart prot & aspart  22 Units Subcutaneous QAM AC     metoprolol succinate ER  25 mg Oral Daily       Data   Recent Labs   Lab 12/11/18  0605 12/10/18  0526 12/09/18  0441  12/07/18  1900 12/07/18  1447   WBC 9.1 10.1 9.0   < >  --  9.5   HGB 8.3* 8.3* 8.3*   < >  --  9.0*   MCV 97 96 97   < >  --  100    181 177   < >  --  206   INR 1.90* 2.15* 2.99*   < >  --  2.37*    138 139   < >  --  140   POTASSIUM 4.0 4.2 4.4   < >  --  3.7   CHLORIDE 105 108* 110*   < >  --  109*   CO2 23 22 22   < >  --  21   BUN 65* 62* 56*   < >  --  56*   CR 2.28* 2.45* 1.98*   < >  --  2.21*   ANIONGAP 7 8 7   < >  --  10   KAE 7.8* 8.0* 8.0*   < >  --  8.4*    88 132*   < >  --  120*   TROPI  --   --   --   --  0.124* 0.126*    < > = values in this interval not displayed.       Echo:   Interpretation Summary  Severely (EF 20-30%) reduced left  ventricular function is present. LVEF 24%  based on biplane 2D tracing. Grade III or advanced diastolic dysfunction.  There is severe global hypokinesis most prominent in the entire inferior,  inferoseptal, and inferolateral segments.  Global right ventricular function is moderately reduced.  There is mild AS with peak velocity of 2.11 m/s and mean gradient of 10. The  NIESHA is 1.32 cm^2. This is in the setting of low stroke volume index (25  mL/m^2).  Estimated pulmonary artery systolic pressure is 52 mmHg suggestive of moderate  pHTN.  Dilation of the inferior vena cava is present with abnormal respiratory  variation in diameter.  Ascending aorta 4.7 cm.     Compared to ECHO on 04/2018, the LVEF has worsened.

## 2018-12-11 NOTE — PHARMACY-ANTICOAGULATION SERVICE
Pharmacy Consult- Coumadin Follow-Up    Sukhdeep Gomez is a 84 year old male admitted on 12/7/2018 with Acute on chronic systolic congestive heart failure (H)    Primary Indication(s) for Anticoagulation: atrial fibrillation    Goal INR: 2.5 (2-3)    FYI, patient is followed by the Anticoagulation/Protime Clinic at: [facility]    Patient Active Problem List   Diagnosis     ACP (advance care planning)     Altered level of consciousness     Anticoagulation monitoring, INR range 2-3     Atrial fibrillation (H)     Basal cell carcinoma of right forehead     Chronic systolic heart failure (H)     CKD (chronic kidney disease) stage 3, GFR 30-59 ml/min (H)     Osteoarthrosis     Diabetes mellitus with multiple complications (H)     Diabetic neuropathy (H)     Elevated INR     Elevated troponin     Essential hypertension     Gout of left foot due to renal impairment     Hamstring tightness of left lower extremity     Hematoma of right hip     History of TIA (transient ischemic attack)     Implantable cardioverter-defibrillator, [employer], Single lead - Not Dependent     Ischemic cardiomyopathy     Lumbago     Macrocytic anemia     Enthesopathy of ankle and tarsus     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     Mixed hyperlipidemia     Onychomycosis     Orthostatic hypotension     Pes planus     Prostate CA (H)     S/P CABG (coronary artery bypass graft)     Spinal stenosis     Squamous cell carcinoma of skin     Stricture of male urethra     Yeast dermatitis     Unspecified atrial fibrillation     Ascending aorta dilatation (H)     Acute on chronic systolic congestive heart failure (H)     H/O myocardial infarction, greater than 8 weeks     Paroxysmal atrial fibrillation (H)     S/P CABG x 4     Stented coronary artery     Muscle weakness (generalized)     Myopathy     Urinary retention     Herpes zoster ophthalmicus, left eye     Hypoglycemia due to type 2 diabetes mellitus (H)     Urinary tract  infection associated with indwelling urethral catheter (H)     Anemia in other chronic diseases classified elsewhere     Systolic heart failure (H)     HENRY (acute kidney injury) (H)       New factors that may increase patient's sensitivity to warfarin (Coumadin) include: none noted    New factors that may decrease patient's sensitivity to warfarin (Coumadin) include: none noted    Dietary Intake: decreased    Recent Labs   Lab Test 12/11/18  0605 12/10/18  0526 12/09/18  0441 12/08/18  0400   HGB 8.3* 8.3* 8.3* 8.9*   INR 1.90* 2.15* 2.99* 2.53*    181 177 188      Recent Dosing:    Date INR Dose Given   12/7 2.37 2 mg   12/8 2.53 0.5 mg   12/9 2.99 0.5 mg   12/10 2.15 1 mg   12/11 1.9 2 mg     Plan: Give warfarin 2 mg today.  Check INR in am.    Thank You for the Consult. Will continue to follow.    Damari Munoz [facility] ....................  12/11/2018   11:22 AM

## 2018-12-11 NOTE — PLAN OF CARE
Discharge Planner PT   Patient plan for discharge: short term rehab  Current status: patient requiring assist of two for all mobility   Barriers to return to prior living situation: fatigue, weakness, poor moiblity  Recommendations for discharge: short term rehab  Rationale for recommendations: to promote strength, stability and safe mobility       Entered by: Owen De La Cruz 12/11/2018 4:34 PM

## 2018-12-11 NOTE — PROGRESS NOTES
Patient B/P 90/45, MD notified. Telephone orders with read back to give scheduled 40 mg lasix, will continue to monitor. Osiris Fowler RN on 12/11/2018 at 2:12 PM

## 2018-12-11 NOTE — PLAN OF CARE
Discharge Planner OT   Patient plan for discharge: pt did not state   Current status: Pt requesting to use commode, felt he could not ambulate to BR, needed max assist of 2 for bed mobility and mod assist of 2 to transfer to commode, max assist to manage bowel incontinence, min assist to wash upper body, set up with grooming from recliner   Barriers to return to prior living situation: weakness, level of care needed   Recommendations for discharge: SNF for on-going therapies   Rationale for recommendations: see above        Entered by: Preciuos Meyers 12/11/2018 4:27 PM

## 2018-12-11 NOTE — PLAN OF CARE
Patient says he is very tired and refuses to get a standing weight. Patient on RA, LS clear bilaterally, Urine out put is low with 450 mls in 42 hours. Will notify MD. Patient refuses to have dressing placed on weeping wounds on legs/foot. Kathia Rios RN 12/11/2018 5:21 AM       22-Jan-2017 17:36

## 2018-12-12 NOTE — PLAN OF CARE
Discharge Planner PT   Patient plan for discharge: short term rehab  Current status: requires assistance with all mobility  Barriers to return to prior living situation: fatigue, weakness, instability  Recommendations for discharge: continued PT  Rationale for recommendations: to promote patient's highest level of functional activity tolerance  Patient ambulating with PT/OT when he became very fatigued and was unable to maintain standing position. PT/OT slowly lowered patient onto his buttock on the floor. Patient remained in seated position with support of and therapist and nursing staff while RN was notified. Patient then safely transferred off of floor and into bed with use of total mechanical lift. RN assessed patient with no apparent injury noted, and MD was notified by PT/OT.       Entered by: Owen De La Cruz 12/12/2018 4:20 PM

## 2018-12-12 NOTE — PROGRESS NOTES
[facility]    Hospitalist Progress Note      Assessment & Plan   Sukhdeep Gomez is a 84 year old male who was admitted on 12/7/2018.      Acute on chronic systolic congestive heart failure (H)    Assessment: present on admission. Echo shows marked decrease in EF and contractility in past year.  Ejection fraction has been 40%, now 20-30%. Breathing improved, but still edematous. Oliguric at this time with a slight bump in creatinine. I spoke with patient and son. I suspect that his poor cardiac function and CKD will prevent further effective diuresis.     Plan: Back off furosemide to 40 mg oral daily. Continue Toprol XL, hold lisinopril until kidney function normalizes.      Active Problems:    Atrial fibrillation (H)    Assessment: chronic, rate controlled    Plan: continue warfarin and metoprolol       Diabetes mellitus with multiple complications (H)    Assessment: BG well controlled here.    Plan: monitor       Elevated troponin    Assessment: presume strain related, CKD and heart failure.      Plan: follow clinically.        Essential hypertension    Assessment: chronic       Implantable cardioverter-defibrillator, [employer], Single lead - Not Dependent    Assessment: chronic       Ischemic cardiomyopathy       HENRY (acute kidney injury) (H)    Assessment: creatinine bump with IV furosemide.      Plan: Lasix 40 mg daily by mouth.  Hold lisinopril.      Bilateral foot pain    Assessment:  presume neuropathy    Plan: gabapentin BID       Anemia    Assessment: unclear cause, on warfarin. Stable. Iron deficiency.    Plan: follow hemoglobin,         DVT Prophylaxis: Warfarin  Code Status: Full Code    Gideon Fernandes    Interval History   Breathing better. Feels weak, tired.    -Data reviewed today: I reviewed all new labs and imaging results over the last 24 hours. I personally reviewed no images or EKG's today.    Physical Exam   Temp: 96.4  F (35.8  C) Temp src: Tympanic BP: 100/58    Heart Rate: 71 Resp: 18 SpO2: 91 % O2 Device: Nasal cannula    Vitals:    12/10/18 0500 12/11/18 0514 12/12/18 0520   Weight: 79.6 kg (175 lb 6.4 oz) 83.3 kg (183 lb 10.3 oz) 83.4 kg (183 lb 13.8 oz)     Vital Signs with Ranges  Temp:  [95  F (35  C)-96.7  F (35.9  C)] 96.4  F (35.8  C)  Heart Rate:  [71-94] 71  Resp:  [18-20] 18  BP: ()/(31-66) 100/58  SpO2:  [91 %-98 %] 91 %  I/O last 3 completed shifts:  In: 640 [P.O.:640]  Out: 575 [Urine:575]    GENERAL: Comfortable, no apparent distress.  CARDIOVASCULAR: irregular rate and rhythm, no murmur. 2+ lower extremity edema   RESPIRATORY: Scattered crackles.  GI: non-tender, non-distended, normal bowel sounds.   SKIN: warm periphery, no rashes      Medications     Continuing ACE inhibitor/ARB/ARNI from home medication list OR ACE inhibitor/ARB order already placed during this visit       - MEDICATION INSTRUCTIONS -       - MEDICATION INSTRUCTIONS -       Warfarin Therapy Reminder         aspirin  81 mg Oral Daily     atorvastatin  10 mg Oral QPM     furosemide  40 mg Oral Daily     gabapentin  300 mg Oral BID     insulin aspart prot & aspart  10 Units Subcutaneous Daily with supper     insulin aspart prot & aspart  22 Units Subcutaneous QAM AC     metoprolol succinate ER  25 mg Oral Daily       Data   Recent Labs   Lab 12/12/18  0616 12/11/18  0605 12/10/18  0526  12/07/18  1900 12/07/18  1447   WBC 9.3 9.1 10.1   < >  --  9.5   HGB 8.2* 8.3* 8.3*   < >  --  9.0*   MCV 96 97 96   < >  --  100    180 181   < >  --  206   INR 2.25* 1.90* 2.15*   < >  --  2.37*    135 138   < >  --  140   POTASSIUM 4.1 4.0 4.2   < >  --  3.7   CHLORIDE 105 105 108*   < >  --  109*   CO2 22 23 22   < >  --  21   BUN 70* 65* 62*   < >  --  56*   CR 2.30* 2.28* 2.45*   < >  --  2.21*   ANIONGAP 9 7 8   < >  --  10   KAE 8.1* 7.8* 8.0*   < >  --  8.4*   * 101 88   < >  --  120*   TROPI  --   --   --   --  0.124* 0.126*    < > = values in this interval not  displayed.

## 2018-12-12 NOTE — PROGRESS NOTES
Patient walking with therapy, became weak and was lowered to floor by per therapy, transfer via natalie lift to bed safetly. Patient denies any pain, BP (!) 82/45 (BP Location: Right arm)   Pulse 82   Temp 97.1  F (36.2  C) (Tympanic)   Resp 15   Wt 83.4 kg (183 lb 13.8 oz)   SpO2 96%   BMI 29.68 kg/m   MD aware, will continue to monitor. Osiris Fowler RN on 12/12/2018 at 2:36 PM

## 2018-12-12 NOTE — PROGRESS NOTES
:    Submitted PAS screen.  Confirmation number [id number].    TELLY Hawk on 12/12/2018 at 4:23 PM

## 2018-12-12 NOTE — PROGRESS NOTES
:    Met with patient to discuss discharge planning.  Discussed short term rehab and patient is agreeable.  Patient was provided with a list of local options and chose [facility].  He stated he has been there before.      Spoke with Mayte from [facility].  Provided verbal referral and faxed written referral.  [facility] accepted patient.  [facility] to transport.  Anticipated discharge tomorrow.    TELLY Hawk on 12/12/2018 at 2:55 PM

## 2018-12-12 NOTE — PROGRESS NOTES
Diet instruction:   Spoke with pt today about heart healthy nutrition therapy. Left handouts with pt on Monday when he was sleeping. He stated today that he does not use the salt shaker and tries to avoid canned foods. His son does the cooking but was not available at this time, pt thinks he will be coming this afternoon. Will try to visit with son later. Pt stated understanding and had no questions at this time.     Alma Delia Hernandez RD on 12/12/2018 at 12:25 PM

## 2018-12-12 NOTE — PLAN OF CARE
Patient A & O x 3, afebrile, b/p 90's/ 30's, HR irregular. BP (!) 90/31 (BP Location: Right arm)   Pulse 82   Temp 95.8  F (35.4  C) (Tympanic)   Resp 20   Wt 83.4 kg (183 lb 13.8 oz)   SpO2 96%   BMI 29.68 kg/m    Lung sounds diminished with expiratory wheezing throughout, infrequent productive cough. +3 generalized edema. Urine cloudy, odorous, sample sent to lab.Small amount of green purulent drainage from penis and small amount of green/bloody drainage at suprapubic cath insertion site, will continue to monitor. Osiris Fowler RN on 12/12/2018 at 8:19 AM

## 2018-12-12 NOTE — PROGRESS NOTES
Suspect that urinalysis shows colonization. Will replace chronic indwelling almeida today.    Gideon Fernandes M.D. 12/12/2018  3:01 PM  [phone]

## 2018-12-12 NOTE — PHARMACY-ANTICOAGULATION SERVICE
Pharmacy Consult- Coumadin Follow-Up    Sukhdeep Gomez is a 84 year old male admitted on 12/7/2018 with Acute on chronic systolic congestive heart failure (H)    Primary Indication(s) for Anticoagulation: atrial fibrillation    Goal INR: 2.5 (2-3)    FYI, patient is followed by the Anticoagulation/Protime Clinic at: [facility]    Patient Active Problem List   Diagnosis     ACP (advance care planning)     Altered level of consciousness     Anticoagulation monitoring, INR range 2-3     Atrial fibrillation (H)     Basal cell carcinoma of right forehead     Chronic systolic heart failure (H)     CKD (chronic kidney disease) stage 3, GFR 30-59 ml/min (H)     Osteoarthrosis     Diabetes mellitus with multiple complications (H)     Diabetic neuropathy (H)     Elevated INR     Elevated troponin     Essential hypertension     Gout of left foot due to renal impairment     Hamstring tightness of left lower extremity     Hematoma of right hip     History of TIA (transient ischemic attack)     Implantable cardioverter-defibrillator, [employer], Single lead - Not Dependent     Ischemic cardiomyopathy     Lumbago     Macrocytic anemia     Enthesopathy of ankle and tarsus     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     Mixed hyperlipidemia     Onychomycosis     Orthostatic hypotension     Pes planus     Prostate CA (H)     S/P CABG (coronary artery bypass graft)     Spinal stenosis     Squamous cell carcinoma of skin     Stricture of male urethra     Yeast dermatitis     Unspecified atrial fibrillation     Ascending aorta dilatation (H)     Acute on chronic systolic congestive heart failure (H)     H/O myocardial infarction, greater than 8 weeks     Paroxysmal atrial fibrillation (H)     S/P CABG x 4     Stented coronary artery     Muscle weakness (generalized)     Myopathy     Urinary retention     Herpes zoster ophthalmicus, left eye     Hypoglycemia due to type 2 diabetes mellitus (H)     Urinary tract  infection associated with indwelling urethral catheter (H)     Anemia in other chronic diseases classified elsewhere     Systolic heart failure (H)     HENRY (acute kidney injury) (H)     New factors that may increase patient's sensitivity to warfarin (Coumadin) include: none noted    New factors that may decrease patient's sensitivity to warfarin (Coumadin) include: none noted    Dietary Intake: 2 gram sodium restriction diet ordered for patient    Recent Labs   Lab Test 12/12/18  0616 12/11/18  0605 12/10/18  0526 12/09/18  0441   HGB 8.2* 8.3* 8.3* 8.3*   INR 2.25* 1.90* 2.15* 2.99*    180 181 177        Recent Dosing:    Date INR Dose Given   12/7 2.37 2 mg   12/8 2.53 0.5 mg   12/9 2.99 0.5 mg   12/10 2.15 1 mg   12/11 1.9 2 mg   12/12 2.25 2 mg     Plan: will resume home warfarin dose of 2 mg today.  Check INR in am.    Thank You for the Consult. Will continue to follow.    Damari Munoz [facility] ....................  12/12/2018   2:09 PM

## 2018-12-12 NOTE — PROGRESS NOTES
Patient B/P 83/62 notified MD, verbal orders to hold 1000 metoprolol. Osiris Fowler RN on 12/12/2018 at 10:21 AM

## 2018-12-12 NOTE — TELEPHONE ENCOUNTER
After verifying pts name and date of birth with daughter,notified daughter I am unable to give any information at this time due to authorization form not being signed.  Prabha Mayer

## 2018-12-12 NOTE — PLAN OF CARE
Patient up in chair for breakfast and lunch,drowsy this afternoon. Urine output 200 for 12 hours, +3 generalized edema. Afebrile, b/p 80's systolic, MD aware, will continue to monitor. Osiris Fowler RN on 12/12/2018 at 5:34 PM

## 2018-12-12 NOTE — PLAN OF CARE
Patient alert and orientated x 3, afebrile, BP 93/48 (BP Location: Right arm)   Pulse 82   Temp 96.4  F (35.8  C) (Tympanic)   Resp 18   Wt 83.3 kg (183 lb 10.3 oz)   SpO2 95%   BMI 29.64 kg/m   . Lung sounds diminished in the bases, no SOB reported. Heart rate irregular, cap refill >3 seconds.+3 generalized edema, +2 edema in right ankle and foot. Patient refused to let writer apply dressings to wounds. Urine is cloudy huy and odorous, will continue to monitor. Osiris Fowler RN on 12/12/2018 at 4:08 AM

## 2018-12-12 NOTE — PLAN OF CARE
"Discharge Planner OT   Patient plan for discharge: to discharge to SNF for on-going therapies and strengthening   Current status: Pt requesting a 4WW, provided with one and agreeable to ambulate from recliner to bed. Pt needed min assist of 2 for sit to stand and for ambulation, ambulated approximately 12 feet before stating \"that's as far as I can go\" and stopped about 2 feet from his bed, when his legs slowly gave out and pt was slowly controlled to the floor, with head and legs protected, pt sat on floor with support of Therapist, while assisted back to bed safely with use of total lift and 3 people. Pt had no complaints of pain the entire time, just stating \"I just felt weak\". RN assisted and assessed, no signs of injury or pain. Pt states he has multiple falls at home of this nature where his son assists him.   Barriers to return to prior living situation: recent falls, weakness, level of assist needed   Recommendations for discharge: SNF for ongoing therapies.   Rationale for recommendations: see above.        Entered by: Precious Meyers 12/12/2018 2:22 PM       "

## 2018-12-13 NOTE — PLAN OF CARE
Discharge Planner PT   Patient plan for discharge: short term rehab  Current status: low blood pressures today did not allow for patient to sit upright, stand and transfer out of bed  Barriers to return to prior living situation: fatigue, weakness  Recommendations for discharge: continued PT  Rationale for recommendations: to promote patient's highest level of independent function       Entered by: Owen De La Crzu 12/13/2018 12:52 PM

## 2018-12-13 NOTE — PLAN OF CARE
"Patient A & O x 3, very weak, b/p 86/40 @ 0845, MD notified. +3 generalized edema. Lung sounds course anteriorly, diminished throughout, patient having trouble coughing up Phlegm states \" I don't have the strength.\" writer instructed patient on IS use. Urine output > 50 mls as of yet this shift, MD aware. 500 ml bolus infusing, will continue to monitor. Osiris Fowler RN on 12/13/2018 at 10:17 AM   "

## 2018-12-13 NOTE — PHARMACY-ANTICOAGULATION SERVICE
Pharmacy Consult- Coumadin Follow-Up    Sukhdeep Gomez is a 84 year old male admitted on 12/7/2018 with Acute on chronic systolic congestive heart failure (H)    Primary Indication(s) for Anticoagulation: atrial fibrillation    Goal INR: 2.5 (2-3)    FYI, patient is followed by the Anticoagulation/Protime Clinic at: [facility]    Patient Active Problem List   Diagnosis     ACP (advance care planning)     Altered level of consciousness     Anticoagulation monitoring, INR range 2-3     Atrial fibrillation (H)     Basal cell carcinoma of right forehead     Chronic systolic heart failure (H)     CKD (chronic kidney disease) stage 3, GFR 30-59 ml/min (H)     Osteoarthrosis     Diabetes mellitus with multiple complications (H)     Diabetic neuropathy (H)     Elevated INR     Elevated troponin     Essential hypertension     Gout of left foot due to renal impairment     Hamstring tightness of left lower extremity     Hematoma of right hip     History of TIA (transient ischemic attack)     Implantable cardioverter-defibrillator, [employer], Single lead - Not Dependent     Ischemic cardiomyopathy     Lumbago     Macrocytic anemia     Enthesopathy of ankle and tarsus     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     Mixed hyperlipidemia     Onychomycosis     Orthostatic hypotension     Pes planus     Prostate CA (H)     S/P CABG (coronary artery bypass graft)     Spinal stenosis     Squamous cell carcinoma of skin     Stricture of male urethra     Yeast dermatitis     Unspecified atrial fibrillation     Ascending aorta dilatation (H)     Acute on chronic systolic congestive heart failure (H)     H/O myocardial infarction, greater than 8 weeks     Paroxysmal atrial fibrillation (H)     S/P CABG x 4     Stented coronary artery     Muscle weakness (generalized)     Myopathy     Urinary retention     Herpes zoster ophthalmicus, left eye     Hypoglycemia due to type 2 diabetes mellitus (H)     Urinary tract  infection associated with indwelling urethral catheter (H)     Anemia in other chronic diseases classified elsewhere     Systolic heart failure (H)     HENRY (acute kidney injury) (H)     New factors that may increase patient's sensitivity to warfarin (Coumadin) include: hemoglobin trending down    New factors that may decrease patient's sensitivity to warfarin (Coumadin) include: none noted    Dietary Intake: 2 gram sodium restricted diet    Recent Labs   Lab Test 12/13/18  0550 12/12/18  0616 12/11/18  0605 12/10/18  0526   HGB 7.8* 8.2* 8.3* 8.3*   INR 2.87* 2.25* 1.90* 2.15*    173 180 181      Recent Dosing:    Date INR Dose Given   12/7 2.37 2 mg   12/8 2.53 0.5 mg   12/9 2.99 0.5 mg   12/10 2.15 1 mg   12/11 1.9 2 mg   12/12 2.25 2 mg   12/13 2.87 0.5 mg     Plan: Will dose warfarin conservatively at 0.5 mg today.  Check INR in am.    Thank You for the Consult. Will continue to follow.    Damari Munoz [facility] ....................  12/13/2018   9:07 AM

## 2018-12-13 NOTE — PROGRESS NOTES
[facility]    Hospitalist Progress Note      Assessment & Plan   Sukhdeep Gomez is a 84 year old male who was admitted on 12/7/2018.     Acute on chronic systolic congestive heart failure (H)    Assessment: present on admission. Echo shows marked decrease in EF and contractility in past year.  Ejection fraction has been 40%, now 20-30%. Breathing improved, but still edematous. Oliguric at this time with increasing creatinine. patient is hypotensive. I spoke with patient, daughter and son about poor prognosis.     Plan: Stop lasix and lisinopril. Hold tomorrow's Toprol. Monitor blood pressure and kidney function after 1 liter total intravenous fluids today. If kidney function and blood pressure improve, consider lasix/metolazone. Elevate legs. Will need skilled nursing facility. Discussed option of a heart failure service if needed in future.      Hypotension    Assessment: secondary to overdiuresis, antihypertensives.    Plan: intravenous fluids bolus, hold meds.    Active Problems:    Atrial fibrillation (H)    Assessment: chronic, rate controlled    Plan: continue warfarin and metoprolol       Diabetes mellitus with multiple complications (H)    Assessment: BG controlled here.    Plan: monitor       Elevated troponin    Assessment: presume strain related, CKD and heart failure.      Plan: follow clinically.        Essential hypertension    Assessment: chronic       Implantable cardioverter-defibrillator, [employer], Single lead - Not Dependent    Assessment: chronic       Ischemic cardiomyopathy       HENRY (acute kidney injury) (H)    Assessment: creatinine bump with furosemide.      Plan: Hold blood pressure meds, diuretics. Intravenous fluid 1 liter NS today.       Bilateral foot pain    Assessment:  presume neuropathy    Plan: gabapentin BID       Anemia    Assessment: unclear cause, on warfarin. Stable. Iron deficiency.     Plan: follow hemoglobin, if drops further consider a 1  unit transfusion.     Chronic indwelling catheter  Assessment: changed on 12/12. abnormal urinalysis, but suspect colonization from chronic almeida.      DVT Prophylaxis: Warfarin  Code Status: Full Code    Gideon Fernandes    Interval History   Breathing well. No chest pain. No nausea, vomiting. Feels weak.     -Data reviewed today: I reviewed all new labs and imaging results over the last 24 hours. I personally reviewed no images or EKG's today.    Physical Exam   Temp: 96.6  F (35.9  C) Temp src: Tympanic BP: 95/58   Heart Rate: 57 Resp: 20 SpO2: 100 % O2 Device: None (Room air)    Vitals:    12/11/18 0514 12/12/18 0520 12/13/18 0154   Weight: 83.3 kg (183 lb 10.3 oz) 83.4 kg (183 lb 13.8 oz) 82.5 kg (181 lb 14.1 oz)     Vital Signs with Ranges  Temp:  [96.2  F (35.7  C)-97.1  F (36.2  C)] 96.6  F (35.9  C)  Heart Rate:  [56-78] 57  Resp:  [15-20] 20  BP: (82-98)/(39-58) 95/58  SpO2:  [92 %-100 %] 100 %  I/O last 3 completed shifts:  In: 620 [P.O.:620]  Out: 350 [Urine:350]    GENERAL: Comfortable, no apparent distress.  CARDIOVASCULAR: irregular rhythm, no murmur. 2+ lower extremity edema   RESPIRATORY: Lung crackles.  GI: non-tender, non-distended, normal bowel sounds.   SKIN: warm periphery, no rashes      Medications     Continuing ACE inhibitor/ARB/ARNI from home medication list OR ACE inhibitor/ARB order already placed during this visit       - MEDICATION INSTRUCTIONS -       - MEDICATION INSTRUCTIONS -       sodium chloride       Warfarin Therapy Reminder         aspirin  81 mg Oral Daily     atorvastatin  10 mg Oral QPM     gabapentin  300 mg Oral BID     insulin aspart prot & aspart  10 Units Subcutaneous Daily with supper     insulin aspart prot & aspart  22 Units Subcutaneous QAM AC     [START ON 12/14/2018] metoprolol succinate ER  12.5 mg Oral Daily     warfarin  0.5 mg Oral ONCE at 18:00       Data   Recent Labs   Lab 12/13/18  0550 12/12/18  0616 12/11/18  0605  12/07/18  1900 12/07/18  1447   WBC  8.4 9.3 9.1   < >  --  9.5   HGB 7.8* 8.2* 8.3*   < >  --  9.0*   MCV 97 96 97   < >  --  100    173 180   < >  --  206   INR 2.87* 2.25* 1.90*   < >  --  2.37*    136 135   < >  --  140   POTASSIUM 3.9 4.1 4.0   < >  --  3.7   CHLORIDE 105 105 105   < >  --  109*   CO2 20* 22 23   < >  --  21   BUN 74* 70* 65*   < >  --  56*   CR 2.61* 2.30* 2.28*   < >  --  2.21*   ANIONGAP 11 9 7   < >  --  10   KAE 8.1* 8.1* 7.8*   < >  --  8.4*   * 160* 101   < >  --  120*   TROPI  --   --   --   --  0.124* 0.126*    < > = values in this interval not displayed.

## 2018-12-13 NOTE — PROGRESS NOTES
Attempted to get  blood pressure on patient Unable to get accurate reading with multiple attempts, HR 44, O2 saturations 91% RA, denies pain. Dr. Fernandes notified, now in room with patient and family members, will continue to monitor.

## 2018-12-13 NOTE — PROGRESS NOTES
:    Spoke with Mayte from [facility] and updated that patient is not ready to discharge today.  Anticipated discharge 1-2 days.    TELLY Hawk on 12/13/2018 at 10:42 AM

## 2018-12-13 NOTE — PLAN OF CARE
Patient Care Overview  Plan of Care/Patient Progress Review   Patient has not had any pain or discomfort.  Denies any shortness of breath.  Wounds remain open to air per patient discretion.  Has continued to have lower blood pressures, 90s systolic.   Continues to have low urine output with only 150 out this shift.  This has been ongoing, MD is aware, see MD note.  Patient has had no lasix this shift, was bladder scanned and <30mL noted on scan.  Pt urine is cloudy with foul odor.  Patient has been turned and repositioned every two hours throughout the night d/t generalized edema and patient unable to turn self appropriately.  No new skin breakdown this shift.     12/13/2018 0646 - No Change by Alysha Livingston RN

## 2018-12-13 NOTE — PROGRESS NOTES
:    Daughter Chen requested to speak with .  Chen stated she was patient's appointed health care agent and wanted to check if we had patient's HCD on file and if we had her telephone number.  Checked patient's HCD and she is his health care agent.  She provided me with her contact information and I contacted registration to have it added.  She also wanted to have a release on file so that she is able to contact his doctor if needed.  She will talk with her dad and if he agrees the NST will get her a release of information form to be signed by patient.  Provided her with a discharge update.    TELLY Hawk on 12/13/2018 at 3:50 PM

## 2018-12-14 NOTE — PLAN OF CARE
Patient Care Overview  Plan of Care/Patient Progress Review     Patient was transferred to larger room for room for family to stay with patient.  Patient has had intermittent confusion, decreased level of consciousness with periods of alertness where he will be talking with family.  Unable to get blood pressure readings, MD is aware.  Patient continues on tele with heart rate 60-70s.  Sp02 >92% on room air.  Patient has denied any shortness of breath, RR <20.  Denies any pain.  Patient has been repositioned every two hours and more frequently as patient has asked to be repositioned if needed.  Will continue to offer comfort, patient declines morphine.  Family remains at bedside.   12/14/2018 0138 - Declining by Alysha Livingston RN

## 2018-12-14 NOTE — PROGRESS NOTES
Unable to obtain blood pressure manually. patient rouses but very lethargic. I spoke with daughter and son regarding poor prognosis. We will not escalate care. Focus on comfort.     Gideon Fernandes M.D. 12/13/2018  8:17 PM  [phone]

## 2018-12-14 NOTE — ASSESSMENT & PLAN NOTE
Admitted with increased shortness of breath, initially improved after diuretic, but then acutely worsened when he was given some fluids.  Echocardiogram showing severe cardiomyopathy with an EF of 15%  Continues with low blood pressures, most the time not able to be registered.  Clinically appears somewhat wet today with some coarse upper airway rhonchi  Restart low-dose Lasix, continue low-dose metoprolol.  Likely discharge tomorrow to rehab

## 2018-12-14 NOTE — ASSESSMENT & PLAN NOTE
Present, discussed with family whether to continue or to turn off  For now patient and family elected to leave on

## 2018-12-14 NOTE — PHARMACY-ANTICOAGULATION SERVICE
Pharmacy Consult- Coumadin Follow-Up    Sukhdeep Gomez is a 84 year old male admitted on 12/7/2018 with Acute on chronic systolic congestive heart failure (H)    Primary Indication(s) for Anticoagulation: atrial fibrillation    Goal INR: 2.5 (2-3)    FYI, patient is followed by the Anticoagulation/Protime Clinic at: [facility]    Patient Active Problem List   Diagnosis     ACP (advance care planning)     Altered level of consciousness     Anticoagulation monitoring, INR range 2-3     Atrial fibrillation (H)     Basal cell carcinoma of right forehead     Chronic systolic heart failure (H)     CKD (chronic kidney disease) stage 3, GFR 30-59 ml/min (H)     Osteoarthrosis     Diabetes mellitus with multiple complications (H)     Diabetic neuropathy (H)     Elevated INR     Elevated troponin     Essential hypertension     Gout of left foot due to renal impairment     Hamstring tightness of left lower extremity     Hematoma of right hip     History of TIA (transient ischemic attack)     Implantable cardioverter-defibrillator, [employer], Single lead - Not Dependent     Ischemic cardiomyopathy     Lumbago     Macrocytic anemia     Enthesopathy of ankle and tarsus     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     Mixed hyperlipidemia     Onychomycosis     Orthostatic hypotension     Pes planus     Prostate CA (H)     S/P CABG (coronary artery bypass graft)     Spinal stenosis     Squamous cell carcinoma of skin     Stricture of male urethra     Yeast dermatitis     Unspecified atrial fibrillation     Ascending aorta dilatation (H)     Acute on chronic systolic congestive heart failure (H)     H/O myocardial infarction, greater than 8 weeks     Paroxysmal atrial fibrillation (H)     S/P CABG x 4     Stented coronary artery     Muscle weakness (generalized)     Myopathy     Urinary retention     Herpes zoster ophthalmicus, left eye     Hypoglycemia due to type 2 diabetes mellitus (H)     Urinary tract  infection associated with indwelling urethral catheter (H)     Anemia in other chronic diseases classified elsewhere     Systolic heart failure (H)     HENRY (acute kidney injury) (H)     New factors that may increase patient's sensitivity to warfarin (Coumadin) include: decreased intake    New factors that may decrease patient's sensitivity to warfarin (Coumadin) include: none noted    Dietary Intake: poor    Recent Labs   Lab Test 12/14/18  0532 12/13/18  0550 12/12/18  0616 12/11/18  0605 12/10/18  0526   HGB  --  7.8* 8.2* 8.3* 8.3*   INR 3.13* 2.87* 2.25* 1.90* 2.15*   PLT  --  178 173 180 181        Recent Dosing:    Date INR Dose Given   12/7 2.37 2 mg   12/8 2.53 0.5 mg   12/9 2.99 0.5 mg   12/10 2.15 1 mg   12/11 1.9 2 mg   12/12 2.25 2 mg   12/13 2.87 Dose held for somnolence   12/14 3.13 HOLD     Plan: hold warfarin today.  Check INR in am.    Thank You for the Consult. Will continue to follow.    Damari Munoz [facility] ....................  12/14/2018   1:02 PM

## 2018-12-14 NOTE — PROGRESS NOTES
Called medical spoke to the primary nurse Ira Palacios RN aidvised PT had an 8 beat run of vtach. PT returned to his baseline. Naila Rivero MT on 12/14/2018 at 8:37 AM

## 2018-12-14 NOTE — PROGRESS NOTES
:    Met with patient along with his daughter Chen to review discharge planning.  Patient is still in agreement with discharge to [facility] for short term rehab.  Discussion regarding community based services available in our area.  They were provided with resource booklets for the area and a hospice brochure.    Updated Mayte at [facility] of patient's discharge plans.  [facility] is unable to accept patient until Sunday unless they have a extra opening come up tomorrow.  Anticipated discharge to [facility] 1-2 days.     TELLY Hawk on 12/14/2018 at 12:59 PM

## 2018-12-14 NOTE — PROGRESS NOTES
"Patient has been turned and repositioned every two hours or as needed.  Family remains in room with patient.  Patient awakens to voice, able to make needs known and has continued to decline any medications or morphine.  Will say he is uncomfortable and after repositioning states he is \"fine\".  RR 18-22.  Shallow breathing, coarse lung sounds.  Patient has had less then 50 ml out via suprapubic catheter.  No blood glucose drawn per MD.  No new skin breakdown.   "

## 2018-12-14 NOTE — ASSESSMENT & PLAN NOTE
Unable to get a reliable blood pressure by cuff  Medically he is improved and will continue current regimen, will restart low-dose diuretic today

## 2018-12-14 NOTE — PLAN OF CARE
Patient intermittently lethargic, Daughter at bedside. Patient refusing any thing to eat,taking sips of water. Patient denies any pain, will continue to monitor. Osiris Fowler RN on 12/13/2018 at 6:31 PM

## 2018-12-14 NOTE — PROGRESS NOTES
[facility]    Medicine Progress Note - Hospitalist Service       Date of Admission:  12/7/2018  Admitted with acute worsening of CHF, initially improved after diuresis.  Yesterday was clinically dry, given fluids and then had rapid worsening, with echo showing an EF of 15%.  His status yesterday looked guarded and comfort care measures were discussed.  Overnight he is improved, now talking, not having any chest pain or shortness of breath and he feels comfortable.  At this point will continue gentle therapy, continue to hold diuretic therapy and continue low-dose beta-blocker for rate control.  The plan is to discharge to a rehab facility, potentially tomorrow.  He may need to be discharged on a very low dose of a diuretic.  Discussed at length with him and family that his prognosis is poor and that a consideration of hospice care it might be entertained.  Assessment & Plan   Acute on chronic systolic congestive heart failure (H)  Admitted with increased shortness of breath, initially improved after diuretic, but then acutely worsened yesterday when he was given some fluids.  Echocardiogram showing severe cardiomyopathy with an EF of 15%  Likely yesterday looked end-stage with provider and family talking about comfort care status.  Improved today, awake and talking with no respiratory issues.  Discussed at length with family and their goal is to keep him comfortable, but to continue treatment with current regimen  This time we will continue hold the diuretic and will reevaluate tomorrow  He is likely going to rehab placement tomorrow will need close outpatient follow-up.    Atrial fibrillation (H)  Rate controlled with low-dose beta-blocker  No change    Diabetes mellitus with multiple complications (H)  Controlled with current insulin regimen  No change    Elevated troponin  Elevated on admission, likely due to strain  No need to follow    Essential hypertension  Unable to get a reliable blood  pressure by cuff  Medically he is improved and will continue current regimen, holding diuretic, continuing low-dose beta-blocker and will continue to hold his ACE inhibitor      Ischemic cardiomyopathy  Worsening status with echo showing an EF of 15%  As above    Anemia in other chronic diseases classified elsewhere  Stable  Monitor    HENRY (acute kidney injury) (H)  Up-and-down related to diuretic use  Recheck tomorrow, continue to hold diuretic at this time    Implantable cardioverter-defibrillator, [employer], 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  Entered: Joao Álvarez MD 12/14/2018, 12:22 PM       The patient's care was discussed with the Bedside Nurse, Care Coordinator/, Patient and Patient's Family.    Joao Álvarez MD  Hospitalist Service  [facility]    ______________________________________________________________________    Interval History   Overnight has improved, more awake and talking.  Denying any shortness of breath or chest pain.  Has a fairly good appetite today.    Data reviewed today: I reviewed all medications, new labs and imaging results over the last 24 hours. I personally reviewed no images or EKG's today.    Physical Exam   Vital Signs: Temp: 95.2  F (35.1  C) Temp src: Tympanic BP: 95/58   Heart Rate: 75 Resp: 20 SpO2: (P) 92 % O2 Device: (P) None (Room air)    Weight: 181 lbs 14.07 oz  Constitutional: awake, alert, cooperative, no apparent distress, and appears stated age  ENT: Normocephalic, without obvious abnormality, atraumatic, sinuses nontender on palpation, external ears without  lesions, oral pharynx with moist mucous membranes, tonsils without erythema or exudates, gums normal and good dentition.  Hematologic / Lymphatic: no cervical lymphadenopathy and no supraclavicular lymphadenopathy  Respiratory: no increased work of breathing, good air exchange and no retractions  Cardiovascular: irregularly irregular rhythm  GI: soft, non-distended and non-tender  Skin: Bruising on arms with some minor skin tears.  Legs with some stasis changes and edema    Data   Recent Labs   Lab 12/14/18  0532 12/13/18  0550 12/12/18  0616 12/11/18  0605  12/07/18  1900 12/07/18  1447   WBC  --  8.4 9.3 9.1   < >  --  9.5   HGB  --  7.8* 8.2* 8.3*   < >  --  9.0*   MCV  --  97 96 97   < >  --  100   PLT  --  178 173 180   < >  --  206   INR 3.13* 2.87* 2.25* 1.90*   < >  --  2.37*   NA  --  136 136 135   < >  --  140   POTASSIUM  --  3.9 4.1 4.0   < >  --  3.7   CHLORIDE  --  105 105 105   < >  --  109*   CO2  --  20* 22 23   < >  --  21   BUN  --  74* 70* 65*   < >  --  56*   CR  --  2.61* 2.30* 2.28*   < >  --  2.21*   ANIONGAP  --  11 9 7   < >  --  10   KAE  --  8.1* 8.1* 7.8*   < >  --  8.4*   GLC  --  247* 160* 101   < >  --  120*   TROPI  --   --   --   --   --  0.124* 0.126*    < > = values in this interval not displayed.

## 2018-12-15 NOTE — PLAN OF CARE
Family notified nurse that pt had seizure- like activity. While this writer was at pt bedside, pt was alert and awake, then arms stiffened up over his head, eyes rolled back and pt had mouth open. Lasted 10-15 seconds after which pt was alert and talking. Dr Álvarez notified and in to see pt. Lab was ordered.

## 2018-12-15 NOTE — PLAN OF CARE
Pt denies having pain. PRN morphine given x1 per family request as pt was restless. Pt repositioned q2hrs. IV removed by pt. Unable to obtain BP. HR 35-47 with ear probe. O2 sats 93%. Skin tears wrapped. Pt removes dressings on own.  Eileen Christianson RN on 12/15/2018 at 7:00 AM

## 2018-12-15 NOTE — DEATH PRONOUNCEMENT
MD DEATH PRONOUNCEMENT    Called to pronounce Sukhdeep Gomez dead.    Physical Exam: Unresponsive to noxious stimuli, Spontaneous respirations absent, Breath sounds absent, Carotid pulse absent and Heart sounds absent    Patient was pronounced dead at 4:22 PM, December 15, 2018.    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 (examples: HIV, chicken pox, TB, Ebola, CJD) :  NO    Multi-drug resistant organism present? (example: MRSA): NO    Please consider an autopsy if any of the following exist:  NO Unexpected or unexplained death during or following any dental, medical, or surgical diagnostic treatment procedures.   NO Death of mother at or up to seven days after delivery.     NO All  and pediatric deaths.     NO Death where the cause is sufficiently obscure to delay completion of the death certificate.   NO Deaths in which autopsy would confirm a suspected illness/condition that would affect surviving family members or recipients of transplanted organs.     The following deaths must be reported to the 's Office:  NO A death that may be due entirely or in part to any factors other than natural disease (recent surgery, recent trauma, suspected abuse/neglect).   NO A death that may be an accident, suicide, or homicide.     NO Any sudden, unexpected death in which there is no prior history of significant heart disease or any other condition associated with sudden death.   NO A death under suspicious, unusual, or unexpected  circumstances.    NO Any death which is apparently due to natural causes but in which the  does not have a personal physician familiar with the patient s medical history, social, or environmental situation or the circumstances of the terminal event.   NO Any death apparently due to Sudden Infant Death Syndrome.     NO Deaths that occur during, in association with, or as consequences of a diagnostic, therapeutic, or anesthetic procedure.   NO Any death in which a fracture of a major bone has occurred within the past (6) six months.   NO A death of persons note seen by their physician within 120 days of demise.     NO Any death in which the  was an inmate of a public institution or was in the custody of Law Enforcement personnel.   NO  All unexpected deaths of children   NO Solid organ donors   NO Unidentified bodies   NO Deaths of persons whose bodies are to be cremated or otherwise disposed of so that the bodies will later be unavailable for examination;   NO Deaths unattended by a physician outside of a licensed healthcare facility or licensed residential hospice program   NO Deaths occurring within 24 hours of arrival to a health care facility if death is unexpected.    NO Deaths associated with the decedent s employment.   NO Deaths attributed to acts of terrorism.   NO Any death in which there is uncertainty as to whether it is a medical examiner s care should be discussed with the medical investigator.        Body disposition: Autopsy was discussed with family member:  Family members in person.  Permission for autopsy was declined.  Organ donation was discussed with family member:  Family members.  Permission for organ donation was obtained.  Body released to the  home.

## 2018-12-15 NOTE — PROGRESS NOTES
[facility]    Medicine Progress Note - Hospitalist Service       Date of Admission:  12/7/2018  Assessment & Plan   Acute on chronic systolic congestive heart failure (H)  Admitted with increased shortness of breath, initially improved after diuretic, but then acutely worsened when he was given some fluids.  Echocardiogram showing severe cardiomyopathy with an EF of 15%  Continues with low blood pressures, most the time not able to be registered.  Clinically appears somewhat wet today with some coarse upper airway rhonchi  Restart low-dose Lasix, continue low-dose metoprolol.  Likely discharge tomorrow to rehab    Atrial fibrillation (H)  Rate controlled with low-dose beta-blocker  No change    Diabetes mellitus with multiple complications (H)  Controlled with current insulin regimen  No change    Elevated troponin  Elevated on admission, likely due to strain  No need to follow    Essential hypertension  Unable to get a reliable blood pressure by cuff  Medically he is improved and will continue current regimen, will restart low-dose diuretic today      Ischemic cardiomyopathy  Worsening status with echo showing an EF of 15%  As above    Anemia in other chronic diseases classified elsewhere  Stable  Monitor    HENRY (acute kidney injury) (H)  Up-and-down related to diuretic use  Recheck tomorrow, continue to hold diuretic at this time    Implantable cardioverter-defibrillator, [employer], Single lead - Not Dependent  Present, discussed with family whether to continue or to turn off  For now patient and family elected to leave on          Diet: Regular Diet Adult    DVT Prophylaxis: Warfarin  Mendosa Catheter: not present  Code Status: DNR/DNI      Disposition Plan   Expected discharge: Tomorrow, recommended to transitional care unit once safe disposition plan/ TCU bed available.  Entered: Joao Álvarez MD 12/15/2018, 8:33 AM       The patient's care was discussed with the Patient and  Patient's Family.    Joao Álvarez MD  Hospitalist Service  [facility]    ______________________________________________________________________    Interval History   Since yesterday has had periods of delerium followed by full wakefulness.  He denies any chest pain, increased shortness of breath.  Nursing is not been able to get reliable blood pressures.  He is eating somewhat.  Urine output has been minimal.    Data reviewed today: I reviewed all medications, new labs and imaging results over the last 24 hours. I personally reviewed no images or EKG's today.    Physical Exam   Vital Signs: Temp: 97  F (36.1  C) Temp src: Tympanic     Heart Rate: (!) 45 Resp: 22 SpO2: 93 % O2 Device: None (Room air)    Weight: 181 lbs 14.07 oz  Constitutional: Patient awakens, is alert and oriented that he is in the hospital skin is cool to touch  ENT: Normocephalic, without obvious abnormality, atraumatic, sinuses nontender on palpation, external ears without lesions, oral pharynx with moist mucous membranes, tonsils without erythema or exudates, gums normal and good dentition.  Hematologic / Lymphatic: no cervical lymphadenopathy and no supraclavicular lymphadenopathy  Respiratory: He has upper airway rhonchi, lungs show shallow breaths with mild crackles  Cardiovascular: Bradycardic with irregular beat  GI: normal bowel sounds, soft, non-distended and non-tender  Skin: Bruising on hands    Data   Results for orders placed or performed during the hospital encounter of 12/07/18 (from the past 24 hour(s))   Basic metabolic panel   Result Value Ref Range    Sodium 132 (L) 134 - 144 mmol/L    Potassium 4.6 3.5 - 5.1 mmol/L    Chloride 104 98 - 107 mmol/L    Carbon Dioxide 18 (L) 21 - 31 mmol/L    Anion Gap 10 3 - 14 mmol/L    Glucose 155 (H) 70 - 105 mg/dL    Urea Nitrogen 72 (H) 7 - 25 mg/dL    Creatinine 3.12 (H) 0.70 - 1.30 mg/dL    GFR Estimate 19 (L) >60 mL/min/1.7m2    GFR Estimate If Black 23 (L)  >60 mL/min/1.7m2    Calcium 7.9 (L) 8.6 - 10.3 mg/dL   INR   Result Value Ref Range    INR 3.77 (H) 0 - 1.3   CBC with platelets   Result Value Ref Range    WBC 8.7 4.0 - 11.0 10e9/L    RBC Count 3.05 (L) 4.4 - 5.9 10e12/L    Hemoglobin 9.2 (L) 13.3 - 17.7 g/dL    Hematocrit 29.8 (L) 40.0 - 53.0 %    MCV 98 78 - 100 fl    MCH 30.2 26.5 - 33.0 pg    MCHC 30.9 (L) 31.5 - 36.5 g/dL    RDW 16.8 (H) 10.0 - 15.0 %    Platelet Count 217 150 - 450 10e9/L   Basic metabolic panel   Result Value Ref Range    Sodium 132 (L) 134 - 144 mmol/L    Potassium 5.0 3.5 - 5.1 mmol/L    Chloride 102 98 - 107 mmol/L    Carbon Dioxide 14 (L) 21 - 31 mmol/L    Anion Gap 16 (H) 3 - 14 mmol/L    Glucose 149 (H) 70 - 105 mg/dL    Urea Nitrogen 76 (H) 7 - 25 mg/dL    Creatinine 3.41 (H) 0.70 - 1.30 mg/dL    GFR Estimate 17 (L) >60 mL/min/1.7m2    GFR Estimate If Black 21 (L) >60 mL/min/1.7m2    Calcium 8.1 (L) 8.6 - 10.3 mg/dL

## 2018-12-15 NOTE — PROGRESS NOTES
Body released to [place]  home. Family here. Daughter states pt's ring is at home. Pt's clothing sent with family. Supra pubic cath removed.

## 2018-12-15 NOTE — PROGRESS NOTES
Patient is declining, after discussion with family, they would like ICD turned off  Spoke to the [employer] rep  For now will tape magnet over pacer/ICD to deactivate  Continue comfort cares  Electronically signed by SEAN Álvarez on December 15, 2018

## 2018-12-15 NOTE — PLAN OF CARE
Pt less responsive today, Mottling noted up to thighs, extremities cool. Pt has rattling respirations. Family here. Requesting defibrillator be deactivated.  Dr Álvarez in to see pt. Magnet placed over ICD. Pt given liquid Morphine.

## 2018-12-15 NOTE — DISCHARGE SUMMARY
[facility]    Death Summary - Hospitalist Service     Date of Admission:  2018  Date of Death: 12/15/2018  Discharging Provider: Joao Álvarez MD    Hospital Course    Patient presented with increasing shortness of breath presumed secondary to worsening of CHF.  Initially this responded to diuresis but then started to decline.  Echocardiogram showed worsening congestive heart failure with an EF of 15%.  He was his and family's decision that they did not want to be aggressive in his management.  The day prior to his death he was alert and talking with family and then declined quickly the family electing to proceed with comfort measures.  A magnet was placed over his ICD and he subsequently .  Family was present with him and he seemed to die comfortably.     Acute on chronic systolic congestive heart failure (H)  Admitted with increased shortness of breath, initially improved after diuretic, but then acutely worsened when he was given some fluids.  Echocardiogram showing severe cardiomyopathy with an EF of 15%  Continues with low blood pressures, most the time not able to be registered.  Clinically appears somewhat wet today with some coarse upper airway rhonchi  Restart low-dose Lasix, continue low-dose metoprolol.  Likely discharge tomorrow to rehab    Atrial fibrillation (H)  Rate controlled with low-dose beta-blocker  No change    Diabetes mellitus with multiple complications (H)  Controlled with current insulin regimen  No change    Elevated troponin  Elevated on admission, likely due to strain  No need to follow    Essential hypertension  Unable to get a reliable blood pressure by cuff  Medically he is improved and will continue current regimen, will restart low-dose diuretic today      Ischemic cardiomyopathy  Worsening status with echo showing an EF of 15%  As above    Anemia in other chronic diseases classified elsewhere  Stable  Monitor    HENRY (acute kidney injury)  (H)  Up-and-down related to diuretic use  Recheck tomorrow, continue to hold diuretic at this time    Implantable cardioverter-defibrillator, [employer], Single lead - Not Dependent  Present, discussed with family whether to continue or to turn off  For now patient and family elected to leave on           Cause of death: Acute respiratory failure secondary to acute on chronic congestive heart failure with ischemic cardiomyopathy.       Joao Álvarez MD  [facility]  ______________________________________________________________________      Significant Results and Procedures   Lab Results   Component Value Date    WBC 8.7 12/15/2018    HGB 9.2 (L) 12/15/2018    HCT 29.8 (L) 12/15/2018     12/15/2018     (L) 12/15/2018    POTASSIUM 5.0 12/15/2018    CHLORIDE 102 12/15/2018    CO2 14 (L) 12/15/2018    BUN 76 (H) 12/15/2018    CR 3.41 (H) 12/15/2018     (H) 12/15/2018    DIMER 292 (H) 12/07/2018    NTBNPI 642 (H) 12/07/2018    TROPI 0.124 (H) 12/07/2018    AST 30 10/17/2018    ALT 22 10/17/2018    ALKPHOS 108 (H) 10/17/2018    BILITOTAL 0.4 10/17/2018    INR 3.77 (H) 12/15/2018     ,   Results for orders placed or performed during the hospital encounter of 12/07/18   XR Chest Port 1 View    Narrative    PROCEDURE:  XR CHEST PORT 1 VW    HISTORY: CHF exacerbation? heart size?; . .    COMPARISON:  10/18/2018    FINDINGS:  A left chest automatic implantable cardiac defibrillator/pacemaker is  present.  The cardiomediastinal contours are enlarged, grossly unchanged. There  is calcific aortic atherosclerosis.   There is some opacification of the left lung base, likely a new  effusion. No pneumothorax is seen.      Impression    IMPRESSION:  Cardiomegaly. Probable left pleural effusion. Recommend  follow-up.      LUANNE CHRISTOPHER MD       Consultations This Hospital Stay   PHARMACY TO DOSE WARFARIN  CARE COORDINATOR IP CONSULT  NUTRITION SERVICES ADULT IP  CONSULT  PHYSICAL THERAPY ADULT IP CONSULT  OCCUPATIONAL THERAPY ADULT IP CONSULT  SOCIAL WORK IP CONSULT  SWALLOW EVAL SPEECH PATH AT BEDSIDE IP CONSULT    Primary Care Physician   Cesar Sanders    Time Spent on this Encounter   I, Joao Álvarez, personally saw the patient today and spent greater than 30 minutes discharging this patient.

## 2018-12-15 NOTE — PROGRESS NOTES
Patient is overall declining, more somnolent and vitals are declining.  Discussed with family and they are seeking comfort care status  Will have nursing check with [employer] to see how to turn off the defibrillator  Comfort care orders placed  Electronically signed by SEAN Álvarez on December 15, 2018

## 2018-12-15 NOTE — PLAN OF CARE
Pt has had 2-3 more episodes of seizure-like activity this shift. The episode witnessed by this writer was 5 seconds long with pt's eyes rolling back, after which the patient was again responsive.

## 2018-12-15 NOTE — PROGRESS NOTES
Pt ceased respirations at 1422. Family at bedside. Dr Álvarez notified and in to see pt/family. Message left for clergy,  Ben of [facility]. Supervisor notified. Family requested [place]  home.

## 2019-01-04 NOTE — PROGRESS NOTES
Spoke w/ primary RN Ira over the phone.  Clarification was given and confirmed that on 12-14-18 @ 2138, Ira gave the patient 1 mg of Morphine, IV.

## 2020-10-26 NOTE — PROGRESS NOTES
Patient Information     Patient Name MRN Sukhdeep Victoria [id number] Male 1934      Progress Notes by Teto Grant DO at 2018 11:15 AM     Author:  Teto Grant DO Service:  (none) Author Type:  PHYS- Osteopathic     Filed:  2018 12:58 PM Encounter Date:  2018 Status:  Signed     :  Teto Grant DO (PHYS- Osteopathic)            CARDIOLOGY PROGRESS NOTE   SUBJECTIVE:    Mr. Gomez is a 83-year-old gentleman who is being seen by cardiology in follow-up to an ER visit from 18. He had previously seen cardiology 17. He had been hypotensive and weak felt to be secondary to dehydration and hypotension potentially related to diuretics/dehydration.     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     He had presented to the ER on 18. He has been hypotensive since the beginning of January with systolic blood pressures in the 70's to 100's. He had been on Lasix 20 mg twice a day, lisinopril 5 mg a day, and metoprolol 75 mg daily. His BNP was at its lowest at 202 previously in  it was 466. His weight has also determined to be at his baseline and his lowest level at 156. Previously, he had been in the 170's. It sounds like he was dry and his Lasix was decreased from 20 mg twice a day to once a day. He was told to follow-up with cardiology as  result.     Since being seen in the ER, he reports doing well. His blood pressures remain low. But he denies any dizziness, lightheadedness, syncope, or near syncope. He has minimal to no swelling to his lower extremities. He uses a walker at home and a wheelchair when he travels. He watches his salt on a regular basis, maintains a low fluid diet, and does weigh himself on a regular basis. He is stable from a heart failure standpoint.     He was also found to have an ascending aortic aneurysm on 11/1/17. It was moderate in size and measured at 4.8 cm. He is currently on metoprolol XL. He is scheduled for a repeat echo in April, 2018. This was explained to him that if he gets to 5.5 cm in diameter, surgery is often performed.     He has a history of coronary artery disease, ischemic cardiomyopathy, and a myocardial infarction previously ×1. He had CABG ×4 through the [facility]. He had a stent placed to an unknown artery in 2010. He denies any chest pain, chest tightness, chest discomfort or anything to suggest angina. He is stable on aspirin 81 mg daily, Toprol 75 mg XL daily, and Zocor.    He has a history of atrial fibrillation. He describes minimal to no symptoms. He is on metoprolol and Coumadin. He is stable from a rhythm standpoint.    He has good control of his cholesterol. He is currently on Zocor 20 mg at bedtime. He had a total cholesterol of 93, triglycerides of 132, HDL of 36, and an LDL of 31. He has no additional complaints.            OBJECTIVE:  BP 96/78 (Cuff Site: Right Arm, Position: Sitting, Cuff Size: Adult Regular)  Pulse 60  GENERAL: Comfortable, no distress    RESPIRATORY: Clear to auscultation bilaterally   CARDIOVASCULAR: Heart: Regular rate and rhythm.  PMI non-displaced.  Normal S1 and S2.  No gallops or other extra sounds. No murmurs.    No other pertinent exam.  ADDITIONAL COMMENTS:  I reviewed the patient's medications (see below):    I reviewed the patient's pertinent  clinical laboratory studies (see below):    I reviewed the patient's pertinent imaging studies:      ASSESSMENT:  1. Chronic systolic heart failure with ejection fraction of 40%.  2. Ischemic cardiomyopathy.  3. History of myocardial infarction ×1 with inferior and posterior wall akinesis.  4. Moderate ascending aortic aneurysm at 4.8 cm based on echo from 11/1/27 with plans for a repeat echo on April, 2018.  4. Coronary artery disease status post CABG ×4 in 1987 at the [facility]. He had a stent placed in ×1 2010 to unknown vessel.  5. Paroxysmal atrial fibrillation currently on Coumadin and metoprolol.  6. Chronic kidney disease stage IV.  7. Diabetes mellitus type 2 with peripheral neuropathy.  8. Hyperlipidemia.  9. Hypertension.  10. Peripheral neuropathy.  11. History of ICD placement with pacemaker originally placed 2007 with an ICD upgrade in 4/2013.  12. History of myocardial infarction.      PLAN:   1. He's doing quite well as he is currently euvolemic, without peripheral edema, and no complaints of shortness of breath. He denies any chest pain or chest discomfort. He has not had palpitations. He is currently well treated and will stabilize.  2. His blood pressures have been low since early January.  3. The lisinopril will be decreased from 5 mg daily to 2.5 mg daily.  4. He is to weigh himself on a daily basis. His weights have been stable around 155 and 156, with that being his baseline.  5. He is scheduled for an echo in April to follow-up on his moderate ascending aortic aneurysm as seen on echo from 11/11/17  6. He is currently getting his pacemaker/ICD checked here in [place]. Previously, he had been getting it checked in [place].  7. He is currently on Lasix 20 mg daily as the dose was reduced from Lasix 20 mg twice a day at his ER visit on 2/1/18 secondary to hypotension and weakness.  8. He is to continue metoprolol 75 mg XL daily and Coumadin unchanged for atrial  "fibrillation.  9. He will be seen in approximately 6 months follow-up.        Teto Grant  Recent Labs       02/01/18   1205   SODIUM  138   POTASSIUM  5.0   MAGNESIUM  2.0   BUN  84 H   CREATININE  2.39 H   GLUCOSE  132 H       Recent Labs        02/01/18   1205  01/31/18   0952   HGB  12.0 L   --    WBC  9.3   --    INR  2.4 H  1.8 H   PLT  126 L   --      No results for input(s): GLUCOSEMETER in the last 720 hours.    Current Outpatient Prescriptions       Medication  Sig Dispense Refill     acetaminophen (TYLENOL) 325 mg tablet Take 975 mg by mouth every 6 hours if needed. Max acetaminophen dose: 4000mg in 24 hrs.       allopurinol (ZYLOPRIM) 100 mg tablet Take 1 tablet by mouth once daily. 90 tablet 4     amoxicillin (AMOXIL) 500 mg capsule TAKE 4 CAPSULES BY MOUTH ONE HOUR PRIOR TO APPT  99     aspirin 81 mg tablet Take one tablet by mouth daily  0     cholecalciferol (VITAMIN D) 1,000 unit tablet Take one tablet by mouth daily  0     furosemide (LASIX) 20 mg tablet Take 1 tablet every morning and 1 tablet every other afternoon. 135 tablet 1     insulin aspart protamine-insulin aspart (NOVOLOG MIX 70-30) 100 unit/mL (70-30) FLEXPEN 35 units every morning and 10 units every evening 10 pen 11     lisinopril (PRINIVIL; ZESTRIL) 5 mg tablet Take 1 tablet by mouth once daily. 90 tablet 3     metoprolol succinate (TOPROL XL) 50 mg sustained-release tablet Take 1.5 tablets by mouth once daily. 135 tablet 4     nystatin-triamcinolone (MYCOLOG) cream Apply  topically to affected area(s) 3 times daily. 30 g 0     pen needle, diabetic (BD INSULIN PEN NEEDLE UF) 31 gauge x 5/16\" FOR ADMINISTERING INSULIN AT HOME. USE WITH NOVOLOG INJECTIONS (TWICE DAILY) Dx: E11.8 200 Each 3     potassium chloride (KLOR-CON M20) 20 mEq Extended-Release tablet Take 1 tablet by mouth once daily with a meal. 90 tablet 4     simvastatin (ZOCOR) 20 mg tablet Take 20 mg by mouth at bedtime.  4     warfarin (COUMADIN) 2 mg tablet Take 1 mg " x 2 days and 2 mg x 5 days/week 90 tablet 0     Wheel Chair Wheelchair with elevating leg rests/foot rest. For home use. Length of need: 99 mo 1 Device 0     No current facility-administered medications for this visit.      Medications have been reviewed by me and are current to the best of my knowledge and ability.           Manual Repair Warning Statement: We plan on removing the manually selected variable below in favor of our much easier automatic structured text blocks found in the previous tab. We decided to do this to help make the flow better and give you the full power of structured data. Manual selection is never going to be ideal in our platform and I would encourage you to avoid using manual selection from this point on, especially since I will be sunsetting this feature. It is important that you do one of two things with the customized text below. First, you can save all of the text in a word file so you can have it for future reference. Second, transfer the text to the appropriate area in the Library tab. Lastly, if there is a flap or graft type which we do not have you need to let us know right away so I can add it in before the variable is hidden. No need to panic, we plan to give you roughly 6 months to make the change.

## 2022-01-27 NOTE — INITIAL ASSESSMENTS
Patient Information     Patient Name MRN Sex Sukhdeep Cline [id number] Male 1934      Initial Assessments by Jerson Shannon, PT at 2018  2:26 PM     Author:  Jerson Shannon PT Service:  (none) Author Type:  PT- Physical Therapist     Filed:  2018  9:49 AM Date of Service:  2018  2:26 PM Status:  Signed     :  Jerson Shannon PT (PT- Physical Therapist)            [facility]  Outpatient PT - Initial Evaluation      Date of Service: 2018   Visit 1 of 10  PSFS completed: 18    Patient Name: Sukhdeep Gomez   YOB: 1934   Referring MD/Provider: Salvatore  Diagnosis: Pain of left thigh [M79.652]    Treatment Diagnosis: weakness, generalized decondition, muscle tightness, neuropathy  Insurance:  Medicare: G Codes/C Modifiers at Initial Assessment:     Start of Care Date: 2018   Certification Dates: From: 2018   Re-Cert Due: 18      Subjective      History/JULES:   Left knee pain/tightness.  Severe neuropathy, pain, numbness, Left>Right.  Shoes help.  In/out of bed on his own. Bathroom with wheel chair and sit/stand transfers, by himself.  Lift chair in living room. Spends too much time.  Does PEAK program 2x/wk.  Occasional does seated LAQ.    Has some dizziness that comes and goes.    Previous Injury: has long time weakness, past TKA,  Surgery:   Past Surgical History:      Procedure  Laterality Date     CORONARY ARTERY BYPASS GRAFT      4 vessel, [facility],        CORONARY STENT PLACEMENT  approx.     angiogram, 2 stents, [facility]       CYSTOSCOPY      & dilation of urethral stricture, Dr. Wallace       CYSTOSCOPY      & catheter placement for acute obstruction, Dr. Elise       ESOPHAGOGASTRODUODENOSCOPY      & colonoscopy with polypectomy, [facility] essetnially normal EGD with small polyp removed       KNEE REPLACEMENT      left       LUMBAR LAMINECTOMY  2006    decompressive, L3, L4 & L5 with  improvement, Dr. Lopes       PACEMAKER PLACEMENT      Guidant ICD, CPI Vitality 2 EL, model #T177, serial #[id number], which was implanted on 2007 at [facility].        Prior Level of Function: has been using 4WW, and WC in home for past few years, is able to get in/out of bed and toileting from chair to toilet and chair to showerchair on his own. He is only able to ambulate about 45 ft.    Pain Ratin = Moderate Pain, (Aggravating, Grueling, Upsetting, Frustrating) and  5 = Moderate Pain, (Aggravating, Grueling, Upsetting, Frustrating) / Location:  Bilateral feet, knees and hams.  Pain description: tight, ache, and neuropathy pain  Aggravating factors: on feet too much.  Relieving Factors: stretch,       Living Situations:  Independent in Living Situation, but has son livign in basement with regular availability.    Preadmission Functional Mobility: Independent with Assistive Device,       Walkers,  Type:  4 Wheeled Walker with Seat and Brakes  Cognition:  Oriented to Person, Place, and Time.   Precautions:  falls    Occupation: retired teacher,  Significant Medical History:   Past Medical History:     Diagnosis  Date     Acute on chronic systolic congestive heart failure (HC) 2012    15-20% EF 2012      Acute urinary retention     with stricture      Aortic root enlargement (HC)     moderate, with mild increase in diameter of the ascending aorta      Aortic valve sclerosis     with mild Al      Arthritis      Atherosclerotic coronary vascular disease      Atrial fibrillation (HC)      Biatrial enlargement      Cardiomyopathy (HC)      Chronic kidney disease     Stage III      Chronic low back pain     lumbar spinal stenosis      Diabetes mellitus type II     A1C 7.6       Gout     tophaceous, right foot, right fingers      Hamstring tightness of left lower extremity 2017     Hyperlipidemia      Hypertension     chronic      Peripheral neuropathy (HC)     via  EMG in [place]       Prostate cancer (HC) 2003     Systolic heart failure (HC) 2009    echo, mild to moderate left ventricular enlargement with marked decreased in systolic function.      TIA (transient ischemic attack) 11/17/2014     Upper GI bleeding 2009    secondary to NSAID gastritis         Current Medications:  Reviewed (see chart)    Drug Allergies:  Reviewed (see chart)  ?   Latex Allergy:  no    Previous Treatment:    Pain Meds / Anti-inflammatory Meds  - reviewed   Physical Therapy - in past for leg strength  Chiro- no  Injections - no  Surgery - no     Were cultural / age or other special adaptations needed? No      Patient is a vulnerable adult: No      Patient is aware of diagnosis: Yes      Risks and benefits explained: Yes    Other:   Patient Specific Functional and Pain Scales (PSFS):    Clinician Instructions: Complete after the history and before the exam.    Initial Assessment: We want to know what 3 activities in your life you are unable to perform, or are having the most difficulty performing, as a result of your chief problem. Please list and score at least 3 activities that you are unable to perform, or having the most difficulty performing, because of your chief problem.   Patient Specific Activity Scoring Scheme (score one number for each activity):   Activity Score (0-10)  0= Unable to perform activity  10= Able to perform activity at same level as before injury or problem   1. walking 6/10   2. balance 3/10   3. transfers 7/10   4.  /10   5.  /10   Totals:  16/30 = 53 % ability which relates to 47% impairment    Patient verbally states that they understand that the information they have provided above is current and complete to the best of their knowledge.    Patient Specific Functional Scale Modifier Scale Conversion: (patient's modifier that correlates with pt's score on PSFS): 6-CK (40% Impaired).    G codes and Modifier taken from patient completing the PSFS:   Initial Primary G Code and Modifier:    Per  the Patient's intake and/or assessment the Primary G Code is: Mobility .   The Patient's Impairment, Limitation or Restriction Modifier would be best described as: CK - 40% - 60% Impairment.   Goal Primary G Code and Modifier:    The Patient's G Code Goal would be: Mobility    The Patient's Impairment, Limitation or Restriction Modifier goal would be best described as: CJ - 20% - 40% Impairment.   Discharge Primary G Code and Modifier:         Objective    Items left blank indicate that the test was inappropriate or not meaningful at the time of evaluation and therefore not performed.    Fall Risk Screening:  Patient reported fear or concerns of falling   TUG 18/19/19    Observation: poor posture, slow walking, strong use of walker,     Palpation: mild tightness in gastroc and hams,     Neuro/sensation: diminished soft touch of feet, but sound proprioception.    ROM: good functional ROM of LE and spine.    Strength: weak overall, has 4/5 strength of knees, 3+ hips and PF.    Special Tests: TUG     Today's Intervention/Treatment:    Eval  TUG  disucssion of HEP  NuStep 5 min.  Practice walking with longer strides using 4WW.  Supine   Stretch   STM of feet and lower legs for neuropathy pain.    Home Exercise Program:    Squats, hip abduct, heel raise, sit/stand, supine LTR and bridge    Assessment    Therapist Assessment: Signs and symptoms consistent with generalized weakness, he sits a lot and has tightened hams and hip flexors.      Pt will benefit from skilled physical therapy to address functional limitations.      Goals:  Patient goal:  Walk and transfer with less leg discusmofort in 8 weeks.    Functional Short Term Goals (4 weeks):     Patient will have improved transfers from chair to chair with less need of hands on assist of device.  Patient will tolerate walking 75ft, to meals and back to chair. With walker  Patient is to have improved leg strength 4/5 for all LE.      Functional Long Term Goals  (8 weeks):     Patient will be independent in their Home Exercise Program without exacerbation of symptoms.  Patient will have 75% leg pain/stiffness.  Patient will tolerate all walking in home with 4WW.  Patient will complete sit/stand and sit to toilet with 50% less need for 4WW.    Patient participated in goal selection and understand(s) the plan of care: Yes   Patient Potential for Achieving Desired Outcome:  Fair, weak, sits too much at home, and neuropaty.    Response to Intervention: Patient had good response and is in understanding of plan of care and home exercise program at this time.      Plan    Treatment Plan / Targeted Outcomes:     Frequency:   16 visits     Duration of Treatment: 3 months    Planned Interventions:    Home Exercise Program development  Therapeutic Exercise (ROM & Strengthening)  Therapeutic Activities  Manual Therapy  Neuromuscular Re-education  Gait Training  Ultrasound  E-Stim/TENS  Phonophoresis with Ketoprofen  Iontophoresis with Dexamethasone  Warm/Cold Pack  Vasopneumatic Compression with Cold Therapy ( Game Ready )    Plan for next visit: STM, NuStep, walk, stretch    Student or PTA has been instructed in and demonstrates skills necessary to carry out above stated treatment plan: Yes    Thank you for your referral to [facility].  Please call with any questions, concerns or comments.  [phone]    The signature, of the referring medical provider, on this document indicates certification of the above prescribed plan of care and is medically necessary.                //

## 2022-06-16 NOTE — NURSING NOTE
Patient Information     Patient Name MRN Sex Sukhdeep Cline [id number] Male 1934      Nursing Note by Ainsley Cardona at 10/23/2017  1:50 PM     Author:  Ainsley Cardona  Service:  (none) Author Type:  (none)     Filed:  10/23/2017  2:08 PM  Encounter Date:  10/23/2017 Status:  Addendum     :  Ainsley Cardona        Related Notes: Original Note by Ainsley Cardona filed at 10/23/2017  1:53 PM            Here today with a lesion on his right forehead.  Ainsley Cardona LPN..........10/23/2017  1:50 PM    Universal Protocol    A. Pre-procedure verification complete yes  1-relevant information / documentation available, reviewed and properly matched to the patient; 2-consent accurate and complete, 3-equipment and supplies available    B. Site marking complete Yes  Site marked if not in continuous attendance with patient    C. TIME OUT completed yes  Time Out was conducted just prior to starting procedure to verify the eight required elements: 1-patient identity, 2-consent accurate and complete, 3-position, 4-correct side/site marked (if applicable), 5-procedure, 6-relevant images / results properly labeled and displayed (if applicable), 7-antibiotics / irrigation fluids (if applicable), 8-safety precautions.    Ainsley Cardona LPN..........10/23/2017  2:08 PM           Recommended observation.

## (undated) DEVICE — CATH FOLEY 18FR 5ML LUBRISIL 0172L18

## (undated) DEVICE — GUIDEWIRE AMPLATZ SUPER STIFF .038"X145CM M0066401061

## (undated) DEVICE — PACK MAJOR LAPAROTOMY LF SBA15MLFCA

## (undated) DEVICE — SU CHROMIC 4-0 RB-1 27" U203H

## (undated) DEVICE — TUOGHY BORST ADAPTER WITH SIDE ARM

## (undated) DEVICE — NDL COUNTER 20CT 31142493

## (undated) DEVICE — SOL WATER 1500ML

## (undated) DEVICE — PROBE COVER SAFERSONIC LONG 50/BX B204538

## (undated) DEVICE — GLOVE PROTEXIS POWDER FREE SMT 8.5 2D72PT85X

## (undated) DEVICE — NDL 25GA 1.5" 305127

## (undated) DEVICE — BNDG KLING 2" 2231

## (undated) DEVICE — DRSG DRAIN 4X4" 7086

## (undated) DEVICE — Device

## (undated) DEVICE — BAG URINARY DRAIN LEG MEDIUM 19OZ LF 150719

## (undated) DEVICE — SU SILK 2-0 FS-1 18" 685G

## (undated) DEVICE — CATH TRAY FOLEY SURESTEP 16FR W/URINE MTR STATLK LF A303416A

## (undated) DEVICE — PREP SKIN SCRUB TRAY 4461A

## (undated) DEVICE — GUIDEWIRE SENSOR DUAL FLEX STR 0.035"X150CM M0066703080

## (undated) DEVICE — TUBING EXTENSION FOLEY CATH W/CONNECTOR 9346

## (undated) DEVICE — PACK CYSTO SBA15CSFCA

## (undated) DEVICE — BLADE KNIFE SURG 11 371111

## (undated) DEVICE — LIGHT HANDLES PLASTIC

## (undated) DEVICE — BAG URINARY DRAIN 4000ML LF 153509

## (undated) DEVICE — TUBING IRR TUR Y TYPE 2C4041

## (undated) DEVICE — GEL ULTRASOUND AQUASONIC 20GM 01-01

## (undated) DEVICE — SPONGE RAY-TEC 4X4" 7317

## (undated) RX ORDER — CEFAZOLIN SODIUM 2 G/100ML
INJECTION, SOLUTION INTRAVENOUS
Status: DISPENSED
Start: 2018-01-01

## (undated) RX ORDER — SODIUM CHLORIDE 9 MG/ML
INJECTION, SOLUTION INTRAVENOUS
Status: DISPENSED
Start: 2018-01-01

## (undated) RX ORDER — FUROSEMIDE 10 MG/ML
INJECTION INTRAMUSCULAR; INTRAVENOUS
Status: DISPENSED
Start: 2018-01-01

## (undated) RX ORDER — DEXTROSE MONOHYDRATE 25 G/50ML
INJECTION, SOLUTION INTRAVENOUS
Status: DISPENSED
Start: 2018-01-01

## (undated) RX ORDER — NEOMYCIN/BACITRACIN/POLYMYXINB 3.5-400-5K
OINTMENT (GRAM) TOPICAL
Status: DISPENSED
Start: 2018-01-01

## (undated) RX ORDER — LIDOCAINE HYDROCHLORIDE 10 MG/ML
INJECTION, SOLUTION EPIDURAL; INFILTRATION; INTRACAUDAL; PERINEURAL
Status: DISPENSED
Start: 2018-01-01

## (undated) RX ORDER — NICOTINE POLACRILEX 4 MG
LOZENGE BUCCAL
Status: DISPENSED
Start: 2018-01-01

## (undated) RX ORDER — BACITRACIN ZINC 500 [USP'U]/G
OINTMENT TOPICAL
Status: DISPENSED
Start: 2018-01-01

## (undated) RX ORDER — LIDOCAINE HYDROCHLORIDE AND EPINEPHRINE 10; 10 MG/ML; UG/ML
INJECTION, SOLUTION INFILTRATION; PERINEURAL
Status: DISPENSED
Start: 2018-01-01

## (undated) RX ORDER — PROPOFOL 10 MG/ML
INJECTION, EMULSION INTRAVENOUS
Status: DISPENSED
Start: 2018-01-01

## (undated) RX ORDER — LIDOCAINE HYDROCHLORIDE 20 MG/ML
INJECTION, SOLUTION EPIDURAL; INFILTRATION; INTRACAUDAL; PERINEURAL
Status: DISPENSED
Start: 2018-01-01

## (undated) RX ORDER — BUPIVACAINE HYDROCHLORIDE 5 MG/ML
INJECTION, SOLUTION PERINEURAL
Status: DISPENSED
Start: 2018-01-01

## (undated) RX ORDER — KETOROLAC TROMETHAMINE 30 MG/ML
INJECTION, SOLUTION INTRAMUSCULAR; INTRAVENOUS
Status: DISPENSED
Start: 2018-01-01

## (undated) RX ORDER — CEFTRIAXONE SODIUM 1 G/50ML
INJECTION, SOLUTION INTRAVENOUS
Status: DISPENSED
Start: 2018-01-01